# Patient Record
Sex: MALE | Race: WHITE | NOT HISPANIC OR LATINO | Employment: OTHER | URBAN - METROPOLITAN AREA
[De-identification: names, ages, dates, MRNs, and addresses within clinical notes are randomized per-mention and may not be internally consistent; named-entity substitution may affect disease eponyms.]

---

## 2017-12-14 ENCOUNTER — HOSPITAL ENCOUNTER (OUTPATIENT)
Dept: GENERAL RADIOLOGY | Facility: HOSPITAL | Age: 72
Discharge: HOME | End: 2017-12-14
Attending: SURGERY | Admitting: SURGERY

## 2017-12-14 DIAGNOSIS — Z98.890 S/P AAA (ABDOMINAL AORTIC ANEURYSM) REPAIR: ICD-10-CM

## 2017-12-14 DIAGNOSIS — Z86.79 S/P AAA (ABDOMINAL AORTIC ANEURYSM) REPAIR: ICD-10-CM

## 2017-12-14 PROCEDURE — 74010 HC ABDOMEN SERIES FOR ANEURYSM: CPT

## 2018-11-01 ENCOUNTER — HOSPITAL ENCOUNTER (OUTPATIENT)
Dept: GENERAL RADIOLOGY | Facility: HOSPITAL | Age: 73
Discharge: HOME | End: 2018-11-01
Attending: SURGERY | Admitting: SURGERY

## 2018-11-01 DIAGNOSIS — Z86.79 S/P AAA REPAIR: ICD-10-CM

## 2018-11-01 DIAGNOSIS — Z98.890 S/P AAA REPAIR: ICD-10-CM

## 2018-11-01 PROCEDURE — 74021 RADEX ABDOMEN 3+ VIEWS: CPT

## 2019-11-07 ENCOUNTER — HOSPITAL ENCOUNTER (OUTPATIENT)
Dept: GENERAL RADIOLOGY | Facility: HOSPITAL | Age: 74
Discharge: HOME | End: 2019-11-07
Admitting: SURGERY

## 2019-11-07 DIAGNOSIS — Z86.79 S/P AAA REPAIR: ICD-10-CM

## 2019-11-07 DIAGNOSIS — Z98.890 S/P AAA REPAIR: ICD-10-CM

## 2019-11-07 PROCEDURE — 74021 RADEX ABDOMEN 3+ VIEWS: CPT

## 2020-11-12 ENCOUNTER — HOSPITAL ENCOUNTER (OUTPATIENT)
Dept: GENERAL RADIOLOGY | Facility: HOSPITAL | Age: 75
Discharge: HOME | End: 2020-11-12
Admitting: SURGERY

## 2020-11-12 DIAGNOSIS — I71.40 ABDOMINAL AORTIC ANEURYSM (AAA) WITHOUT RUPTURE (HCC): ICD-10-CM

## 2020-11-12 PROCEDURE — 74021 RADEX ABDOMEN 3+ VIEWS: CPT

## 2021-05-13 ENCOUNTER — HOSPITAL ENCOUNTER (OUTPATIENT)
Dept: GENERAL RADIOLOGY | Facility: HOSPITAL | Age: 76
Discharge: HOME | End: 2021-05-13
Admitting: SURGERY

## 2021-05-13 DIAGNOSIS — I71.40 ABDOMINAL AORTIC ANEURYSM (AAA) WITHOUT RUPTURE (HCC): ICD-10-CM

## 2021-05-13 PROCEDURE — 74021 RADEX ABDOMEN 3+ VIEWS: CPT

## 2021-11-18 ENCOUNTER — HOSPITAL ENCOUNTER (OUTPATIENT)
Dept: GENERAL RADIOLOGY | Facility: HOSPITAL | Age: 76
Discharge: HOME | End: 2021-11-18
Admitting: SURGERY

## 2021-11-18 DIAGNOSIS — I71.40 ABDOMINAL AORTIC ANEURYSM (AAA) WITHOUT RUPTURE (HCC): ICD-10-CM

## 2021-11-18 PROCEDURE — 74021 RADEX ABDOMEN 3+ VIEWS: CPT

## 2022-08-25 ENCOUNTER — TRANSCRIBE ORDERS (OUTPATIENT)
Dept: ADMINISTRATIVE | Facility: HOSPITAL | Age: 77
End: 2022-08-25

## 2022-08-25 ENCOUNTER — HOSPITAL ENCOUNTER (OUTPATIENT)
Dept: GENERAL RADIOLOGY | Facility: HOSPITAL | Age: 77
Discharge: HOME | End: 2022-08-25
Admitting: SURGERY

## 2022-08-25 DIAGNOSIS — Z95.828 HISTORY OF ENDOVASCULAR STENT GRAFT FOR ABDOMINAL AORTIC ANEURYSM: ICD-10-CM

## 2022-08-25 DIAGNOSIS — I71.40 AAA (ABDOMINAL AORTIC ANEURYSM) WITHOUT RUPTURE: Primary | ICD-10-CM

## 2022-08-25 PROCEDURE — 74021 RADEX ABDOMEN 3+ VIEWS: CPT

## 2022-10-13 ENCOUNTER — APPOINTMENT (OUTPATIENT)
Dept: CT IMAGING | Facility: HOSPITAL | Age: 77
End: 2022-10-13

## 2023-01-05 ENCOUNTER — TRANSCRIBE ORDERS (OUTPATIENT)
Dept: CARDIOLOGY | Facility: HOSPITAL | Age: 78
End: 2023-01-05
Payer: MEDICARE

## 2023-01-23 DIAGNOSIS — I71.40 ABDOMINAL AORTIC ANEURYSM (AAA) WITHOUT RUPTURE, UNSPECIFIED PART: Primary | ICD-10-CM

## 2023-01-23 RX ORDER — LORAZEPAM 1 MG/1
1 TABLET ORAL AS NEEDED
Qty: 2 TABLET | Refills: 0 | Status: SHIPPED | OUTPATIENT
Start: 2023-01-23 | End: 2023-03-20 | Stop reason: HOSPADM

## 2023-01-24 DIAGNOSIS — I71.40 ABDOMINAL AORTIC ANEURYSM (AAA) WITHOUT RUPTURE, UNSPECIFIED PART: Primary | ICD-10-CM

## 2023-01-24 RX ORDER — LORAZEPAM 1 MG/1
1 TABLET ORAL AS NEEDED
Qty: 2 TABLET | Refills: 0 | Status: ON HOLD | OUTPATIENT
Start: 2023-01-24 | End: 2023-03-20 | Stop reason: SDUPTHER

## 2023-02-23 ENCOUNTER — TRANSCRIBE ORDERS (OUTPATIENT)
Dept: ADMINISTRATIVE | Facility: HOSPITAL | Age: 78
End: 2023-02-23
Payer: MEDICARE

## 2023-02-23 ENCOUNTER — HOSPITAL ENCOUNTER (OUTPATIENT)
Dept: CT IMAGING | Facility: HOSPITAL | Age: 78
Discharge: HOME | End: 2023-02-23
Payer: MEDICARE

## 2023-02-23 DIAGNOSIS — R91.8 LUNG MASS: ICD-10-CM

## 2023-02-23 DIAGNOSIS — R91.8 LUNG MASS: Primary | ICD-10-CM

## 2023-02-23 DIAGNOSIS — I71.40 AAA (ABDOMINAL AORTIC ANEURYSM) WITHOUT RUPTURE: ICD-10-CM

## 2023-02-23 LAB — CREAT BLDA-MCNC: 1.1 MG/DL (ref 0.6–1.3)

## 2023-02-23 PROCEDURE — 0 IOPAMIDOL PER 1 ML: Performed by: SURGERY

## 2023-02-23 PROCEDURE — 82565 ASSAY OF CREATININE: CPT

## 2023-02-23 PROCEDURE — 74174 CTA ABD&PLVS W/CONTRAST: CPT

## 2023-02-23 PROCEDURE — 71250 CT THORAX DX C-: CPT

## 2023-02-23 RX ADMIN — IOPAMIDOL 95 ML: 755 INJECTION, SOLUTION INTRAVENOUS at 13:19

## 2023-03-02 ENCOUNTER — TELEPHONE (OUTPATIENT)
Dept: OTHER | Facility: HOSPITAL | Age: 78
End: 2023-03-02
Payer: MEDICARE

## 2023-03-02 NOTE — TELEPHONE ENCOUNTER
Rec'd call from wife. She states PCP submitted referral to see Dr. Lara. I am not seeing referral to Dr. Lara or [facility] [place] in [place]. She will call PCP office about this.

## 2023-03-09 ENCOUNTER — OFFICE VISIT (OUTPATIENT)
Dept: OTHER | Facility: HOSPITAL | Age: 78
End: 2023-03-09
Payer: MEDICARE

## 2023-03-09 ENCOUNTER — PREP FOR SURGERY (OUTPATIENT)
Dept: OTHER | Facility: HOSPITAL | Age: 78
End: 2023-03-09
Payer: MEDICARE

## 2023-03-09 VITALS
DIASTOLIC BLOOD PRESSURE: 69 MMHG | WEIGHT: 215 LBS | RESPIRATION RATE: 14 BRPM | SYSTOLIC BLOOD PRESSURE: 113 MMHG | BODY MASS INDEX: 30.1 KG/M2 | HEART RATE: 77 BPM | HEIGHT: 71 IN | OXYGEN SATURATION: 96 %

## 2023-03-09 DIAGNOSIS — R91.1 LUNG NODULE: Primary | ICD-10-CM

## 2023-03-09 DIAGNOSIS — R79.1 ABNORMAL COAGULATION PROFILE: ICD-10-CM

## 2023-03-09 DIAGNOSIS — I25.10 CORONARY ARTERY DISEASE INVOLVING NATIVE CORONARY ARTERY OF NATIVE HEART WITHOUT ANGINA PECTORIS: ICD-10-CM

## 2023-03-09 PROCEDURE — G0463 HOSPITAL OUTPT CLINIC VISIT: HCPCS

## 2023-03-09 PROCEDURE — 99204 OFFICE O/P NEW MOD 45 MIN: CPT | Performed by: THORACIC SURGERY (CARDIOTHORACIC VASCULAR SURGERY)

## 2023-03-09 RX ORDER — VIT C/B6/B5/MAGNESIUM/HERB 173 50-5-6-5MG
CAPSULE ORAL
COMMUNITY

## 2023-03-09 RX ORDER — DIAZEPAM 5 MG/1
5 TABLET ORAL 2 TIMES DAILY PRN
Qty: 2 TABLET | Refills: 0 | Status: SHIPPED | OUTPATIENT
Start: 2023-03-09 | End: 2023-03-20 | Stop reason: HOSPADM

## 2023-03-09 RX ORDER — MULTIVITAMIN WITH IRON
TABLET ORAL
COMMUNITY

## 2023-03-09 RX ORDER — UBIDECARENONE 100 MG
100 CAPSULE ORAL DAILY
COMMUNITY

## 2023-03-09 RX ORDER — SODIUM CHLORIDE 0.9 % (FLUSH) 0.9 %
3-10 SYRINGE (ML) INJECTION AS NEEDED
Status: CANCELLED | OUTPATIENT
Start: 2023-03-09

## 2023-03-09 RX ORDER — ASPIRIN 81 MG/1
TABLET ORAL EVERY 24 HOURS
COMMUNITY

## 2023-03-09 RX ORDER — SODIUM CHLORIDE 9 MG/ML
40 INJECTION, SOLUTION INTRAVENOUS AS NEEDED
Status: CANCELLED | OUTPATIENT
Start: 2023-03-09

## 2023-03-09 RX ORDER — SODIUM CHLORIDE 0.9 % (FLUSH) 0.9 %
3 SYRINGE (ML) INJECTION EVERY 12 HOURS SCHEDULED
Status: CANCELLED | OUTPATIENT
Start: 2023-03-09

## 2023-03-09 RX ORDER — TIZANIDINE 4 MG/1
TABLET ORAL EVERY 8 HOURS
COMMUNITY

## 2023-03-09 RX ORDER — PRAVASTATIN SODIUM 40 MG
TABLET ORAL EVERY 24 HOURS
COMMUNITY
Start: 2023-02-07

## 2023-03-09 RX ORDER — MULTIVIT WITH MINERALS/LUTEIN
TABLET ORAL
COMMUNITY

## 2023-03-09 RX ORDER — ZINC GLUCONATE 50 MG
50 TABLET ORAL DAILY
COMMUNITY

## 2023-03-09 NOTE — PROGRESS NOTES
"Chief Complaint  Lung nodule    Subjective        Russell Ramírez Sr. presents to [facility]  History of Present Illness    Mr. Ramírez is a pleasant 77-year-old gentleman who was undergoing work-up with Dr. Arroyo. On his work-up for his abdominal aortic aneurysm, he was found to have a lung nodule. He presents with a CT of the chest. He is accompanied by an adult female.    The patient reports he has been feeling fine. The adult female states the patient has been very lethargic. She adds he has been very tired, and has no energy at all. He states he does not get much exercise. He reports he experiences constant lower extremity pain. He reports he has a history of 2 back surgeries. The adult female states the patient has a history of angioplasty in 2013 or 2014.     He reports he discontinued smoking approximately 1 month ago. He reports he smoked 1 pack a day. He reports he started smoking when he was approximately 21 years old. The adult female reports the patient has had a partial colectomy and prostatectomy. He reports he has a history of vertigo. He states he experiences a difficult time laying on his back. The adult female states the patient will experience dizziness, and vomiting. He reports he utilizes a CPAP machine.    Objective   Vital Signs:  /69   Pulse 77   Resp 14   Ht 180.3 cm (71\")   Wt 97.5 kg (215 lb)   SpO2 96%   BMI 29.99 kg/m²   Estimated body mass index is 29.99 kg/m² as calculated from the following:    Height as of this encounter: 180.3 cm (71\").    Weight as of this encounter: 97.5 kg (215 lb).             Physical Exam  Vitals and nursing note reviewed.   Constitutional:       Appearance: He is well-developed.   HENT:      Head: Normocephalic and atraumatic.      Nose: Nose normal.   Eyes:      Conjunctiva/sclera: Conjunctivae normal.   Pulmonary:      Effort: Pulmonary effort is normal.   Musculoskeletal:         General: Normal range of " motion.      Cervical back: Normal range of motion and neck supple.   Skin:     General: Skin is warm and dry.   Neurological:      Mental Status: He is alert and oriented to person, place, and time.   Psychiatric:         Behavior: Behavior normal.         Thought Content: Thought content normal.         Judgment: Judgment normal.        Result Review :        Result Review     I have independently reviewed the CT of the chest performed on 02/23/2023 which demonstrates right lower lobe lung nodules, the largest of which is 1.7 cm and is contiguous with the other 1.1 cm nodule. He does have fibrous changes bilateral lungs, mostly in the upper portion of the lung and there are calcified granuloma in the left upper lobe.             Assessment and Plan   Diagnoses and all orders for this visit:    1. Lung nodule (Primary)  -     NM PET/CT Skull Base to Mid Thigh; Future  -     CT Chest Without Contrast; Future  -     Full Pulmonary Function Test With Bronchodilator; Future  -     Adult Stress Echo W/ Cont or Stress Agent if Necessary Per Protocol; Future  -     diazePAM (Valium) 5 MG tablet; Take 1 tablet by mouth 2 (Two) Times a Day As Needed for Anxiety.  Dispense: 2 tablet; Refill: 0    2. Coronary artery disease involving native coronary artery of native heart without angina pectoris  -     NM PET/CT Skull Base to Mid Thigh; Future  -     CT Chest Without Contrast; Future  -     Full Pulmonary Function Test With Bronchodilator; Future  -     Adult Stress Echo W/ Cont or Stress Agent if Necessary Per Protocol; Future      Assessment & Plan     Mr. Ramírez is a pleasant 77-year-old gentleman with a history of prostate cancer and an abdominal aortic aneurysm status post repair with a significant smoking history who presents with a new lung nodule in the right lower lobe. Given his history, this is concerning for bronchogenic malignancy.     He will need a PET scan and an ion robotic bronchoscopy for diagnosis. He will  also need pulmonary function studies for risk stratification. I will plan to see him back after he obtained all of these studies. We will plan his robotic navigation for next week.  Risks and benefits of the procedure were discussed with the patient today.  He will need a CT chest prior to the procedure for planning.       I spent 45 minutes caring for Russell on this date of service. This time includes time spent by me in the following activities:preparing for the visit, reviewing tests, obtaining and/or reviewing a separately obtained history, performing a medically appropriate examination and/or evaluation , counseling and educating the patient/family/caregiver, ordering medications, tests, or procedures, documenting information in the medical record and independently interpreting results and communicating that information with the patient/family/caregiver  Follow Up   No follow-ups on file.  Patient was given instructions and counseling regarding his condition or for health maintenance advice. Please see specific information pulled into the AVS if appropriate.     Transcribed from ambient dictation for Sonali Lara MD by Betty Forde.  03/09/23   11:46 EST    Patient or patient representative verbalized consent to the visit recording.  I have personally performed the services described in this document as transcribed by the above individual, and it is both accurate and complete.

## 2023-03-09 NOTE — H&P (VIEW-ONLY)
"Chief Complaint  Lung nodule    Subjective        Russell Ramírez Sr. presents to [facility]  History of Present Illness    Mr. Ramírez is a pleasant 77-year-old gentleman who was undergoing work-up with Dr. Arroyo. On his work-up for his abdominal aortic aneurysm, he was found to have a lung nodule. He presents with a CT of the chest. He is accompanied by an adult female.    The patient reports he has been feeling fine. The adult female states the patient has been very lethargic. She adds he has been very tired, and has no energy at all. He states he does not get much exercise. He reports he experiences constant lower extremity pain. He reports he has a history of 2 back surgeries. The adult female states the patient has a history of angioplasty in 2013 or 2014.     He reports he discontinued smoking approximately 1 month ago. He reports he smoked 1 pack a day. He reports he started smoking when he was approximately 21 years old. The adult female reports the patient has had a partial colectomy and prostatectomy. He reports he has a history of vertigo. He states he experiences a difficult time laying on his back. The adult female states the patient will experience dizziness, and vomiting. He reports he utilizes a CPAP machine.    Objective   Vital Signs:  /69   Pulse 77   Resp 14   Ht 180.3 cm (71\")   Wt 97.5 kg (215 lb)   SpO2 96%   BMI 29.99 kg/m²   Estimated body mass index is 29.99 kg/m² as calculated from the following:    Height as of this encounter: 180.3 cm (71\").    Weight as of this encounter: 97.5 kg (215 lb).             Physical Exam  Vitals and nursing note reviewed.   Constitutional:       Appearance: He is well-developed.   HENT:      Head: Normocephalic and atraumatic.      Nose: Nose normal.   Eyes:      Conjunctiva/sclera: Conjunctivae normal.   Pulmonary:      Effort: Pulmonary effort is normal.   Musculoskeletal:         General: Normal range of " motion.      Cervical back: Normal range of motion and neck supple.   Skin:     General: Skin is warm and dry.   Neurological:      Mental Status: He is alert and oriented to person, place, and time.   Psychiatric:         Behavior: Behavior normal.         Thought Content: Thought content normal.         Judgment: Judgment normal.        Result Review :        Result Review     I have independently reviewed the CT of the chest performed on 02/23/2023 which demonstrates right lower lobe lung nodules, the largest of which is 1.7 cm and is contiguous with the other 1.1 cm nodule. He does have fibrous changes bilateral lungs, mostly in the upper portion of the lung and there are calcified granuloma in the left upper lobe.             Assessment and Plan   Diagnoses and all orders for this visit:    1. Lung nodule (Primary)  -     NM PET/CT Skull Base to Mid Thigh; Future  -     CT Chest Without Contrast; Future  -     Full Pulmonary Function Test With Bronchodilator; Future  -     Adult Stress Echo W/ Cont or Stress Agent if Necessary Per Protocol; Future  -     diazePAM (Valium) 5 MG tablet; Take 1 tablet by mouth 2 (Two) Times a Day As Needed for Anxiety.  Dispense: 2 tablet; Refill: 0    2. Coronary artery disease involving native coronary artery of native heart without angina pectoris  -     NM PET/CT Skull Base to Mid Thigh; Future  -     CT Chest Without Contrast; Future  -     Full Pulmonary Function Test With Bronchodilator; Future  -     Adult Stress Echo W/ Cont or Stress Agent if Necessary Per Protocol; Future      Assessment & Plan     Mr. Ramírez is a pleasant 77-year-old gentleman with a history of prostate cancer and an abdominal aortic aneurysm status post repair with a significant smoking history who presents with a new lung nodule in the right lower lobe. Given his history, this is concerning for bronchogenic malignancy.     He will need a PET scan and an ion robotic bronchoscopy for diagnosis. He will  also need pulmonary function studies for risk stratification. I will plan to see him back after he obtained all of these studies. We will plan his robotic navigation for next week.  Risks and benefits of the procedure were discussed with the patient today.  He will need a CT chest prior to the procedure for planning.       I spent 45 minutes caring for Russell on this date of service. This time includes time spent by me in the following activities:preparing for the visit, reviewing tests, obtaining and/or reviewing a separately obtained history, performing a medically appropriate examination and/or evaluation , counseling and educating the patient/family/caregiver, ordering medications, tests, or procedures, documenting information in the medical record and independently interpreting results and communicating that information with the patient/family/caregiver  Follow Up   No follow-ups on file.  Patient was given instructions and counseling regarding his condition or for health maintenance advice. Please see specific information pulled into the AVS if appropriate.     Transcribed from ambient dictation for Sonali Lara MD by Betty Forde.  03/09/23   11:46 EST    Patient or patient representative verbalized consent to the visit recording.  I have personally performed the services described in this document as transcribed by the above individual, and it is both accurate and complete.

## 2023-03-09 NOTE — PATIENT INSTRUCTIONS
Met with patient and family member in lung clinic on 3/9/23. He met with Dr. Lara. He was sent for PFTs today @ [facility]. He is scheduled for stress echo and PET scan on 3/14/23. Provided education pamphlets for PET scan.  Explained pertinent information regarding preparation for and location of tests. Patient and wife able to repeat back and verbalize understanding. He will be scheduled for ION biopsy @ [facility].  [facility] will schedule tentatively Monday with CT scan @ [place] per ION protocol tomorrow. Pt will be seen for follow up appointment in lung clinic once tests are completed.   Gilma Goldstein RN, BSN, OCN, CCM, Lung navigator

## 2023-03-09 NOTE — H&P (VIEW-ONLY)
"Chief Complaint  Lung nodule    Subjective        Russell Ramírez Sr. presents to [facility]  History of Present Illness    Mr. Ramírez is a pleasant 77-year-old gentleman who was undergoing work-up with Dr. Arroyo. On his work-up for his abdominal aortic aneurysm, he was found to have a lung nodule. He presents with a CT of the chest. He is accompanied by an adult female.    The patient reports he has been feeling fine. The adult female states the patient has been very lethargic. She adds he has been very tired, and has no energy at all. He states he does not get much exercise. He reports he experiences constant lower extremity pain. He reports he has a history of 2 back surgeries. The adult female states the patient has a history of angioplasty in 2013 or 2014.     He reports he discontinued smoking approximately 1 month ago. He reports he smoked 1 pack a day. He reports he started smoking when he was approximately 21 years old. The adult female reports the patient has had a partial colectomy and prostatectomy. He reports he has a history of vertigo. He states he experiences a difficult time laying on his back. The adult female states the patient will experience dizziness, and vomiting. He reports he utilizes a CPAP machine.    Objective   Vital Signs:  /69   Pulse 77   Resp 14   Ht 180.3 cm (71\")   Wt 97.5 kg (215 lb)   SpO2 96%   BMI 29.99 kg/m²   Estimated body mass index is 29.99 kg/m² as calculated from the following:    Height as of this encounter: 180.3 cm (71\").    Weight as of this encounter: 97.5 kg (215 lb).             Physical Exam  Vitals and nursing note reviewed.   Constitutional:       Appearance: He is well-developed.   HENT:      Head: Normocephalic and atraumatic.      Nose: Nose normal.   Eyes:      Conjunctiva/sclera: Conjunctivae normal.   Pulmonary:      Effort: Pulmonary effort is normal.   Musculoskeletal:         General: Normal range of " motion.      Cervical back: Normal range of motion and neck supple.   Skin:     General: Skin is warm and dry.   Neurological:      Mental Status: He is alert and oriented to person, place, and time.   Psychiatric:         Behavior: Behavior normal.         Thought Content: Thought content normal.         Judgment: Judgment normal.        Result Review :        Result Review     I have independently reviewed the CT of the chest performed on 02/23/2023 which demonstrates right lower lobe lung nodules, the largest of which is 1.7 cm and is contiguous with the other 1.1 cm nodule. He does have fibrous changes bilateral lungs, mostly in the upper portion of the lung and there are calcified granuloma in the left upper lobe.             Assessment and Plan   Diagnoses and all orders for this visit:    1. Lung nodule (Primary)  -     NM PET/CT Skull Base to Mid Thigh; Future  -     CT Chest Without Contrast; Future  -     Full Pulmonary Function Test With Bronchodilator; Future  -     Adult Stress Echo W/ Cont or Stress Agent if Necessary Per Protocol; Future  -     diazePAM (Valium) 5 MG tablet; Take 1 tablet by mouth 2 (Two) Times a Day As Needed for Anxiety.  Dispense: 2 tablet; Refill: 0    2. Coronary artery disease involving native coronary artery of native heart without angina pectoris  -     NM PET/CT Skull Base to Mid Thigh; Future  -     CT Chest Without Contrast; Future  -     Full Pulmonary Function Test With Bronchodilator; Future  -     Adult Stress Echo W/ Cont or Stress Agent if Necessary Per Protocol; Future      Assessment & Plan     Mr. Ramírez is a pleasant 77-year-old gentleman with a history of prostate cancer and an abdominal aortic aneurysm status post repair with a significant smoking history who presents with a new lung nodule in the right lower lobe. Given his history, this is concerning for bronchogenic malignancy.     He will need a PET scan and an ion robotic bronchoscopy for diagnosis. He will  also need pulmonary function studies for risk stratification. I will plan to see him back after he obtained all of these studies. We will plan his robotic navigation for next week.  Risks and benefits of the procedure were discussed with the patient today.  He will need a CT chest prior to the procedure for planning.       I spent 45 minutes caring for Russell on this date of service. This time includes time spent by me in the following activities:preparing for the visit, reviewing tests, obtaining and/or reviewing a separately obtained history, performing a medically appropriate examination and/or evaluation , counseling and educating the patient/family/caregiver, ordering medications, tests, or procedures, documenting information in the medical record and independently interpreting results and communicating that information with the patient/family/caregiver  Follow Up   No follow-ups on file.  Patient was given instructions and counseling regarding his condition or for health maintenance advice. Please see specific information pulled into the AVS if appropriate.     Transcribed from ambient dictation for Sonali Lara MD by eBtty Forde.  03/09/23   11:46 EST    Patient or patient representative verbalized consent to the visit recording.  I have personally performed the services described in this document as transcribed by the above individual, and it is both accurate and complete.

## 2023-03-11 ENCOUNTER — HOSPITAL ENCOUNTER (OUTPATIENT)
Dept: CARDIOLOGY | Facility: HOSPITAL | Age: 78
Discharge: HOME | End: 2023-03-11
Payer: MEDICARE

## 2023-03-11 ENCOUNTER — HOSPITAL ENCOUNTER (OUTPATIENT)
Dept: CT IMAGING | Facility: HOSPITAL | Age: 78
Discharge: HOME | End: 2023-03-11
Payer: MEDICARE

## 2023-03-11 ENCOUNTER — LAB (OUTPATIENT)
Dept: LAB | Facility: HOSPITAL | Age: 78
End: 2023-03-11
Payer: MEDICARE

## 2023-03-11 DIAGNOSIS — R91.1 LUNG NODULE: ICD-10-CM

## 2023-03-11 DIAGNOSIS — I25.10 CORONARY ARTERY DISEASE INVOLVING NATIVE CORONARY ARTERY OF NATIVE HEART WITHOUT ANGINA PECTORIS: ICD-10-CM

## 2023-03-11 LAB
ANION GAP SERPL CALCULATED.3IONS-SCNC: 15.9 MMOL/L (ref 5–15)
APTT PPP: 27.9 SECONDS (ref 24–31)
BASOPHILS # BLD AUTO: 0.05 10*3/MM3 (ref 0–0.2)
BASOPHILS NFR BLD AUTO: 0.7 % (ref 0–1.5)
BUN SERPL-MCNC: 16 MG/DL (ref 8–23)
BUN/CREAT SERPL: 20.8 (ref 7–25)
CALCIUM SPEC-SCNC: 9.7 MG/DL (ref 8.6–10.5)
CHLORIDE SERPL-SCNC: 102 MMOL/L (ref 98–107)
CO2 SERPL-SCNC: 23.1 MMOL/L (ref 22–29)
CREAT SERPL-MCNC: 0.77 MG/DL (ref 0.76–1.27)
DEPRECATED RDW RBC AUTO: 53.8 FL (ref 37–54)
EGFRCR SERPLBLD CKD-EPI 2021: 92.8 ML/MIN/1.73
EOSINOPHIL # BLD AUTO: 0.33 10*3/MM3 (ref 0–0.4)
EOSINOPHIL NFR BLD AUTO: 4.4 % (ref 0.3–6.2)
ERYTHROCYTE [DISTWIDTH] IN BLOOD BY AUTOMATED COUNT: 15.7 % (ref 12.3–15.4)
GLUCOSE SERPL-MCNC: 100 MG/DL (ref 65–99)
HCT VFR BLD AUTO: 41.6 % (ref 37.5–51)
HGB BLD-MCNC: 14.4 G/DL (ref 13–17.7)
IMM GRANULOCYTES # BLD AUTO: 0.03 10*3/MM3 (ref 0–0.05)
IMM GRANULOCYTES NFR BLD AUTO: 0.4 % (ref 0–0.5)
INR PPP: 0.97 (ref 0.93–1.1)
LYMPHOCYTES # BLD AUTO: 1.53 10*3/MM3 (ref 0.7–3.1)
LYMPHOCYTES NFR BLD AUTO: 20.3 % (ref 19.6–45.3)
MCH RBC QN AUTO: 32.3 PG (ref 26.6–33)
MCHC RBC AUTO-ENTMCNC: 34.6 G/DL (ref 31.5–35.7)
MCV RBC AUTO: 93.3 FL (ref 79–97)
MONOCYTES # BLD AUTO: 0.84 10*3/MM3 (ref 0.1–0.9)
MONOCYTES NFR BLD AUTO: 11.1 % (ref 5–12)
NEUTROPHILS NFR BLD AUTO: 4.76 10*3/MM3 (ref 1.7–7)
NEUTROPHILS NFR BLD AUTO: 63.1 % (ref 42.7–76)
NRBC BLD AUTO-RTO: 0 /100 WBC (ref 0–0.2)
PLATELET # BLD AUTO: 167 10*3/MM3 (ref 140–450)
PMV BLD AUTO: 9.3 FL (ref 6–12)
POTASSIUM SERPL-SCNC: 4.6 MMOL/L (ref 3.5–5.2)
PROTHROMBIN TIME: 10 SECONDS (ref 9.6–11.7)
RBC # BLD AUTO: 4.46 10*6/MM3 (ref 4.14–5.8)
SODIUM SERPL-SCNC: 141 MMOL/L (ref 136–145)
WBC NRBC COR # BLD: 7.54 10*3/MM3 (ref 3.4–10.8)

## 2023-03-11 PROCEDURE — 85610 PROTHROMBIN TIME: CPT

## 2023-03-11 PROCEDURE — 85730 THROMBOPLASTIN TIME PARTIAL: CPT

## 2023-03-11 PROCEDURE — 80048 BASIC METABOLIC PNL TOTAL CA: CPT

## 2023-03-11 PROCEDURE — 93005 ELECTROCARDIOGRAM TRACING: CPT | Performed by: THORACIC SURGERY (CARDIOTHORACIC VASCULAR SURGERY)

## 2023-03-11 PROCEDURE — 71250 CT THORAX DX C-: CPT

## 2023-03-11 PROCEDURE — 93010 ELECTROCARDIOGRAM REPORT: CPT | Performed by: INTERNAL MEDICINE

## 2023-03-11 PROCEDURE — 85025 COMPLETE CBC W/AUTO DIFF WBC: CPT

## 2023-03-11 PROCEDURE — 36415 COLL VENOUS BLD VENIPUNCTURE: CPT

## 2023-03-12 LAB — QT INTERVAL: 392 MS

## 2023-03-13 ENCOUNTER — APPOINTMENT (OUTPATIENT)
Dept: GENERAL RADIOLOGY | Facility: HOSPITAL | Age: 78
End: 2023-03-13
Payer: MEDICARE

## 2023-03-13 ENCOUNTER — ANESTHESIA EVENT (OUTPATIENT)
Dept: GASTROENTEROLOGY | Facility: HOSPITAL | Age: 78
End: 2023-03-13
Payer: MEDICARE

## 2023-03-13 ENCOUNTER — ANESTHESIA (OUTPATIENT)
Dept: GASTROENTEROLOGY | Facility: HOSPITAL | Age: 78
End: 2023-03-13
Payer: MEDICARE

## 2023-03-13 ENCOUNTER — HOSPITAL ENCOUNTER (OUTPATIENT)
Facility: HOSPITAL | Age: 78
Setting detail: HOSPITAL OUTPATIENT SURGERY
Discharge: HOME | End: 2023-03-13
Attending: THORACIC SURGERY (CARDIOTHORACIC VASCULAR SURGERY) | Admitting: THORACIC SURGERY (CARDIOTHORACIC VASCULAR SURGERY)
Payer: MEDICARE

## 2023-03-13 VITALS
OXYGEN SATURATION: 94 % | DIASTOLIC BLOOD PRESSURE: 77 MMHG | SYSTOLIC BLOOD PRESSURE: 123 MMHG | BODY MASS INDEX: 29.08 KG/M2 | HEART RATE: 86 BPM | WEIGHT: 214.73 LBS | TEMPERATURE: 98.5 F | RESPIRATION RATE: 17 BRPM | HEIGHT: 72 IN

## 2023-03-13 DIAGNOSIS — R91.1 LUNG NODULE: ICD-10-CM

## 2023-03-13 PROCEDURE — 88305 TISSUE EXAM BY PATHOLOGIST: CPT | Performed by: THORACIC SURGERY (CARDIOTHORACIC VASCULAR SURGERY)

## 2023-03-13 PROCEDURE — 88341 IMHCHEM/IMCYTCHM EA ADD ANTB: CPT | Performed by: THORACIC SURGERY (CARDIOTHORACIC VASCULAR SURGERY)

## 2023-03-13 PROCEDURE — 31654 BRONCH EBUS IVNTJ PERPH LES: CPT | Performed by: THORACIC SURGERY (CARDIOTHORACIC VASCULAR SURGERY)

## 2023-03-13 PROCEDURE — 88108 CYTOPATH CONCENTRATE TECH: CPT | Performed by: THORACIC SURGERY (CARDIOTHORACIC VASCULAR SURGERY)

## 2023-03-13 PROCEDURE — 76000 FLUOROSCOPY <1 HR PHYS/QHP: CPT

## 2023-03-13 PROCEDURE — 25010000002 SUCCINYLCHOLINE PER 20 MG: Performed by: NURSE ANESTHETIST, CERTIFIED REGISTERED

## 2023-03-13 PROCEDURE — 88342 IMHCHEM/IMCYTCHM 1ST ANTB: CPT | Performed by: THORACIC SURGERY (CARDIOTHORACIC VASCULAR SURGERY)

## 2023-03-13 PROCEDURE — 31645 BRNCHSC W/THER ASPIR 1ST: CPT | Performed by: THORACIC SURGERY (CARDIOTHORACIC VASCULAR SURGERY)

## 2023-03-13 PROCEDURE — 31628 BRONCHOSCOPY/LUNG BX EACH: CPT | Performed by: THORACIC SURGERY (CARDIOTHORACIC VASCULAR SURGERY)

## 2023-03-13 PROCEDURE — 31624 DX BRONCHOSCOPE/LAVAGE: CPT | Performed by: THORACIC SURGERY (CARDIOTHORACIC VASCULAR SURGERY)

## 2023-03-13 PROCEDURE — 25010000002 DEXAMETHASONE PER 1 MG: Performed by: NURSE ANESTHETIST, CERTIFIED REGISTERED

## 2023-03-13 PROCEDURE — 25010000002 ONDANSETRON PER 1 MG: Performed by: NURSE ANESTHETIST, CERTIFIED REGISTERED

## 2023-03-13 PROCEDURE — 88177 CYTP FNA EVAL EA ADDL: CPT | Performed by: THORACIC SURGERY (CARDIOTHORACIC VASCULAR SURGERY)

## 2023-03-13 PROCEDURE — 25010000002 PROPOFOL 1000 MG/100ML EMULSION: Performed by: NURSE ANESTHETIST, CERTIFIED REGISTERED

## 2023-03-13 PROCEDURE — 88172 CYTP DX EVAL FNA 1ST EA SITE: CPT | Performed by: THORACIC SURGERY (CARDIOTHORACIC VASCULAR SURGERY)

## 2023-03-13 PROCEDURE — 71045 X-RAY EXAM CHEST 1 VIEW: CPT

## 2023-03-13 RX ORDER — SUCCINYLCHOLINE CHLORIDE 20 MG/ML
INJECTION INTRAMUSCULAR; INTRAVENOUS AS NEEDED
Status: DISCONTINUED | OUTPATIENT
Start: 2023-03-13 | End: 2023-03-13 | Stop reason: SURG

## 2023-03-13 RX ORDER — PROPOFOL 10 MG/ML
INJECTION, EMULSION INTRAVENOUS AS NEEDED
Status: DISCONTINUED | OUTPATIENT
Start: 2023-03-13 | End: 2023-03-13 | Stop reason: SURG

## 2023-03-13 RX ORDER — SODIUM CHLORIDE 0.9 % (FLUSH) 0.9 %
3 SYRINGE (ML) INJECTION EVERY 12 HOURS SCHEDULED
Status: DISCONTINUED | OUTPATIENT
Start: 2023-03-13 | End: 2023-03-13 | Stop reason: HOSPADM

## 2023-03-13 RX ORDER — ROCURONIUM BROMIDE 10 MG/ML
INJECTION, SOLUTION INTRAVENOUS AS NEEDED
Status: DISCONTINUED | OUTPATIENT
Start: 2023-03-13 | End: 2023-03-13 | Stop reason: SURG

## 2023-03-13 RX ORDER — ONDANSETRON 2 MG/ML
4 INJECTION INTRAMUSCULAR; INTRAVENOUS EVERY 6 HOURS PRN
Status: CANCELLED | OUTPATIENT
Start: 2023-03-13

## 2023-03-13 RX ORDER — SODIUM CHLORIDE 0.9 % (FLUSH) 0.9 %
3-10 SYRINGE (ML) INJECTION AS NEEDED
Status: DISCONTINUED | OUTPATIENT
Start: 2023-03-13 | End: 2023-03-13 | Stop reason: HOSPADM

## 2023-03-13 RX ORDER — EPHEDRINE SULFATE 5 MG/ML
INJECTION INTRAVENOUS AS NEEDED
Status: DISCONTINUED | OUTPATIENT
Start: 2023-03-13 | End: 2023-03-13 | Stop reason: SURG

## 2023-03-13 RX ORDER — SODIUM CHLORIDE 9 MG/ML
INJECTION, SOLUTION INTRAVENOUS CONTINUOUS PRN
Status: DISCONTINUED | OUTPATIENT
Start: 2023-03-13 | End: 2023-03-13 | Stop reason: SURG

## 2023-03-13 RX ORDER — PHENYLEPHRINE HCL IN 0.9% NACL 1 MG/10 ML
SYRINGE (ML) INTRAVENOUS AS NEEDED
Status: DISCONTINUED | OUTPATIENT
Start: 2023-03-13 | End: 2023-03-13 | Stop reason: SURG

## 2023-03-13 RX ORDER — LIDOCAINE 50 MG/G
OINTMENT TOPICAL AS NEEDED
Status: DISCONTINUED | OUTPATIENT
Start: 2023-03-13 | End: 2023-03-13 | Stop reason: HOSPADM

## 2023-03-13 RX ORDER — ONDANSETRON 4 MG/1
4 TABLET, FILM COATED ORAL EVERY 6 HOURS PRN
Status: CANCELLED | OUTPATIENT
Start: 2023-03-13

## 2023-03-13 RX ORDER — ONDANSETRON 2 MG/ML
INJECTION INTRAMUSCULAR; INTRAVENOUS AS NEEDED
Status: DISCONTINUED | OUTPATIENT
Start: 2023-03-13 | End: 2023-03-13 | Stop reason: SURG

## 2023-03-13 RX ORDER — LIDOCAINE HYDROCHLORIDE 20 MG/ML
INJECTION, SOLUTION INFILTRATION; PERINEURAL AS NEEDED
Status: DISCONTINUED | OUTPATIENT
Start: 2023-03-13 | End: 2023-03-13 | Stop reason: SURG

## 2023-03-13 RX ORDER — DEXAMETHASONE SODIUM PHOSPHATE 4 MG/ML
INJECTION, SOLUTION INTRA-ARTICULAR; INTRALESIONAL; INTRAMUSCULAR; INTRAVENOUS; SOFT TISSUE AS NEEDED
Status: DISCONTINUED | OUTPATIENT
Start: 2023-03-13 | End: 2023-03-13 | Stop reason: SURG

## 2023-03-13 RX ORDER — SODIUM CHLORIDE 9 MG/ML
40 INJECTION, SOLUTION INTRAVENOUS AS NEEDED
Status: DISCONTINUED | OUTPATIENT
Start: 2023-03-13 | End: 2023-03-13 | Stop reason: HOSPADM

## 2023-03-13 RX ADMIN — Medication 100 MCG: at 12:57

## 2023-03-13 RX ADMIN — SUGAMMADEX 200 MG: 100 INJECTION, SOLUTION INTRAVENOUS at 13:14

## 2023-03-13 RX ADMIN — LIDOCAINE HYDROCHLORIDE 100 MG: 20 INJECTION, SOLUTION INFILTRATION; PERINEURAL at 11:59

## 2023-03-13 RX ADMIN — ROCURONIUM BROMIDE 45 MG: 10 INJECTION, SOLUTION INTRAVENOUS at 12:00

## 2023-03-13 RX ADMIN — Medication 100 MCG: at 12:18

## 2023-03-13 RX ADMIN — PROPOFOL INJECTABLE EMULSION 130 MCG/KG/MIN: 10 INJECTION, EMULSION INTRAVENOUS at 12:57

## 2023-03-13 RX ADMIN — SODIUM CHLORIDE 40 ML: 9 INJECTION, SOLUTION INTRAVENOUS at 10:31

## 2023-03-13 RX ADMIN — EPHEDRINE SULFATE 5 MG: 5 INJECTION INTRAVENOUS at 12:39

## 2023-03-13 RX ADMIN — Medication 100 MCG: at 12:34

## 2023-03-13 RX ADMIN — EPHEDRINE SULFATE 10 MG: 5 INJECTION INTRAVENOUS at 12:20

## 2023-03-13 RX ADMIN — SODIUM CHLORIDE: 0.9 INJECTION, SOLUTION INTRAVENOUS at 11:53

## 2023-03-13 RX ADMIN — Medication 100 MCG: at 12:11

## 2023-03-13 RX ADMIN — PROPOFOL INJECTABLE EMULSION 150 MCG/KG/MIN: 10 INJECTION, EMULSION INTRAVENOUS at 12:02

## 2023-03-13 RX ADMIN — EPHEDRINE SULFATE 5 MG: 5 INJECTION INTRAVENOUS at 12:29

## 2023-03-13 RX ADMIN — Medication 100 MCG: at 12:29

## 2023-03-13 RX ADMIN — EPHEDRINE SULFATE 5 MG: 5 INJECTION INTRAVENOUS at 12:56

## 2023-03-13 RX ADMIN — SUCCINYLCHOLINE CHLORIDE 120 MG: 20 INJECTION, SOLUTION INTRAMUSCULAR; INTRAVENOUS at 11:59

## 2023-03-13 RX ADMIN — DEXAMETHASONE SODIUM PHOSPHATE 8 MG: 4 INJECTION, SOLUTION INTRAMUSCULAR; INTRAVENOUS at 12:15

## 2023-03-13 RX ADMIN — Medication 100 MCG: at 12:15

## 2023-03-13 RX ADMIN — ROCURONIUM BROMIDE 10 MG: 10 INJECTION, SOLUTION INTRAVENOUS at 13:02

## 2023-03-13 RX ADMIN — PROPOFOL INJECTABLE EMULSION 130 MG: 10 INJECTION, EMULSION INTRAVENOUS at 11:59

## 2023-03-13 RX ADMIN — ROCURONIUM BROMIDE 5 MG: 10 INJECTION, SOLUTION INTRAVENOUS at 11:59

## 2023-03-13 RX ADMIN — Medication 100 MCG: at 12:39

## 2023-03-13 RX ADMIN — ONDANSETRON 4 MG: 2 INJECTION INTRAMUSCULAR; INTRAVENOUS at 12:15

## 2023-03-13 NOTE — OP NOTE
BRONCHOSCOPY WITH ION ROBOT  Procedure Report    Patient Name:  Russell Ramírez Sr.  YOB: 1945    Date of Surgery:  3/13/2023     Indications:  Lung nodule    Pre-op Diagnosis:   Lung nodule [R91.1]       Post-Op Diagnosis Codes:     * Lung nodule [R91.1]    Procedure/CPT® Codes:      Procedure(s):  BRONCHOSCOPY ,ENDOBRONCHIAL ULTRASOUND AND ROBOTIC NAVIGATIONAL BRONCHOSCOPY WITH FINE NEEDLE ASPIRATION, BIOPSY X1 AREA, AND BRONCHOALEOLAR LAVAGE    Staff:  Surgeon(s):  Sonali Lara MD         Anesthesia: General    Estimated Blood Loss: minimal    Implants:    Nothing was implanted during the procedure    Specimen:          Specimens     ID Source Type Tests Collected By Collected At Frozen?    A Lung, Right Lower Lobe Fine Needle Aspirate · FINE NEEDLE ASPIRATION   Sonali Lara MD 3/13/23 1239     Description: SLIDES IN ALCOHOL    This specimen was not marked as sent.    B Lung, Right Lower Lobe Fine Needle Aspirate · FINE NEEDLE ASPIRATION   Sonali Lara MD 3/13/23 1241     Description: DRY SLIDES     This specimen was not marked as sent.    C Lung, Right Lower Lobe Fine Needle Aspirate · FINE NEEDLE ASPIRATION   Sonali Lara MD 3/13/23 1241     Description: CELL BLOCK    This specimen was not marked as sent.    D Lung, Right Lower Lobe Tissue · TISSUE PATHOLOGY EXAM   Sonali Lara MD 3/13/23 1256     Description: IN FORMALIN    This specimen was not marked as sent.    E Lung, Right Lower Lobe Lavage · NON-GYNECOLOGIC CYTOLOGY   Sonali Lara MD 3/13/23 1309     This specimen was not marked as sent.            Findings: Nodule consistent with malignancy    Complications: None apparent    Description of Procedure: Mr. Ramírez was identified in the preoperative area and again his consent for the procedure was verified.  He was transported the endoscopy suite and placed on the table in supine position.  A general anesthetic was successfully ministered he was intubated without  difficulty.  A timeout was performed.  The Olympus video endoscope was introduced in the patient's airway is an examination was conducted the secondary khang's.  All mucus was cleared from the airways.  The scope was withdrawn.  The Ion robotic platform was brought to the patient and docked in standard fashion.  The catheter was passed into the ET tube.  The airways were registered based off a preplanned path that was performed prior to the start of the procedure.  The area in the right lower lobe was navigated to without difficulty.  Radial EBUS was used to evaluate for the nodule and there was good signal.  Multiple biopsies were performed using a 19-gauge needle.  A pathologic evaluation was performed which demonstrated concerning features for small cell.  Biopsy forceps were used.  We navigated to a slightly different area and repeated this process.  A bronchoalveolar lavage was performed with 15 cc of saline.  All of this was sent for pathologic evaluation.  The scope was withdrawn.  The Olympus video endoscope was introduced in the patient's airways and there was no evidence of bleeding.  Patient tolerated this procedure well, was extubated and transferred to recovery room in stable condition.      Sonali Lara MD     Date: 3/13/2023  Time: 13:32 EDT

## 2023-03-13 NOTE — DISCHARGE INSTRUCTIONS
Keep appointment for PET scan and stress test tomorrow, March 14th. Follow up with Dr. Lara in her office on Thursday 8am, March 16th.     Endoscopic Bronchial Ultrasound (EBUS)  Endoscopic Bronchial Ultrasound (EBUS) allows for sampling of lymph nodes in your chest during a bronchoscopy procedure.  Samples (biopsies) of the lymph nodes are taken from inside the lungs and sent for diagnostic testing.  Final results of your biopsy take up to 5 business days to process; your endoscopic physician will contact you with your results.  EBUS is recommended in the following instances:  To diagnose different types of lung disorders (such as sarcoidosis or tuberculosis)  To diagnose or 'stage' cancer   To investigate enlarged lymph  nodes in the chest  Due to effects of sedation, do not drive or operate heavy machinery for 24 hours.  Avoid heavy lifting (>10 lbs.) or strenuous activity for 48 hours.  Continue to avoid non-steroidal anti-inflammatory medications (NSAIDS) for 5-7 days after the procedure unless told otherwise by your endoscopic and primary care physicians.  Some patients have a temporary sore throat after the procedure; this is a normal finding and over-the-counter lozenges help soothe symptoms.  You may resume normal diet once you are discharged.   Avoid red-colored foods for 24 hours.   You may resume your blood thinner medications (if applicable) as directed by your endoscopic and primary care physicians.  It is normal to have a very small amount of blood-tinged sputum immediately after the procedure. This should resolve within a few hours.  Although complications are rare, there is a small risk of pain, collapsed lung, bleeding and infection. Contact your endoscopic physician if you have these signs or symptoms (Dr. Durán @ [phone]/ Dr. Arango @ [phone]):  Temperature greater than 102°F  Worsening pain not relieved by medications  Go to your nearest emergency room is you experience:  Shaking,  chills or a temperature over 102°F.    New, sudden difficulty breathing.    New pain when taking a deep breath.    New, sudden chest pain.  Worsening cough that produces large amounts of blood.

## 2023-03-13 NOTE — ANESTHESIA PREPROCEDURE EVALUATION
Anesthesia Evaluation     Patient summary reviewed and Nursing notes reviewed   NPO Solid Status: > 8 hours  NPO Liquid Status: > 8 hours           Airway   Mallampati: I  TM distance: >3 FB  Neck ROM: full  No difficulty expected  Dental - normal exam   (+) edentulous    Pulmonary - negative pulmonary ROS and normal exam   Cardiovascular - normal exam    (+) hypertension, CAD, cardiac stents more than 12 months ago hyperlipidemia,       Neuro/Psych- negative ROS  GI/Hepatic/Renal/Endo    (+)  GERD,      Musculoskeletal (-) negative ROS    Abdominal  - normal exam    Bowel sounds: normal.   Substance History - negative use     OB/GYN negative ob/gyn ROS         Other      history of cancer active                    Anesthesia Plan    ASA 3     general   total IV anesthesia  intravenous induction     Anesthetic plan, risks, benefits, and alternatives have been provided, discussed and informed consent has been obtained with: patient.  Pre-procedure education provided  Plan discussed with CRNA.        CODE STATUS:

## 2023-03-14 ENCOUNTER — HOSPITAL ENCOUNTER (OUTPATIENT)
Dept: PET IMAGING | Facility: HOSPITAL | Age: 78
Discharge: HOME | End: 2023-03-14
Payer: MEDICARE

## 2023-03-14 ENCOUNTER — HOSPITAL ENCOUNTER (OUTPATIENT)
Dept: CARDIOLOGY | Facility: HOSPITAL | Age: 78
Discharge: HOME | End: 2023-03-14
Payer: MEDICARE

## 2023-03-14 VITALS — BODY MASS INDEX: 29.08 KG/M2 | WEIGHT: 214.73 LBS | HEIGHT: 72 IN

## 2023-03-14 DIAGNOSIS — I25.10 CORONARY ARTERY DISEASE INVOLVING NATIVE CORONARY ARTERY OF NATIVE HEART WITHOUT ANGINA PECTORIS: ICD-10-CM

## 2023-03-14 DIAGNOSIS — R91.1 LUNG NODULE: ICD-10-CM

## 2023-03-14 LAB
AORTIC DIMENSIONLESS INDEX: 0.6 (DI)
BEAKER LAB AP INTRAOPERATIVE CONSULTATION: NORMAL
BH CV ECHO MEAS - ACS: 2.24 CM
BH CV ECHO MEAS - AO MAX PG: 11.9 MMHG
BH CV ECHO MEAS - AO MEAN PG: 6.4 MMHG
BH CV ECHO MEAS - AO ROOT DIAM: 3.5 CM
BH CV ECHO MEAS - AO V2 MAX: 172.4 CM/SEC
BH CV ECHO MEAS - AO V2 VTI: 34.8 CM
BH CV ECHO MEAS - AVA(I,D): 3.2 CM2
BH CV ECHO MEAS - EDV(CUBED): 147.4 ML
BH CV ECHO MEAS - EDV(MOD-SP2): 100 ML
BH CV ECHO MEAS - EDV(MOD-SP4): 103 ML
BH CV ECHO MEAS - EF(MOD-BP): 58 %
BH CV ECHO MEAS - EF(MOD-SP2): 73 %
BH CV ECHO MEAS - EF(MOD-SP4): 58 %
BH CV ECHO MEAS - ESV(CUBED): 61.1 ML
BH CV ECHO MEAS - ESV(MOD-SP2): 27 ML
BH CV ECHO MEAS - ESV(MOD-SP4): 25 ML
BH CV ECHO MEAS - FS: 25.4 %
BH CV ECHO MEAS - IVS/LVPW: 1.01 CM
BH CV ECHO MEAS - IVSD: 1.26 CM
BH CV ECHO MEAS - LV DIASTOLIC VOL/BSA (35-75): 46.9 CM2
BH CV ECHO MEAS - LV MASS(C)D: 271.3 GRAMS
BH CV ECHO MEAS - LV MAX PG: 3.9 MMHG
BH CV ECHO MEAS - LV MEAN PG: 2.2 MMHG
BH CV ECHO MEAS - LV SYSTOLIC VOL/BSA (12-30): 11.4 CM2
BH CV ECHO MEAS - LV V1 MAX: 99 CM/SEC
BH CV ECHO MEAS - LV V1 VTI: 21.5 CM
BH CV ECHO MEAS - LVIDD: 5.3 CM
BH CV ECHO MEAS - LVIDS: 3.9 CM
BH CV ECHO MEAS - LVOT AREA: 5.1 CM2
BH CV ECHO MEAS - LVOT DIAM: 2.6 CM
BH CV ECHO MEAS - LVPWD: 1.25 CM
BH CV ECHO MEAS - MV A DUR: 0.12 SEC
BH CV ECHO MEAS - MV A MAX VEL: 105 CM/SEC
BH CV ECHO MEAS - MV DEC SLOPE: 370.1 CM/SEC2
BH CV ECHO MEAS - MV DEC TIME: 254 MSEC
BH CV ECHO MEAS - MV E MAX VEL: 93.8 CM/SEC
BH CV ECHO MEAS - MV E/A: 0.89
BH CV ECHO MEAS - MV MEAN PG: 3.8 MMHG
BH CV ECHO MEAS - PA ACC TIME: 0.09 SEC
BH CV ECHO MEAS - PA PR(ACCEL): 38.6 MMHG
BH CV ECHO MEAS - PA V2 MAX: 109.9 CM/SEC
BH CV ECHO MEAS - PI END-D VEL: 134.4 CM/SEC
BH CV ECHO MEAS - RAP SYSTOLE: 8 MMHG
BH CV ECHO MEAS - RV MAX PG: 1.98 MMHG
BH CV ECHO MEAS - RV V1 MAX: 70.3 CM/SEC
BH CV ECHO MEAS - RV V1 VTI: 14.5 CM
BH CV ECHO MEAS - RVSP: 34 MMHG
BH CV ECHO MEAS - SI(MOD-SP2): 33.2 ML/M2
BH CV ECHO MEAS - SI(MOD-SP4): 35.5 ML/M2
BH CV ECHO MEAS - SV(LVOT): 110.2 ML
BH CV ECHO MEAS - SV(MOD-SP2): 73 ML
BH CV ECHO MEAS - SV(MOD-SP4): 78 ML
BH CV ECHO MEAS - TAPSE (>1.6): 2.3 CM
BH CV ECHO MEAS - TR MAX PG: 25.8 MMHG
BH CV ECHO MEAS - TR MAX VEL: 253.8 CM/SEC
BH CV STRESS BP STAGE 1: NORMAL
BH CV STRESS BP STAGE 2: NORMAL
BH CV STRESS BP STAGE 3: NORMAL
BH CV STRESS DOSE DOBUTAMINE STAGE 1: 10
BH CV STRESS DOSE DOBUTAMINE STAGE 2: 20
BH CV STRESS DOSE DOBUTAMINE STAGE 3: 30
BH CV STRESS DURATION MIN STAGE 1: 3
BH CV STRESS DURATION MIN STAGE 2: 2
BH CV STRESS DURATION MIN STAGE 3: 1
BH CV STRESS DURATION SEC STAGE 1: 0
BH CV STRESS DURATION SEC STAGE 2: 0
BH CV STRESS DURATION SEC STAGE 3: 40
BH CV STRESS ECHO POST STRESS EJECTION FRACTION EF: 74 %
BH CV STRESS HR STAGE 1: 102
BH CV STRESS HR STAGE 2: 120
BH CV STRESS HR STAGE 3: 130
BH CV STRESS PROTOCOL 1: NORMAL
BH CV STRESS RECOVERY BP: NORMAL MMHG
BH CV STRESS RECOVERY HR: 100 BPM
BH CV STRESS STAGE 1: 1
BH CV STRESS STAGE 2: 2
BH CV STRESS STAGE 3: 3
GLUCOSE BLDC GLUCOMTR-MCNC: 95 MG/DL (ref 70–130)
LAB AP CASE REPORT: NORMAL
LAB AP CASE REPORT: NORMAL
LAB AP DIAGNOSIS COMMENT: NORMAL
LEFT ATRIUM VOLUME INDEX: 33.7 ML/M2
MAXIMAL PREDICTED HEART RATE: 144 BPM
PATH REPORT.FINAL DX SPEC: NORMAL
PATH REPORT.FINAL DX SPEC: NORMAL
PATH REPORT.GROSS SPEC: NORMAL
PATH REPORT.GROSS SPEC: NORMAL
PERCENT MAX PREDICTED HR: 92.36 %
SINUS: 4.1 CM
STJ: 3.1 CM
STRESS BASELINE BP: NORMAL MMHG
STRESS BASELINE HR: 83 BPM
STRESS PERCENT HR: 109 %
STRESS POST EXERCISE DUR MIN: 7 MIN
STRESS POST EXERCISE DUR SEC: 40 SEC
STRESS POST PEAK BP: NORMAL MMHG
STRESS POST PEAK HR: 133 BPM
STRESS TARGET HR: 122 BPM

## 2023-03-14 PROCEDURE — 93325 DOPPLER ECHO COLOR FLOW MAPG: CPT | Performed by: INTERNAL MEDICINE

## 2023-03-14 PROCEDURE — 25510000001 PERFLUTREN (DEFINITY) 8.476 MG IN SODIUM CHLORIDE (PF) 0.9 % 10 ML INJECTION: Performed by: THORACIC SURGERY (CARDIOTHORACIC VASCULAR SURGERY)

## 2023-03-14 PROCEDURE — A9552 F18 FDG: HCPCS | Performed by: THORACIC SURGERY (CARDIOTHORACIC VASCULAR SURGERY)

## 2023-03-14 PROCEDURE — 93350 STRESS TTE ONLY: CPT

## 2023-03-14 PROCEDURE — 78815 PET IMAGE W/CT SKULL-THIGH: CPT

## 2023-03-14 PROCEDURE — 93018 CV STRESS TEST I&R ONLY: CPT | Performed by: INTERNAL MEDICINE

## 2023-03-14 PROCEDURE — 93016 CV STRESS TEST SUPVJ ONLY: CPT | Performed by: INTERNAL MEDICINE

## 2023-03-14 PROCEDURE — 93352 ADMIN ECG CONTRAST AGENT: CPT | Performed by: INTERNAL MEDICINE

## 2023-03-14 PROCEDURE — 0 FLUDEOXYGLUCOSE F18 SOLUTION: Performed by: THORACIC SURGERY (CARDIOTHORACIC VASCULAR SURGERY)

## 2023-03-14 PROCEDURE — 93325 DOPPLER ECHO COLOR FLOW MAPG: CPT

## 2023-03-14 PROCEDURE — 93017 CV STRESS TEST TRACING ONLY: CPT

## 2023-03-14 PROCEDURE — 82962 GLUCOSE BLOOD TEST: CPT

## 2023-03-14 PROCEDURE — 93320 DOPPLER ECHO COMPLETE: CPT

## 2023-03-14 PROCEDURE — 93350 STRESS TTE ONLY: CPT | Performed by: INTERNAL MEDICINE

## 2023-03-14 PROCEDURE — 93320 DOPPLER ECHO COMPLETE: CPT | Performed by: INTERNAL MEDICINE

## 2023-03-14 RX ADMIN — SODIUM CHLORIDE 8 ML: 9 INJECTION INTRAMUSCULAR; INTRAVENOUS; SUBCUTANEOUS at 11:30

## 2023-03-14 RX ADMIN — FLUDEOXYGLUCOSE F18 1 DOSE: 300 INJECTION INTRAVENOUS at 12:37

## 2023-03-15 ENCOUNTER — PATIENT OUTREACH (OUTPATIENT)
Dept: OTHER | Facility: HOSPITAL | Age: 78
End: 2023-03-15
Payer: MEDICARE

## 2023-03-15 DIAGNOSIS — C34.31 MALIGNANT NEOPLASM OF LOWER LOBE OF RIGHT LUNG: Primary | ICD-10-CM

## 2023-03-16 ENCOUNTER — APPOINTMENT (OUTPATIENT)
Dept: OTHER | Facility: HOSPITAL | Age: 78
End: 2023-03-16
Payer: MEDICARE

## 2023-03-16 ENCOUNTER — PATIENT OUTREACH (OUTPATIENT)
Dept: OTHER | Facility: HOSPITAL | Age: 78
End: 2023-03-16
Payer: MEDICARE

## 2023-03-16 ENCOUNTER — DOCUMENTATION (OUTPATIENT)
Dept: OTHER | Facility: HOSPITAL | Age: 78
End: 2023-03-16
Payer: MEDICARE

## 2023-03-16 ENCOUNTER — OFFICE VISIT (OUTPATIENT)
Dept: OTHER | Facility: HOSPITAL | Age: 78
End: 2023-03-16
Payer: MEDICARE

## 2023-03-16 ENCOUNTER — PREP FOR SURGERY (OUTPATIENT)
Dept: OTHER | Facility: HOSPITAL | Age: 78
End: 2023-03-16
Payer: MEDICARE

## 2023-03-16 VITALS
BODY MASS INDEX: 29.16 KG/M2 | WEIGHT: 215.3 LBS | DIASTOLIC BLOOD PRESSURE: 82 MMHG | OXYGEN SATURATION: 96 % | SYSTOLIC BLOOD PRESSURE: 150 MMHG | RESPIRATION RATE: 16 BRPM | TEMPERATURE: 98.7 F | HEIGHT: 72 IN | HEART RATE: 85 BPM

## 2023-03-16 VITALS
DIASTOLIC BLOOD PRESSURE: 82 MMHG | BODY MASS INDEX: 29.16 KG/M2 | SYSTOLIC BLOOD PRESSURE: 150 MMHG | HEIGHT: 72 IN | RESPIRATION RATE: 16 BRPM | HEART RATE: 85 BPM | WEIGHT: 215.3 LBS | OXYGEN SATURATION: 96 % | TEMPERATURE: 98.7 F

## 2023-03-16 DIAGNOSIS — C34.90 SMALL CELL LUNG CANCER: Primary | ICD-10-CM

## 2023-03-16 DIAGNOSIS — C34.31 MALIGNANT NEOPLASM OF LOWER LOBE OF RIGHT LUNG: ICD-10-CM

## 2023-03-16 DIAGNOSIS — C34.90 SMALL CELL LUNG CANCER: ICD-10-CM

## 2023-03-16 PROBLEM — Z45.2 ENCOUNTER FOR ADJUSTMENT OR MANAGEMENT OF VASCULAR ACCESS DEVICE: Status: ACTIVE | Noted: 2023-03-16

## 2023-03-16 PROBLEM — Z79.899 HIGH RISK MEDICATION USE: Status: ACTIVE | Noted: 2023-03-16

## 2023-03-16 PROCEDURE — G0463 HOSPITAL OUTPT CLINIC VISIT: HCPCS

## 2023-03-16 PROCEDURE — 99205 OFFICE O/P NEW HI 60 MIN: CPT | Performed by: INTERNAL MEDICINE

## 2023-03-16 PROCEDURE — 1159F MED LIST DOCD IN RCRD: CPT | Performed by: THORACIC SURGERY (CARDIOTHORACIC VASCULAR SURGERY)

## 2023-03-16 PROCEDURE — 1160F RVW MEDS BY RX/DR IN RCRD: CPT | Performed by: THORACIC SURGERY (CARDIOTHORACIC VASCULAR SURGERY)

## 2023-03-16 PROCEDURE — 99214 OFFICE O/P EST MOD 30 MIN: CPT | Performed by: THORACIC SURGERY (CARDIOTHORACIC VASCULAR SURGERY)

## 2023-03-16 PROCEDURE — 1126F AMNT PAIN NOTED NONE PRSNT: CPT | Performed by: INTERNAL MEDICINE

## 2023-03-16 RX ORDER — SODIUM CHLORIDE 0.9 % (FLUSH) 0.9 %
10 SYRINGE (ML) INJECTION AS NEEDED
Status: CANCELLED | OUTPATIENT
Start: 2023-03-27

## 2023-03-16 RX ORDER — HEPARIN SODIUM (PORCINE) LOCK FLUSH IV SOLN 100 UNIT/ML 100 UNIT/ML
500 SOLUTION INTRAVENOUS AS NEEDED
Status: CANCELLED | OUTPATIENT
Start: 2023-03-27

## 2023-03-16 RX ORDER — HEPARIN SODIUM 5000 [USP'U]/ML
5000 INJECTION, SOLUTION INTRAVENOUS; SUBCUTANEOUS
Status: CANCELLED | OUTPATIENT
Start: 2023-03-21 | End: 2023-03-22

## 2023-03-16 RX ORDER — CELECOXIB 200 MG/1
200 CAPSULE ORAL ONCE
Status: CANCELLED | OUTPATIENT
Start: 2023-03-20 | End: 2023-03-16

## 2023-03-16 RX ORDER — GABAPENTIN 300 MG/1
300 CAPSULE ORAL ONCE
Status: CANCELLED | OUTPATIENT
Start: 2023-03-20 | End: 2023-03-16

## 2023-03-16 RX ORDER — ACETAMINOPHEN 500 MG
1000 TABLET ORAL ONCE
Status: CANCELLED | OUTPATIENT
Start: 2023-03-20 | End: 2023-03-16

## 2023-03-16 RX ORDER — DIPHENHYDRAMINE HCL 25 MG
25 CAPSULE ORAL EVERY 6 HOURS PRN
COMMUNITY

## 2023-03-16 NOTE — PROGRESS NOTES
Subjective     REASON FOR CONSULTATION: Extensive stage small cell carcinoma of the lung  Provide an opinion on any further workup or treatment                             REQUESTING PHYSICIAN: Mann Nobles MD    RECORDS OBTAINED:  Records of the patients history including those obtained from the referring provider were reviewed and summarized in detail.    HISTORY OF PRESENT ILLNESS:  The patient is a 77 y.o. year old male who is here for an opinion about the above issue.  He was seen today in the multidisciplinary thoracic oncology clinic along with Dr Lara and Dr. Coreas.  He has been following up with Dr. Nobles of vascular surgery and underwent a CT angiogram of the abdomen on 2/23/2023.  There were some nodules noted in the lung bases and this led to a CT scan of the chest which again showed pulmonary nodules in the right lower lobe as well as subcarinal lymphadenopathy.    He had previously been seen by Dr. Lara and she arranged for him to undergo outpatient navigational bronchoscopy with biopsy on 3/13/2023.  His pathology was positive for small cell carcinoma of the lung.    He underwent additional staging with a PET scan performed on 3/14/2023 which showed evidence of metastatic disease to the liver and the right adrenal gland as well as hypermetabolic mediastinal and hilar lymphadenopathy.    He is here today to discuss treatment recommendations.  We had a lengthy visit with the patient and his wife.  They live in [facility] which is close to [facility] but they expressed that they would desire to have their treatment delivered here in [place].    Because of his extensive stage disease we have recommended systemic treatment with carboplatin/-16 and atezolizumab immunotherapy.    He needs an MRI of the brain to complete his staging but he has positional vertigo and is unable to lie flat without becoming terribly nauseated.  He will need to receive IV sedation for the MRI to be  performed.    History of Present Illness     Past Medical History:   Diagnosis Date   • Acid reflux    • Anemia    • Arthritis    • Coronary artery disease    • High cholesterol    • Hx of blood clots    • Hypertension    • Irregular heartbeat    • Peptic ulcer disease    • Prostate cancer (HCC)    • Rheumatoid arthritis (HCC)    • Skin cancer         Past Surgical History:   Procedure Laterality Date   • ABDOMINAL AORTIC ANEURYSM REPAIR     • BACK SURGERY      x2   • BRONCHOSCOPY WITH ION ROBOTIC ASSIST N/A 3/13/2023    Procedure: BRONCHOSCOPY ,ENDOBRONCHIAL ULTRASOUND AND ROBOTIC NAVIGATIONAL BRONCHOSCOPY WITH FINE NEEDLE ASPIRATION, BIOPSY X1 AREA, AND BRONCHOALEOLAR LAVAGE;  Surgeon: Sonali Lara MD;  Location: [facility] ENDOSCOPY;  Service: Robotics - Pulmonary;  Laterality: N/A;  Post: LUNG CANCER   • CARDIAC ABLATION      x2   • COLON SURGERY     • HERNIA REPAIR          Current Outpatient Medications on File Prior to Visit   Medication Sig Dispense Refill   • Acetaminophen (ARTHRITIS PAIN RELIEF PO) Arthritis Relief   650 mg     • ascorbic acid (VITAMIN C) 1000 MG tablet Vitamin C     • aspirin 81 MG EC tablet Daily.     • coenzyme Q10 100 MG capsule Take 1 capsule by mouth Daily.     • cyanocobalamin (VITAMIN B-12) 1000 MCG tablet Take 1 tablet by mouth Daily.     • diphenhydrAMINE (BENADRYL) 25 mg capsule Take 1 capsule by mouth Every 6 (Six) Hours As Needed for Itching.     • Elderberry 500 MG capsule Take  by mouth.     • Magnesium 250 MG tablet Take  by mouth.     • metoprolol tartrate (LOPRESSOR) 25 MG tablet metoprolol tartrate 25 mg tablet   TAKE 1 TABLET BY MOUTH TWICE DAILY.     • pravastatin (PRAVACHOL) 40 MG tablet Daily.     • tiZANidine (ZANAFLEX) 4 MG tablet Every 8 (Eight) Hours.     • Turmeric 500 MG capsule Take  by mouth.     • vitamin D3 125 MCG (5000 UT) capsule capsule Take 1 capsule by mouth Daily.     • Zinc 50 MG tablet Take 1 tablet by mouth Daily.     • diazePAM (Valium) 5 MG  tablet Take 1 tablet by mouth 2 (Two) Times a Day As Needed for Anxiety. (Patient not taking: Reported on 3/16/2023) 2 tablet 0   • LORazepam (Ativan) 1 MG tablet Take 1 tablet by mouth As Needed for Anxiety (Take 1 pill 60 minutes prior to planned CT scan.  Patient to have  and caregiver for the entirety of the day.) for up to 1 dose. (Patient not taking: Reported on 3/16/2023) 2 tablet 0   • LORazepam (Ativan) 1 MG tablet Take 1 tablet by mouth As Needed for Anxiety (take 1 hour prior to CT scan). Patient to take 1 hour prior to CT scan.  Patient to have a ride to and from the CT scan and to have someone with him the rest of the day (Patient not taking: Reported on 3/16/2023) 2 tablet 0     No current facility-administered medications on file prior to visit.        ALLERGIES:    Allergies   Allergen Reactions   • Codeine Other (See Comments)     headache          Social History     Socioeconomic History   • Marital status:    Tobacco Use   • Smoking status: Former     Types: Cigarettes   • Smokeless tobacco: Never   Vaping Use   • Vaping Use: Never used   Substance and Sexual Activity   • Alcohol use: Never   • Drug use: Never   • Sexual activity: Defer        Family History   Problem Relation Age of Onset   • Brain cancer Mother    • Lung cancer Sister         Review of Systems   Constitutional: Negative for activity change, chills, fatigue and fever.   HENT: Negative for mouth sores, trouble swallowing and voice change.    Eyes: Negative for pain and visual disturbance.   Respiratory: Positive for cough and shortness of breath. Negative for wheezing.    Cardiovascular: Negative for chest pain and palpitations.   Gastrointestinal: Negative for abdominal pain, constipation, diarrhea, nausea and vomiting.   Genitourinary: Negative for difficulty urinating, frequency and urgency.   Musculoskeletal: Negative for arthralgias and joint swelling.   Skin: Negative for rash.   Neurological: Positive for  "dizziness. Negative for seizures, weakness and headaches.        Patient reports severe positional vertigo.  He is unable to lay flat on his back without having nausea and vomiting   Hematological: Negative for adenopathy. Does not bruise/bleed easily.   Psychiatric/Behavioral: Negative for behavioral problems and confusion. The patient is not nervous/anxious.         Objective     Vitals:    03/16/23 0849   BP: 150/82   Pulse: 85   Resp: 16   Temp: 98.7 °F (37.1 °C)   SpO2: 96%   Weight: 97.7 kg (215 lb 4.8 oz)   Height: 183 cm (72.05\")   PainSc: 0-No pain     Current Status 3/16/2023   ECOG score 0       Physical Exam  Constitutional:       General: He is not in acute distress.     Appearance: He is well-developed.   HENT:      Head: Normocephalic.   Eyes:      General: No scleral icterus.     Conjunctiva/sclera: Conjunctivae normal.      Pupils: Pupils are equal, round, and reactive to light.   Neck:      Thyroid: No thyromegaly.      Vascular: No JVD.   Cardiovascular:      Rate and Rhythm: Normal rate and regular rhythm.      Heart sounds: No murmur heard.    No friction rub. No gallop.   Pulmonary:      Effort: Pulmonary effort is normal.      Breath sounds: Normal breath sounds. No wheezing or rales.   Abdominal:      General: There is no distension.      Palpations: Abdomen is soft. There is no mass.      Tenderness: There is no abdominal tenderness.   Musculoskeletal:         General: No deformity. Normal range of motion.      Cervical back: Normal range of motion and neck supple.   Lymphadenopathy:      Cervical: No cervical adenopathy.   Skin:     General: Skin is warm and dry.      Findings: No erythema or rash.   Neurological:      Mental Status: He is alert and oriented to person, place, and time.      Cranial Nerves: No cranial nerve deficit.      Deep Tendon Reflexes: Reflexes are normal and symmetric.   Psychiatric:         Behavior: Behavior normal.         Judgment: Judgment normal.       "     RECENT LABS:  Hematology WBC   Date Value Ref Range Status   03/11/2023 7.54 3.40 - 10.80 10*3/mm3 Final   09/18/2019 5.9 4.8 - 10.8 10*9/L Final     RBC   Date Value Ref Range Status   03/11/2023 4.46 4.14 - 5.80 10*6/mm3 Final   09/18/2019 4.36 (L) 4.7 - 6.1 10*12/L Final     Hemoglobin   Date Value Ref Range Status   03/11/2023 14.4 13.0 - 17.7 g/dL Final   09/18/2019 14.1 14.0 - 18.0 g/dL Final     Hematocrit   Date Value Ref Range Status   03/11/2023 41.6 37.5 - 51.0 % Final   09/18/2019 42.0 42 - 54 % Final     Platelets   Date Value Ref Range Status   03/11/2023 167 140 - 450 10*3/mm3 Final   09/18/2019 165 150 - 450 10*9/L Final        Lab Results   Component Value Date    GLUCOSE 100 (H) 03/11/2023    CALCIUM 9.7 03/11/2023     03/11/2023    K 4.6 03/11/2023    CO2 23.1 03/11/2023     03/11/2023    BUN 16 03/11/2023    CREATININE 0.77 03/11/2023    EGFR 92.8 03/11/2023    BCR 20.8 03/11/2023    ANIONGAP 15.9 (H) 03/11/2023     PATHOLOGY 3/13/2023  Final Diagnosis   Lung, right lower lobe, endobronchial biopsies:    Small cell carcinoma, see comment      Comment    The biopsy shows an invasive tumor with crush artifact. Immunohistochemistry was performed utilizing appropriate controls and the tumor is positive for CD56, TTF-1 and synaptophysin and is negative for CD45 and chromogranin. The staining pattern and morphology are consistent with small cell carcinoma.       IMAGING:  F-18 FDG PET FROM SKULL BASE TO MID THIGH WITH PET/CT FUSION 3/14/2023  IMPRESSION:  1.  Intensely avid right lower lobe pulmonary nodule most suggestive of  malignancy.  2.  Hypermetabolic mediastinal and hilar adenopathy, hepatic lesions,  right adrenal nodule and sternal lesion most likely representing  metastatic disease. Nodular thickening along the right major fissure  which contacts the right hilum indicate uptake mildly above that of the  adjacent lung may represent lymphangitic spread of malignancy  3.   Scattered bilateral sub-6 mm pulmonary nodules are below PET  resolution. A mildly hypermetabolic cortes hepatic node is present. These  findings are indeterminate indeterminate and continued close attention  on follow-up is recommended.  4.  Other findings as above.    CT CHEST WO CONTRAST DIAGNOSTIC-2/23/2023  IMPRESSION:   Right lower lobe pulmonary nodules may represent neoplasm, initial  further evaluation with positron emission tomography is advised. Mildly  prominent, nonspecific subcarinal lymph node.    Assessment & Plan     1.  New diagnosis of extensive stage small cell lung carcinoma with PET scan evidence of metastatic disease to the liver and right adrenal gland.  2.  Comorbidities including vascular disease, coronary artery disease and history of prostate cancer.  He does have a decent performance status overall.  3.  We discussed the diagnosis of small cell lung carcinoma and the stage of his disease which appears to be extensive and metastatic.  We also had a lengthy discussion regarding recommended treatment with chemotherapy and immunotherapy.  The patient lives in [facility] which is a couple of hours away but he and his wife wish to receive his treatment here in [place].  4.  He will need port placement for chemotherapy and this will be scheduled by Dr. Lara  5.  He will need to undergo an MRI of the brain to complete his staging.  He has terrible issues with positional vertigo and is unable to lie flat on his back without becoming violently ill therefore he will require sedation with his MRI visit.  6.  We will schedule formal chemotherapy education for carboplatin -16 and atezolizumab with Neulasta growth factor support.  7.  We hope to be able to begin his first cycle of therapy the week of 3/27/2023.  8.  Obviously, if his MRI of the brain does show evidence of metastatic disease we will need to refer him to radiation oncology as well.  9.  We discussed an overall treatment  plan of 4 cycles of chemotherapy and immunotherapy with radiographic assessment with CT scans after cycle 2 and 4.  If he does well with combined chemoimmunotherapy treatment we may then continue maintenance atezolizumab immunotherapy from there.    Thanks for allowing us to see this nice gentleman in consultation.

## 2023-03-16 NOTE — PROGRESS NOTES
Referral rec'd from Dr. Lara. Met patient and wife initially in lung clinic 3/9 and again today. The patient met with Dr. Conn and Dr. Lara for his biopsy confirmed small cell carcinoma.  Dr. Lara will be placing a port (her office will call him to schedule). Dr. Conn ordered a MRI of the brain, which his office was going to schedule, as the patient stated he needed sedation for this.     The patient will be starting chemotherapy, which he will receive 3 days in a row every 21 days. Dr. Conn's office will call to schedule chemotherapy teaching next week. He has a good understanding of his pathology and treatment options.  He was able to teach back of his plan of care.    He has a PMH of [id number] prostate cancer (s/p prostatectomy @ [facility] 6/2007), PVD, osteoarthritis, HTN, CAD, Raynauds, A fib, LEXII, AAA (s/p endovascular stent, AAA repair 2014), anxiety/depression (takes Ativan), back surgery 1990, 1995, 2005, right hemicolectomy due to unresectable adenoma 2018.  His family HX of cancer includes sister with lung cancer, mother with brain cancer, brother with prostate cancer.      The patient and his wife live in a single level home in [place], [place].  He is alert and oriented. He is independent with ADLs and does not use assistive devices. He drives although his wife is available to drive if he can't.     He was a former smoker, 1/2 PPD x40 years; quit in 2013.    He does not have an ACP on file and does not want additional information on this at this time.     The patient states his appetite is down, although he denies weight loss. We discussed [facility] services. He denies needing a dietician at this time.     The patient has not received any E bills and denies needing to speak with [employer] financial navigator at this time. The patient denies any financial or other resource needs at this time.     The patient and wife are overwhelmed today hearing about his diagnosis.  He reported an adequate support system.  Provided active listening and emotional support, validating and normalizing patient's feelings. We discussed [facility], [facility], OSW and Behavioral Oncology services.  They met with Gale JACKSON today, as they may need assistance with lodging while receiving treatment, since they live in [place], [place].  Patient expressed gratitude for my support and denied any additional needs at this time.    We discussed integrative therapies and other services at the [facility].  He received a navigation folder with the following information today:   [facility] Cancer Support Network, Cancer and Restorative Exercise - CARE, [facility] exercise program, Living with a Diagnosis of Lung Cancer, Lung Cancer [place] Support Services, Cancer Resource [place], Message Therapy, Reiki Therapy, [facility] \[facility]\"", Cancer Nutrition, Smoking Cessation and Survivorship Clinic.  I provided the patient with a seatbelt cover for his port from the Cancer Center.     I will call in 1-2 weeks; they have my contact information and will call if the need arises.

## 2023-03-16 NOTE — PROGRESS NOTES
"Chief Complaint  Follow-up    Subjective          Russell Ramírez . presents to [facility]  History of Present Illness     Mr. Russell Ramírez is a pleasant 77-year-old gentleman status post ION robotic bronchoscopy on 03/13/2023. He presents in follow-up. He is accompanited by an adult female. His son has joined the visit via phone.     Mr. Ramírez reports he is doing well. The accompanying female queries as to where the patient's port would be placed and needs for care of port. She states she will make sure he gets to his treatments. He reports he had prostate cancer and questions if it could have spread and caused his lung cancer. The adult female is wondering if the patient needs to keep his pulmonary appointment on 05/23/2023 with Dr. Watkins. They ask about diet restriction. They will follow with Dr. Conn for chemotherapy treatments.     Objective   Vital Signs:   /82   Pulse 85   Temp 98.7 °F (37.1 °C)   Resp 16   Ht 183 cm (72.05\")   Wt 97.7 kg (215 lb 4.8 oz)   SpO2 96%   BMI 29.16 kg/m²     Physical Exam  Vitals and nursing note reviewed.   Constitutional:       Appearance: He is well-developed.   HENT:      Head: Normocephalic and atraumatic.      Nose: Nose normal.   Eyes:      Conjunctiva/sclera: Conjunctivae normal.   Pulmonary:      Effort: Pulmonary effort is normal.   Musculoskeletal:         General: Normal range of motion.      Cervical back: Normal range of motion and neck supple.   Skin:     General: Skin is warm and dry.   Neurological:      Mental Status: He is alert and oriented to person, place, and time.   Psychiatric:         Behavior: Behavior normal.         Thought Content: Thought content normal.         Judgment: Judgment normal.          Result Review :            I have independently reviewed the PET CT performed on 3/14/2023 which demonstrates an intensely avid right lower lobe lung nodule with hypermetabolic mediastinal and hilar " lymphadenopathy, hepatic lesions and right adrenal lesion and a sternal lesion representing metastatic disease.    Pathology consistent with small cell carcinoma.           Assessment and Plan    Diagnoses and all orders for this visit:    1. Small cell lung cancer    Mr. Ramírez is a pleasant 77-year-old gentleman with extensive stage small cell lung cancer. He will need treatment with chemotherapy for his therapy. He will need a port to facilitate this. We will plan to get him scheduled with Dr. Javier Gonzalez for this port..    1. Small cell lung cancer  - Chemotherapy port implantaton will be scheduled for next week.      I spent 30 minutes caring for Russell on this date of service. This time includes time spent by me in the following activities:preparing for the visit, reviewing tests, obtaining and/or reviewing a separately obtained history, performing a medically appropriate examination and/or evaluation , counseling and educating the patient/family/caregiver, ordering medications, tests, or procedures, documenting information in the medical record and independently interpreting results and communicating that information with the patient/family/caregiver  Follow Up   No follow-ups on file.  Patient was given instructions and counseling regarding his condition or for health maintenance advice. Please see specific information pulled into the AVS if appropriate.       Transcribed from ambient dictation for Sonali Lara MD by Maggie Patton.  03/16/23   10:31 EDT    Patient or patient representative verbalized consent to the visit recording.  I have personally performed the services described in this document as transcribed by the above individual, and it is both accurate and complete.

## 2023-03-16 NOTE — PROGRESS NOTES
Oncology Social Work  Clinical Case Management - juliarocco    Diagnosis: Metastatic Small Cell Lung Cancer   Treatment:  Chemotherapy and Immunotherapy    Physical Concerns: Vertigo, weakness, decreased mobility    Practical Problems: patient lives in [facility] which is about 2 hours away.  Will need Lodging assistance and Travel costs assistance ( gasoline).     Emotional Concerns: Overwhelmed, tearful, scared - many questions pertaining to how aggressive this cancer is and prognosis.  OSW did discuss the seriousness of his diagnosis and did explain that it was advanced and an aggressive type - discussed that the goal of chemo/immunotherapy was to treat, manage and slow down the cancer ;focusing on having a good response to tx and to be able to tolerate the tx.  Patient expressed understanding.  Encouraged him to also raise these questions with his doctor at his next appt.   Patient's daughter asked question in regards to him traveling to [place] for his grandsons graduation in May.     Family Concerns: Daughter lives in [place] and son lives near [place].         Interventions:     Transportation Needs: gas cards; other (see comments) (Will refer to [place] [facility] for gas card.  Will refer to Travel to [place] for travel costs)  Emotional Needs: emotional suppport/coping strategies    Comments:  Will cont to follow and assist with needs.   Gale Worthy, L[place], CSW

## 2023-03-20 ENCOUNTER — TELEPHONE (OUTPATIENT)
Dept: OTHER | Facility: HOSPITAL | Age: 78
End: 2023-03-20
Payer: MEDICARE

## 2023-03-20 ENCOUNTER — APPOINTMENT (OUTPATIENT)
Dept: GENERAL RADIOLOGY | Facility: HOSPITAL | Age: 78
End: 2023-03-20
Payer: MEDICARE

## 2023-03-20 ENCOUNTER — TELEPHONE (OUTPATIENT)
Dept: ONCOLOGY | Facility: CLINIC | Age: 78
End: 2023-03-20
Payer: MEDICARE

## 2023-03-20 ENCOUNTER — HOSPITAL ENCOUNTER (OUTPATIENT)
Facility: HOSPITAL | Age: 78
Setting detail: HOSPITAL OUTPATIENT SURGERY
Discharge: HOME | End: 2023-03-20
Attending: STUDENT IN AN ORGANIZED HEALTH CARE EDUCATION/TRAINING PROGRAM | Admitting: STUDENT IN AN ORGANIZED HEALTH CARE EDUCATION/TRAINING PROGRAM
Payer: MEDICARE

## 2023-03-20 ENCOUNTER — ANESTHESIA (OUTPATIENT)
Dept: PERIOP | Facility: HOSPITAL | Age: 78
End: 2023-03-20
Payer: MEDICARE

## 2023-03-20 ENCOUNTER — ANESTHESIA EVENT (OUTPATIENT)
Dept: PERIOP | Facility: HOSPITAL | Age: 78
End: 2023-03-20
Payer: MEDICARE

## 2023-03-20 VITALS
DIASTOLIC BLOOD PRESSURE: 92 MMHG | SYSTOLIC BLOOD PRESSURE: 142 MMHG | RESPIRATION RATE: 18 BRPM | OXYGEN SATURATION: 98 % | HEIGHT: 71 IN | BODY MASS INDEX: 30.15 KG/M2 | HEART RATE: 72 BPM | WEIGHT: 215.39 LBS | TEMPERATURE: 98.3 F

## 2023-03-20 DIAGNOSIS — C34.90 SMALL CELL LUNG CANCER: ICD-10-CM

## 2023-03-20 LAB
LAB AP CARIS, ADDENDUM: NORMAL
LAB AP CASE REPORT: NORMAL
LAB AP DIAGNOSIS COMMENT: NORMAL
PATH REPORT.FINAL DX SPEC: NORMAL
PATH REPORT.GROSS SPEC: NORMAL

## 2023-03-20 PROCEDURE — 77001 FLUOROGUIDE FOR VEIN DEVICE: CPT | Performed by: STUDENT IN AN ORGANIZED HEALTH CARE EDUCATION/TRAINING PROGRAM

## 2023-03-20 PROCEDURE — 0 LIDOCAINE 1 % SOLUTION: Performed by: STUDENT IN AN ORGANIZED HEALTH CARE EDUCATION/TRAINING PROGRAM

## 2023-03-20 PROCEDURE — 76000 FLUOROSCOPY <1 HR PHYS/QHP: CPT

## 2023-03-20 PROCEDURE — C1788 PORT, INDWELLING, IMP: HCPCS | Performed by: STUDENT IN AN ORGANIZED HEALTH CARE EDUCATION/TRAINING PROGRAM

## 2023-03-20 PROCEDURE — 36561 INSERT TUNNELED CV CATH: CPT | Performed by: STUDENT IN AN ORGANIZED HEALTH CARE EDUCATION/TRAINING PROGRAM

## 2023-03-20 PROCEDURE — 25010000002 PROPOFOL 10 MG/ML EMULSION: Performed by: STUDENT IN AN ORGANIZED HEALTH CARE EDUCATION/TRAINING PROGRAM

## 2023-03-20 PROCEDURE — 25010000002 CEFOXITIN PER 1 G: Performed by: STUDENT IN AN ORGANIZED HEALTH CARE EDUCATION/TRAINING PROGRAM

## 2023-03-20 PROCEDURE — 76937 US GUIDE VASCULAR ACCESS: CPT | Performed by: STUDENT IN AN ORGANIZED HEALTH CARE EDUCATION/TRAINING PROGRAM

## 2023-03-20 PROCEDURE — 25010000002 HEPARIN (PORCINE) PER 1000 UNITS: Performed by: STUDENT IN AN ORGANIZED HEALTH CARE EDUCATION/TRAINING PROGRAM

## 2023-03-20 DEVICE — PRT INTRO VASC/INTERV VORTEX FILL/HL DETACH/POLYURET/CATH 8F: Type: IMPLANTABLE DEVICE | Site: CHEST | Status: FUNCTIONAL

## 2023-03-20 RX ORDER — FLUMAZENIL 0.1 MG/ML
0.2 INJECTION INTRAVENOUS AS NEEDED
Status: DISCONTINUED | OUTPATIENT
Start: 2023-03-20 | End: 2023-03-20 | Stop reason: HOSPADM

## 2023-03-20 RX ORDER — FENTANYL CITRATE 50 UG/ML
50 INJECTION, SOLUTION INTRAMUSCULAR; INTRAVENOUS
Status: DISCONTINUED | OUTPATIENT
Start: 2023-03-20 | End: 2023-03-20 | Stop reason: HOSPADM

## 2023-03-20 RX ORDER — FAMOTIDINE 10 MG/ML
20 INJECTION, SOLUTION INTRAVENOUS ONCE
Status: COMPLETED | OUTPATIENT
Start: 2023-03-20 | End: 2023-03-20

## 2023-03-20 RX ORDER — IPRATROPIUM BROMIDE AND ALBUTEROL SULFATE 2.5; .5 MG/3ML; MG/3ML
3 SOLUTION RESPIRATORY (INHALATION) ONCE AS NEEDED
Status: DISCONTINUED | OUTPATIENT
Start: 2023-03-20 | End: 2023-03-20 | Stop reason: HOSPADM

## 2023-03-20 RX ORDER — ACETAMINOPHEN 500 MG
1000 TABLET ORAL ONCE
Status: COMPLETED | OUTPATIENT
Start: 2023-03-20 | End: 2023-03-20

## 2023-03-20 RX ORDER — LIDOCAINE HYDROCHLORIDE 10 MG/ML
INJECTION, SOLUTION INFILTRATION; PERINEURAL AS NEEDED
Status: DISCONTINUED | OUTPATIENT
Start: 2023-03-20 | End: 2023-03-20 | Stop reason: HOSPADM

## 2023-03-20 RX ORDER — DIPHENHYDRAMINE HYDROCHLORIDE 50 MG/ML
12.5 INJECTION INTRAMUSCULAR; INTRAVENOUS
Status: DISCONTINUED | OUTPATIENT
Start: 2023-03-20 | End: 2023-03-20 | Stop reason: HOSPADM

## 2023-03-20 RX ORDER — EPHEDRINE SULFATE 50 MG/ML
5 INJECTION, SOLUTION INTRAVENOUS ONCE AS NEEDED
Status: DISCONTINUED | OUTPATIENT
Start: 2023-03-20 | End: 2023-03-20 | Stop reason: HOSPADM

## 2023-03-20 RX ORDER — NALOXONE HCL 0.4 MG/ML
0.2 VIAL (ML) INJECTION AS NEEDED
Status: DISCONTINUED | OUTPATIENT
Start: 2023-03-20 | End: 2023-03-20 | Stop reason: HOSPADM

## 2023-03-20 RX ORDER — SODIUM CHLORIDE 0.9 % (FLUSH) 0.9 %
3 SYRINGE (ML) INJECTION EVERY 12 HOURS SCHEDULED
Status: DISCONTINUED | OUTPATIENT
Start: 2023-03-20 | End: 2023-03-20 | Stop reason: HOSPADM

## 2023-03-20 RX ORDER — SODIUM CHLORIDE, SODIUM LACTATE, POTASSIUM CHLORIDE, CALCIUM CHLORIDE 600; 310; 30; 20 MG/100ML; MG/100ML; MG/100ML; MG/100ML
9 INJECTION, SOLUTION INTRAVENOUS CONTINUOUS
Status: DISCONTINUED | OUTPATIENT
Start: 2023-03-20 | End: 2023-03-20 | Stop reason: HOSPADM

## 2023-03-20 RX ORDER — HEPARIN SODIUM 5000 [USP'U]/ML
5000 INJECTION, SOLUTION INTRAVENOUS; SUBCUTANEOUS
Status: DISCONTINUED | OUTPATIENT
Start: 2023-03-20 | End: 2023-03-20

## 2023-03-20 RX ORDER — PROMETHAZINE HYDROCHLORIDE 25 MG/1
25 TABLET ORAL ONCE AS NEEDED
Status: DISCONTINUED | OUTPATIENT
Start: 2023-03-20 | End: 2023-03-20 | Stop reason: HOSPADM

## 2023-03-20 RX ORDER — HYDRALAZINE HYDROCHLORIDE 20 MG/ML
5 INJECTION INTRAMUSCULAR; INTRAVENOUS
Status: DISCONTINUED | OUTPATIENT
Start: 2023-03-20 | End: 2023-03-20 | Stop reason: HOSPADM

## 2023-03-20 RX ORDER — LIDOCAINE HYDROCHLORIDE 10 MG/ML
0.5 INJECTION, SOLUTION EPIDURAL; INFILTRATION; INTRACAUDAL; PERINEURAL ONCE AS NEEDED
Status: DISCONTINUED | OUTPATIENT
Start: 2023-03-20 | End: 2023-03-20 | Stop reason: HOSPADM

## 2023-03-20 RX ORDER — GABAPENTIN 300 MG/1
300 CAPSULE ORAL ONCE
Status: COMPLETED | OUTPATIENT
Start: 2023-03-20 | End: 2023-03-20

## 2023-03-20 RX ORDER — PROPOFOL 10 MG/ML
VIAL (ML) INTRAVENOUS AS NEEDED
Status: DISCONTINUED | OUTPATIENT
Start: 2023-03-20 | End: 2023-03-20 | Stop reason: SURG

## 2023-03-20 RX ORDER — MIDAZOLAM HYDROCHLORIDE 1 MG/ML
0.5 INJECTION INTRAMUSCULAR; INTRAVENOUS
Status: DISCONTINUED | OUTPATIENT
Start: 2023-03-20 | End: 2023-03-20 | Stop reason: HOSPADM

## 2023-03-20 RX ORDER — LIDOCAINE HYDROCHLORIDE 20 MG/ML
INJECTION, SOLUTION INFILTRATION; PERINEURAL AS NEEDED
Status: DISCONTINUED | OUTPATIENT
Start: 2023-03-20 | End: 2023-03-20 | Stop reason: SURG

## 2023-03-20 RX ORDER — LABETALOL HYDROCHLORIDE 5 MG/ML
5 INJECTION, SOLUTION INTRAVENOUS
Status: DISCONTINUED | OUTPATIENT
Start: 2023-03-20 | End: 2023-03-20 | Stop reason: HOSPADM

## 2023-03-20 RX ORDER — SODIUM CHLORIDE 0.9 % (FLUSH) 0.9 %
3-10 SYRINGE (ML) INJECTION AS NEEDED
Status: DISCONTINUED | OUTPATIENT
Start: 2023-03-20 | End: 2023-03-20 | Stop reason: HOSPADM

## 2023-03-20 RX ORDER — DROPERIDOL 2.5 MG/ML
0.62 INJECTION, SOLUTION INTRAMUSCULAR; INTRAVENOUS
Status: DISCONTINUED | OUTPATIENT
Start: 2023-03-20 | End: 2023-03-20 | Stop reason: HOSPADM

## 2023-03-20 RX ORDER — ONDANSETRON 2 MG/ML
4 INJECTION INTRAMUSCULAR; INTRAVENOUS ONCE AS NEEDED
Status: DISCONTINUED | OUTPATIENT
Start: 2023-03-20 | End: 2023-03-20 | Stop reason: HOSPADM

## 2023-03-20 RX ORDER — FENTANYL CITRATE 50 UG/ML
25 INJECTION, SOLUTION INTRAMUSCULAR; INTRAVENOUS
Status: DISCONTINUED | OUTPATIENT
Start: 2023-03-20 | End: 2023-03-20 | Stop reason: HOSPADM

## 2023-03-20 RX ORDER — PROMETHAZINE HYDROCHLORIDE 25 MG/1
25 SUPPOSITORY RECTAL ONCE AS NEEDED
Status: DISCONTINUED | OUTPATIENT
Start: 2023-03-20 | End: 2023-03-20 | Stop reason: HOSPADM

## 2023-03-20 RX ORDER — CELECOXIB 200 MG/1
200 CAPSULE ORAL ONCE
Status: COMPLETED | OUTPATIENT
Start: 2023-03-20 | End: 2023-03-20

## 2023-03-20 RX ADMIN — GABAPENTIN 300 MG: 300 CAPSULE ORAL at 07:42

## 2023-03-20 RX ADMIN — SODIUM CHLORIDE, POTASSIUM CHLORIDE, SODIUM LACTATE AND CALCIUM CHLORIDE 9 ML/HR: 600; 310; 30; 20 INJECTION, SOLUTION INTRAVENOUS at 09:08

## 2023-03-20 RX ADMIN — PROPOFOL 170 MG: 10 INJECTION, EMULSION INTRAVENOUS at 09:45

## 2023-03-20 RX ADMIN — ACETAMINOPHEN 1000 MG: 500 TABLET, FILM COATED ORAL at 07:42

## 2023-03-20 RX ADMIN — CELECOXIB 200 MG: 200 CAPSULE ORAL at 07:42

## 2023-03-20 RX ADMIN — LIDOCAINE HYDROCHLORIDE 60 MG: 20 INJECTION, SOLUTION INFILTRATION; PERINEURAL at 09:45

## 2023-03-20 RX ADMIN — CEFOXITIN SODIUM 2 G: 2 POWDER, FOR SOLUTION INTRAVENOUS at 09:16

## 2023-03-20 RX ADMIN — FAMOTIDINE 20 MG: 10 INJECTION INTRAVENOUS at 09:08

## 2023-03-20 NOTE — ANESTHESIA PROCEDURE NOTES
Airway  Urgency: elective    Date/Time: 3/20/2023 9:47 AM  Airway not difficult    General Information and Staff    Patient location during procedure: OR  Anesthesiologist: Claudia Klein MD  CRNA/CAA: Edis Jones CRNA    Indications and Patient Condition  Indications for airway management: airway protection    Preoxygenated: yes  MILS not maintained throughout  Mask difficulty assessment: 0 - not attempted    Final Airway Details  Final airway type: supraglottic airway      Successful airway: classic  Size 4     Number of attempts at approach: 1  Assessment: lips, teeth, and gum same as pre-op

## 2023-03-20 NOTE — OP NOTE
INSERTION VENOUS ACCESS DEVICE  Procedure Report    Patient Name:  Russell Ramírez Sr.  YOB: 1945    Date of Surgery:  3/20/2023     Indications: Small cell lung cancer.  Need for venous access port for chemotherapy treatment.    Pre-op Diagnosis:   Small cell lung cancer (HCC) [C34.90]       Post-Op Diagnosis Codes:     * Small cell lung cancer (HCC) [C34.90]    Procedure/CPT® Codes:      Procedure(s):  INSERTION VENOUS ACCESS DEVICE    Staff:  Surgeon(s):  Javier Gonzalez MD PhD         Anesthesia: General    Estimated Blood Loss: minimal    Implants:    Implant Name Type Inv. Item Serial No.  Lot No. LRB No. Used Action   PRT INTRO VASC/INTERV VORTEX FILL/HL DETACH/POLYURET/CATH 8F - [id number] Implant PRT INTRO VASC/INTERV VORTEX FILL/HL DETACH/POLYURET/CATH 8F  [employer] [id number] Right 1 Implanted       Specimen:          None      Findings: Port with easy flush and withdraw after skin closure    Complications: None    Description of Procedure: Russell Ramírez Sr. was identified in the preoperative holding area and again his consent for the procedure was verified.  He was transported to the operating room and placed on the operating room table in supine position.  A general anesthetic was successfully administered and an LMA was placed without difficulty.  A timeout was performed to verify correct patient, correct procedure, and correct administration of IV antibiotics per [facility] protocol.  The patient was prepped and draped in standard sterile fashion.  An ultrasound probe was utilized to access the right IJ vein with an 18-gauge needle and a wire was placed into the vein.  Fluoroscopy was used to confirm placement.  Lidocaine was instilled into the incision site.  An approximately 2 cm incision was made 2 finger breaths below the right clavicle and dissection was carried down to the fascia.  A pocket was constructed using blunt dissection to be big enough  for the PowerPort.  A small puncture was made at the IJ insertion site.  The tunneler was used to connect the infraclavicular port site to the right internal jugular puncture site.  The catheter and port were connected and flushed.  The port was secured to the underlying fascia with a silk suture.  The catheter was measured using fluoroscopy.  The introducer trocar was inserted over the wire via Seldinger technique.  The catheter was placed into the trocar under fluoroscopic guidance and the outer sheath of the trocar was removed.  The positioning was confirmed with fluoroscopy and the catheter tip was at the SVC/right atrial junction.  The port was flushed with heparinized saline and the incisions were closed with Vicryl and Monocryl in standard fashion.  Dermabond was applied as dressing and the counts were correct at the end of the case.  The patient tolerated this procedure well, was extubated and transported to the recovery room in stable condition.      Javier Gonzalez MD PhD

## 2023-03-20 NOTE — ANESTHESIA PREPROCEDURE EVALUATION
Anesthesia Evaluation     Patient summary reviewed and Nursing notes reviewed   NPO Solid Status: > 8 hours  NPO Liquid Status: > 6 hours           Airway   Mallampati: II  TM distance: >3 FB  Neck ROM: full  No difficulty expected  Dental    (+) edentulous    Pulmonary - normal exam   (+) a smoker Former, lung cancer,   Cardiovascular - normal exam    ECG reviewed  Rhythm: regular  Rate: normal    (+) hypertension less than 2 medications, CAD, hyperlipidemia,     ROS comment: Ef 58%, normal stress ECHO 3/23/hx afib, s/p ablations X2/hx AAA stent graft repair  PE comment: NSR    Neuro/Psych  GI/Hepatic/Renal/Endo    (+) obesity,  GERD, PUD,      Musculoskeletal     Abdominal   (+) obese,    Substance History      OB/GYN          Other   arthritis, autoimmune disease rheumatoid arthritis,    history of cancer      Other Comment: Lung CA                  Anesthesia Plan    ASA 3     general     (Possible LMA)  intravenous induction     Anesthetic plan, risks, benefits, and alternatives have been provided, discussed and informed consent has been obtained with: patient.    Plan discussed with CRNA.        CODE STATUS:

## 2023-03-20 NOTE — TELEPHONE ENCOUNTER
Oncology Social Work  Clinical Case Management - [employer]    Referrals made to [employer] [place], [facility] Stay [facility]od[facility] and [place] [facility] on patient's behalf.  Will cont to assist with both travel and lodging needs for upcoming chemotherapy appt's.     OSW will call patient's wife to make them aware of these referrals and follow up communications that are necessary to access the assistance.     Gale Worthy, MSSW, CSW

## 2023-03-20 NOTE — ANESTHESIA POSTPROCEDURE EVALUATION
Patient: Russell Ramírez Sr.    Procedure Summary     Date: 03/20/23 Room / Location: [facility] OR 06 / [facility] MAIN OR    Anesthesia Start: 0937 Anesthesia Stop: 1036    Procedure: INSERTION VENOUS ACCESS DEVICE (Right) Diagnosis:       Small cell lung cancer (HCC)      (Small cell lung cancer (HCC) [C34.90])    Surgeons: Javier Gonzalez MD PhD Provider: Claudia Klein MD    Anesthesia Type: general ASA Status: 3          Anesthesia Type: general    Vitals  Vitals Value Taken Time   /98 03/20/23 1131   Temp 36.8 °C (98.3 °F) 03/20/23 1130   Pulse 72 03/20/23 1136   Resp 16 03/20/23 1130   SpO2 95 % 03/20/23 1136   Vitals shown include unvalidated device data.        Post Anesthesia Care and Evaluation    Patient location during evaluation: PACU  Patient participation: complete - patient participated  Level of consciousness: awake and alert  Pain management: adequate    Airway patency: patent  Anesthetic complications: No anesthetic complications    Cardiovascular status: acceptable  Respiratory status: acceptable  Hydration status: acceptable    Comments: --------------------            03/20/23               1145     --------------------   BP:       137/78     Pulse:      72       Resp:       18       Temp:                SpO2:      97%      --------------------

## 2023-03-20 NOTE — TELEPHONE ENCOUNTER
----- Message from Alexsander Conn MD sent at 3/16/2023  8:49 AM EDT -----  Patient has extensive stage small cell lung cancer.  Dr. Lara will put in a port and he will need chemo education for carboplatin ,-16, and atezolizumab next week.  Please schedule the chemo education visit next week and place a care plan with plan to start treatment as early as possible the week of 3/27/2023.  Patient will need 2 unit MD appointment with labs that day.    He also needs to be scheduled for an MRI of the brain next week.  He will require IV sedation at the time of his MRI due to the fact that he has severe vertigo and has nausea and vomiting if he lies on his back for any length of time.    He also needs to talk with oncology social worker.  He lives in [facility] [place] and will be traveling extensively for these treatments.  I do not know if we have any assistance available but that would certainly be welcome.

## 2023-03-21 ENCOUNTER — APPOINTMENT (OUTPATIENT)
Dept: OTHER | Facility: HOSPITAL | Age: 78
End: 2023-03-21
Payer: MEDICARE

## 2023-03-21 ENCOUNTER — HOSPITAL ENCOUNTER (OUTPATIENT)
Dept: INTERVENTIONAL RADIOLOGY/VASCULAR | Facility: HOSPITAL | Age: 78
Discharge: HOME | End: 2023-03-21
Payer: MEDICARE

## 2023-03-21 ENCOUNTER — ANESTHESIA (OUTPATIENT)
Dept: MRI IMAGING | Facility: HOSPITAL | Age: 78
End: 2023-03-21
Payer: MEDICARE

## 2023-03-21 ENCOUNTER — HOSPITAL ENCOUNTER (OUTPATIENT)
Dept: MRI IMAGING | Facility: HOSPITAL | Age: 78
Discharge: HOME | End: 2023-03-21
Payer: MEDICARE

## 2023-03-21 ENCOUNTER — ANESTHESIA EVENT (OUTPATIENT)
Dept: MRI IMAGING | Facility: HOSPITAL | Age: 78
End: 2023-03-21
Payer: MEDICARE

## 2023-03-21 VITALS
WEIGHT: 213 LBS | TEMPERATURE: 97.8 F | OXYGEN SATURATION: 97 % | HEIGHT: 72 IN | BODY MASS INDEX: 28.85 KG/M2 | DIASTOLIC BLOOD PRESSURE: 51 MMHG | HEART RATE: 75 BPM | RESPIRATION RATE: 19 BRPM | SYSTOLIC BLOOD PRESSURE: 130 MMHG

## 2023-03-21 DIAGNOSIS — C34.90 SMALL CELL LUNG CANCER: ICD-10-CM

## 2023-03-21 DIAGNOSIS — C34.31 MALIGNANT NEOPLASM OF LOWER LOBE OF RIGHT LUNG: ICD-10-CM

## 2023-03-21 DIAGNOSIS — Z09 FOLLOW-UP EXAM: ICD-10-CM

## 2023-03-21 PROCEDURE — 70553 MRI BRAIN STEM W/O & W/DYE: CPT

## 2023-03-21 PROCEDURE — 25010000002 PROPOFOL 10 MG/ML EMULSION: Performed by: ANESTHESIOLOGY

## 2023-03-21 PROCEDURE — 25010000002 DEXAMETHASONE PER 1 MG: Performed by: ANESTHESIOLOGY

## 2023-03-21 PROCEDURE — A9577 INJ MULTIHANCE: HCPCS | Performed by: INTERNAL MEDICINE

## 2023-03-21 PROCEDURE — 25010000002 PHENYLEPHRINE 10 MG/ML SOLUTION: Performed by: ANESTHESIOLOGY

## 2023-03-21 PROCEDURE — 0 GADOBENATE DIMEGLUMINE 529 MG/ML SOLUTION: Performed by: INTERNAL MEDICINE

## 2023-03-21 PROCEDURE — 25010000002 ONDANSETRON PER 1 MG: Performed by: ANESTHESIOLOGY

## 2023-03-21 RX ORDER — EPHEDRINE SULFATE 50 MG/ML
INJECTION, SOLUTION INTRAVENOUS AS NEEDED
Status: DISCONTINUED | OUTPATIENT
Start: 2023-03-21 | End: 2023-03-21 | Stop reason: SURG

## 2023-03-21 RX ORDER — DROPERIDOL 2.5 MG/ML
0.62 INJECTION, SOLUTION INTRAMUSCULAR; INTRAVENOUS
OUTPATIENT
Start: 2023-03-21

## 2023-03-21 RX ORDER — ONDANSETRON 2 MG/ML
INJECTION INTRAMUSCULAR; INTRAVENOUS AS NEEDED
Status: DISCONTINUED | OUTPATIENT
Start: 2023-03-21 | End: 2023-03-21 | Stop reason: SURG

## 2023-03-21 RX ORDER — HYDRALAZINE HYDROCHLORIDE 20 MG/ML
5 INJECTION INTRAMUSCULAR; INTRAVENOUS
OUTPATIENT
Start: 2023-03-21

## 2023-03-21 RX ORDER — DEXAMETHASONE SODIUM PHOSPHATE 10 MG/ML
INJECTION INTRAMUSCULAR; INTRAVENOUS AS NEEDED
Status: DISCONTINUED | OUTPATIENT
Start: 2023-03-21 | End: 2023-03-21 | Stop reason: SURG

## 2023-03-21 RX ORDER — ONDANSETRON 2 MG/ML
4 INJECTION INTRAMUSCULAR; INTRAVENOUS ONCE AS NEEDED
OUTPATIENT
Start: 2023-03-21

## 2023-03-21 RX ORDER — SODIUM CHLORIDE, SODIUM LACTATE, POTASSIUM CHLORIDE, CALCIUM CHLORIDE 600; 310; 30; 20 MG/100ML; MG/100ML; MG/100ML; MG/100ML
INJECTION, SOLUTION INTRAVENOUS CONTINUOUS PRN
Status: DISCONTINUED | OUTPATIENT
Start: 2023-03-21 | End: 2023-03-21 | Stop reason: SURG

## 2023-03-21 RX ORDER — PROMETHAZINE HYDROCHLORIDE 25 MG/1
25 TABLET ORAL ONCE AS NEEDED
OUTPATIENT
Start: 2023-03-21

## 2023-03-21 RX ORDER — EPHEDRINE SULFATE 50 MG/ML
5 INJECTION, SOLUTION INTRAVENOUS ONCE AS NEEDED
OUTPATIENT
Start: 2023-03-21

## 2023-03-21 RX ORDER — PHENYLEPHRINE HYDROCHLORIDE 10 MG/ML
INJECTION INTRAVENOUS AS NEEDED
Status: DISCONTINUED | OUTPATIENT
Start: 2023-03-21 | End: 2023-03-21 | Stop reason: SURG

## 2023-03-21 RX ORDER — FLUMAZENIL 0.1 MG/ML
0.2 INJECTION INTRAVENOUS AS NEEDED
OUTPATIENT
Start: 2023-03-21

## 2023-03-21 RX ORDER — PROPOFOL 10 MG/ML
VIAL (ML) INTRAVENOUS AS NEEDED
Status: DISCONTINUED | OUTPATIENT
Start: 2023-03-21 | End: 2023-03-21 | Stop reason: SURG

## 2023-03-21 RX ORDER — LABETALOL HYDROCHLORIDE 5 MG/ML
5 INJECTION, SOLUTION INTRAVENOUS
OUTPATIENT
Start: 2023-03-21

## 2023-03-21 RX ORDER — IPRATROPIUM BROMIDE AND ALBUTEROL SULFATE 2.5; .5 MG/3ML; MG/3ML
3 SOLUTION RESPIRATORY (INHALATION) ONCE AS NEEDED
OUTPATIENT
Start: 2023-03-21

## 2023-03-21 RX ORDER — DIPHENHYDRAMINE HYDROCHLORIDE 50 MG/ML
12.5 INJECTION INTRAMUSCULAR; INTRAVENOUS
OUTPATIENT
Start: 2023-03-21

## 2023-03-21 RX ORDER — PROMETHAZINE HYDROCHLORIDE 25 MG/1
25 SUPPOSITORY RECTAL ONCE AS NEEDED
OUTPATIENT
Start: 2023-03-21

## 2023-03-21 RX ORDER — NALOXONE HCL 0.4 MG/ML
0.2 VIAL (ML) INJECTION AS NEEDED
OUTPATIENT
Start: 2023-03-21

## 2023-03-21 RX ORDER — LIDOCAINE HYDROCHLORIDE 20 MG/ML
INJECTION, SOLUTION INFILTRATION; PERINEURAL AS NEEDED
Status: DISCONTINUED | OUTPATIENT
Start: 2023-03-21 | End: 2023-03-21 | Stop reason: SURG

## 2023-03-21 RX ADMIN — PROPOFOL 150 MG: 10 INJECTION, EMULSION INTRAVENOUS at 12:54

## 2023-03-21 RX ADMIN — ONDANSETRON HYDROCHLORIDE 4 MG: 2 SOLUTION INTRAMUSCULAR; INTRAVENOUS at 13:26

## 2023-03-21 RX ADMIN — PHENYLEPHRINE HYDROCHLORIDE 100 MCG: 10 INJECTION INTRAVENOUS at 13:25

## 2023-03-21 RX ADMIN — PHENYLEPHRINE HYDROCHLORIDE 100 MCG: 10 INJECTION INTRAVENOUS at 13:13

## 2023-03-21 RX ADMIN — PHENYLEPHRINE HYDROCHLORIDE 50 MCG: 10 INJECTION INTRAVENOUS at 13:04

## 2023-03-21 RX ADMIN — EPHEDRINE SULFATE 5 MG: 50 INJECTION INTRAVENOUS at 13:13

## 2023-03-21 RX ADMIN — GADOBENATE DIMEGLUMINE 20 ML: 529 INJECTION, SOLUTION INTRAVENOUS at 13:19

## 2023-03-21 RX ADMIN — DEXAMETHASONE SODIUM PHOSPHATE 4 MG: 10 INJECTION INTRAMUSCULAR; INTRAVENOUS at 13:00

## 2023-03-21 RX ADMIN — SODIUM CHLORIDE, POTASSIUM CHLORIDE, SODIUM LACTATE AND CALCIUM CHLORIDE: 600; 310; 30; 20 INJECTION, SOLUTION INTRAVENOUS at 12:51

## 2023-03-21 RX ADMIN — LIDOCAINE HYDROCHLORIDE 100 MG: 20 INJECTION, SOLUTION INFILTRATION; PERINEURAL at 12:54

## 2023-03-21 NOTE — ANESTHESIA PROCEDURE NOTES
Airway  Urgency: elective    Date/Time: 3/21/2023 12:56 PM  Airway not difficult    General Information and Staff    Patient location during procedure: OR  Anesthesiologist: Lety Stewart MD    Indications and Patient Condition  Indications for airway management: airway protection    Preoxygenated: yes  Mask difficulty assessment: 0 - not attempted    Final Airway Details  Final airway type: supraglottic airway      Successful airway: Size 4  Cuff Pressure (cm H2O): 20     Number of attempts at approach: 1  Assessment: lips, teeth, and gum same as pre-op and atraumatic intubation

## 2023-03-21 NOTE — PROGRESS NOTES
[facility] Hematology/Oncology Treatment Plan Summary    Name: Russell Ramírez Sr.  [facility]# [id number]  MD: Dr. Conn    Diagnosis:     ICD-10-CM ICD-9-CM   1. Small cell lung cancer (HCC)  C34.90 162.9         Goal of treatment: disease control    Treatment Medication(s):   1. Carboplatin   2. -16   3. Atezolizumab   4. Neulasta or biosimilar     Frequency: Carboplatin and Atezo given day 1; -16 given days 1,2,3; Neulasta/biosimilar given day 4. Repeat every 21 days    Number of cycles: 4    Starting on: 3/27/2023    Repeat after 2 cycles: CT Scan    Items for home use: Senokot-S (for constipation), Imodium AD (for diarrhea) and Thermometer    Rx written for: [x] Nausea    [x] Pre-Treatment   EMLA cream to port site 30 minutes prior to access, olanzapine 5mg nightly on days as directed and ondansetron 8 mg by mouth every 8 hours as needed for nausea    Notes:     Next Steps: PORT     Completing Provider: PAULIE Whelan           Date/time: 03/24/2023      Please note: You will be seen by a provider frequently with your treatment plan. This plan may change depending on many factors, if so, this will be discussed with you by your physician.  Last update 03/2022.

## 2023-03-21 NOTE — NURSING NOTE
Patient arrived to bay 17 in IR preop area.  PPE worn per patient and care providers for any bedside care.

## 2023-03-21 NOTE — ANESTHESIA PREPROCEDURE EVALUATION
Anesthesia Evaluation     Patient summary reviewed and Nursing notes reviewed   NPO Solid Status: > 8 hours  NPO Liquid Status: > 6 hours           Airway   Mallampati: II  TM distance: >3 FB  Neck ROM: full  No difficulty expected  Dental    (+) edentulous    Pulmonary - normal exam   (+) a smoker Former, lung cancer,   Cardiovascular - normal exam    ECG reviewed  Rhythm: regular  Rate: normal    (+) hypertension less than 2 medications, CAD, hyperlipidemia,     ROS comment: Ef 58%, normal stress ECHO 3/23/hx afib, s/p ablations X2/hx AAA stent graft repair  PE comment: NSR    Neuro/Psych  GI/Hepatic/Renal/Endo    (+) obesity,  GERD, PUD,      Musculoskeletal     Abdominal   (+) obese,    Substance History      OB/GYN          Other   arthritis, autoimmune disease rheumatoid arthritis,    history of cancer      Other Comment: Lung CA                    Anesthesia Plan    ASA 3     general     (LMA    I have reviewed the patient's history and chart with the patient, including all pertinent laboratory results and imaging. I have explained the risks of anesthesia including but not limited to dental damage, corneal abrasion, nerve injury, MI, stroke, aspiration, and death. Patient has agreed to proceed.     There were no vitals taken for this visit.  )  intravenous induction     Anesthetic plan, risks, benefits, and alternatives have been provided, discussed and informed consent has been obtained with: patient.    Plan discussed with CRNA.        CODE STATUS:

## 2023-03-21 NOTE — ANESTHESIA POSTPROCEDURE EVALUATION
Patient: Russlel Ramírez Sr.    Procedure Summary     Date: 03/21/23 Room / Location: [facility] MRI; [facility] INTERVENTIONAL    Anesthesia Start: 1251 Anesthesia Stop: 1344    Procedure: MRI BRAIN W WO CONTRAST Diagnosis:       Small cell lung cancer (HCC)      Malignant neoplasm of lower lobe of right lung (HCC)    Scheduled Providers:  Provider: Lety Stewart MD    Anesthesia Type: general ASA Status: 3          Anesthesia Type: general    Vitals  Vitals Value Taken Time   /51 03/21/23 1430   Temp     Pulse 75 03/21/23 1430   Resp 19 03/21/23 1430   SpO2 97 % 03/21/23 1430           Post Anesthesia Care and Evaluation      Comments: Pt. Discharged prior to being evaluated by anesthesia.  Chart is reviewed and no complications are noted.  THIS CASE IS NOT MEDICALLY DIRECTED

## 2023-03-21 NOTE — NURSING NOTE
Patient transferred to discharge area via wheelchair and with all belongings. Patient transferred into private vehicle and into care of wife Marivel. No concerns voiced at discharge.

## 2023-03-23 ENCOUNTER — TELEPHONE (OUTPATIENT)
Dept: ONCOLOGY | Facility: CLINIC | Age: 78
End: 2023-03-23

## 2023-03-23 ENCOUNTER — TELEPHONE (OUTPATIENT)
Dept: OTHER | Facility: HOSPITAL | Age: 78
End: 2023-03-23
Payer: MEDICARE

## 2023-03-23 NOTE — TELEPHONE ENCOUNTER
Made aware that Dr Conn will be calling them. [facility] understood and requests to call the [phone].

## 2023-03-23 NOTE — TELEPHONE ENCOUNTER
Oncology Social Work  Clinical Case Management - [facility]    OSW called and spoke to patient's wife regarding transportation assistance and lodging assistance.   Patient's daughter is coming in town this weekend and will be assisting with lodging arrangements for this first cycle of treatment.   Patient and wife did receive a phone call and spoke with [employer] ( [employer]) with instructions on how to utilize this program.     Application received from [employer] [place] for gasoline assistance.  With patient's permission - will fill out the application and send back to [facility] with [employer] [place].     OSW will remain available to assist with psychosocial needs  Gale Worthy LCSW

## 2023-03-23 NOTE — TELEPHONE ENCOUNTER
Caller: RICHIE CROUCH    Relationship: Child    Best call back number: [phone]    What test was performed: MRI BRAIN    When was the test performed: 03/21    Where was the test performed:

## 2023-03-24 ENCOUNTER — OFFICE VISIT (OUTPATIENT)
Dept: ONCOLOGY | Facility: CLINIC | Age: 78
End: 2023-03-24
Payer: MEDICARE

## 2023-03-24 DIAGNOSIS — Z79.899 HIGH RISK MEDICATION USE: ICD-10-CM

## 2023-03-24 DIAGNOSIS — C34.90 SMALL CELL LUNG CANCER: Primary | ICD-10-CM

## 2023-03-24 PROCEDURE — 1125F AMNT PAIN NOTED PAIN PRSNT: CPT | Performed by: NURSE PRACTITIONER

## 2023-03-24 PROCEDURE — 99215 OFFICE O/P EST HI 40 MIN: CPT | Performed by: NURSE PRACTITIONER

## 2023-03-24 RX ORDER — OLANZAPINE 5 MG/1
5 TABLET ORAL NIGHTLY
Qty: 3 TABLET | Refills: 3 | Status: SHIPPED | OUTPATIENT
Start: 2023-03-24

## 2023-03-24 RX ORDER — ONDANSETRON HYDROCHLORIDE 8 MG/1
8 TABLET, FILM COATED ORAL 3 TIMES DAILY PRN
Qty: 30 TABLET | Refills: 5 | Status: SHIPPED | OUTPATIENT
Start: 2023-03-24

## 2023-03-24 RX ORDER — LIDOCAINE AND PRILOCAINE 25; 25 MG/G; MG/G
CREAM TOPICAL
Qty: 5 G | Refills: 2 | Status: SHIPPED | OUTPATIENT
Start: 2023-03-24

## 2023-03-24 NOTE — PROGRESS NOTES
TREATMENT  PREPARATION    Russell Ramírez Sr.  [id number]  1945    Chief Complaint: Treatment preparation and needs assessment    History of present illness:  Russell Ramírez Sr. is a 77 y.o. year old male who is here today for treatment preparation and needs assessment.  The patient has been diagnosed with   Encounter Diagnoses   Name Primary?   • Small cell lung cancer (HCC) Yes   • High risk medication use     and is scheduled to begin treatment with:     Oncology History:    Oncology/Hematology History   Small cell lung cancer (HCC)   3/16/2023 Initial Diagnosis    Small cell lung cancer (HCC)     3/27/2023 -  Chemotherapy    OP LUNG Atezolizumab / CARBOplatin AUC=5 / Etoposide (SCLC)         The current medication list and allergy list were reviewed and reconciled.     Past Medical History, Past Surgical History, Social History, Family History have been reviewed and are without significant changes except as mentioned.    Physical Exam:    There were no vitals filed for this visit.  Vitals:    03/24/23 1248   PainSc:   2        ECOG score: 0             Physical Exam  HENT:      Head: Normocephalic and atraumatic.   Eyes:      Extraocular Movements: Extraocular movements intact.      Conjunctiva/sclera: Conjunctivae normal.   Pulmonary:      Effort: Pulmonary effort is normal. No respiratory distress.   Neurological:      General: No focal deficit present.      Mental Status: He is alert and oriented to person, place, and time.   Psychiatric:         Mood and Affect: Mood normal.         Behavior: Behavior normal.           NEEDS ASSESSMENTS    Genetics  The patient's new diagnosis and family history have been reviewed for genetic counseling needs. The patient will not be referred..     Psychosocial and Barriers to care  The patient has completed a PHQ-9 Depression Screening and the Distress Thermometer (DT) today.  PHQ-9 results show PHQ-2 Total Score: 0 PHQ-9 Total Score: PHQ-9 Total Score: 0     The  patient scored their distress today as Distress Level: 0-No distress on a scale of 0-10 with 0 being no distress and 10 being extreme distress. Problems marked by the patient as being an issue for them within the last week include Practical Problems  : No  Housing: No  Transportation: No  Treatment decisions: No  Family Concerns  Ability to have children: No  Physical concerns  Fatigue: No  Pain: No  Sleep: No  Substance abuse: No .      Results were reviewed along with psychosocial resources offered by our cancer center.  Our Supportive Oncology team will be flagged for a score of 4 or above, and a same day call will be made for a score of 9 or 10.  A mental health referral is offered at that time. Patients who score less than 4 have been educated on our support services and can be referred to our  upon request.  The patient will not be referred to our .       Nutrition  The patient has completed the malnutrition screening today. They scored Malnutrition Screening Tool  Have you recently lost weight without trying?  If yes, how much weight have you lost?: 0--> No  Have you been eating poorly because of a decreased appetite?: 0--> No  MST score: 0   with a score of 0-1 meaning not at risk in a score of 2 or greater meaning at risk.  Patients with a score of 3 or higher will be referred to our oncology dietitian for support. Patients beginning at risk treatment regimens or who have dietary concerns will also be referred to our oncology dietitian. The patient will not be referred.    Functional Assessment  Persons who are age 70 or greater will be screened for qualification of a comprehensive geriatric assessment by our survivorship nurse practitioner.  Older adults with cancer face unique challenges. These may include an increased risk of drug reactions, financial burdens, and caregiver stress. The patient scored G8 Questionnaire  Has food intake declined over the past 3 months due  to loss of appetite, digestive problems, chewing or swallowing difficulties?: No decrease in food intake  Weight loss during the last 3 months: No weight loss  Mobility: Goes out  Neuropsychological Problems: No psychological problems  Body Mass Index (BMI (weight in kg) / (height in m2)): BMI 23 and > 23  Takes more than 3 medications a day: Yes, takes more than 3 prescription drugs per day  In comparison with other people of the same age, how does the patient consider his/her health status?: As good  Age: < 80  Total Score: 15 . Patients scoring 14 or lower will referred for an older adult functional assessment with the survivorship advanced practice registered nurse to ensure all needed support is provided as patients plan for their treatments. The patient will not be referred.    Intravenous Access Assessment  The patient and I discussed planned intravenous chemo/biotherapy as well as other IV treatments that are often needed throughout the course of treatment. These may include, but are not limited to blood transfusions, antibiotics, and IV hydration. Discussed that depending on selected treatment and vein assessment, patient may require venous access device (VAD) which could include but not limited to a Mediport or PICC line. Risks and benefits of VADs reviewed. The patient will be treated via Port.    Reproductive/Sexual Activity   People should avoid becoming pregnant and should not get a partner pregnant while undergoing chemo/biotherapy.  People of childbearing age should use effective contraception during active therapy. The best recommendation for all people is to use a barrier method for a minimum of 1 week after the last infusion of chemo/biotherapy to prevent your partner being exposed to byproducts from treatment medications in bodily fluids. Effective contraception should be discussed with your oncology team to make sure it is safe to take based on your diagnosis. Possible options include oral  "contraceptives, barrier methods. Chemo/biotherapy can change your ability to reproduce children in the future.  There are options for fertility preservation. NOT APPLICABLE    Advanced Care Planning  Advance Care Planning   The patient and I discussed advanced care planning, \"Conversations that Matter\".   This service is offered for development of advance directives with a certified ACP facilitator.  The patient does not have an up-to-date advanced directive. This document is not on file with our office. The patient is not interested in an appointment with one of our facilitators to create or update their advanced directives.     Smoking cessation  Tobacco Use: Medium Risk   • Smoking Tobacco Use: Former   • Smokeless Tobacco Use: Never   • Passive Exposure: Not on file       Patient and I discussed their tobacco use history. Referral will not be made for smoking cessation.      Palliative Care  When appropriate, the patient and I discussed the availability palliative care services and when appropriate Hospice care. Palliative care is not the same as Hospice care which was explained to the patient.The patient is not interested in additional information from our  on these services.    Survivorship   When appropriate, we discussed that we will refer the patient to survivorship clinic to discuss next steps following completion of planned treatment.  Reviewed this visit will include assessment of your physical, psychological, functional, and spiritual needs as a survivor and the need at attend this visit when scheduled.    TREATMENT EDUCATION    Today I met with the patient to discuss the chemo/biotherapy regimen recommended for treatment of Small cell lung cancer (HCC)  - lidocaine-prilocaine (EMLA) 2.5-2.5 % cream  - OLANZapine (zyPREXA) 5 MG tablet  - ondansetron (ZOFRAN) 8 MG tablet    High risk medication use  - lidocaine-prilocaine (EMLA) 2.5-2.5 % cream  - OLANZapine (zyPREXA) 5 MG tablet  - " ondansetron (ZOFRAN) 8 MG tablet  .  The patient was given explanation of treatment premed side effects including office policy that prohibits patients to drive if sedating medications are administered, MD explanation given regarding benefits, side effects, toxicities and goals of treatment.  The patient received a Chemotherapy/Biotherapy Plan Summary including diagnosis and explanation of specific treatment plan.    SIDE EFFECTS:  Common side effects were discussed with the patient and/or significant other.  Discussion included where applicable hair loss/discoloration, anemia/fatigue, infection/chills/fever, appetite, bleeding risk/precautions, constipation, diarrhea, mouth sores, taste alteration, loss of appetite, nausea/vomiting, peripheral neuropathy, skin/nail changes, rash, muscle aches/weakness, photosensitivity, weight gain/loss, hearing loss, dizziness, menopausal symptoms, menstrual irregularity, sterility, high blood pressure, heart damage, liver damage, lung damage, kidney damage, DVT/PE risk, fluid retention, pleural/pericardial effusion, somnolence, electrolyte/LFT imbalance, vein exercises and/or the possible need for vascular access/port placement.  The patient was advised that although uncommon, leakage of an infused medication from the vein or venous access device may lead to skin breakdown and/or other tissue damage.  The patient was advised that he/she may have pain, bleeding, and/or bruising from the insertion of a needle in their vein or venous access device (port).  The patient was further advised that, in spite of proper technique, infection with redness and irritation may rarely occur at the site where the needle was inserted.  The patient was advised that if complications occur, additional medical treatment is available.  Finally, where applicable we have reviewed rare but potential immune mediated side effects including shortness of breath, cough, chest pain (pneumonitis), abdominal pain,  diarrhea (colitis), thyroiditis (hypothyroid or hyperthyroid), hepatitis and liver dysfunction, nephritis and renal dysfunction.    Discussion also included side effects specific to drugs in the treatment plan, specifically:    Treatment Plans     Name Type Hold Status Plan Dates Plan Provider       Active    OP LUNG Atezolizumab / CARBOplatin AUC=5 / Etoposide (SCLC) ONCOLOGY TREATMENT On Automatic Hold  3/26/2023 - Present Alexsander Conn MD                   Questions answered and additional information discussed on topics including:  Anemia, Thrombocytopenia, Neutropenia, Nutrition and appetite changes, Constipation, Diarrhea, Nausea & vomiting, Mouth sores, Alopecia, Nervous system changes, Skin & nail changes, Organ toxicities and Home care       Assessment and Plan:    Diagnoses and all orders for this visit:    1. Small cell lung cancer (HCC) (Primary)  -     lidocaine-prilocaine (EMLA) 2.5-2.5 % cream; Apply nickel size amount to port site 30 min before appt time do not rub in cover with plastic wrap  Dispense: 5 g; Refill: 2  -     OLANZapine (zyPREXA) 5 MG tablet; Take 1 tablet by mouth Every Night. Take on days 2, 3 and 4 after chemotherapy.  Dispense: 3 tablet; Refill: 3  -     ondansetron (ZOFRAN) 8 MG tablet; Take 1 tablet by mouth 3 (Three) Times a Day As Needed for Nausea or Vomiting.  Dispense: 30 tablet; Refill: 5    2. High risk medication use  -     lidocaine-prilocaine (EMLA) 2.5-2.5 % cream; Apply nickel size amount to port site 30 min before appt time do not rub in cover with plastic wrap  Dispense: 5 g; Refill: 2  -     OLANZapine (zyPREXA) 5 MG tablet; Take 1 tablet by mouth Every Night. Take on days 2, 3 and 4 after chemotherapy.  Dispense: 3 tablet; Refill: 3  -     ondansetron (ZOFRAN) 8 MG tablet; Take 1 tablet by mouth 3 (Three) Times a Day As Needed for Nausea or Vomiting.  Dispense: 30 tablet; Refill: 5      No orders of the defined types were placed in this encounter.        1. The  patient and I have reviewed their diagnosis and scheduled treatment plan. Needs assessment was completed where applicable including genetics, psychosocial needs, barriers to care, VAD evaluation, advanced care planning, survivorship, and palliative care services where indicated. Referrals have been ordered as appropriate based upon evaluation today and patient desires.   2. Chemo/biotherapy teaching was completed today and consent obtained. See separate documentation for further details.  3. Adequate time was given to answer questions.  Patient made aware of their care team members and contact information if they have questions or problems throughout the treatment course.  4. Discussion held and written information provided describing frequency of office visits and ongoing monitoring throughout the treatment plan.     5. Reviewed with patient any prescribed medication sent to pharmacy.  Education provided regarding proper storage, safe handling, and proper disposal of unused medication.  6. Proper handling of body fluids and waste discussed and written information provided.  7. If appropriate, patient had pretreatment labs drawn today.    Learning assessment completed at initial patient encounter. See separate flowsheet. Chemo/biotherapy education comprehension assessed at today's visit.    I spent 55 minutes caring for Russell on this date of service. This time includes time spent by me in the following activities: preparing for the visit, reviewing tests, obtaining and/or reviewing a separately obtained history, counseling and educating the patient/family/caregiver, ordering medications, tests, or procedures, documenting information in the medical record and care coordination.     You have chosen to receive care through a telehealth visit.  Do you consent to use a video/audio connection for your medical care today? Yes      Tonja Navarro, APRN   03/24/23

## 2023-03-27 ENCOUNTER — INFUSION (OUTPATIENT)
Dept: ONCOLOGY | Facility: HOSPITAL | Age: 78
End: 2023-03-27
Payer: MEDICARE

## 2023-03-27 ENCOUNTER — OFFICE VISIT (OUTPATIENT)
Dept: ONCOLOGY | Facility: CLINIC | Age: 78
End: 2023-03-27
Payer: MEDICARE

## 2023-03-27 VITALS
DIASTOLIC BLOOD PRESSURE: 72 MMHG | WEIGHT: 216 LBS | OXYGEN SATURATION: 98 % | RESPIRATION RATE: 18 BRPM | SYSTOLIC BLOOD PRESSURE: 128 MMHG | BODY MASS INDEX: 29.26 KG/M2 | HEIGHT: 72 IN | HEART RATE: 79 BPM | TEMPERATURE: 96.8 F

## 2023-03-27 DIAGNOSIS — Z79.899 HIGH RISK MEDICATION USE: ICD-10-CM

## 2023-03-27 DIAGNOSIS — C34.90 SMALL CELL LUNG CANCER: Primary | ICD-10-CM

## 2023-03-27 DIAGNOSIS — Z45.2 ENCOUNTER FOR ADJUSTMENT OR MANAGEMENT OF VASCULAR ACCESS DEVICE: ICD-10-CM

## 2023-03-27 LAB
ALBUMIN SERPL-MCNC: 4 G/DL (ref 3.5–5.2)
ALBUMIN/GLOB SERPL: 1.4 G/DL (ref 1.1–2.4)
ALP SERPL-CCNC: 84 U/L (ref 38–116)
ALT SERPL W P-5'-P-CCNC: 16 U/L (ref 0–41)
ANION GAP SERPL CALCULATED.3IONS-SCNC: 10.8 MMOL/L (ref 5–15)
AST SERPL-CCNC: 16 U/L (ref 0–40)
BASOPHILS # BLD AUTO: 0.07 10*3/MM3 (ref 0–0.2)
BASOPHILS NFR BLD AUTO: 0.8 % (ref 0–1.5)
BILIRUB SERPL-MCNC: 0.3 MG/DL (ref 0.2–1.2)
BUN SERPL-MCNC: 24 MG/DL (ref 6–20)
BUN/CREAT SERPL: 23.8 (ref 7.3–30)
CALCIUM SPEC-SCNC: 9.5 MG/DL (ref 8.5–10.2)
CHLORIDE SERPL-SCNC: 105 MMOL/L (ref 98–107)
CO2 SERPL-SCNC: 26.2 MMOL/L (ref 22–29)
CREAT SERPL-MCNC: 1.01 MG/DL (ref 0.7–1.3)
DEPRECATED RDW RBC AUTO: 51.1 FL (ref 37–54)
EGFRCR SERPLBLD CKD-EPI 2021: 77.1 ML/MIN/1.73
EOSINOPHIL # BLD AUTO: 0.36 10*3/MM3 (ref 0–0.4)
EOSINOPHIL NFR BLD AUTO: 4.3 % (ref 0.3–6.2)
ERYTHROCYTE [DISTWIDTH] IN BLOOD BY AUTOMATED COUNT: 14.3 % (ref 12.3–15.4)
GLOBULIN UR ELPH-MCNC: 2.8 GM/DL (ref 1.8–3.5)
GLUCOSE SERPL-MCNC: 109 MG/DL (ref 74–124)
HCT VFR BLD AUTO: 38.7 % (ref 37.5–51)
HGB BLD-MCNC: 13 G/DL (ref 13–17.7)
IMM GRANULOCYTES # BLD AUTO: 0.03 10*3/MM3 (ref 0–0.05)
IMM GRANULOCYTES NFR BLD AUTO: 0.4 % (ref 0–0.5)
LYMPHOCYTES # BLD AUTO: 1.28 10*3/MM3 (ref 0.7–3.1)
LYMPHOCYTES NFR BLD AUTO: 15.4 % (ref 19.6–45.3)
MCH RBC QN AUTO: 32.7 PG (ref 26.6–33)
MCHC RBC AUTO-ENTMCNC: 33.6 G/DL (ref 31.5–35.7)
MCV RBC AUTO: 97.2 FL (ref 79–97)
MONOCYTES # BLD AUTO: 0.78 10*3/MM3 (ref 0.1–0.9)
MONOCYTES NFR BLD AUTO: 9.4 % (ref 5–12)
NEUTROPHILS NFR BLD AUTO: 5.8 10*3/MM3 (ref 1.7–7)
NEUTROPHILS NFR BLD AUTO: 69.7 % (ref 42.7–76)
NRBC BLD AUTO-RTO: 0 /100 WBC (ref 0–0.2)
PLATELET # BLD AUTO: 147 10*3/MM3 (ref 140–450)
PMV BLD AUTO: 8.9 FL (ref 6–12)
POTASSIUM SERPL-SCNC: 4 MMOL/L (ref 3.5–4.7)
PROT SERPL-MCNC: 6.8 G/DL (ref 6.3–8)
RBC # BLD AUTO: 3.98 10*6/MM3 (ref 4.14–5.8)
SODIUM SERPL-SCNC: 142 MMOL/L (ref 134–145)
T3FREE SERPL-MCNC: 3.39 PG/ML (ref 2–4.4)
T4 FREE SERPL-MCNC: 1.24 NG/DL (ref 0.93–1.7)
TSH SERPL DL<=0.05 MIU/L-ACNC: 2.48 UIU/ML (ref 0.27–4.2)
WBC NRBC COR # BLD: 8.32 10*3/MM3 (ref 3.4–10.8)

## 2023-03-27 PROCEDURE — 80053 COMPREHEN METABOLIC PANEL: CPT

## 2023-03-27 PROCEDURE — 85025 COMPLETE CBC W/AUTO DIFF WBC: CPT

## 2023-03-27 PROCEDURE — 96417 CHEMO IV INFUS EACH ADDL SEQ: CPT

## 2023-03-27 PROCEDURE — 63710000001 OLANZAPINE 5 MG TABLET: Performed by: INTERNAL MEDICINE

## 2023-03-27 PROCEDURE — A9270 NON-COVERED ITEM OR SERVICE: HCPCS | Performed by: INTERNAL MEDICINE

## 2023-03-27 PROCEDURE — 25010000002 DEXAMETHASONE SODIUM PHOSPHATE 100 MG/10ML SOLUTION: Performed by: INTERNAL MEDICINE

## 2023-03-27 PROCEDURE — 84439 ASSAY OF FREE THYROXINE: CPT | Performed by: INTERNAL MEDICINE

## 2023-03-27 PROCEDURE — 84481 FREE ASSAY (FT-3): CPT | Performed by: INTERNAL MEDICINE

## 2023-03-27 PROCEDURE — 25010000002 ETOPOSIDE 1 GM/50ML SOLUTION 50 ML VIAL: Performed by: INTERNAL MEDICINE

## 2023-03-27 PROCEDURE — 96413 CHEMO IV INFUSION 1 HR: CPT

## 2023-03-27 PROCEDURE — 25010000002 CARBOPLATIN PER 50 MG: Performed by: INTERNAL MEDICINE

## 2023-03-27 PROCEDURE — 84443 ASSAY THYROID STIM HORMONE: CPT | Performed by: INTERNAL MEDICINE

## 2023-03-27 PROCEDURE — 1125F AMNT PAIN NOTED PAIN PRSNT: CPT | Performed by: INTERNAL MEDICINE

## 2023-03-27 PROCEDURE — 99215 OFFICE O/P EST HI 40 MIN: CPT | Performed by: INTERNAL MEDICINE

## 2023-03-27 PROCEDURE — 96367 TX/PROPH/DG ADDL SEQ IV INF: CPT

## 2023-03-27 PROCEDURE — 25010000002 PALONOSETRON PER 25 MCG: Performed by: INTERNAL MEDICINE

## 2023-03-27 PROCEDURE — 25010000002 ATEZOLIZUMAB 1200 MG/20ML SOLUTION 20 ML VIAL: Performed by: INTERNAL MEDICINE

## 2023-03-27 PROCEDURE — 25010000002 FOSAPREPITANT PER 1 MG: Performed by: INTERNAL MEDICINE

## 2023-03-27 PROCEDURE — 96375 TX/PRO/DX INJ NEW DRUG ADDON: CPT

## 2023-03-27 RX ORDER — OLANZAPINE 5 MG/1
5 TABLET ORAL ONCE
Status: COMPLETED | OUTPATIENT
Start: 2023-03-27 | End: 2023-03-27

## 2023-03-27 RX ORDER — PALONOSETRON 0.05 MG/ML
0.25 INJECTION, SOLUTION INTRAVENOUS ONCE
Status: COMPLETED | OUTPATIENT
Start: 2023-03-27 | End: 2023-03-27

## 2023-03-27 RX ORDER — OLANZAPINE 5 MG/1
5 TABLET ORAL ONCE
Status: CANCELLED | OUTPATIENT
Start: 2023-03-27 | End: 2023-03-27

## 2023-03-27 RX ORDER — FAMOTIDINE 10 MG/ML
20 INJECTION, SOLUTION INTRAVENOUS AS NEEDED
Status: CANCELLED | OUTPATIENT
Start: 2023-03-27

## 2023-03-27 RX ORDER — PALONOSETRON 0.05 MG/ML
0.25 INJECTION, SOLUTION INTRAVENOUS ONCE
Status: CANCELLED | OUTPATIENT
Start: 2023-03-27

## 2023-03-27 RX ORDER — DIPHENHYDRAMINE HYDROCHLORIDE 50 MG/ML
50 INJECTION INTRAMUSCULAR; INTRAVENOUS AS NEEDED
Status: CANCELLED | OUTPATIENT
Start: 2023-03-27

## 2023-03-27 RX ORDER — SODIUM CHLORIDE 9 MG/ML
250 INJECTION, SOLUTION INTRAVENOUS ONCE
Status: COMPLETED | OUTPATIENT
Start: 2023-03-27 | End: 2023-03-27

## 2023-03-27 RX ORDER — SODIUM CHLORIDE 9 MG/ML
250 INJECTION, SOLUTION INTRAVENOUS ONCE
Status: CANCELLED | OUTPATIENT
Start: 2023-03-27

## 2023-03-27 RX ORDER — SODIUM CHLORIDE 9 MG/ML
250 INJECTION, SOLUTION INTRAVENOUS ONCE
Status: CANCELLED | OUTPATIENT
Start: 2023-03-29

## 2023-03-27 RX ORDER — SODIUM CHLORIDE 9 MG/ML
250 INJECTION, SOLUTION INTRAVENOUS ONCE
Status: CANCELLED | OUTPATIENT
Start: 2023-03-28

## 2023-03-27 RX ADMIN — OLANZAPINE 5 MG: 5 TABLET, FILM COATED ORAL at 11:24

## 2023-03-27 RX ADMIN — DEXAMETHASONE SODIUM PHOSPHATE 12 MG: 10 INJECTION, SOLUTION INTRAMUSCULAR; INTRAVENOUS at 13:09

## 2023-03-27 RX ADMIN — PALONOSETRON 0.25 MG: 0.05 INJECTION, SOLUTION INTRAVENOUS at 11:30

## 2023-03-27 RX ADMIN — SODIUM CHLORIDE 100 ML: 9 INJECTION, SOLUTION INTRAVENOUS at 11:30

## 2023-03-27 RX ADMIN — ETOPOSIDE 220 MG: 20 INJECTION INTRAVENOUS at 14:05

## 2023-03-27 RX ADMIN — ATEZOLIZUMAB 1200 MG: 1200 INJECTION, SOLUTION INTRAVENOUS at 12:07

## 2023-03-27 RX ADMIN — SODIUM CHLORIDE 250 ML: 9 INJECTION, SOLUTION INTRAVENOUS at 11:24

## 2023-03-27 RX ADMIN — SODIUM CHLORIDE 560 MG: 900 INJECTION, SOLUTION INTRAVENOUS at 13:30

## 2023-03-27 NOTE — NURSING NOTE
Written consent obtained for Atezo,Carbo, and Etoposide. Questions answered for patient and spouse. Chemo folder provided. Teaching video on Neulasta Onbody viewed by patient and spouse.

## 2023-03-27 NOTE — PROGRESS NOTES
Subjective     REASON FOR FOLLOW UP:  1.  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  2.  Initiation of palliative chemo/immunotherapy with carboplatin/-16/atezolizumab with cycle #1 3/27/2023  3.  MediPort placed 3/20/2023 by Dr. Gonzalez      HISTORY OF PRESENT ILLNESS:  The patient is a 77 y.o. year old male who is here for an opinion about the above issue.  He was seen today in the multidisciplinary thoracic oncology clinic along with Dr Lara and Dr. Coreas.  He has been following up with Dr. Nobles of vascular surgery and underwent a CT angiogram of the abdomen on 2/23/2023.  There were some nodules noted in the lung bases and this led to a CT scan of the chest which again showed pulmonary nodules in the right lower lobe as well as subcarinal lymphadenopathy.    He had previously been seen by Dr. Lara and she arranged for him to undergo outpatient navigational bronchoscopy with biopsy on 3/13/2023.  His pathology was positive for small cell carcinoma of the lung.    He underwent additional staging with a PET scan performed on 3/14/2023 which showed evidence of metastatic disease to the liver and the right adrenal gland as well as hypermetabolic mediastinal and hilar lymphadenopathy.    INTERVAL HISTORY:  He was seen in the multidisciplinary thoracic oncology clinic 3/16/2023 to discuss treatment recommendations.  We had a lengthy visit with the patient and his wife.  They live in [facility] which is close to [facility] but they expressed that they would desire to have their treatment delivered here in [place].    Because of his extensive stage disease we have recommended systemic treatment with carboplatin/-16 and atezolizumab immunotherapy.    He underwent an MRI of the brain 3/21/2023 to complete his staging and was found to have multiple tiny mets with no surrounding edema or obvious mass effect.  We had discussed the MRI results with the patient  and his family by phone and elected to proceed with his systemic treatment initially with close follow-up on subsequent MRIs and eventual whole brain radiation treatment in the future.    History of Present Illness     Past Medical History:   Diagnosis Date   • Acid reflux    • Anemia    • Arthritis    • Coronary artery disease    • CVA (cerebral vascular accident) (HCC)    • High cholesterol    • History of atrial fibrillation    • Hx of blood clots    • Hypertension    • Irregular heartbeat    • Peptic ulcer disease    • Prostate cancer (HCC)    • Rheumatoid arthritis (HCC)    • Skin cancer    • Small cell lung cancer (HCC)         Past Surgical History:   Procedure Laterality Date   • ABDOMINAL AORTIC ANEURYSM REPAIR     • BACK SURGERY      x2   • BRONCHOSCOPY WITH ION ROBOTIC ASSIST N/A 03/13/2023    Procedure: BRONCHOSCOPY ,ENDOBRONCHIAL ULTRASOUND AND ROBOTIC NAVIGATIONAL BRONCHOSCOPY WITH FINE NEEDLE ASPIRATION, BIOPSY X1 AREA, AND BRONCHOALEOLAR LAVAGE;  Surgeon: Sonali Lara MD;  Location[facility] ENDOSCOPY;  Service: Robotics - Pulmonary;  Laterality: N/A;  Post: LUNG CANCER   • CARDIAC ABLATION      x2   • COLON SURGERY     • HERNIA REPAIR     • INSERTION CENTRAL VENOUS ACCESS DEVICE W/ SUBCUTANEOUS PORT Right 03/20/2023   • VENOUS ACCESS DEVICE (PORT) INSERTION Right 03/20/2023    Procedure: INSERTION VENOUS ACCESS DEVICE;  Surgeon: Javier Gonzalez MD PhD;  Location: [facility];  Service: Thoracic;  Laterality: Right;        Current Outpatient Medications on File Prior to Visit   Medication Sig Dispense Refill   • Acetaminophen (ARTHRITIS PAIN RELIEF PO) Arthritis Relief   650 mg     • ascorbic acid (VITAMIN C) 1000 MG tablet Vitamin C     • aspirin 81 MG EC tablet Daily.     • coenzyme Q10 100 MG capsule Take 1 capsule by mouth Daily.     • cyanocobalamin (VITAMIN B-12) 1000 MCG tablet Take 1 tablet by mouth Daily.     • diphenhydrAMINE (BENADRYL) 25 mg capsule Take 1 capsule by mouth Every 6  (Six) Hours As Needed for Itching.     • Elderberry 500 MG capsule Take  by mouth.     • lidocaine-prilocaine (EMLA) 2.5-2.5 % cream Apply nickel size amount to port site 30 min before appt time do not rub in cover with plastic wrap 5 g 2   • Magnesium 250 MG tablet Take  by mouth.     • metoprolol tartrate (LOPRESSOR) 25 MG tablet metoprolol tartrate 25 mg tablet   TAKE 1 TABLET BY MOUTH TWICE DAILY.     • OLANZapine (zyPREXA) 5 MG tablet Take 1 tablet by mouth Every Night. Take on days 2, 3 and 4 after chemotherapy. 3 tablet 3   • ondansetron (ZOFRAN) 8 MG tablet Take 1 tablet by mouth 3 (Three) Times a Day As Needed for Nausea or Vomiting. 30 tablet 5   • pravastatin (PRAVACHOL) 40 MG tablet Daily.     • tiZANidine (ZANAFLEX) 4 MG tablet Every 8 (Eight) Hours.     • Turmeric 500 MG capsule Take  by mouth.     • vitamin D3 125 MCG (5000 UT) capsule capsule Take 1 capsule by mouth Daily.     • Zinc 50 MG tablet Take 1 tablet by mouth Daily.       No current facility-administered medications on file prior to visit.        ALLERGIES:    Allergies   Allergen Reactions   • Codeine Other (See Comments)     headache          Social History     Socioeconomic History   • Marital status:      Spouse name: Marivel   Tobacco Use   • Smoking status: Former     Types: Cigarettes   • Smokeless tobacco: Never   Vaping Use   • Vaping Use: Never used   Substance and Sexual Activity   • Alcohol use: Never   • Drug use: Never   • Sexual activity: Defer        Family History   Problem Relation Age of Onset   • Brain cancer Mother    • Lung cancer Sister         Review of Systems   Constitutional: Negative for activity change, chills, fatigue and fever.   HENT: Negative for mouth sores, trouble swallowing and voice change.    Eyes: Negative for pain and visual disturbance.   Respiratory: Positive for cough and shortness of breath. Negative for wheezing.    Cardiovascular: Negative for chest pain and palpitations.  "  Gastrointestinal: Negative for abdominal pain, constipation, diarrhea, nausea and vomiting.   Genitourinary: Negative for difficulty urinating, frequency and urgency.   Musculoskeletal: Negative for arthralgias and joint swelling.   Skin: Negative for rash.   Neurological: Positive for dizziness. Negative for seizures, weakness and headaches.        Patient reports severe positional vertigo.  He is unable to lay flat on his back without having nausea and vomiting   Hematological: Negative for adenopathy. Does not bruise/bleed easily.   Psychiatric/Behavioral: Negative for behavioral problems and confusion. The patient is not nervous/anxious.         Objective     Vitals:    03/27/23 0955   BP: 128/72   Pulse: 79   Resp: 18   Temp: 96.8 °F (36 °C)   TempSrc: Infrared   SpO2: 98%   Weight: 98 kg (216 lb)   Height: 183 cm (72.05\")   PainSc:   2   PainLoc: Back  Comment: back and leg     Current Status 3/27/2023   ECOG score 0       Physical Exam  Constitutional:       General: He is not in acute distress.     Appearance: He is well-developed.   HENT:      Head: Normocephalic.   Eyes:      General: No scleral icterus.     Conjunctiva/sclera: Conjunctivae normal.      Pupils: Pupils are equal, round, and reactive to light.   Neck:      Thyroid: No thyromegaly.      Vascular: No JVD.      Comments: Right IJ Mediport  Cardiovascular:      Rate and Rhythm: Normal rate and regular rhythm.      Heart sounds: No murmur heard.    No friction rub. No gallop.   Pulmonary:      Effort: Pulmonary effort is normal.      Breath sounds: Normal breath sounds. No wheezing or rales.   Abdominal:      General: There is no distension.      Palpations: Abdomen is soft. There is no mass.      Tenderness: There is no abdominal tenderness.   Musculoskeletal:         General: No deformity. Normal range of motion.      Cervical back: Normal range of motion and neck supple.   Lymphadenopathy:      Cervical: No cervical adenopathy.   Skin:     " General: Skin is warm and dry.      Findings: No erythema or rash.   Neurological:      Mental Status: He is alert and oriented to person, place, and time.      Cranial Nerves: No cranial nerve deficit.      Deep Tendon Reflexes: Reflexes are normal and symmetric.   Psychiatric:         Behavior: Behavior normal.         Judgment: Judgment normal.           RECENT LABS:  Hematology WBC   Date Value Ref Range Status   03/27/2023 8.32 3.40 - 10.80 10*3/mm3 Final   09/18/2019 5.9 4.8 - 10.8 10*9/L Final     RBC   Date Value Ref Range Status   03/27/2023 3.98 (L) 4.14 - 5.80 10*6/mm3 Final   09/18/2019 4.36 (L) 4.7 - 6.1 10*12/L Final     Hemoglobin   Date Value Ref Range Status   03/27/2023 13.0 13.0 - 17.7 g/dL Final   09/18/2019 14.1 14.0 - 18.0 g/dL Final     Hematocrit   Date Value Ref Range Status   03/27/2023 38.7 37.5 - 51.0 % Final   09/18/2019 42.0 42 - 54 % Final     Platelets   Date Value Ref Range Status   03/27/2023 147 140 - 450 10*3/mm3 Final   09/18/2019 165 150 - 450 10*9/L Final        Lab Results   Component Value Date    GLUCOSE 100 (H) 03/11/2023    CALCIUM 9.7 03/11/2023     03/11/2023    K 4.6 03/11/2023    CO2 23.1 03/11/2023     03/11/2023    BUN 16 03/11/2023    CREATININE 0.77 03/11/2023    EGFR 92.8 03/11/2023    BCR 20.8 03/11/2023    ANIONGAP 15.9 (H) 03/11/2023     PATHOLOGY 3/13/2023  Final Diagnosis   Lung, right lower lobe, endobronchial biopsies:    Small cell carcinoma, see comment      Comment    The biopsy shows an invasive tumor with crush artifact. Immunohistochemistry was performed utilizing appropriate controls and the tumor is positive for CD56, TTF-1 and synaptophysin and is negative for CD45 and chromogranin. The staining pattern and morphology are consistent with small cell carcinoma.     Molecular profiling has been ordered but is pending    IMAGING:  MRI OF THE BRAIN WITH AND WITHOUT CONTRAST ON 03/21/2023  IMPRESSION:  1. There is mild-to-moderate small  vessel disease in the cerebral white  matter and there is a tiny 5 x 3 mm old posterior lateral right  cerebellar infarct in the right PICA territory  2. This patient has at least 8 enhancing metastatic lesions to the  brain, 2 infratentorial lesions including a 3 to 4 mm lesion in the  inferior lateral right cerebellum and an additional 9 x 8 x 10 mm lesion  in the posterior medial left cerebellum with some some central necrosis.  There are at least 6 separate tiny 2 to 5 mm enhancing supratentorial  metastatic lesions including a 4 mm enhancing metastases to the  posterior superior right frontal lobe and 5 mm round enhancing  metastases to  the posterior superior left frontal juxtacortical white  matter, 2 mm met to the posterior medial left parietal lobe and an  additional 2 mm met to the far posterior inferior medial tip of the left  parietal lobe just above the left parietal occipital sulcus and a 4 to 5  mm round enhancing metastatic lesion in the anterior-inferior left  frontal lobe just above the left orbital roof and a 3 mm met to the left  putamen and there is a questionable very subtle 3 x 2 mm cortical met to  the anterior-inferior tip of the left temporal lobe which could be a  seventh supratentorial met and this is an indeterminate lesion. There is  no surrounding edema and no mass effect. The remainder of MRI of the  brain is normal.     F-18 FDG PET FROM SKULL BASE TO MID THIGH WITH PET/CT FUSION 3/14/2023  IMPRESSION:  1.  Intensely avid right lower lobe pulmonary nodule most suggestive of  malignancy.  2.  Hypermetabolic mediastinal and hilar adenopathy, hepatic lesions,  right adrenal nodule and sternal lesion most likely representing  metastatic disease. Nodular thickening along the right major fissure  which contacts the right hilum indicate uptake mildly above that of the  adjacent lung may represent lymphangitic spread of malignancy  3.  Scattered bilateral sub-6 mm pulmonary nodules are below  PET  resolution. A mildly hypermetabolic cortes hepatic node is present. These  findings are indeterminate indeterminate and continued close attention  on follow-up is recommended.  4.  Other findings as above.    CT CHEST WO CONTRAST DIAGNOSTIC-2/23/2023  IMPRESSION:   Right lower lobe pulmonary nodules may represent neoplasm, initial  further evaluation with positron emission tomography is advised. Mildly  prominent, nonspecific subcarinal lymph node.    Assessment & Plan     1.  New diagnosis of extensive stage small cell lung carcinoma with PET scan evidence of metastatic disease to the liver and right adrenal gland, hilar and mediastinal lymph nodes and several scattered tiny lesions noted on MRI of the brain.  Patient is remarkably asymptomatic and has a decent performance status and is ready to proceed with carboplatin -16 and atezolizumab palliative chemoimmunotherapy.  2.  Comorbidities including vascular disease, coronary artery disease and history of prostate cancer.  He does have a decent performance status overall.  3.  Right IJ Mediport placed by Dr. Gonzalez.    PLAN:  1.   Patient will proceed with his first cycle of chemotherapy today (day 1, cycle #1 carboplatin, -16, atezolizumab).  2.  Although he lives in [facility] he and his wife have a hotel room in [place] for the next couple of nights.  3.  He will receive Neulasta on body injector on day 3 of the cycle.  4.  He will be scheduled for 2-week nurse practitioner visit for toxicity check.  5.  MD follow-up with lab in 3 weeks at that time he should be ready to begin his second cycle of treatment.  6.  We discussed an overall treatment plan of 4 cycles of chemotherapy and immunotherapy with radiographic assessment with CT scans after cycle 2 and 4.  If he does well with combined chemoimmunotherapy treatment we may then continue maintenance atezolizumab immunotherapy from there.  7.  Whole brain radiation will be recommended if he  develops any symptomatic progression of his brain mets but otherwise we would hold off on considering radiation therapy at least until he has completed his 4 cycles of chemotherapy treatment.    We spent over 60 minutes on the visit today including 30 minutes of face-to-face time with the patient and his family to address all of their questions concerning his first cycle of chemotherapy.  We also reviewed his labs and radiographic imaging on the computer, make sure he had necessary prescriptions to manage side effects, and arranged appropriate follow-up.

## 2023-03-28 ENCOUNTER — INFUSION (OUTPATIENT)
Dept: ONCOLOGY | Facility: HOSPITAL | Age: 78
End: 2023-03-28
Payer: MEDICARE

## 2023-03-28 ENCOUNTER — DOCUMENTATION (OUTPATIENT)
Dept: OTHER | Facility: HOSPITAL | Age: 78
End: 2023-03-28
Payer: MEDICARE

## 2023-03-28 VITALS
WEIGHT: 217 LBS | SYSTOLIC BLOOD PRESSURE: 148 MMHG | TEMPERATURE: 98.7 F | BODY MASS INDEX: 29.39 KG/M2 | DIASTOLIC BLOOD PRESSURE: 80 MMHG | OXYGEN SATURATION: 95 % | RESPIRATION RATE: 16 BRPM | HEART RATE: 82 BPM

## 2023-03-28 DIAGNOSIS — Z45.2 ENCOUNTER FOR ADJUSTMENT OR MANAGEMENT OF VASCULAR ACCESS DEVICE: ICD-10-CM

## 2023-03-28 DIAGNOSIS — C34.90 SMALL CELL LUNG CANCER: Primary | ICD-10-CM

## 2023-03-28 DIAGNOSIS — Z79.899 HIGH RISK MEDICATION USE: ICD-10-CM

## 2023-03-28 PROCEDURE — 25010000002 HEPARIN LOCK FLUSH PER 10 UNITS: Performed by: INTERNAL MEDICINE

## 2023-03-28 PROCEDURE — 63710000001 PROCHLORPERAZINE MALEATE PER 10 MG: Performed by: INTERNAL MEDICINE

## 2023-03-28 PROCEDURE — A9270 NON-COVERED ITEM OR SERVICE: HCPCS | Performed by: INTERNAL MEDICINE

## 2023-03-28 PROCEDURE — 25010000002 ETOPOSIDE 1 GM/50ML SOLUTION 50 ML VIAL: Performed by: INTERNAL MEDICINE

## 2023-03-28 PROCEDURE — 96413 CHEMO IV INFUSION 1 HR: CPT

## 2023-03-28 RX ORDER — PROCHLORPERAZINE MALEATE 10 MG
10 TABLET ORAL ONCE
Status: COMPLETED | OUTPATIENT
Start: 2023-03-28 | End: 2023-03-28

## 2023-03-28 RX ORDER — SODIUM CHLORIDE 0.9 % (FLUSH) 0.9 %
10 SYRINGE (ML) INJECTION AS NEEDED
Status: DISCONTINUED | OUTPATIENT
Start: 2023-03-28 | End: 2023-03-28 | Stop reason: HOSPADM

## 2023-03-28 RX ORDER — HEPARIN SODIUM (PORCINE) LOCK FLUSH IV SOLN 100 UNIT/ML 100 UNIT/ML
500 SOLUTION INTRAVENOUS AS NEEDED
OUTPATIENT
Start: 2023-03-28

## 2023-03-28 RX ORDER — SODIUM CHLORIDE 0.9 % (FLUSH) 0.9 %
10 SYRINGE (ML) INJECTION AS NEEDED
OUTPATIENT
Start: 2023-03-28

## 2023-03-28 RX ORDER — SODIUM CHLORIDE 9 MG/ML
250 INJECTION, SOLUTION INTRAVENOUS ONCE
Status: COMPLETED | OUTPATIENT
Start: 2023-03-28 | End: 2023-03-28

## 2023-03-28 RX ORDER — HEPARIN SODIUM (PORCINE) LOCK FLUSH IV SOLN 100 UNIT/ML 100 UNIT/ML
500 SOLUTION INTRAVENOUS AS NEEDED
Status: DISCONTINUED | OUTPATIENT
Start: 2023-03-28 | End: 2023-03-28 | Stop reason: HOSPADM

## 2023-03-28 RX ADMIN — SODIUM CHLORIDE 250 ML: 9 INJECTION, SOLUTION INTRAVENOUS at 14:23

## 2023-03-28 RX ADMIN — Medication 500 UNITS: at 15:30

## 2023-03-28 RX ADMIN — Medication 10 ML: at 15:29

## 2023-03-28 RX ADMIN — ETOPOSIDE 220 MG: 20 INJECTION INTRAVENOUS at 14:23

## 2023-03-28 RX ADMIN — PROCHLORPERAZINE MALEATE 10 MG: 10 TABLET ORAL at 14:03

## 2023-03-28 NOTE — PROGRESS NOTES
Oncology Social Work  Clinical Case Management     OSW has been assisting patient and wife with travel and lodging concerns.  Patient has been awarded $500.00 from [employer] for travel and gasoline costs in order to get back and forth to his treatments here in [place].  OSW to work with patient's wife on necessary documents to submit for reimbursement.      Patient and wife also have information on seeking assistance through the [employer] Extended Stay hotel lodging.       Will remain available and assist  Gale Worthy LCSW

## 2023-03-29 ENCOUNTER — PATIENT OUTREACH (OUTPATIENT)
Dept: OTHER | Facility: HOSPITAL | Age: 78
End: 2023-03-29
Payer: MEDICARE

## 2023-03-29 ENCOUNTER — INFUSION (OUTPATIENT)
Dept: ONCOLOGY | Facility: HOSPITAL | Age: 78
End: 2023-03-29
Payer: MEDICARE

## 2023-03-29 VITALS
BODY MASS INDEX: 29.8 KG/M2 | HEART RATE: 81 BPM | RESPIRATION RATE: 16 BRPM | DIASTOLIC BLOOD PRESSURE: 83 MMHG | WEIGHT: 220 LBS | SYSTOLIC BLOOD PRESSURE: 148 MMHG | OXYGEN SATURATION: 94 % | TEMPERATURE: 98.8 F

## 2023-03-29 DIAGNOSIS — Z79.899 HIGH RISK MEDICATION USE: ICD-10-CM

## 2023-03-29 DIAGNOSIS — C34.90 SMALL CELL LUNG CANCER: Primary | ICD-10-CM

## 2023-03-29 PROCEDURE — 96413 CHEMO IV INFUSION 1 HR: CPT

## 2023-03-29 PROCEDURE — 25010000002 PEGFILGRASTIM 6 MG/0.6ML PREFILLED SYRINGE KIT: Performed by: INTERNAL MEDICINE

## 2023-03-29 PROCEDURE — 25010000002 ETOPOSIDE 1 GM/50ML SOLUTION 50 ML VIAL: Performed by: INTERNAL MEDICINE

## 2023-03-29 PROCEDURE — A9270 NON-COVERED ITEM OR SERVICE: HCPCS | Performed by: INTERNAL MEDICINE

## 2023-03-29 PROCEDURE — 96377 APPLICATON ON-BODY INJECTOR: CPT

## 2023-03-29 PROCEDURE — 63710000001 PROCHLORPERAZINE MALEATE PER 10 MG: Performed by: INTERNAL MEDICINE

## 2023-03-29 RX ORDER — PROCHLORPERAZINE MALEATE 10 MG
10 TABLET ORAL ONCE
Status: COMPLETED | OUTPATIENT
Start: 2023-03-29 | End: 2023-03-29

## 2023-03-29 RX ORDER — SODIUM CHLORIDE 9 MG/ML
250 INJECTION, SOLUTION INTRAVENOUS ONCE
Status: COMPLETED | OUTPATIENT
Start: 2023-03-29 | End: 2023-03-29

## 2023-03-29 RX ADMIN — SODIUM CHLORIDE 250 ML: 9 INJECTION, SOLUTION INTRAVENOUS at 14:37

## 2023-03-29 RX ADMIN — PROCHLORPERAZINE MALEATE 10 MG: 10 TABLET ORAL at 14:36

## 2023-03-29 RX ADMIN — PEGFILGRASTIM 6 MG: KIT SUBCUTANEOUS at 15:22

## 2023-03-29 RX ADMIN — ETOPOSIDE 220 MG: 20 INJECTION INTRAVENOUS at 15:18

## 2023-03-29 NOTE — PROGRESS NOTES
Called patient. Left message that I was checking in to see how he was doing. Saw he talked with Gale JACKSON. Wanted to make sure he had no other needs at this time. Requested cb. If I don't hear back, will try again in a few weeks

## 2023-03-30 ENCOUNTER — DOCUMENTATION (OUTPATIENT)
Dept: OTHER | Facility: HOSPITAL | Age: 78
End: 2023-03-30
Payer: MEDICARE

## 2023-03-30 NOTE — PROGRESS NOTES
Oncology Social Work  Clinical Case Management - [place]    Met with patient's wife.  Completed necessary documents.  Scanned and Emailed to Yulisa with [employer] -  First reimbursement check should be issued some time next week to patient and wife -  Reimbursement for a total of 260 miles.     Will cont to assist.   Gale Worthy, VERÓNICA

## 2023-04-10 LAB
LAB AP CASE REPORT: NORMAL
PATH REPORT.FINAL DX SPEC: NORMAL
PATH REPORT.GROSS SPEC: NORMAL

## 2023-04-11 ENCOUNTER — LAB (OUTPATIENT)
Dept: LAB | Facility: HOSPITAL | Age: 78
End: 2023-04-11
Payer: MEDICARE

## 2023-04-11 ENCOUNTER — OFFICE VISIT (OUTPATIENT)
Dept: ONCOLOGY | Facility: CLINIC | Age: 78
End: 2023-04-11
Payer: MEDICARE

## 2023-04-11 VITALS
OXYGEN SATURATION: 96 % | TEMPERATURE: 97.8 F | RESPIRATION RATE: 18 BRPM | WEIGHT: 216.2 LBS | BODY MASS INDEX: 29.28 KG/M2 | DIASTOLIC BLOOD PRESSURE: 73 MMHG | HEIGHT: 72 IN | HEART RATE: 80 BPM | SYSTOLIC BLOOD PRESSURE: 121 MMHG

## 2023-04-11 DIAGNOSIS — Z79.899 HIGH RISK MEDICATION USE: ICD-10-CM

## 2023-04-11 DIAGNOSIS — C34.90 SMALL CELL LUNG CANCER: Primary | ICD-10-CM

## 2023-04-11 DIAGNOSIS — C34.90 SMALL CELL LUNG CANCER: ICD-10-CM

## 2023-04-11 LAB
ALBUMIN SERPL-MCNC: 3.9 G/DL (ref 3.5–5.2)
ALBUMIN/GLOB SERPL: 1.3 G/DL (ref 1.1–2.4)
ALP SERPL-CCNC: 109 U/L (ref 38–116)
ALT SERPL W P-5'-P-CCNC: 15 U/L (ref 0–41)
ANION GAP SERPL CALCULATED.3IONS-SCNC: 8.4 MMOL/L (ref 5–15)
AST SERPL-CCNC: 17 U/L (ref 0–40)
BASOPHILS # BLD AUTO: 0.06 10*3/MM3 (ref 0–0.2)
BASOPHILS NFR BLD AUTO: 0.6 % (ref 0–1.5)
BILIRUB SERPL-MCNC: 0.2 MG/DL (ref 0.2–1.2)
BUN SERPL-MCNC: 21 MG/DL (ref 6–20)
BUN/CREAT SERPL: 20.8 (ref 7.3–30)
CALCIUM SPEC-SCNC: 9.2 MG/DL (ref 8.5–10.2)
CHLORIDE SERPL-SCNC: 104 MMOL/L (ref 98–107)
CO2 SERPL-SCNC: 25.6 MMOL/L (ref 22–29)
CREAT SERPL-MCNC: 1.01 MG/DL (ref 0.7–1.3)
DEPRECATED RDW RBC AUTO: 48.3 FL (ref 37–54)
EGFRCR SERPLBLD CKD-EPI 2021: 77.1 ML/MIN/1.73
EOSINOPHIL # BLD AUTO: 0.15 10*3/MM3 (ref 0–0.4)
EOSINOPHIL NFR BLD AUTO: 1.6 % (ref 0.3–6.2)
ERYTHROCYTE [DISTWIDTH] IN BLOOD BY AUTOMATED COUNT: 13.8 % (ref 12.3–15.4)
GLOBULIN UR ELPH-MCNC: 3 GM/DL (ref 1.8–3.5)
GLUCOSE SERPL-MCNC: 125 MG/DL (ref 74–124)
HCT VFR BLD AUTO: 34 % (ref 37.5–51)
HGB BLD-MCNC: 11.4 G/DL (ref 13–17.7)
IMM GRANULOCYTES # BLD AUTO: 0.18 10*3/MM3 (ref 0–0.05)
IMM GRANULOCYTES NFR BLD AUTO: 1.9 % (ref 0–0.5)
LYMPHOCYTES # BLD AUTO: 1.68 10*3/MM3 (ref 0.7–3.1)
LYMPHOCYTES NFR BLD AUTO: 17.4 % (ref 19.6–45.3)
MCH RBC QN AUTO: 32.9 PG (ref 26.6–33)
MCHC RBC AUTO-ENTMCNC: 33.5 G/DL (ref 31.5–35.7)
MCV RBC AUTO: 98.3 FL (ref 79–97)
MONOCYTES # BLD AUTO: 1.23 10*3/MM3 (ref 0.1–0.9)
MONOCYTES NFR BLD AUTO: 12.7 % (ref 5–12)
NEUTROPHILS NFR BLD AUTO: 6.37 10*3/MM3 (ref 1.7–7)
NEUTROPHILS NFR BLD AUTO: 65.8 % (ref 42.7–76)
NRBC BLD AUTO-RTO: 0 /100 WBC (ref 0–0.2)
PLATELET # BLD AUTO: 96 10*3/MM3 (ref 140–450)
PMV BLD AUTO: 8.8 FL (ref 6–12)
POTASSIUM SERPL-SCNC: 4.6 MMOL/L (ref 3.5–4.7)
PROT SERPL-MCNC: 6.9 G/DL (ref 6.3–8)
RBC # BLD AUTO: 3.46 10*6/MM3 (ref 4.14–5.8)
SODIUM SERPL-SCNC: 138 MMOL/L (ref 134–145)
WBC NRBC COR # BLD: 9.67 10*3/MM3 (ref 3.4–10.8)

## 2023-04-11 PROCEDURE — 85025 COMPLETE CBC W/AUTO DIFF WBC: CPT

## 2023-04-11 PROCEDURE — 99214 OFFICE O/P EST MOD 30 MIN: CPT | Performed by: NURSE PRACTITIONER

## 2023-04-11 PROCEDURE — 36415 COLL VENOUS BLD VENIPUNCTURE: CPT

## 2023-04-11 PROCEDURE — 80053 COMPREHEN METABOLIC PANEL: CPT

## 2023-04-11 PROCEDURE — 1125F AMNT PAIN NOTED PAIN PRSNT: CPT | Performed by: NURSE PRACTITIONER

## 2023-04-11 RX ORDER — TESTOSTERONE 16.2 MG/G
GEL TRANSDERMAL
COMMUNITY
End: 2023-04-17

## 2023-04-11 RX ORDER — PANTOPRAZOLE SODIUM 40 MG/1
TABLET, DELAYED RELEASE ORAL
COMMUNITY
End: 2023-04-17

## 2023-04-11 RX ORDER — B-COMPLEX WITH VITAMIN C
TABLET ORAL
COMMUNITY

## 2023-04-11 RX ORDER — AMIODARONE HYDROCHLORIDE 200 MG/1
TABLET ORAL
COMMUNITY
End: 2023-04-17

## 2023-04-11 RX ORDER — BUPROPION HYDROCHLORIDE 100 MG/1
TABLET ORAL
COMMUNITY
End: 2023-04-17

## 2023-04-11 RX ORDER — OXYCODONE AND ACETAMINOPHEN 7.5; 325 MG/1; MG/1
TABLET ORAL
COMMUNITY
End: 2023-04-17

## 2023-04-11 RX ORDER — FUROSEMIDE 40 MG/1
TABLET ORAL
COMMUNITY

## 2023-04-11 RX ORDER — AMLODIPINE BESYLATE 5 MG/1
TABLET ORAL
COMMUNITY
End: 2023-04-17

## 2023-04-11 RX ORDER — BETHANECHOL CHLORIDE 50 MG/1
1 TABLET ORAL EVERY 12 HOURS SCHEDULED
COMMUNITY
Start: 2023-04-04

## 2023-04-11 RX ORDER — HYDROCODONE BITARTRATE AND ACETAMINOPHEN 5; 300 MG/1; MG/1
TABLET ORAL
COMMUNITY

## 2023-04-11 NOTE — PROGRESS NOTES
Subjective     REASON FOR FOLLOW UP:  1.  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  2.  Initiation of palliative chemo/immunotherapy with carboplatin/-16/atezolizumab with cycle #1 3/27/2023  3.  MediPort placed 3/20/2023 by Dr. Gonzalez      HISTORY OF PRESENT ILLNESS:  The patient is a 77 y.o. year old male who is here for an opinion about the above issue.  He was seen today in the multidisciplinary thoracic oncology clinic along with Dr Lara and Dr. Coreas.  He has been following up with Dr. Nobles of vascular surgery and underwent a CT angiogram of the abdomen on 2/23/2023.  There were some nodules noted in the lung bases and this led to a CT scan of the chest which again showed pulmonary nodules in the right lower lobe as well as subcarinal lymphadenopathy.    He had previously been seen by Dr. Lara and she arranged for him to undergo outpatient navigational bronchoscopy with biopsy on 3/13/2023.  His pathology was positive for small cell carcinoma of the lung.    He underwent additional staging with a PET scan performed on 3/14/2023 which showed evidence of metastatic disease to the liver and the right adrenal gland as well as hypermetabolic mediastinal and hilar lymphadenopathy.    He was seen in the multidisciplinary thoracic oncology clinic 3/16/2023 to discuss treatment recommendations.  We had a lengthy visit with the patient and his wife.  They live in [facility] which is close to [facility] but they expressed that they would desire to have their treatment delivered here in [place].    Because of his extensive stage disease we have recommended systemic treatment with carboplatin/-16 and atezolizumab immunotherapy.    He underwent an MRI of the brain 3/21/2023 to complete his staging and was found to have multiple tiny mets with no surrounding edema or obvious mass effect.  We had discussed the MRI results with the patient and his family by  phone and elected to proceed with his systemic treatment initially with close follow-up on subsequent MRIs and eventual whole brain radiation treatment in the future.    Patient initiating C1 carbo/etoposide/atezolizumab with Neulasta support on 3/27/2023    INTERVAL HISTORY:  Patient is seen back today in 2-week follow-up after receiving cycle 1 carbo/etoposide/atezolizumab along with Neulasta support.  He is accompanied by his wife.  Patient thankfully has done well so far with treatment.  Denies any significant GI toxicity.  Has continued to eat well.  Weight is stable.    We reviewed his blood counts showing mild thrombocytopenia with platelet count 96,000 and hemoglobin down slightly to 11.4.  He denies any bleeding difficulty.    [place] tick bites recently and has a few scabbed areas on his right upper back as well as left leg.  He is noting some tenderness at the right upper back as well as the right deltoid area.  This is just occurred in the last few days.  We discussed monitoring this for now.    He denies other concerns at this time.      History of Present Illness     Past Medical History:   Diagnosis Date   • Acid reflux    • Anemia    • Arthritis    • Coronary artery disease    • CVA (cerebral vascular accident)    • High cholesterol    • History of atrial fibrillation    • Hx of blood clots    • Hypertension    • Irregular heartbeat    • Peptic ulcer disease    • Prostate cancer    • Rheumatoid arthritis    • Skin cancer    • Small cell lung cancer         Past Surgical History:   Procedure Laterality Date   • ABDOMINAL AORTIC ANEURYSM REPAIR     • BACK SURGERY      x2   • BRONCHOSCOPY WITH ION ROBOTIC ASSIST N/A 03/13/2023    Procedure: BRONCHOSCOPY ,ENDOBRONCHIAL ULTRASOUND AND ROBOTIC NAVIGATIONAL BRONCHOSCOPY WITH FINE NEEDLE ASPIRATION, BIOPSY X1 AREA, AND BRONCHOALEOLAR LAVAGE;  Surgeon: Sonali Lara MD;  Location: [facility] ENDOSCOPY;  Service:  Robotics - Pulmonary;  Laterality: N/A;  Post: LUNG CANCER   • CARDIAC ABLATION      x2   • COLON SURGERY     • HERNIA REPAIR     • INSERTION CENTRAL VENOUS ACCESS DEVICE W/ SUBCUTANEOUS PORT Right 03/20/2023   • VENOUS ACCESS DEVICE (PORT) INSERTION Right 03/20/2023    Procedure: INSERTION VENOUS ACCESS DEVICE;  Surgeon: Javier Gonzalez MD PhD;  Location: [facility] OR;  Service: Thoracic;  Laterality: Right;        Current Outpatient Medications on File Prior to Visit   Medication Sig Dispense Refill   • Acetaminophen (ARTHRITIS PAIN RELIEF PO) Arthritis Relief   650 mg     • amiodarone (PACERONE) 200 MG tablet Take 1 tablet 3 times a day by oral route.     • amLODIPine (NORVASC) 5 MG tablet Take 1 tablet every day by oral route.     • ascorbic acid (VITAMIN C) 1000 MG tablet Vitamin C     • Ashwagandha 125 MG capsule      • aspirin 81 MG EC tablet Daily.     • bethanechol (URECHOLINE) 50 MG tablet Take 1 tablet by mouth Every 12 (Twelve) Hours.     • buPROPion (WELLBUTRIN) 100 MG tablet Take 1 tablet twice a day by oral route.     • coenzyme Q10 100 MG capsule Take 1 capsule by mouth Daily.     • cyanocobalamin (VITAMIN B-12) 1000 MCG tablet Take 1 tablet by mouth Daily.     • diphenhydrAMINE (BENADRYL) 25 mg capsule Take 1 capsule by mouth Every 6 (Six) Hours As Needed for Itching.     • Elderberry 500 MG capsule Take  by mouth.     • fluocinonide (LIDEX) 0.05 % cream APPLY TO THE AFFECTED AREA(S) BY TOPICAL ROUTE 2 TIMES PER DAY     • furosemide (LASIX) 40 MG tablet Take 1 tablet every day by oral route.     • HYDROcodone-Acetaminophen (XODOL) 5-300 MG per tablet Take 1 tablet every 4 hours by oral route.     • lidocaine-prilocaine (EMLA) 2.5-2.5 % cream Apply nickel size amount to port site 30 min before appt time do not rub in cover with plastic wrap 5 g 2   • Magnesium 250 MG tablet Take  by mouth.     • metoprolol tartrate (LOPRESSOR) 25 MG tablet metoprolol tartrate 25 mg tablet   TAKE 1 TABLET BY MOUTH  TWICE DAILY.     • OLANZapine (zyPREXA) 5 MG tablet Take 1 tablet by mouth Every Night. Take on days 2, 3 and 4 after chemotherapy. 3 tablet 3   • ondansetron (ZOFRAN) 8 MG tablet Take 1 tablet by mouth 3 (Three) Times a Day As Needed for Nausea or Vomiting. 30 tablet 5   • oxyCODONE-acetaminophen (PERCOCET) 7.5-325 MG per tablet Take 1 tablet every 6 hours by oral route.     • pantoprazole (PROTONIX) 40 MG EC tablet Take 1 tablet every day by oral route.     • pravastatin (PRAVACHOL) 40 MG tablet Daily.     • Testosterone 20.25 MG/ACT (1.62%) gel Apply 1 pump every day by transdermal route.     • Turmeric 500 MG capsule Take  by mouth.     • Vitamin B Complex-C capsule      • vitamin D3 125 MCG (5000 UT) capsule capsule Take 1 capsule by mouth Daily.     • Zinc 50 MG tablet Take 1 tablet by mouth Daily.     • tiZANidine (ZANAFLEX) 4 MG tablet Every 8 (Eight) Hours.       No current facility-administered medications on file prior to visit.        ALLERGIES:    Allergies   Allergen Reactions   • Codeine Other (See Comments)     headache          Social History     Socioeconomic History   • Marital status:      Spouse name: Marivel   Tobacco Use   • Smoking status: Former     Types: Cigarettes   • Smokeless tobacco: Never   Vaping Use   • Vaping Use: Never used   Substance and Sexual Activity   • Alcohol use: Never   • Drug use: Never   • Sexual activity: Defer        Family History   Problem Relation Age of Onset   • Brain cancer Mother    • Lung cancer Sister         Review of Systems   Constitutional: Negative for activity change, chills, fatigue and fever.   HENT: Negative for mouth sores, trouble swallowing and voice change.    Eyes: Negative for pain and visual disturbance.   Respiratory: Positive for shortness of breath. Negative for wheezing.    Cardiovascular: Negative for chest pain and palpitations.   Gastrointestinal: Negative for abdominal pain, constipation, diarrhea, nausea and vomiting.  "  Genitourinary: Negative for difficulty urinating, frequency and urgency.   Musculoskeletal: Negative for arthralgias and joint swelling.   Skin: Negative for rash.   Neurological: Negative for seizures, weakness and headaches.        Patient reports severe positional vertigo.  He is unable to lay flat on his back without having nausea and vomiting   Hematological: Negative for adenopathy. Does not bruise/bleed easily.   Psychiatric/Behavioral: Negative for behavioral problems and confusion. The patient is not nervous/anxious.         Objective     Vitals:    04/11/23 1324   BP: 121/73   Pulse: 80   Resp: 18   Temp: 97.8 °F (36.6 °C)   TempSrc: Temporal   SpO2: 96%   Weight: 98.1 kg (216 lb 3.2 oz)   Height: 183 cm (72.05\")   PainSc:   4   PainLoc: Generalized         4/11/2023     1:21 PM   Current Status   ECOG score 0       Physical Exam  Constitutional:       General: He is not in acute distress.     Appearance: He is well-developed.   HENT:      Head: Normocephalic.   Eyes:      General: No scleral icterus.     Conjunctiva/sclera: Conjunctivae normal.      Pupils: Pupils are equal, round, and reactive to light.   Neck:      Thyroid: No thyromegaly.      Vascular: No JVD.      Comments: Right IJ Mediport  Cardiovascular:      Rate and Rhythm: Normal rate and regular rhythm.      Heart sounds: No murmur heard.    No friction rub. No gallop.   Pulmonary:      Effort: Pulmonary effort is normal.      Breath sounds: Normal breath sounds. No wheezing or rales.   Abdominal:      General: There is no distension.      Palpations: Abdomen is soft. There is no mass.      Tenderness: There is no abdominal tenderness.   Musculoskeletal:         General: No deformity. Normal range of motion.      Cervical back: Normal range of motion and neck supple.   Lymphadenopathy:      Cervical: No cervical adenopathy.   Skin:     General: Skin is warm and dry.      Findings: Lesion (right upper back and left anterior lower leg with " scabbed/healing areas from tick bites. No obvious infection) present. No erythema or rash.   Neurological:      Mental Status: He is alert and oriented to person, place, and time.      Cranial Nerves: No cranial nerve deficit.      Deep Tendon Reflexes: Reflexes are normal and symmetric.   Psychiatric:         Behavior: Behavior normal.         Judgment: Judgment normal.           RECENT LABS:  Results from last 7 days   Lab Units 04/11/23  1302   WBC 10*3/mm3 9.67   NEUTROS ABS 10*3/mm3 6.37   HEMOGLOBIN g/dL 11.4*   HEMATOCRIT % 34.0*   PLATELETS 10*3/mm3 96*     Results from last 7 days   Lab Units 04/11/23  1302   SODIUM mmol/L 138   POTASSIUM mmol/L 4.6   CHLORIDE mmol/L 104   CO2 mmol/L 25.6   BUN mg/dL 21*   CREATININE mg/dL 1.01   CALCIUM mg/dL 9.2   ALBUMIN g/dL 3.9   BILIRUBIN mg/dL 0.2   ALK PHOS U/L 109   ALT (SGPT) U/L 15   AST (SGOT) U/L 17   GLUCOSE mg/dL 125*             PATHOLOGY 3/13/2023  Final Diagnosis   Lung, right lower lobe, endobronchial biopsies:    Small cell carcinoma, see comment      Comment    The biopsy shows an invasive tumor with crush artifact. Immunohistochemistry was performed utilizing appropriate controls and the tumor is positive for CD56, TTF-1 and synaptophysin and is negative for CD45 and chromogranin. The staining pattern and morphology are consistent with small cell carcinoma.     Molecular profiling has been ordered but is pending    IMAGING:  MRI OF THE BRAIN WITH AND WITHOUT CONTRAST ON 03/21/2023  IMPRESSION:  1. There is mild-to-moderate small vessel disease in the cerebral white  matter and there is a tiny 5 x 3 mm old posterior lateral right  cerebellar infarct in the right PICA territory  2. This patient has at least 8 enhancing metastatic lesions to the  brain, 2 infratentorial lesions including a 3 to 4 mm lesion in the  inferior lateral right cerebellum and an additional 9 x 8 x 10 mm lesion  in the posterior medial left cerebellum with some some central  necrosis.  There are at least 6 separate tiny 2 to 5 mm enhancing supratentorial  metastatic lesions including a 4 mm enhancing metastases to the  posterior superior right frontal lobe and 5 mm round enhancing  metastases to  the posterior superior left frontal juxtacortical white  matter, 2 mm met to the posterior medial left parietal lobe and an  additional 2 mm met to the far posterior inferior medial tip of the left  parietal lobe just above the left parietal occipital sulcus and a 4 to 5  mm round enhancing metastatic lesion in the anterior-inferior left  frontal lobe just above the left orbital roof and a 3 mm met to the left  putamen and there is a questionable very subtle 3 x 2 mm cortical met to  the anterior-inferior tip of the left temporal lobe which could be a  seventh supratentorial met and this is an indeterminate lesion. There is  no surrounding edema and no mass effect. The remainder of MRI of the  brain is normal.     F-18 FDG PET FROM SKULL BASE TO MID THIGH WITH PET/CT FUSION 3/14/2023  IMPRESSION:  1.  Intensely avid right lower lobe pulmonary nodule most suggestive of  malignancy.  2.  Hypermetabolic mediastinal and hilar adenopathy, hepatic lesions,  right adrenal nodule and sternal lesion most likely representing  metastatic disease. Nodular thickening along the right major fissure  which contacts the right hilum indicate uptake mildly above that of the  adjacent lung may represent lymphangitic spread of malignancy  3.  Scattered bilateral sub-6 mm pulmonary nodules are below PET  resolution. A mildly hypermetabolic cortes hepatic node is present. These  findings are indeterminate indeterminate and continued close attention  on follow-up is recommended.  4.  Other findings as above.    CT CHEST WO CONTRAST DIAGNOSTIC-2/23/2023  IMPRESSION:   Right lower lobe pulmonary nodules may represent neoplasm, initial  further evaluation with positron emission tomography is advised. Mildly  prominent,  nonspecific subcarinal lymph node.    Assessment & Plan     1.  New diagnosis of extensive stage small cell lung carcinoma with PET scan evidence of metastatic disease to the liver and right adrenal gland, hilar and mediastinal lymph nodes and several scattered tiny lesions noted on MRI of the brain.  Patient is remarkably asymptomatic and has a decent performance status.  · 3/27/2023 C1 carbo/etoposide/atezo with Neulasta support.  2.  Comorbidities including vascular disease, coronary artery disease and history of prostate cancer.  He does have a decent performance status overall.  3.  Right IJ Mediport placed by Dr. Gonzalez.    PLAN:  1. Patient with good tolerance to cycle 1 thus far.  2. Discussed mildly low platelet count and monitoring for any bleeding.  3. Patient and his wife with many questions about his blood work and we discussed there is nothing he can do to get his CBC counts up but do expect them to be recovered for treatment next week.  4. He will return in 1 week for follow-up with Dr. Conn with CBC, CMP and consideration of cycle 2 carboplatin/etoposide/atezolizumab.  5. Plan to repeat CT scans after cycle 2 and again after cycle 4.  If he does well with combined chemoimmunotherapy treatment we may then continue maintenance atezolizumab immunotherapy from there.  6. Whole brain radiation will be recommended if he develops any symptomatic progression of his brain mets but otherwise we would hold off on considering radiation therapy at least until he has completed his 4 cycles of chemotherapy treatment.    This patient is on high risk drug therapy requiring intensive monitoring for toxicity.

## 2023-04-12 ENCOUNTER — PATIENT OUTREACH (OUTPATIENT)
Dept: OTHER | Facility: HOSPITAL | Age: 78
End: 2023-04-12
Payer: MEDICARE

## 2023-04-12 NOTE — PROGRESS NOTES
Called patient, s/w wife. He is asleep.  She says he is doing pretty good. He has had one treatment so far. He goes for treatment every 21 days. He will have 4 cycles then CT scan.      The patient hasn't had any significant side effects except hair loss; his wife also reports that his appetite has gone down; they have been encouraging him to eat often. He denies weight loss. We discussed a possible referral to [facility] in the future depending how his appetite and weight is.     The patient/wife met with MANUEL Beasley, who has been helping with transportation.  She denies any other resource needs at this time.     I will f/u in May; she has my info and will call as needed

## 2023-04-17 ENCOUNTER — INFUSION (OUTPATIENT)
Dept: ONCOLOGY | Facility: HOSPITAL | Age: 78
End: 2023-04-17
Payer: MEDICARE

## 2023-04-17 ENCOUNTER — OFFICE VISIT (OUTPATIENT)
Dept: ONCOLOGY | Facility: CLINIC | Age: 78
End: 2023-04-17
Payer: MEDICARE

## 2023-04-17 VITALS
BODY MASS INDEX: 29.27 KG/M2 | OXYGEN SATURATION: 97 % | DIASTOLIC BLOOD PRESSURE: 72 MMHG | RESPIRATION RATE: 16 BRPM | HEIGHT: 72 IN | WEIGHT: 216.1 LBS | TEMPERATURE: 97.1 F | HEART RATE: 78 BPM | SYSTOLIC BLOOD PRESSURE: 122 MMHG

## 2023-04-17 DIAGNOSIS — Z79.899 HIGH RISK MEDICATION USE: ICD-10-CM

## 2023-04-17 DIAGNOSIS — Z45.2 ENCOUNTER FOR ADJUSTMENT OR MANAGEMENT OF VASCULAR ACCESS DEVICE: ICD-10-CM

## 2023-04-17 DIAGNOSIS — C34.90 SMALL CELL LUNG CANCER: Primary | ICD-10-CM

## 2023-04-17 DIAGNOSIS — C34.90 SMALL CELL LUNG CANCER: ICD-10-CM

## 2023-04-17 LAB
ALBUMIN SERPL-MCNC: 3.9 G/DL (ref 3.5–5.2)
ALBUMIN/GLOB SERPL: 1.3 G/DL (ref 1.1–2.4)
ALP SERPL-CCNC: 106 U/L (ref 38–116)
ALT SERPL W P-5'-P-CCNC: 16 U/L (ref 0–41)
ANION GAP SERPL CALCULATED.3IONS-SCNC: 9.6 MMOL/L (ref 5–15)
AST SERPL-CCNC: 16 U/L (ref 0–40)
BASOPHILS # BLD AUTO: 0.07 10*3/MM3 (ref 0–0.2)
BASOPHILS NFR BLD AUTO: 1 % (ref 0–1.5)
BILIRUB SERPL-MCNC: 0.2 MG/DL (ref 0.2–1.2)
BUN SERPL-MCNC: 14 MG/DL (ref 6–20)
BUN/CREAT SERPL: 15.6 (ref 7.3–30)
CALCIUM SPEC-SCNC: 9.4 MG/DL (ref 8.5–10.2)
CHLORIDE SERPL-SCNC: 106 MMOL/L (ref 98–107)
CO2 SERPL-SCNC: 25.4 MMOL/L (ref 22–29)
CREAT SERPL-MCNC: 0.9 MG/DL (ref 0.7–1.3)
DEPRECATED RDW RBC AUTO: 50.2 FL (ref 37–54)
EGFRCR SERPLBLD CKD-EPI 2021: 88.5 ML/MIN/1.73
EOSINOPHIL # BLD AUTO: 0.11 10*3/MM3 (ref 0–0.4)
EOSINOPHIL NFR BLD AUTO: 1.5 % (ref 0.3–6.2)
ERYTHROCYTE [DISTWIDTH] IN BLOOD BY AUTOMATED COUNT: 14.4 % (ref 12.3–15.4)
GLOBULIN UR ELPH-MCNC: 3.1 GM/DL (ref 1.8–3.5)
GLUCOSE SERPL-MCNC: 151 MG/DL (ref 74–124)
HCT VFR BLD AUTO: 35.3 % (ref 37.5–51)
HGB BLD-MCNC: 11.3 G/DL (ref 13–17.7)
IMM GRANULOCYTES # BLD AUTO: 0.08 10*3/MM3 (ref 0–0.05)
IMM GRANULOCYTES NFR BLD AUTO: 1.1 % (ref 0–0.5)
LYMPHOCYTES # BLD AUTO: 1.37 10*3/MM3 (ref 0.7–3.1)
LYMPHOCYTES NFR BLD AUTO: 18.6 % (ref 19.6–45.3)
MCH RBC QN AUTO: 32.1 PG (ref 26.6–33)
MCHC RBC AUTO-ENTMCNC: 32 G/DL (ref 31.5–35.7)
MCV RBC AUTO: 100.3 FL (ref 79–97)
MONOCYTES # BLD AUTO: 0.7 10*3/MM3 (ref 0.1–0.9)
MONOCYTES NFR BLD AUTO: 9.5 % (ref 5–12)
NEUTROPHILS NFR BLD AUTO: 5.03 10*3/MM3 (ref 1.7–7)
NEUTROPHILS NFR BLD AUTO: 68.3 % (ref 42.7–76)
NRBC BLD AUTO-RTO: 0 /100 WBC (ref 0–0.2)
PLATELET # BLD AUTO: 212 10*3/MM3 (ref 140–450)
PMV BLD AUTO: 8.8 FL (ref 6–12)
POTASSIUM SERPL-SCNC: 4.2 MMOL/L (ref 3.5–4.7)
PROT SERPL-MCNC: 7 G/DL (ref 6.3–8)
RBC # BLD AUTO: 3.52 10*6/MM3 (ref 4.14–5.8)
SODIUM SERPL-SCNC: 141 MMOL/L (ref 134–145)
WBC NRBC COR # BLD: 7.36 10*3/MM3 (ref 3.4–10.8)

## 2023-04-17 PROCEDURE — 63710000001 OLANZAPINE 5 MG TABLET: Performed by: INTERNAL MEDICINE

## 2023-04-17 PROCEDURE — 25010000002 ETOPOSIDE 1 GM/50ML SOLUTION 50 ML VIAL: Performed by: INTERNAL MEDICINE

## 2023-04-17 PROCEDURE — 25010000002 HEPARIN LOCK FLUSH PER 10 UNITS: Performed by: INTERNAL MEDICINE

## 2023-04-17 PROCEDURE — 25010000002 FOSAPREPITANT PER 1 MG: Performed by: INTERNAL MEDICINE

## 2023-04-17 PROCEDURE — 25010000002 CARBOPLATIN PER 50 MG: Performed by: INTERNAL MEDICINE

## 2023-04-17 PROCEDURE — 25010000002 DEXAMETHASONE SODIUM PHOSPHATE 100 MG/10ML SOLUTION: Performed by: INTERNAL MEDICINE

## 2023-04-17 PROCEDURE — 96367 TX/PROPH/DG ADDL SEQ IV INF: CPT

## 2023-04-17 PROCEDURE — 85025 COMPLETE CBC W/AUTO DIFF WBC: CPT

## 2023-04-17 PROCEDURE — 25010000002 ATEZOLIZUMAB 1200 MG/20ML SOLUTION 20 ML VIAL: Performed by: INTERNAL MEDICINE

## 2023-04-17 PROCEDURE — 80053 COMPREHEN METABOLIC PANEL: CPT

## 2023-04-17 PROCEDURE — 96417 CHEMO IV INFUS EACH ADDL SEQ: CPT

## 2023-04-17 PROCEDURE — 25010000002 PALONOSETRON PER 25 MCG: Performed by: INTERNAL MEDICINE

## 2023-04-17 PROCEDURE — 96413 CHEMO IV INFUSION 1 HR: CPT

## 2023-04-17 PROCEDURE — 96375 TX/PRO/DX INJ NEW DRUG ADDON: CPT

## 2023-04-17 PROCEDURE — A9270 NON-COVERED ITEM OR SERVICE: HCPCS | Performed by: INTERNAL MEDICINE

## 2023-04-17 RX ORDER — PROCHLORPERAZINE MALEATE 10 MG
10 TABLET ORAL ONCE
OUTPATIENT
Start: 2023-05-10 | End: 2023-05-10

## 2023-04-17 RX ORDER — PALONOSETRON 0.05 MG/ML
0.25 INJECTION, SOLUTION INTRAVENOUS ONCE
OUTPATIENT
Start: 2023-05-08

## 2023-04-17 RX ORDER — SODIUM CHLORIDE 9 MG/ML
250 INJECTION, SOLUTION INTRAVENOUS ONCE
OUTPATIENT
Start: 2023-05-08

## 2023-04-17 RX ORDER — PROCHLORPERAZINE MALEATE 10 MG
10 TABLET ORAL ONCE
Status: CANCELLED | OUTPATIENT
Start: 2023-04-18 | End: 2023-04-18

## 2023-04-17 RX ORDER — SODIUM CHLORIDE 9 MG/ML
250 INJECTION, SOLUTION INTRAVENOUS ONCE
OUTPATIENT
Start: 2023-05-10

## 2023-04-17 RX ORDER — HEPARIN SODIUM (PORCINE) LOCK FLUSH IV SOLN 100 UNIT/ML 100 UNIT/ML
500 SOLUTION INTRAVENOUS AS NEEDED
Status: DISCONTINUED | OUTPATIENT
Start: 2023-04-17 | End: 2023-04-17 | Stop reason: HOSPADM

## 2023-04-17 RX ORDER — SODIUM CHLORIDE 9 MG/ML
250 INJECTION, SOLUTION INTRAVENOUS ONCE
OUTPATIENT
Start: 2023-05-09

## 2023-04-17 RX ORDER — PROCHLORPERAZINE MALEATE 10 MG
10 TABLET ORAL ONCE
OUTPATIENT
Start: 2023-05-09 | End: 2023-05-09

## 2023-04-17 RX ORDER — PALONOSETRON 0.05 MG/ML
0.25 INJECTION, SOLUTION INTRAVENOUS ONCE
Status: COMPLETED | OUTPATIENT
Start: 2023-04-17 | End: 2023-04-17

## 2023-04-17 RX ORDER — PROCHLORPERAZINE MALEATE 10 MG
10 TABLET ORAL ONCE
Status: CANCELLED | OUTPATIENT
Start: 2023-04-19 | End: 2023-04-19

## 2023-04-17 RX ORDER — FAMOTIDINE 10 MG/ML
20 INJECTION, SOLUTION INTRAVENOUS AS NEEDED
OUTPATIENT
Start: 2023-05-08

## 2023-04-17 RX ORDER — SODIUM CHLORIDE 0.9 % (FLUSH) 0.9 %
10 SYRINGE (ML) INJECTION AS NEEDED
Status: CANCELLED | OUTPATIENT
Start: 2023-04-17

## 2023-04-17 RX ORDER — DIPHENHYDRAMINE HYDROCHLORIDE 50 MG/ML
50 INJECTION INTRAMUSCULAR; INTRAVENOUS AS NEEDED
Status: CANCELLED | OUTPATIENT
Start: 2023-04-17

## 2023-04-17 RX ORDER — SODIUM CHLORIDE 9 MG/ML
250 INJECTION, SOLUTION INTRAVENOUS ONCE
Status: CANCELLED | OUTPATIENT
Start: 2023-04-17

## 2023-04-17 RX ORDER — OLANZAPINE 5 MG/1
5 TABLET ORAL ONCE
OUTPATIENT
Start: 2023-05-08 | End: 2023-05-08

## 2023-04-17 RX ORDER — SODIUM CHLORIDE 9 MG/ML
250 INJECTION, SOLUTION INTRAVENOUS ONCE
Status: CANCELLED | OUTPATIENT
Start: 2023-04-18

## 2023-04-17 RX ORDER — FAMOTIDINE 10 MG/ML
20 INJECTION, SOLUTION INTRAVENOUS AS NEEDED
Status: CANCELLED | OUTPATIENT
Start: 2023-04-17

## 2023-04-17 RX ORDER — SODIUM CHLORIDE 9 MG/ML
250 INJECTION, SOLUTION INTRAVENOUS ONCE
Status: CANCELLED | OUTPATIENT
Start: 2023-04-19

## 2023-04-17 RX ORDER — SODIUM CHLORIDE 0.9 % (FLUSH) 0.9 %
10 SYRINGE (ML) INJECTION AS NEEDED
Status: DISCONTINUED | OUTPATIENT
Start: 2023-04-17 | End: 2023-04-17 | Stop reason: HOSPADM

## 2023-04-17 RX ORDER — SODIUM CHLORIDE 9 MG/ML
250 INJECTION, SOLUTION INTRAVENOUS ONCE
Status: COMPLETED | OUTPATIENT
Start: 2023-04-17 | End: 2023-04-17

## 2023-04-17 RX ORDER — PALONOSETRON 0.05 MG/ML
0.25 INJECTION, SOLUTION INTRAVENOUS ONCE
Status: CANCELLED | OUTPATIENT
Start: 2023-04-17

## 2023-04-17 RX ORDER — DIPHENHYDRAMINE HYDROCHLORIDE 50 MG/ML
50 INJECTION INTRAMUSCULAR; INTRAVENOUS AS NEEDED
OUTPATIENT
Start: 2023-05-08

## 2023-04-17 RX ORDER — OLANZAPINE 5 MG/1
5 TABLET ORAL ONCE
Status: COMPLETED | OUTPATIENT
Start: 2023-04-17 | End: 2023-04-17

## 2023-04-17 RX ORDER — OLANZAPINE 5 MG/1
5 TABLET ORAL ONCE
Status: CANCELLED | OUTPATIENT
Start: 2023-04-17 | End: 2023-04-17

## 2023-04-17 RX ORDER — HEPARIN SODIUM (PORCINE) LOCK FLUSH IV SOLN 100 UNIT/ML 100 UNIT/ML
500 SOLUTION INTRAVENOUS AS NEEDED
Status: CANCELLED | OUTPATIENT
Start: 2023-04-17

## 2023-04-17 RX ADMIN — Medication 10 ML: at 12:26

## 2023-04-17 RX ADMIN — FOSAPREPITANT DIMEGLUMINE 100 ML: 150 INJECTION, POWDER, LYOPHILIZED, FOR SOLUTION INTRAVENOUS at 09:19

## 2023-04-17 RX ADMIN — DEXAMETHASONE SODIUM PHOSPHATE 12 MG: 10 INJECTION, SOLUTION INTRAMUSCULAR; INTRAVENOUS at 10:29

## 2023-04-17 RX ADMIN — Medication 500 UNITS: at 12:26

## 2023-04-17 RX ADMIN — ETOPOSIDE 220 MG: 20 INJECTION INTRAVENOUS at 11:22

## 2023-04-17 RX ADMIN — PALONOSETRON 0.25 MG: 0.05 INJECTION, SOLUTION INTRAVENOUS at 09:17

## 2023-04-17 RX ADMIN — ATEZOLIZUMAB 1200 MG: 1200 INJECTION, SOLUTION INTRAVENOUS at 09:53

## 2023-04-17 RX ADMIN — CARBOPLATIN 610 MG: 10 INJECTION, SOLUTION INTRAVENOUS at 10:46

## 2023-04-17 RX ADMIN — SODIUM CHLORIDE 250 ML: 9 INJECTION, SOLUTION INTRAVENOUS at 09:17

## 2023-04-17 RX ADMIN — OLANZAPINE 5 MG: 5 TABLET, FILM COATED ORAL at 09:17

## 2023-04-17 NOTE — PROGRESS NOTES
Subjective     REASON FOR FOLLOW UP:  1.  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  2.  Initiation of palliative chemo/immunotherapy with carboplatin/-16/atezolizumab with cycle #1 3/27/2023  3.  MediPort placed 3/20/2023 by Dr. Gonzalez      HISTORY OF PRESENT ILLNESS:  The patient is a 77 y.o. year old male who is here for an opinion about the above issue.  He was seen today in the multidisciplinary thoracic oncology clinic along with Dr Lara and Dr. Coreas.  He has been following up with Dr. Nobles of vascular surgery and underwent a CT angiogram of the abdomen on 2/23/2023.  There were some nodules noted in the lung bases and this led to a CT scan of the chest which again showed pulmonary nodules in the right lower lobe as well as subcarinal lymphadenopathy.    He had previously been seen by Dr. Lara and she arranged for him to undergo outpatient navigational bronchoscopy with biopsy on 3/13/2023.  His pathology was positive for small cell carcinoma of the lung.    He underwent additional staging with a PET scan performed on 3/14/2023 which showed evidence of metastatic disease to the liver and the right adrenal gland as well as hypermetabolic mediastinal and hilar lymphadenopathy.    INTERVAL HISTORY:  He was seen in the multidisciplinary thoracic oncology clinic 3/16/2023 to discuss treatment recommendations.  We had a lengthy visit with the patient and his wife.  They live in [facility] which is close to [facility] but they expressed that they would desire to have their treatment delivered here in [place].    Because of his extensive stage disease we have recommended systemic treatment with carboplatin/-16 and atezolizumab immunotherapy.    He underwent an MRI of the brain 3/21/2023 to complete his staging and was found to have multiple tiny mets with no surrounding edema or obvious mass effect.  We had discussed the MRI results with the patient  and his family by phone and elected to proceed with his systemic treatment initially with close follow-up on subsequent MRIs and eventual whole brain radiation treatment in the future.    INTERVAL HISTORY:  He returns today scheduled For cycle #2 carboplatin -16 and atezolizumab.  His blood counts have recovered.  We will be ordering follow-up scans after this cycle with CT of the chest and repeat MRI of the brain.  He will again require sedation for his MRI of the brain due to severe claustrophobia.  History of Present Illness     Past Medical History:   Diagnosis Date   • Acid reflux    • Anemia    • Arthritis    • Coronary artery disease    • CVA (cerebral vascular accident)    • High cholesterol    • History of atrial fibrillation    • Hx of blood clots    • Hypertension    • Irregular heartbeat    • Peptic ulcer disease    • Prostate cancer    • Rheumatoid arthritis    • Skin cancer    • Small cell lung cancer         Past Surgical History:   Procedure Laterality Date   • ABDOMINAL AORTIC ANEURYSM REPAIR     • BACK SURGERY      x2   • BRONCHOSCOPY WITH ION ROBOTIC ASSIST N/A 03/13/2023    Procedure: BRONCHOSCOPY ,ENDOBRONCHIAL ULTRASOUND AND ROBOTIC NAVIGATIONAL BRONCHOSCOPY WITH FINE NEEDLE ASPIRATION, BIOPSY X1 AREA, AND BRONCHOALEOLAR LAVAGE;  Surgeon: Sonali Lara MD;  Location: [facility] ENDOSCOPY;  Service: Robotics - Pulmonary;  Laterality: N/A;  Post: LUNG CANCER   • CARDIAC ABLATION      x2   • COLON SURGERY     • HERNIA REPAIR     • INSERTION CENTRAL VENOUS ACCESS DEVICE W/ SUBCUTANEOUS PORT Right 03/20/2023   • VENOUS ACCESS DEVICE (PORT) INSERTION Right 03/20/2023    Procedure: INSERTION VENOUS ACCESS DEVICE;  Surgeon: Javier Gonzalez MD PhD;  Location: [facility];  Service: Thoracic;  Laterality: Right;        Current Outpatient Medications on File Prior to Visit   Medication Sig Dispense Refill   • Acetaminophen (ARTHRITIS PAIN RELIEF PO) Arthritis Relief   650 mg     • amiodarone  (PACERONE) 200 MG tablet Take 1 tablet 3 times a day by oral route.     • amLODIPine (NORVASC) 5 MG tablet Take 1 tablet every day by oral route.     • ascorbic acid (VITAMIN C) 1000 MG tablet Vitamin C     • Ashwagandha 125 MG capsule      • aspirin 81 MG EC tablet Daily.     • bethanechol (URECHOLINE) 50 MG tablet Take 1 tablet by mouth Every 12 (Twelve) Hours.     • buPROPion (WELLBUTRIN) 100 MG tablet Take 1 tablet twice a day by oral route.     • coenzyme Q10 100 MG capsule Take 1 capsule by mouth Daily.     • cyanocobalamin (VITAMIN B-12) 1000 MCG tablet Take 1 tablet by mouth Daily.     • diphenhydrAMINE (BENADRYL) 25 mg capsule Take 1 capsule by mouth Every 6 (Six) Hours As Needed for Itching.     • Elderberry 500 MG capsule Take  by mouth.     • fluocinonide (LIDEX) 0.05 % cream APPLY TO THE AFFECTED AREA(S) BY TOPICAL ROUTE 2 TIMES PER DAY     • furosemide (LASIX) 40 MG tablet Take 1 tablet every day by oral route.     • HYDROcodone-Acetaminophen (XODOL) 5-300 MG per tablet Take 1 tablet every 4 hours by oral route.     • lidocaine-prilocaine (EMLA) 2.5-2.5 % cream Apply nickel size amount to port site 30 min before appt time do not rub in cover with plastic wrap 5 g 2   • Magnesium 250 MG tablet Take  by mouth.     • metoprolol tartrate (LOPRESSOR) 25 MG tablet metoprolol tartrate 25 mg tablet   TAKE 1 TABLET BY MOUTH TWICE DAILY.     • OLANZapine (zyPREXA) 5 MG tablet Take 1 tablet by mouth Every Night. Take on days 2, 3 and 4 after chemotherapy. 3 tablet 3   • ondansetron (ZOFRAN) 8 MG tablet Take 1 tablet by mouth 3 (Three) Times a Day As Needed for Nausea or Vomiting. 30 tablet 5   • oxyCODONE-acetaminophen (PERCOCET) 7.5-325 MG per tablet Take 1 tablet every 6 hours by oral route.     • pantoprazole (PROTONIX) 40 MG EC tablet Take 1 tablet every day by oral route.     • pravastatin (PRAVACHOL) 40 MG tablet Daily.     • Testosterone 20.25 MG/ACT (1.62%) gel Apply 1 pump every day by transdermal route.      • Turmeric 500 MG capsule Take  by mouth.     • Vitamin B Complex-C capsule      • vitamin D3 125 MCG (5000 UT) capsule capsule Take 1 capsule by mouth Daily.     • Zinc 50 MG tablet Take 1 tablet by mouth Daily.       No current facility-administered medications on file prior to visit.        ALLERGIES:    Allergies   Allergen Reactions   • Codeine Other (See Comments)     headache          Social History     Socioeconomic History   • Marital status:      Spouse name: Marivel   Tobacco Use   • Smoking status: Former     Types: Cigarettes   • Smokeless tobacco: Never   Vaping Use   • Vaping Use: Never used   Substance and Sexual Activity   • Alcohol use: Never   • Drug use: Never   • Sexual activity: Defer        Family History   Problem Relation Age of Onset   • Brain cancer Mother    • Lung cancer Sister         Review of Systems   Constitutional: Negative for activity change, chills, fatigue and fever.   HENT: Negative for mouth sores, trouble swallowing and voice change.    Eyes: Negative for pain and visual disturbance.   Respiratory: Positive for cough and shortness of breath. Negative for wheezing.    Cardiovascular: Negative for chest pain and palpitations.   Gastrointestinal: Negative for abdominal pain, constipation, diarrhea, nausea and vomiting.   Genitourinary: Negative for difficulty urinating, frequency and urgency.   Musculoskeletal: Negative for arthralgias and joint swelling.   Skin: Negative for rash.   Neurological: Positive for dizziness. Negative for seizures, weakness and headaches.        Patient reports severe positional vertigo.  He is unable to lay flat on his back without having nausea and vomiting   Hematological: Negative for adenopathy. Does not bruise/bleed easily.   Psychiatric/Behavioral: Negative for behavioral problems and confusion. The patient is not nervous/anxious.         Objective     Vitals:    04/17/23 0817   BP: 122/72   Pulse: 78   Resp: 16   Temp: 97.1 °F  "(36.2 °C)   TempSrc: Temporal   SpO2: 97%   Weight: 98 kg (216 lb 1.6 oz)   Height: 183 cm (72.05\")   PainSc:   3   PainLoc: Abdomen  Comment: lower         4/17/2023     8:17 AM   Current Status   ECOG score 0       Physical Exam  Constitutional:       General: He is not in acute distress.     Appearance: He is well-developed.   HENT:      Head: Normocephalic.   Eyes:      General: No scleral icterus.     Conjunctiva/sclera: Conjunctivae normal.      Pupils: Pupils are equal, round, and reactive to light.   Neck:      Thyroid: No thyromegaly.      Vascular: No JVD.      Comments: Right IJ Mediport  Cardiovascular:      Rate and Rhythm: Normal rate and regular rhythm.      Heart sounds: No murmur heard.    No friction rub. No gallop.   Pulmonary:      Effort: Pulmonary effort is normal.      Breath sounds: Normal breath sounds. No wheezing or rales.   Abdominal:      General: There is no distension.      Palpations: Abdomen is soft. There is no mass.      Tenderness: There is no abdominal tenderness.   Musculoskeletal:         General: No deformity. Normal range of motion.      Cervical back: Normal range of motion and neck supple.   Lymphadenopathy:      Cervical: No cervical adenopathy.   Skin:     General: Skin is warm and dry.      Findings: No erythema or rash.   Neurological:      Mental Status: He is alert and oriented to person, place, and time.      Cranial Nerves: No cranial nerve deficit.      Deep Tendon Reflexes: Reflexes are normal and symmetric.   Psychiatric:         Behavior: Behavior normal.         Judgment: Judgment normal.           RECENT LABS:  Hematology WBC   Date Value Ref Range Status   04/17/2023 7.36 3.40 - 10.80 10*3/mm3 Final   09/18/2019 5.9 4.8 - 10.8 10*9/L Final     RBC   Date Value Ref Range Status   04/17/2023 3.52 (L) 4.14 - 5.80 10*6/mm3 Final   09/18/2019 4.36 (L) 4.7 - 6.1 10*12/L Final     Hemoglobin   Date Value Ref Range Status   04/17/2023 11.3 (L) 13.0 - 17.7 g/dL Final "   09/18/2019 14.1 14.0 - 18.0 g/dL Final     Hematocrit   Date Value Ref Range Status   04/17/2023 35.3 (L) 37.5 - 51.0 % Final   09/18/2019 42.0 42 - 54 % Final     Platelets   Date Value Ref Range Status   04/17/2023 212 140 - 450 10*3/mm3 Final   09/18/2019 165 150 - 450 10*9/L Final        Lab Results   Component Value Date    GLUCOSE 125 (H) 04/11/2023    CALCIUM 9.2 04/11/2023     04/11/2023    K 4.6 04/11/2023    CO2 25.6 04/11/2023     04/11/2023    BUN 21 (H) 04/11/2023    CREATININE 1.01 04/11/2023    EGFR 77.1 04/11/2023    BCR 20.8 04/11/2023    ANIONGAP 8.4 04/11/2023     PATHOLOGY 3/13/2023  Final Diagnosis   Lung, right lower lobe, endobronchial biopsies:    Small cell carcinoma, see comment      Comment    The biopsy shows an invasive tumor with crush artifact. Immunohistochemistry was performed utilizing appropriate controls and the tumor is positive for CD56, TTF-1 and synaptophysin and is negative for CD45 and chromogranin. The staining pattern and morphology are consistent with small cell carcinoma.     Molecular profiling has been ordered but is pending    IMAGING:  MRI OF THE BRAIN WITH AND WITHOUT CONTRAST ON 03/21/2023  IMPRESSION:  1. There is mild-to-moderate small vessel disease in the cerebral white  matter and there is a tiny 5 x 3 mm old posterior lateral right  cerebellar infarct in the right PICA territory  2. This patient has at least 8 enhancing metastatic lesions to the  brain, 2 infratentorial lesions including a 3 to 4 mm lesion in the  inferior lateral right cerebellum and an additional 9 x 8 x 10 mm lesion  in the posterior medial left cerebellum with some some central necrosis.  There are at least 6 separate tiny 2 to 5 mm enhancing supratentorial  metastatic lesions including a 4 mm enhancing metastases to the  posterior superior right frontal lobe and 5 mm round enhancing  metastases to  the posterior superior left frontal juxtacortical white  matter, 2 mm met to  the posterior medial left parietal lobe and an  additional 2 mm met to the far posterior inferior medial tip of the left  parietal lobe just above the left parietal occipital sulcus and a 4 to 5  mm round enhancing metastatic lesion in the anterior-inferior left  frontal lobe just above the left orbital roof and a 3 mm met to the left  putamen and there is a questionable very subtle 3 x 2 mm cortical met to  the anterior-inferior tip of the left temporal lobe which could be a  seventh supratentorial met and this is an indeterminate lesion. There is  no surrounding edema and no mass effect. The remainder of MRI of the  brain is normal.     F-18 FDG PET FROM SKULL BASE TO MID THIGH WITH PET/CT FUSION 3/14/2023  IMPRESSION:  1.  Intensely avid right lower lobe pulmonary nodule most suggestive of  malignancy.  2.  Hypermetabolic mediastinal and hilar adenopathy, hepatic lesions,  right adrenal nodule and sternal lesion most likely representing  metastatic disease. Nodular thickening along the right major fissure  which contacts the right hilum indicate uptake mildly above that of the  adjacent lung may represent lymphangitic spread of malignancy  3.  Scattered bilateral sub-6 mm pulmonary nodules are below PET  resolution. A mildly hypermetabolic cortes hepatic node is present. These  findings are indeterminate indeterminate and continued close attention  on follow-up is recommended.  4.  Other findings as above.    CT CHEST WO CONTRAST DIAGNOSTIC-2/23/2023  IMPRESSION:   Right lower lobe pulmonary nodules may represent neoplasm, initial  further evaluation with positron emission tomography is advised. Mildly  prominent, nonspecific subcarinal lymph node.    Assessment & Plan     1.  Extensive stage small cell lung carcinoma with PET scan evidence of metastatic disease to the liver and right adrenal gland, hilar and mediastinal lymph nodes and several scattered tiny lesions noted on MRI of the brain.  Currently  undergoing chemoimmunotherapy with carboplatin -16 and atezolizumab.  He is scheduled for cycle #2 in the office today.  2.  Comorbidities including vascular disease, coronary artery disease and history of prostate cancer.  He does have a decent performance status overall.  3.  Right IJ Mediport placed by Dr. Gonzalez.  4.  Neulasta support with each cycle of treatment due to high risk of neutropenic fever.    PLAN:  1.   Patient will proceed with his 2nd cycle of chemotherapy today (day 1, cycle #2 carboplatin, -16, atezolizumab).  2.  Although he lives in [facility] he and his wife have a hotel room in [place] for the next couple of nights.  3.  He will receive Neulasta on body injector on day 3 of the cycle.  4.  He will be scheduled for CT scan of the chest and MRI of the brain in 2 weeks.  He will require anesthesia sedation for the MRI due to his severe claustrophobia.    5.  MD follow-up with lab in 3 weeks at that time we will review the results of the scans and likely proceed with cycle #3 carboplatin -16 and atezolizumab on that visit.    6.  We discussed an overall treatment plan of 4 cycles of chemotherapy and immunotherapy with radiographic assessment with CT scans after cycle 2 and 4.  If he does well with combined chemoimmunotherapy treatment we may then continue maintenance atezolizumab immunotherapy from there.  7.  Whole brain radiation will be recommended if he develops any symptomatic progression of his brain mets but otherwise we would hold off on considering radiation therapy at least until he has completed his 4 cycles of chemotherapy treatment.    We spent over 60 minutes on the visit today including 30 minutes of face-to-face time with the patient and his family to address all of their questions concerning his first cycle of chemotherapy.  We also reviewed his labs and radiographic imaging on the computer, make sure he had necessary prescriptions to manage side effects, and  arranged appropriate follow-up.

## 2023-04-18 ENCOUNTER — INFUSION (OUTPATIENT)
Dept: ONCOLOGY | Facility: HOSPITAL | Age: 78
End: 2023-04-18
Payer: MEDICARE

## 2023-04-18 VITALS
SYSTOLIC BLOOD PRESSURE: 151 MMHG | TEMPERATURE: 98.9 F | BODY MASS INDEX: 29.5 KG/M2 | DIASTOLIC BLOOD PRESSURE: 72 MMHG | WEIGHT: 217.8 LBS | HEART RATE: 94 BPM | RESPIRATION RATE: 18 BRPM | OXYGEN SATURATION: 98 %

## 2023-04-18 DIAGNOSIS — C34.90 SMALL CELL LUNG CANCER: Primary | ICD-10-CM

## 2023-04-18 DIAGNOSIS — Z79.899 HIGH RISK MEDICATION USE: ICD-10-CM

## 2023-04-18 PROCEDURE — A9270 NON-COVERED ITEM OR SERVICE: HCPCS | Performed by: INTERNAL MEDICINE

## 2023-04-18 PROCEDURE — 96413 CHEMO IV INFUSION 1 HR: CPT

## 2023-04-18 PROCEDURE — 25010000002 ETOPOSIDE 1 GM/50ML SOLUTION 50 ML VIAL: Performed by: INTERNAL MEDICINE

## 2023-04-18 PROCEDURE — 63710000001 PROCHLORPERAZINE MALEATE PER 10 MG: Performed by: INTERNAL MEDICINE

## 2023-04-18 RX ORDER — SODIUM CHLORIDE 9 MG/ML
250 INJECTION, SOLUTION INTRAVENOUS ONCE
Status: COMPLETED | OUTPATIENT
Start: 2023-04-18 | End: 2023-04-18

## 2023-04-18 RX ORDER — PROCHLORPERAZINE MALEATE 10 MG
10 TABLET ORAL ONCE
Status: COMPLETED | OUTPATIENT
Start: 2023-04-18 | End: 2023-04-18

## 2023-04-18 RX ADMIN — PROCHLORPERAZINE MALEATE 10 MG: 10 TABLET ORAL at 14:16

## 2023-04-18 RX ADMIN — ETOPOSIDE 220 MG: 20 INJECTION INTRAVENOUS at 14:31

## 2023-04-18 RX ADMIN — SODIUM CHLORIDE 250 ML: 9 INJECTION, SOLUTION INTRAVENOUS at 14:16

## 2023-04-19 ENCOUNTER — INFUSION (OUTPATIENT)
Dept: ONCOLOGY | Facility: HOSPITAL | Age: 78
End: 2023-04-19
Payer: MEDICARE

## 2023-04-19 ENCOUNTER — TELEPHONE (OUTPATIENT)
Dept: ONCOLOGY | Facility: CLINIC | Age: 78
End: 2023-04-19
Payer: MEDICARE

## 2023-04-19 VITALS
HEART RATE: 87 BPM | TEMPERATURE: 97.2 F | RESPIRATION RATE: 16 BRPM | BODY MASS INDEX: 29.93 KG/M2 | WEIGHT: 221 LBS | DIASTOLIC BLOOD PRESSURE: 73 MMHG | OXYGEN SATURATION: 94 % | SYSTOLIC BLOOD PRESSURE: 124 MMHG

## 2023-04-19 DIAGNOSIS — Z45.2 ENCOUNTER FOR ADJUSTMENT OR MANAGEMENT OF VASCULAR ACCESS DEVICE: ICD-10-CM

## 2023-04-19 DIAGNOSIS — Z79.899 HIGH RISK MEDICATION USE: ICD-10-CM

## 2023-04-19 DIAGNOSIS — C34.90 SMALL CELL LUNG CANCER: Primary | ICD-10-CM

## 2023-04-19 PROCEDURE — 96413 CHEMO IV INFUSION 1 HR: CPT

## 2023-04-19 PROCEDURE — 25010000002 ETOPOSIDE 1 GM/50ML SOLUTION 50 ML VIAL: Performed by: INTERNAL MEDICINE

## 2023-04-19 PROCEDURE — 96377 APPLICATON ON-BODY INJECTOR: CPT

## 2023-04-19 PROCEDURE — 63710000001 PROCHLORPERAZINE MALEATE PER 10 MG: Performed by: INTERNAL MEDICINE

## 2023-04-19 PROCEDURE — A9270 NON-COVERED ITEM OR SERVICE: HCPCS | Performed by: INTERNAL MEDICINE

## 2023-04-19 PROCEDURE — 25010000002 PEGFILGRASTIM 6 MG/0.6ML PREFILLED SYRINGE KIT: Performed by: INTERNAL MEDICINE

## 2023-04-19 PROCEDURE — 25010000002 HEPARIN LOCK FLUSH PER 10 UNITS: Performed by: INTERNAL MEDICINE

## 2023-04-19 RX ORDER — SODIUM CHLORIDE 0.9 % (FLUSH) 0.9 %
10 SYRINGE (ML) INJECTION AS NEEDED
OUTPATIENT
Start: 2023-04-19

## 2023-04-19 RX ORDER — PROCHLORPERAZINE MALEATE 10 MG
10 TABLET ORAL ONCE
Status: COMPLETED | OUTPATIENT
Start: 2023-04-19 | End: 2023-04-19

## 2023-04-19 RX ORDER — HEPARIN SODIUM (PORCINE) LOCK FLUSH IV SOLN 100 UNIT/ML 100 UNIT/ML
500 SOLUTION INTRAVENOUS AS NEEDED
Status: DISCONTINUED | OUTPATIENT
Start: 2023-04-19 | End: 2023-04-19 | Stop reason: HOSPADM

## 2023-04-19 RX ORDER — SODIUM CHLORIDE 9 MG/ML
250 INJECTION, SOLUTION INTRAVENOUS ONCE
Status: COMPLETED | OUTPATIENT
Start: 2023-04-19 | End: 2023-04-19

## 2023-04-19 RX ORDER — HEPARIN SODIUM (PORCINE) LOCK FLUSH IV SOLN 100 UNIT/ML 100 UNIT/ML
500 SOLUTION INTRAVENOUS AS NEEDED
OUTPATIENT
Start: 2023-04-19

## 2023-04-19 RX ORDER — SODIUM CHLORIDE 0.9 % (FLUSH) 0.9 %
10 SYRINGE (ML) INJECTION AS NEEDED
Status: DISCONTINUED | OUTPATIENT
Start: 2023-04-19 | End: 2023-04-19 | Stop reason: HOSPADM

## 2023-04-19 RX ADMIN — SODIUM CHLORIDE, PRESERVATIVE FREE 500 UNITS: 5 INJECTION INTRAVENOUS at 15:35

## 2023-04-19 RX ADMIN — Medication 10 ML: at 15:35

## 2023-04-19 RX ADMIN — SODIUM CHLORIDE 250 ML: 9 INJECTION, SOLUTION INTRAVENOUS at 14:07

## 2023-04-19 RX ADMIN — PROCHLORPERAZINE MALEATE 10 MG: 10 TABLET ORAL at 14:02

## 2023-04-19 RX ADMIN — PEGFILGRASTIM 6 MG: KIT SUBCUTANEOUS at 15:30

## 2023-04-19 RX ADMIN — ETOPOSIDE 220 MG: 20 INJECTION INTRAVENOUS at 14:31

## 2023-04-19 NOTE — TELEPHONE ENCOUNTER
----- Message from Lizbet Smith RN sent at 4/19/2023  8:18 AM EDT -----  Regarding: FW: Cat scan and blood work  Contact: [phone]    ----- Message -----  From: Russell Ramírez Sr.  Sent: 4/18/2023   7:37 PM EDT  To: [facility] Clinical Pool  Subject: Cat scan and blood work                          Dr Conn    Can you please try to schedule Bill's  (Russell Ramírez D.O.B [birth year]) CT scan on the same day as his MRI?   His MRI is scheduled for 5/5 at 12:45 (?).  This way we will only have to make 1 trip to have this done.    Thank you,    Marivel Ramírez

## 2023-04-19 NOTE — NURSING NOTE
Pt and his wife present for day 3 Etoposide. Pt and his wife expressed concern over pts pain level/generalized fatigue. Pt currently rates his pain a 5/10 located in his back, legs and abdomen, which is a new pain. Pt states he is having normal bowel movements. Pts wife stated he had one choking episode last night while sleeping with his CPAP machine. She also mentioned he has had c/o feeling lightheaded. VS normal today. They would like me to notify Dr. Conn. I will send MD a direct message.

## 2023-04-20 RX ORDER — TRAMADOL HYDROCHLORIDE 50 MG/1
100 TABLET ORAL EVERY 6 HOURS PRN
Qty: 56 TABLET | Refills: 0 | Status: SHIPPED | OUTPATIENT
Start: 2023-04-20

## 2023-04-20 RX ORDER — TRAMADOL HYDROCHLORIDE 50 MG/1
100 TABLET ORAL EVERY 4 HOURS PRN
Qty: 100 TABLET | Refills: 0 | Status: SHIPPED | OUTPATIENT
Start: 2023-04-20

## 2023-04-20 NOTE — TELEPHONE ENCOUNTER
Caller: Marivel Ramírez    Relationship: Emergency Contact    Best call back number: [phone]    What is the best time to reach you: ASAP    Who are you requesting to speak with (clinical staff, provider,  specific staff member): CLINICAL      What was the call regarding: PT'S WIFE SAYS THE PHARMACY IS TELLING HER INSURANCE WON'T APPROVE FOR 8 DAYS, THEY WILL APPROVE FOR ONLY 8 PILLS A DAY INSTEAD OF THE WAY IT IS WRITTEN.    Do you require a callback: YES, PLEASE CALL PT'S WIFE TO ADVISE IF SOMETHING DIFFERENT CAN BE SENT, EITHER LOWER DOSAGE OR ANOTHER WAY TO GET MORE AT A TIME AND APPROVED BY INSURANCE.

## 2023-04-20 NOTE — TELEPHONE ENCOUNTER
----- Message from Alexsander Conn MD sent at 4/20/2023  8:02 AM EDT -----  Regarding: FW: status update  Joy,  Please give this patient a call and see how he is doing. Is he on pain meds?    Thanks  ----- Message -----  From: Skylar Mckenzie RN  Sent: 4/19/2023   3:46 PM EDT  To: Alexsander Conn MD  Subject: status update                                    Pt and his wife present for day 3 Etoposide. Pt and his wife expressed concern over pts pain level/generalized fatigue. Pt currently rates his pain a 5/10 located in his back, legs and abdomen, which is a new pain. Pt states he is having normal bowel movements. Pts wife stated he had one choking episode last night while sleeping with his CPAP machine. She also mentioned he has had c/o feeling lightheaded. VS normal today. They would like me to notify Dr. Conn. I will send MD a direct message.       Called and spoke with spouse. Patient still in bed, currently 8:30am in their location. Patient is tired and sluggish. Patient tries not to complain, but she seems patient is still in pain and only takes Tylenol (arthritis pain relief), not taking a hydrocodone. Choking spell happened again this morning while patient was sleeping with his CPAP machine. Patient is still light headed. Discussed with Dr Conn and recommends for patient to take Tramadol 100 mg po Q4hrs PRN   #100  0RF. Patient spouse aware and confirmed pharmacy.

## 2023-04-20 NOTE — TELEPHONE ENCOUNTER
Spoke with pharmacist at [facility] pharmacy. Insurance will approve a 7 day supply of not more than 8 tabs/day without a PA. New script routed to Dr. Kunz (MD #2) for signature and will send message for PA to be obtained for ongoing prescription coverage after his 7 day script is completed. Called pt's wife to inform her, but there was no answer- left voicemail.

## 2023-04-27 RX ORDER — LORAZEPAM 1 MG/1
TABLET ORAL
Qty: 1 TABLET | Refills: 0 | Status: SHIPPED | OUTPATIENT
Start: 2023-04-27

## 2023-04-27 NOTE — TELEPHONE ENCOUNTER
Discussed with Dr Conn, received order for Ativan 1mg PRN 1 hr prior to CT scan. Called back patient, spoke to wife, made aware, confirmed pharmacy.

## 2023-05-02 ENCOUNTER — TELEPHONE (OUTPATIENT)
Dept: ONCOLOGY | Facility: CLINIC | Age: 78
End: 2023-05-02
Payer: MEDICARE

## 2023-05-04 NOTE — PROGRESS NOTES
05/05/23 0001   Pre-Procedure Phone Call   Procedure Time Verified Yes   Arrival Time 1115   Procedure Location Verified Yes   Medical History Reviewed No   NPO Status Reinforced Yes   Ride and Caregiver Arranged Yes   Patient Knows to Bring Current Medications Yes   Bring Outside Films Requested No

## 2023-05-05 ENCOUNTER — HOSPITAL ENCOUNTER (OUTPATIENT)
Dept: CT IMAGING | Facility: HOSPITAL | Age: 78
Discharge: HOME | End: 2023-05-05
Payer: MEDICARE

## 2023-05-05 ENCOUNTER — ANESTHESIA (OUTPATIENT)
Dept: MRI IMAGING | Facility: HOSPITAL | Age: 78
End: 2023-05-05
Payer: MEDICARE

## 2023-05-05 ENCOUNTER — ANESTHESIA EVENT (OUTPATIENT)
Dept: MRI IMAGING | Facility: HOSPITAL | Age: 78
End: 2023-05-05
Payer: MEDICARE

## 2023-05-05 ENCOUNTER — HOSPITAL ENCOUNTER (OUTPATIENT)
Dept: MRI IMAGING | Facility: HOSPITAL | Age: 78
Discharge: HOME | End: 2023-05-05
Payer: MEDICARE

## 2023-05-05 VITALS
DIASTOLIC BLOOD PRESSURE: 86 MMHG | TEMPERATURE: 97.6 F | HEART RATE: 79 BPM | HEIGHT: 71 IN | RESPIRATION RATE: 15 BRPM | BODY MASS INDEX: 29.4 KG/M2 | WEIGHT: 210 LBS | OXYGEN SATURATION: 93 % | SYSTOLIC BLOOD PRESSURE: 153 MMHG

## 2023-05-05 DIAGNOSIS — Z79.899 HIGH RISK MEDICATION USE: ICD-10-CM

## 2023-05-05 DIAGNOSIS — C34.90 SMALL CELL LUNG CANCER: ICD-10-CM

## 2023-05-05 LAB — CREAT BLDA-MCNC: 0.9 MG/DL (ref 0.6–1.3)

## 2023-05-05 PROCEDURE — 82565 ASSAY OF CREATININE: CPT

## 2023-05-05 PROCEDURE — 70553 MRI BRAIN STEM W/O & W/DYE: CPT

## 2023-05-05 PROCEDURE — 25010000002 ONDANSETRON PER 1 MG: Performed by: NURSE ANESTHETIST, CERTIFIED REGISTERED

## 2023-05-05 PROCEDURE — 0 GADOBENATE DIMEGLUMINE 529 MG/ML SOLUTION: Performed by: INTERNAL MEDICINE

## 2023-05-05 PROCEDURE — 71260 CT THORAX DX C+: CPT

## 2023-05-05 PROCEDURE — 25510000001 IOPAMIDOL 61 % SOLUTION: Performed by: INTERNAL MEDICINE

## 2023-05-05 PROCEDURE — 25010000002 PROPOFOL 10 MG/ML EMULSION: Performed by: NURSE ANESTHETIST, CERTIFIED REGISTERED

## 2023-05-05 PROCEDURE — A9577 INJ MULTIHANCE: HCPCS | Performed by: INTERNAL MEDICINE

## 2023-05-05 PROCEDURE — 25010000002 DEXAMETHASONE PER 1 MG: Performed by: NURSE ANESTHETIST, CERTIFIED REGISTERED

## 2023-05-05 PROCEDURE — 25010000002 PHENYLEPHRINE 10 MG/ML SOLUTION: Performed by: NURSE ANESTHETIST, CERTIFIED REGISTERED

## 2023-05-05 RX ORDER — DEXAMETHASONE SODIUM PHOSPHATE 4 MG/ML
INJECTION, SOLUTION INTRA-ARTICULAR; INTRALESIONAL; INTRAMUSCULAR; INTRAVENOUS; SOFT TISSUE AS NEEDED
Status: DISCONTINUED | OUTPATIENT
Start: 2023-05-05 | End: 2023-05-05 | Stop reason: SURG

## 2023-05-05 RX ORDER — ONDANSETRON 2 MG/ML
4 INJECTION INTRAMUSCULAR; INTRAVENOUS ONCE AS NEEDED
Status: DISCONTINUED | OUTPATIENT
Start: 2023-05-05 | End: 2023-05-06 | Stop reason: HOSPADM

## 2023-05-05 RX ORDER — PHENYLEPHRINE HYDROCHLORIDE 10 MG/ML
INJECTION INTRAVENOUS AS NEEDED
Status: DISCONTINUED | OUTPATIENT
Start: 2023-05-05 | End: 2023-05-05 | Stop reason: SURG

## 2023-05-05 RX ORDER — PROMETHAZINE HYDROCHLORIDE 25 MG/1
25 TABLET ORAL ONCE AS NEEDED
Status: DISCONTINUED | OUTPATIENT
Start: 2023-05-05 | End: 2023-05-06 | Stop reason: HOSPADM

## 2023-05-05 RX ORDER — EPHEDRINE SULFATE 50 MG/ML
5 INJECTION, SOLUTION INTRAVENOUS ONCE AS NEEDED
Status: DISCONTINUED | OUTPATIENT
Start: 2023-05-05 | End: 2023-05-06 | Stop reason: HOSPADM

## 2023-05-05 RX ORDER — DROPERIDOL 2.5 MG/ML
0.62 INJECTION, SOLUTION INTRAMUSCULAR; INTRAVENOUS
Status: DISCONTINUED | OUTPATIENT
Start: 2023-05-05 | End: 2023-05-06 | Stop reason: HOSPADM

## 2023-05-05 RX ORDER — LABETALOL HYDROCHLORIDE 5 MG/ML
5 INJECTION, SOLUTION INTRAVENOUS
Status: DISCONTINUED | OUTPATIENT
Start: 2023-05-05 | End: 2023-05-06 | Stop reason: HOSPADM

## 2023-05-05 RX ORDER — PROMETHAZINE HYDROCHLORIDE 25 MG/1
25 SUPPOSITORY RECTAL ONCE AS NEEDED
Status: DISCONTINUED | OUTPATIENT
Start: 2023-05-05 | End: 2023-05-06 | Stop reason: HOSPADM

## 2023-05-05 RX ORDER — SODIUM CHLORIDE, SODIUM LACTATE, POTASSIUM CHLORIDE, CALCIUM CHLORIDE 600; 310; 30; 20 MG/100ML; MG/100ML; MG/100ML; MG/100ML
INJECTION, SOLUTION INTRAVENOUS CONTINUOUS PRN
Status: DISCONTINUED | OUTPATIENT
Start: 2023-05-05 | End: 2023-05-05 | Stop reason: SURG

## 2023-05-05 RX ORDER — DIPHENHYDRAMINE HYDROCHLORIDE 50 MG/ML
12.5 INJECTION INTRAMUSCULAR; INTRAVENOUS
Status: DISCONTINUED | OUTPATIENT
Start: 2023-05-05 | End: 2023-05-06 | Stop reason: HOSPADM

## 2023-05-05 RX ORDER — LIDOCAINE HYDROCHLORIDE 20 MG/ML
INJECTION, SOLUTION INFILTRATION; PERINEURAL AS NEEDED
Status: DISCONTINUED | OUTPATIENT
Start: 2023-05-05 | End: 2023-05-05 | Stop reason: SURG

## 2023-05-05 RX ORDER — PROPOFOL 10 MG/ML
VIAL (ML) INTRAVENOUS AS NEEDED
Status: DISCONTINUED | OUTPATIENT
Start: 2023-05-05 | End: 2023-05-05 | Stop reason: SURG

## 2023-05-05 RX ORDER — ONDANSETRON 2 MG/ML
INJECTION INTRAMUSCULAR; INTRAVENOUS AS NEEDED
Status: DISCONTINUED | OUTPATIENT
Start: 2023-05-05 | End: 2023-05-05 | Stop reason: SURG

## 2023-05-05 RX ORDER — NALOXONE HCL 0.4 MG/ML
0.2 VIAL (ML) INJECTION AS NEEDED
Status: DISCONTINUED | OUTPATIENT
Start: 2023-05-05 | End: 2023-05-06 | Stop reason: HOSPADM

## 2023-05-05 RX ORDER — FLUMAZENIL 0.1 MG/ML
0.2 INJECTION INTRAVENOUS AS NEEDED
Status: DISCONTINUED | OUTPATIENT
Start: 2023-05-05 | End: 2023-05-06 | Stop reason: HOSPADM

## 2023-05-05 RX ORDER — IPRATROPIUM BROMIDE AND ALBUTEROL SULFATE 2.5; .5 MG/3ML; MG/3ML
3 SOLUTION RESPIRATORY (INHALATION) ONCE AS NEEDED
Status: DISCONTINUED | OUTPATIENT
Start: 2023-05-05 | End: 2023-05-06 | Stop reason: HOSPADM

## 2023-05-05 RX ORDER — HYDRALAZINE HYDROCHLORIDE 20 MG/ML
5 INJECTION INTRAMUSCULAR; INTRAVENOUS
Status: DISCONTINUED | OUTPATIENT
Start: 2023-05-05 | End: 2023-05-06 | Stop reason: HOSPADM

## 2023-05-05 RX ADMIN — PHENYLEPHRINE HYDROCHLORIDE 100 MCG: 10 INJECTION INTRAVENOUS at 12:24

## 2023-05-05 RX ADMIN — LIDOCAINE HYDROCHLORIDE 100 MG: 20 INJECTION, SOLUTION INFILTRATION; PERINEURAL at 11:58

## 2023-05-05 RX ADMIN — GADOBENATE DIMEGLUMINE 20 ML: 529 INJECTION, SOLUTION INTRAVENOUS at 12:20

## 2023-05-05 RX ADMIN — PHENYLEPHRINE HYDROCHLORIDE 100 MCG: 10 INJECTION INTRAVENOUS at 12:14

## 2023-05-05 RX ADMIN — PROPOFOL 150 MG: 10 INJECTION, EMULSION INTRAVENOUS at 11:58

## 2023-05-05 RX ADMIN — IOPAMIDOL 80 ML: 612 INJECTION, SOLUTION INTRAVENOUS at 10:55

## 2023-05-05 RX ADMIN — PHENYLEPHRINE HYDROCHLORIDE 100 MCG: 10 INJECTION INTRAVENOUS at 12:03

## 2023-05-05 RX ADMIN — DEXAMETHASONE SODIUM PHOSPHATE 4 MG: 4 INJECTION, SOLUTION INTRA-ARTICULAR; INTRALESIONAL; INTRAMUSCULAR; INTRAVENOUS; SOFT TISSUE at 11:58

## 2023-05-05 RX ADMIN — ONDANSETRON HYDROCHLORIDE 4 MG: 2 SOLUTION INTRAMUSCULAR; INTRAVENOUS at 11:58

## 2023-05-05 RX ADMIN — SODIUM CHLORIDE, POTASSIUM CHLORIDE, SODIUM LACTATE AND CALCIUM CHLORIDE: 600; 310; 30; 20 INJECTION, SOLUTION INTRAVENOUS at 11:56

## 2023-05-05 NOTE — ANESTHESIA PROCEDURE NOTES
Airway  Urgency: elective    Date/Time: 5/5/2023 11:59 AM  Airway not difficult    General Information and Staff    Patient location during procedure: radiology  Anesthesiologist: Ollie Vazquez MD  CRNA/CAA: Kim Watters CRNA    Indications and Patient Condition  Indications for airway management: airway protection    Preoxygenated: yes  MILS not maintained throughout  Mask difficulty assessment: 0 - not attempted    Final Airway Details  Final airway type: supraglottic airway      Successful airway: unique  Size 4     Number of attempts at approach: 1  Assessment: lips, teeth, and gum same as pre-op and atraumatic intubation

## 2023-05-05 NOTE — ANESTHESIA POSTPROCEDURE EVALUATION
"Patient: Russell Ramírez Sr.    Procedure Summary     Date: 05/05/23 Room / Location: [facility] MRI; [facility] INTERVENTIONAL    Anesthesia Start: 1156 Anesthesia Stop: 1253    Procedure: MRI BRAIN W WO CONTRAST Diagnosis:       Small cell lung cancer      High risk medication use    Scheduled Providers:  Provider: Ollie Vazquez MD    Anesthesia Type: general ASA Status: 3          Anesthesia Type: general    Vitals  Vitals Value Taken Time   /82 05/05/23 1316   Temp     Pulse 73 05/05/23 1328   Resp 17 05/05/23 1315   SpO2 99 % 05/05/23 1328   Vitals shown include unvalidated device data.        Post Anesthesia Care and Evaluation    Patient location during evaluation: bedside  Patient participation: complete - patient participated  Level of consciousness: sleepy but conscious and responsive to verbal stimuli  Pain management: adequate    Airway patency: patent  Anesthetic complications: No anesthetic complications  PONV Status: controlled  Cardiovascular status: acceptable  Respiratory status: acceptable  Hydration status: acceptable    Comments: /82 (BP Location: Right arm, Patient Position: Lying)   Pulse 76   Temp 36.4 °C (97.6 °F) (Oral)   Resp 17   Ht 180.3 cm (71\")   Wt 95.3 kg (210 lb)   SpO2 100%   BMI 29.29 kg/m²         "

## 2023-05-05 NOTE — ANESTHESIA PREPROCEDURE EVALUATION
Anesthesia Evaluation     Patient summary reviewed and Nursing notes reviewed   NPO Solid Status: > 8 hours  NPO Liquid Status: > 2 hours           Airway   Mallampati: II  TM distance: >3 FB  Neck ROM: full  No difficulty expected  Dental    (+) edentulous    Pulmonary - normal exam   (+) a smoker Former, lung cancer,   Cardiovascular - normal exam  Exercise tolerance: good (4-7 METS)    (+) hypertension, CAD, hyperlipidemia,       Neuro/Psych  (+) CVA,    GI/Hepatic/Renal/Endo    (+)  GERD,      Musculoskeletal     Abdominal    Substance History - negative use     OB/GYN          Other   arthritis,    history of cancer                    Anesthesia Plan    ASA 3     general     intravenous induction     Anesthetic plan, risks, benefits, and alternatives have been provided, discussed and informed consent has been obtained with: patient.    Plan discussed with CRNA.        CODE STATUS:

## 2023-05-08 ENCOUNTER — INFUSION (OUTPATIENT)
Dept: ONCOLOGY | Facility: HOSPITAL | Age: 78
End: 2023-05-08
Payer: MEDICARE

## 2023-05-08 ENCOUNTER — OFFICE VISIT (OUTPATIENT)
Dept: ONCOLOGY | Facility: CLINIC | Age: 78
End: 2023-05-08
Payer: MEDICARE

## 2023-05-08 VITALS
TEMPERATURE: 97.8 F | BODY MASS INDEX: 28.62 KG/M2 | WEIGHT: 211.3 LBS | HEIGHT: 72 IN | OXYGEN SATURATION: 96 % | DIASTOLIC BLOOD PRESSURE: 64 MMHG | RESPIRATION RATE: 16 BRPM | HEART RATE: 75 BPM | SYSTOLIC BLOOD PRESSURE: 125 MMHG

## 2023-05-08 DIAGNOSIS — C34.90 SMALL CELL LUNG CANCER: Primary | ICD-10-CM

## 2023-05-08 DIAGNOSIS — C34.90 SMALL CELL LUNG CANCER: ICD-10-CM

## 2023-05-08 DIAGNOSIS — Z79.899 HIGH RISK MEDICATION USE: ICD-10-CM

## 2023-05-08 LAB
ALBUMIN SERPL-MCNC: 3.8 G/DL (ref 3.5–5.2)
ALBUMIN/GLOB SERPL: 1.3 G/DL (ref 1.1–2.4)
ALP SERPL-CCNC: 104 U/L (ref 38–116)
ALT SERPL W P-5'-P-CCNC: 7 U/L (ref 0–41)
ANION GAP SERPL CALCULATED.3IONS-SCNC: 10.9 MMOL/L (ref 5–15)
AST SERPL-CCNC: 15 U/L (ref 0–40)
BASOPHILS # BLD AUTO: 0.1 10*3/MM3 (ref 0–0.2)
BASOPHILS NFR BLD AUTO: 0.8 % (ref 0–1.5)
BILIRUB SERPL-MCNC: 0.2 MG/DL (ref 0.2–1.2)
BUN SERPL-MCNC: 15 MG/DL (ref 6–20)
BUN/CREAT SERPL: 15.5 (ref 7.3–30)
CALCIUM SPEC-SCNC: 9.2 MG/DL (ref 8.5–10.2)
CHLORIDE SERPL-SCNC: 105 MMOL/L (ref 98–107)
CO2 SERPL-SCNC: 24.1 MMOL/L (ref 22–29)
CREAT SERPL-MCNC: 0.97 MG/DL (ref 0.7–1.3)
DEPRECATED RDW RBC AUTO: 59.5 FL (ref 37–54)
EGFRCR SERPLBLD CKD-EPI 2021: 80.9 ML/MIN/1.73
EOSINOPHIL # BLD AUTO: 0.2 10*3/MM3 (ref 0–0.4)
EOSINOPHIL NFR BLD AUTO: 1.7 % (ref 0.3–6.2)
ERYTHROCYTE [DISTWIDTH] IN BLOOD BY AUTOMATED COUNT: 15.8 % (ref 12.3–15.4)
GLOBULIN UR ELPH-MCNC: 3 GM/DL (ref 1.8–3.5)
GLUCOSE SERPL-MCNC: 126 MG/DL (ref 74–124)
HCT VFR BLD AUTO: 34 % (ref 37.5–51)
HGB BLD-MCNC: 10.9 G/DL (ref 13–17.7)
IMM GRANULOCYTES # BLD AUTO: 0.1 10*3/MM3 (ref 0–0.05)
IMM GRANULOCYTES NFR BLD AUTO: 0.8 % (ref 0–0.5)
LYMPHOCYTES # BLD AUTO: 1.33 10*3/MM3 (ref 0.7–3.1)
LYMPHOCYTES NFR BLD AUTO: 11 % (ref 19.6–45.3)
MCH RBC QN AUTO: 33.6 PG (ref 26.6–33)
MCHC RBC AUTO-ENTMCNC: 32.1 G/DL (ref 31.5–35.7)
MCV RBC AUTO: 104.9 FL (ref 79–97)
MONOCYTES # BLD AUTO: 1.31 10*3/MM3 (ref 0.1–0.9)
MONOCYTES NFR BLD AUTO: 10.8 % (ref 5–12)
NEUTROPHILS NFR BLD AUTO: 74.9 % (ref 42.7–76)
NEUTROPHILS NFR BLD AUTO: 9.07 10*3/MM3 (ref 1.7–7)
NRBC BLD AUTO-RTO: 0 /100 WBC (ref 0–0.2)
PLATELET # BLD AUTO: 162 10*3/MM3 (ref 140–450)
PMV BLD AUTO: 9 FL (ref 6–12)
POTASSIUM SERPL-SCNC: 4.2 MMOL/L (ref 3.5–4.7)
PROT SERPL-MCNC: 6.8 G/DL (ref 6.3–8)
RBC # BLD AUTO: 3.24 10*6/MM3 (ref 4.14–5.8)
SODIUM SERPL-SCNC: 140 MMOL/L (ref 134–145)
T3FREE SERPL-MCNC: 3 PG/ML (ref 2–4.4)
T4 FREE SERPL-MCNC: 0.98 NG/DL (ref 0.93–1.7)
TSH SERPL DL<=0.05 MIU/L-ACNC: 3.41 UIU/ML (ref 0.27–4.2)
WBC NRBC COR # BLD: 12.11 10*3/MM3 (ref 3.4–10.8)

## 2023-05-08 PROCEDURE — 80053 COMPREHEN METABOLIC PANEL: CPT

## 2023-05-08 PROCEDURE — 96367 TX/PROPH/DG ADDL SEQ IV INF: CPT

## 2023-05-08 PROCEDURE — 25010000002 ATEZOLIZUMAB 1200 MG/20ML SOLUTION 20 ML VIAL: Performed by: INTERNAL MEDICINE

## 2023-05-08 PROCEDURE — 63710000001 OLANZAPINE 5 MG TABLET: Performed by: INTERNAL MEDICINE

## 2023-05-08 PROCEDURE — 84481 FREE ASSAY (FT-3): CPT | Performed by: INTERNAL MEDICINE

## 2023-05-08 PROCEDURE — 96375 TX/PRO/DX INJ NEW DRUG ADDON: CPT

## 2023-05-08 PROCEDURE — 96417 CHEMO IV INFUS EACH ADDL SEQ: CPT

## 2023-05-08 PROCEDURE — 25010000002 PALONOSETRON PER 25 MCG: Performed by: INTERNAL MEDICINE

## 2023-05-08 PROCEDURE — 85025 COMPLETE CBC W/AUTO DIFF WBC: CPT

## 2023-05-08 PROCEDURE — A9270 NON-COVERED ITEM OR SERVICE: HCPCS | Performed by: INTERNAL MEDICINE

## 2023-05-08 PROCEDURE — 25010000002 CARBOPLATIN PER 50 MG: Performed by: INTERNAL MEDICINE

## 2023-05-08 PROCEDURE — 25010000002 DEXAMETHASONE SODIUM PHOSPHATE 100 MG/10ML SOLUTION: Performed by: INTERNAL MEDICINE

## 2023-05-08 PROCEDURE — 25010000002 ETOPOSIDE 1 GM/50ML SOLUTION 50 ML VIAL: Performed by: INTERNAL MEDICINE

## 2023-05-08 PROCEDURE — 25010000002 FOSAPREPITANT PER 1 MG: Performed by: INTERNAL MEDICINE

## 2023-05-08 PROCEDURE — 84439 ASSAY OF FREE THYROXINE: CPT | Performed by: INTERNAL MEDICINE

## 2023-05-08 PROCEDURE — 96413 CHEMO IV INFUSION 1 HR: CPT

## 2023-05-08 PROCEDURE — 84443 ASSAY THYROID STIM HORMONE: CPT | Performed by: INTERNAL MEDICINE

## 2023-05-08 RX ORDER — SODIUM CHLORIDE 9 MG/ML
250 INJECTION, SOLUTION INTRAVENOUS ONCE
OUTPATIENT
Start: 2023-05-29

## 2023-05-08 RX ORDER — PALONOSETRON 0.05 MG/ML
0.25 INJECTION, SOLUTION INTRAVENOUS ONCE
OUTPATIENT
Start: 2023-05-29

## 2023-05-08 RX ORDER — OLANZAPINE 5 MG/1
5 TABLET ORAL ONCE
Status: COMPLETED | OUTPATIENT
Start: 2023-05-08 | End: 2023-05-08

## 2023-05-08 RX ORDER — SODIUM CHLORIDE 9 MG/ML
250 INJECTION, SOLUTION INTRAVENOUS ONCE
OUTPATIENT
Start: 2023-05-30

## 2023-05-08 RX ORDER — SODIUM CHLORIDE 9 MG/ML
250 INJECTION, SOLUTION INTRAVENOUS ONCE
Status: COMPLETED | OUTPATIENT
Start: 2023-05-08 | End: 2023-05-08

## 2023-05-08 RX ORDER — OLANZAPINE 5 MG/1
5 TABLET ORAL ONCE
OUTPATIENT
Start: 2023-05-29 | End: 2023-05-29

## 2023-05-08 RX ORDER — DIPHENHYDRAMINE HYDROCHLORIDE 50 MG/ML
50 INJECTION INTRAMUSCULAR; INTRAVENOUS AS NEEDED
OUTPATIENT
Start: 2023-05-29

## 2023-05-08 RX ORDER — PROCHLORPERAZINE MALEATE 10 MG
10 TABLET ORAL ONCE
OUTPATIENT
Start: 2023-05-31 | End: 2023-05-31

## 2023-05-08 RX ORDER — PALONOSETRON 0.05 MG/ML
0.25 INJECTION, SOLUTION INTRAVENOUS ONCE
Status: COMPLETED | OUTPATIENT
Start: 2023-05-08 | End: 2023-05-08

## 2023-05-08 RX ORDER — FAMOTIDINE 10 MG/ML
20 INJECTION, SOLUTION INTRAVENOUS AS NEEDED
OUTPATIENT
Start: 2023-05-29

## 2023-05-08 RX ORDER — SODIUM CHLORIDE 9 MG/ML
250 INJECTION, SOLUTION INTRAVENOUS ONCE
OUTPATIENT
Start: 2023-05-31

## 2023-05-08 RX ORDER — PROCHLORPERAZINE MALEATE 10 MG
10 TABLET ORAL ONCE
OUTPATIENT
Start: 2023-05-30 | End: 2023-05-30

## 2023-05-08 RX ADMIN — OLANZAPINE 5 MG: 5 TABLET, FILM COATED ORAL at 10:17

## 2023-05-08 RX ADMIN — SODIUM CHLORIDE 250 ML: 9 INJECTION, SOLUTION INTRAVENOUS at 09:45

## 2023-05-08 RX ADMIN — PALONOSETRON 0.25 MG: 0.05 INJECTION, SOLUTION INTRAVENOUS at 10:17

## 2023-05-08 RX ADMIN — ETOPOSIDE 220 MG: 20 INJECTION INTRAVENOUS at 11:51

## 2023-05-08 RX ADMIN — ATEZOLIZUMAB 1200 MG: 1200 INJECTION, SOLUTION INTRAVENOUS at 10:26

## 2023-05-08 RX ADMIN — DEXAMETHASONE SODIUM PHOSPHATE 12 MG: 10 INJECTION, SOLUTION INTRAMUSCULAR; INTRAVENOUS at 10:58

## 2023-05-08 RX ADMIN — SODIUM CHLORIDE 100 ML: 9 INJECTION, SOLUTION INTRAVENOUS at 09:47

## 2023-05-08 RX ADMIN — CARBOPLATIN 560 MG: 10 INJECTION, SOLUTION INTRAVENOUS at 11:16

## 2023-05-09 ENCOUNTER — INFUSION (OUTPATIENT)
Dept: ONCOLOGY | Facility: HOSPITAL | Age: 78
End: 2023-05-09
Payer: MEDICARE

## 2023-05-09 VITALS
WEIGHT: 216.8 LBS | BODY MASS INDEX: 29.37 KG/M2 | OXYGEN SATURATION: 95 % | HEART RATE: 84 BPM | RESPIRATION RATE: 18 BRPM | DIASTOLIC BLOOD PRESSURE: 63 MMHG | SYSTOLIC BLOOD PRESSURE: 99 MMHG | TEMPERATURE: 98.6 F

## 2023-05-09 DIAGNOSIS — C34.90 SMALL CELL LUNG CANCER: Primary | ICD-10-CM

## 2023-05-09 DIAGNOSIS — Z79.899 HIGH RISK MEDICATION USE: ICD-10-CM

## 2023-05-09 PROCEDURE — 25010000002 ETOPOSIDE 1 GM/50ML SOLUTION 50 ML VIAL: Performed by: INTERNAL MEDICINE

## 2023-05-09 PROCEDURE — 96413 CHEMO IV INFUSION 1 HR: CPT

## 2023-05-09 PROCEDURE — 63710000001 PROCHLORPERAZINE MALEATE PER 10 MG: Performed by: INTERNAL MEDICINE

## 2023-05-09 PROCEDURE — A9270 NON-COVERED ITEM OR SERVICE: HCPCS | Performed by: INTERNAL MEDICINE

## 2023-05-09 RX ORDER — SODIUM CHLORIDE 9 MG/ML
250 INJECTION, SOLUTION INTRAVENOUS ONCE
Status: COMPLETED | OUTPATIENT
Start: 2023-05-09 | End: 2023-05-09

## 2023-05-09 RX ORDER — PROCHLORPERAZINE MALEATE 10 MG
10 TABLET ORAL ONCE
Status: COMPLETED | OUTPATIENT
Start: 2023-05-09 | End: 2023-05-09

## 2023-05-09 RX ADMIN — ETOPOSIDE 220 MG: 20 INJECTION INTRAVENOUS at 13:36

## 2023-05-09 RX ADMIN — SODIUM CHLORIDE 250 ML: 9 INJECTION, SOLUTION INTRAVENOUS at 13:36

## 2023-05-09 RX ADMIN — PROCHLORPERAZINE MALEATE 10 MG: 10 TABLET ORAL at 13:21

## 2023-05-10 ENCOUNTER — INFUSION (OUTPATIENT)
Dept: ONCOLOGY | Facility: HOSPITAL | Age: 78
End: 2023-05-10
Payer: MEDICARE

## 2023-05-10 VITALS
RESPIRATION RATE: 16 BRPM | DIASTOLIC BLOOD PRESSURE: 71 MMHG | HEART RATE: 84 BPM | BODY MASS INDEX: 29.39 KG/M2 | WEIGHT: 217 LBS | TEMPERATURE: 98.2 F | SYSTOLIC BLOOD PRESSURE: 123 MMHG | OXYGEN SATURATION: 97 %

## 2023-05-10 DIAGNOSIS — Z45.2 ENCOUNTER FOR ADJUSTMENT OR MANAGEMENT OF VASCULAR ACCESS DEVICE: ICD-10-CM

## 2023-05-10 DIAGNOSIS — C34.90 SMALL CELL LUNG CANCER: Primary | ICD-10-CM

## 2023-05-10 DIAGNOSIS — Z79.899 HIGH RISK MEDICATION USE: ICD-10-CM

## 2023-05-10 PROCEDURE — 25010000002 HEPARIN LOCK FLUSH PER 10 UNITS: Performed by: INTERNAL MEDICINE

## 2023-05-10 PROCEDURE — 96413 CHEMO IV INFUSION 1 HR: CPT

## 2023-05-10 PROCEDURE — A9270 NON-COVERED ITEM OR SERVICE: HCPCS | Performed by: INTERNAL MEDICINE

## 2023-05-10 PROCEDURE — 96377 APPLICATON ON-BODY INJECTOR: CPT

## 2023-05-10 PROCEDURE — 63710000001 PROCHLORPERAZINE MALEATE PER 10 MG: Performed by: INTERNAL MEDICINE

## 2023-05-10 PROCEDURE — 25010000002 ETOPOSIDE 1 GM/50ML SOLUTION 50 ML VIAL: Performed by: INTERNAL MEDICINE

## 2023-05-10 PROCEDURE — 25010000002 PEGFILGRASTIM 6 MG/0.6ML PREFILLED SYRINGE KIT: Performed by: INTERNAL MEDICINE

## 2023-05-10 RX ORDER — SODIUM CHLORIDE 0.9 % (FLUSH) 0.9 %
10 SYRINGE (ML) INJECTION AS NEEDED
OUTPATIENT
Start: 2023-05-10

## 2023-05-10 RX ORDER — HEPARIN SODIUM (PORCINE) LOCK FLUSH IV SOLN 100 UNIT/ML 100 UNIT/ML
500 SOLUTION INTRAVENOUS AS NEEDED
Status: DISCONTINUED | OUTPATIENT
Start: 2023-05-10 | End: 2023-05-10 | Stop reason: HOSPADM

## 2023-05-10 RX ORDER — HEPARIN SODIUM (PORCINE) LOCK FLUSH IV SOLN 100 UNIT/ML 100 UNIT/ML
500 SOLUTION INTRAVENOUS AS NEEDED
OUTPATIENT
Start: 2023-05-10

## 2023-05-10 RX ORDER — SODIUM CHLORIDE 9 MG/ML
250 INJECTION, SOLUTION INTRAVENOUS ONCE
Status: COMPLETED | OUTPATIENT
Start: 2023-05-10 | End: 2023-05-10

## 2023-05-10 RX ORDER — SODIUM CHLORIDE 0.9 % (FLUSH) 0.9 %
10 SYRINGE (ML) INJECTION AS NEEDED
Status: DISCONTINUED | OUTPATIENT
Start: 2023-05-10 | End: 2023-05-10 | Stop reason: HOSPADM

## 2023-05-10 RX ORDER — PROCHLORPERAZINE MALEATE 10 MG
10 TABLET ORAL ONCE
Status: COMPLETED | OUTPATIENT
Start: 2023-05-10 | End: 2023-05-10

## 2023-05-10 RX ADMIN — ETOPOSIDE 220 MG: 20 INJECTION INTRAVENOUS at 15:11

## 2023-05-10 RX ADMIN — PEGFILGRASTIM 6 MG: KIT SUBCUTANEOUS at 14:52

## 2023-05-10 RX ADMIN — PROCHLORPERAZINE MALEATE 10 MG: 10 TABLET ORAL at 14:35

## 2023-05-10 RX ADMIN — SODIUM CHLORIDE 250 ML: 9 INJECTION, SOLUTION INTRAVENOUS at 15:11

## 2023-05-10 RX ADMIN — Medication 10 ML: at 16:14

## 2023-05-10 RX ADMIN — Medication 500 UNITS: at 16:13

## 2023-05-12 ENCOUNTER — TELEPHONE (OUTPATIENT)
Dept: ONCOLOGY | Facility: CLINIC | Age: 78
End: 2023-05-12
Payer: MEDICARE

## 2023-05-12 NOTE — TELEPHONE ENCOUNTER
LM for patient that appointments have been updated for the last week in May and can be found on their MyChart or call [phone] and hit option 3 for more information.

## 2023-05-22 ENCOUNTER — PATIENT OUTREACH (OUTPATIENT)
Dept: OTHER | Facility: HOSPITAL | Age: 78
End: 2023-05-22
Payer: MEDICARE

## 2023-05-22 NOTE — PROGRESS NOTES
"Called patient. S/w his wife, Marivel. He complains of fatigue, some pain (managed with meds) and some \"lapses in memory\".  He denies any other side effects to treatment.  The patient has had three treatments so far.  The patient is eating ok. He has had a little weight loss, which they attribute to \"puling him off sugar\".    They previously met with [facility]. She needs to submit her mileage to a company who reimburses .20 per mile.     Marivel states the patient has developed a \"hard area\" on the inside if his mouth. She states it is not sore and hasn't opened. Encouraged her to contact PCP if it worsens or doesn't resolve.    Marivel has no other questions/concerns.  I will call next month; she will call as needed  "

## 2023-05-24 ENCOUNTER — TELEPHONE (OUTPATIENT)
Dept: ONCOLOGY | Facility: CLINIC | Age: 78
End: 2023-05-24
Payer: MEDICARE

## 2023-05-24 NOTE — TELEPHONE ENCOUNTER
Reviewed patient chart and returned call to patient with the information that currently there are no scans or MRI's ordered, so I cannot guess a timeline.  I advised him that when he sees Dr. Conn on 5/31/2023 to discuss and determine a more appropriate timeline for his daughter to fly into town.  He v/u.

## 2023-05-24 NOTE — TELEPHONE ENCOUNTER
Caller: Laura Ramírez Sr.    Relationship: Self    Best call back number: [phone]    What is the best time to reach you: ANY    Who are you requesting to speak with (clinical staff, provider,  specific staff member): CLINICAL    What was the call regarding: LAURA IS WANTING TO KNOW WHEN HE WILL BE SCHEDULED FOR HIS CTS AND MRIS AFTER HIS INFUSIONS.  HIS DAUGHTER WANTS TO BOOK HER FLIGHT SO SHE CAN BE HERE WITH HIM    Do you require a callback: YES

## 2023-05-31 ENCOUNTER — INFUSION (OUTPATIENT)
Dept: ONCOLOGY | Facility: HOSPITAL | Age: 78
End: 2023-05-31

## 2023-05-31 ENCOUNTER — OFFICE VISIT (OUTPATIENT)
Dept: ONCOLOGY | Facility: CLINIC | Age: 78
End: 2023-05-31

## 2023-05-31 ENCOUNTER — DOCUMENTATION (OUTPATIENT)
Dept: OTHER | Facility: HOSPITAL | Age: 78
End: 2023-05-31

## 2023-05-31 VITALS
SYSTOLIC BLOOD PRESSURE: 115 MMHG | OXYGEN SATURATION: 98 % | HEIGHT: 72 IN | DIASTOLIC BLOOD PRESSURE: 69 MMHG | HEART RATE: 83 BPM | WEIGHT: 211.7 LBS | RESPIRATION RATE: 18 BRPM | BODY MASS INDEX: 28.68 KG/M2 | TEMPERATURE: 99.5 F

## 2023-05-31 DIAGNOSIS — C34.90 SMALL CELL LUNG CANCER: ICD-10-CM

## 2023-05-31 DIAGNOSIS — Z45.2 ENCOUNTER FOR ADJUSTMENT OR MANAGEMENT OF VASCULAR ACCESS DEVICE: ICD-10-CM

## 2023-05-31 DIAGNOSIS — C34.90 SMALL CELL LUNG CANCER: Primary | ICD-10-CM

## 2023-05-31 DIAGNOSIS — Z79.899 HIGH RISK MEDICATION USE: ICD-10-CM

## 2023-05-31 LAB
ALBUMIN SERPL-MCNC: 3.8 G/DL (ref 3.5–5.2)
ALBUMIN/GLOB SERPL: 1.2 G/DL (ref 1.1–2.4)
ALP SERPL-CCNC: 107 U/L (ref 38–116)
ALT SERPL W P-5'-P-CCNC: 9 U/L (ref 0–41)
ANION GAP SERPL CALCULATED.3IONS-SCNC: 11.9 MMOL/L (ref 5–15)
AST SERPL-CCNC: 15 U/L (ref 0–40)
BASOPHILS # BLD AUTO: 0.1 10*3/MM3 (ref 0–0.2)
BASOPHILS NFR BLD AUTO: 1.2 % (ref 0–1.5)
BILIRUB SERPL-MCNC: 0.2 MG/DL (ref 0.2–1.2)
BUN SERPL-MCNC: 24 MG/DL (ref 6–20)
BUN/CREAT SERPL: 22.2 (ref 7.3–30)
CALCIUM SPEC-SCNC: 9.5 MG/DL (ref 8.5–10.2)
CHLORIDE SERPL-SCNC: 105 MMOL/L (ref 98–107)
CO2 SERPL-SCNC: 25.1 MMOL/L (ref 22–29)
CREAT SERPL-MCNC: 1.08 MG/DL (ref 0.7–1.3)
DEPRECATED RDW RBC AUTO: 63.7 FL (ref 37–54)
EGFRCR SERPLBLD CKD-EPI 2021: 71.1 ML/MIN/1.73
EOSINOPHIL # BLD AUTO: 0.36 10*3/MM3 (ref 0–0.4)
EOSINOPHIL NFR BLD AUTO: 4.2 % (ref 0.3–6.2)
ERYTHROCYTE [DISTWIDTH] IN BLOOD BY AUTOMATED COUNT: 16.3 % (ref 12.3–15.4)
GLOBULIN UR ELPH-MCNC: 3.1 GM/DL (ref 1.8–3.5)
GLUCOSE SERPL-MCNC: 105 MG/DL (ref 74–124)
HCT VFR BLD AUTO: 32.9 % (ref 37.5–51)
HGB BLD-MCNC: 10.6 G/DL (ref 13–17.7)
IMM GRANULOCYTES # BLD AUTO: 0.03 10*3/MM3 (ref 0–0.05)
IMM GRANULOCYTES NFR BLD AUTO: 0.4 % (ref 0–0.5)
LYMPHOCYTES # BLD AUTO: 1.54 10*3/MM3 (ref 0.7–3.1)
LYMPHOCYTES NFR BLD AUTO: 18.1 % (ref 19.6–45.3)
MCH RBC QN AUTO: 34.6 PG (ref 26.6–33)
MCHC RBC AUTO-ENTMCNC: 32.2 G/DL (ref 31.5–35.7)
MCV RBC AUTO: 107.5 FL (ref 79–97)
MONOCYTES # BLD AUTO: 1 10*3/MM3 (ref 0.1–0.9)
MONOCYTES NFR BLD AUTO: 11.7 % (ref 5–12)
NEUTROPHILS NFR BLD AUTO: 5.5 10*3/MM3 (ref 1.7–7)
NEUTROPHILS NFR BLD AUTO: 64.4 % (ref 42.7–76)
NRBC BLD AUTO-RTO: 0 /100 WBC (ref 0–0.2)
PLATELET # BLD AUTO: 141 10*3/MM3 (ref 140–450)
PMV BLD AUTO: 8.4 FL (ref 6–12)
POTASSIUM SERPL-SCNC: 4.2 MMOL/L (ref 3.5–4.7)
PROT SERPL-MCNC: 6.9 G/DL (ref 6.3–8)
RBC # BLD AUTO: 3.06 10*6/MM3 (ref 4.14–5.8)
SODIUM SERPL-SCNC: 142 MMOL/L (ref 134–145)
WBC NRBC COR # BLD: 8.53 10*3/MM3 (ref 3.4–10.8)

## 2023-05-31 PROCEDURE — 25010000002 DEXAMETHASONE SODIUM PHOSPHATE 100 MG/10ML SOLUTION: Performed by: INTERNAL MEDICINE

## 2023-05-31 PROCEDURE — A9270 NON-COVERED ITEM OR SERVICE: HCPCS | Performed by: INTERNAL MEDICINE

## 2023-05-31 PROCEDURE — 25010000002 ETOPOSIDE 1 GM/50ML SOLUTION 50 ML VIAL: Performed by: INTERNAL MEDICINE

## 2023-05-31 PROCEDURE — 63710000001 OLANZAPINE 5 MG TABLET: Performed by: INTERNAL MEDICINE

## 2023-05-31 PROCEDURE — 96375 TX/PRO/DX INJ NEW DRUG ADDON: CPT

## 2023-05-31 PROCEDURE — 25010000002 CARBOPLATIN PER 50 MG: Performed by: INTERNAL MEDICINE

## 2023-05-31 PROCEDURE — 96367 TX/PROPH/DG ADDL SEQ IV INF: CPT

## 2023-05-31 PROCEDURE — 25010000002 HEPARIN LOCK FLUSH PER 10 UNITS: Performed by: INTERNAL MEDICINE

## 2023-05-31 PROCEDURE — 25010000002 FOSAPREPITANT PER 1 MG: Performed by: INTERNAL MEDICINE

## 2023-05-31 PROCEDURE — 80053 COMPREHEN METABOLIC PANEL: CPT

## 2023-05-31 PROCEDURE — 85025 COMPLETE CBC W/AUTO DIFF WBC: CPT

## 2023-05-31 PROCEDURE — 96417 CHEMO IV INFUS EACH ADDL SEQ: CPT

## 2023-05-31 PROCEDURE — 25010000002 PALONOSETRON PER 25 MCG: Performed by: INTERNAL MEDICINE

## 2023-05-31 PROCEDURE — 96413 CHEMO IV INFUSION 1 HR: CPT

## 2023-05-31 PROCEDURE — 25010000002 ATEZOLIZUMAB 1200 MG/20ML SOLUTION 20 ML VIAL: Performed by: INTERNAL MEDICINE

## 2023-05-31 RX ORDER — HEPARIN SODIUM (PORCINE) LOCK FLUSH IV SOLN 100 UNIT/ML 100 UNIT/ML
500 SOLUTION INTRAVENOUS AS NEEDED
Status: CANCELLED | OUTPATIENT
Start: 2023-05-31

## 2023-05-31 RX ORDER — PALONOSETRON 0.05 MG/ML
0.25 INJECTION, SOLUTION INTRAVENOUS ONCE
Status: COMPLETED | OUTPATIENT
Start: 2023-05-31 | End: 2023-05-31

## 2023-05-31 RX ORDER — SODIUM CHLORIDE 0.9 % (FLUSH) 0.9 %
10 SYRINGE (ML) INJECTION AS NEEDED
Status: DISCONTINUED | OUTPATIENT
Start: 2023-05-31 | End: 2023-05-31 | Stop reason: HOSPADM

## 2023-05-31 RX ORDER — HEPARIN SODIUM (PORCINE) LOCK FLUSH IV SOLN 100 UNIT/ML 100 UNIT/ML
500 SOLUTION INTRAVENOUS AS NEEDED
Status: DISCONTINUED | OUTPATIENT
Start: 2023-05-31 | End: 2023-05-31 | Stop reason: HOSPADM

## 2023-05-31 RX ORDER — CEFDINIR 300 MG/1
CAPSULE ORAL
COMMUNITY
Start: 2023-05-30

## 2023-05-31 RX ORDER — OLANZAPINE 5 MG/1
5 TABLET ORAL ONCE
Status: COMPLETED | OUTPATIENT
Start: 2023-05-31 | End: 2023-05-31

## 2023-05-31 RX ORDER — SODIUM CHLORIDE 0.9 % (FLUSH) 0.9 %
10 SYRINGE (ML) INJECTION AS NEEDED
Status: CANCELLED | OUTPATIENT
Start: 2023-05-31

## 2023-05-31 RX ORDER — SODIUM CHLORIDE 9 MG/ML
250 INJECTION, SOLUTION INTRAVENOUS ONCE
Status: COMPLETED | OUTPATIENT
Start: 2023-05-31 | End: 2023-05-31

## 2023-05-31 RX ADMIN — ETOPOSIDE 220 MG: 20 INJECTION INTRAVENOUS at 10:42

## 2023-05-31 RX ADMIN — Medication 500 UNITS: at 11:51

## 2023-05-31 RX ADMIN — FOSAPREPITANT 100 ML: 150 INJECTION, POWDER, LYOPHILIZED, FOR SOLUTION INTRAVENOUS at 08:45

## 2023-05-31 RX ADMIN — DEXAMETHASONE SODIUM PHOSPHATE 12 MG: 10 INJECTION, SOLUTION INTRAMUSCULAR; INTRAVENOUS at 09:48

## 2023-05-31 RX ADMIN — PALONOSETRON 0.25 MG: 0.05 INJECTION, SOLUTION INTRAVENOUS at 08:48

## 2023-05-31 RX ADMIN — SODIUM CHLORIDE 250 ML: 9 INJECTION, SOLUTION INTRAVENOUS at 08:44

## 2023-05-31 RX ADMIN — OLANZAPINE 5 MG: 5 TABLET, FILM COATED ORAL at 08:47

## 2023-05-31 RX ADMIN — Medication 10 ML: at 11:51

## 2023-05-31 RX ADMIN — ATEZOLIZUMAB 1200 MG: 1200 INJECTION, SOLUTION INTRAVENOUS at 09:13

## 2023-05-31 RX ADMIN — SODIUM CHLORIDE 520 MG: 900 INJECTION, SOLUTION INTRAVENOUS at 10:06

## 2023-05-31 NOTE — PROGRESS NOTES
Subjective     REASON FOR FOLLOW UP:  1.  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  2.  Initiation of palliative chemo/immunotherapy with carboplatin/-16/atezolizumab with cycle #1 3/27/2023  3.  MediPort placed 3/20/2023 by Dr. Gonzalez  4.  Fourth cycle of carboplatin -16 and atezolizumab initiated 5/31/2023      HISTORY OF PRESENT ILLNESS:  The patient is a 77 y.o. year old male who is here for an opinion about the above issue.  He was seen today in the multidisciplinary thoracic oncology clinic along with Dr Lara and Dr. Coreas.  He has been following up with Dr. Nobles of vascular surgery and underwent a CT angiogram of the abdomen on 2/23/2023.  There were some nodules noted in the lung bases and this led to a CT scan of the chest which again showed pulmonary nodules in the right lower lobe as well as subcarinal lymphadenopathy.    He had previously been seen by Dr. Lara and she arranged for him to undergo outpatient navigational bronchoscopy with biopsy on 3/13/2023.  His pathology was positive for small cell carcinoma of the lung.    He underwent additional staging with a PET scan performed on 3/14/2023 which showed evidence of metastatic disease to the liver and the right adrenal gland as well as hypermetabolic mediastinal and hilar lymphadenopathy.    INTERVAL HISTORY:  He was seen in the multidisciplinary thoracic oncology clinic 3/16/2023 to discuss treatment recommendations.  We had a lengthy visit with the patient and his wife.  They live in [facility] which is close to [facility] but they expressed that they would desire to have their treatment delivered here in [place].    Because of his extensive stage disease we have recommended systemic treatment with carboplatin/-16 and atezolizumab immunotherapy.    He underwent an MRI of the brain 3/21/2023 to complete his staging and was found to have multiple tiny mets with no surrounding edema  or obvious mass effect.  We had discussed the MRI results with the patient and his family by phone and elected to proceed with his systemic treatment initially with close follow-up on subsequent MRIs and eventual whole brain radiation treatment in the future.    INTERVAL HISTORY:  He returns today scheduled For cycle #4 carboplatin -16 and atezolizumab with Neulasta support.  His counts are adequate for treatment today although he is a little more anemic from the chemotherapy with a hemoglobin of 10.6 g/dL.    He was started on antibiotics by his dermatologist for what appears to be an infected sebaceous cyst on his back.  He will be having this lesion drained at the dermatology office Monday, 6/5/2023.    We plan to repeat CT scans and a follow-up MRI of the brain after this cycle of treatment.  We anticipate this being his last cycle of chemotherapy and switching to single agent atezolizumab 1200 mg per metered squared every 3 weeks as maintenance.    We also may need to discuss with him the option of whole brain radiation depending on the appearance of his upcoming MRI of the brain.      History of Present Illness     Past Medical History:   Diagnosis Date   • Acid reflux    • Anemia    • Arthritis    • Coronary artery disease    • CVA (cerebral vascular accident)    • High cholesterol    • History of atrial fibrillation    • Hx of blood clots    • Hypertension    • Irregular heartbeat    • Peptic ulcer disease    • Prostate cancer    • Rheumatoid arthritis    • Skin cancer    • Small cell lung cancer         Past Surgical History:   Procedure Laterality Date   • ABDOMINAL AORTIC ANEURYSM REPAIR     • BACK SURGERY      x2   • BRONCHOSCOPY WITH ION ROBOTIC ASSIST N/A 03/13/2023    Procedure: BRONCHOSCOPY ,ENDOBRONCHIAL ULTRASOUND AND ROBOTIC NAVIGATIONAL BRONCHOSCOPY WITH FINE NEEDLE ASPIRATION, BIOPSY X1 AREA, AND BRONCHOALEOLAR LAVAGE;  Surgeon: Sonali Lara MD;  Location: [facility] ENDOSCOPY;  Service:  Robotics - Pulmonary;  Laterality: N/A;  Post: LUNG CANCER   • CARDIAC ABLATION      x2   • COLON SURGERY     • HERNIA REPAIR     • INSERTION CENTRAL VENOUS ACCESS DEVICE W/ SUBCUTANEOUS PORT Right 03/20/2023   • VENOUS ACCESS DEVICE (PORT) INSERTION Right 03/20/2023    Procedure: INSERTION VENOUS ACCESS DEVICE;  Surgeon: Javier Gonzalez MD PhD;  Location: [facility] OR;  Service: Thoracic;  Laterality: Right;        Current Outpatient Medications on File Prior to Visit   Medication Sig Dispense Refill   • Acetaminophen (ARTHRITIS PAIN RELIEF PO) Arthritis Relief   650 mg     • ascorbic acid (VITAMIN C) 1000 MG tablet Vitamin C     • Ashwagandha 125 MG capsule      • aspirin 81 MG EC tablet Daily.     • bethanechol (URECHOLINE) 50 MG tablet Take 1 tablet by mouth Every 12 (Twelve) Hours.     • cefdinir (OMNICEF) 300 MG capsule      • coenzyme Q10 100 MG capsule Take 1 capsule by mouth Daily.     • cyanocobalamin (VITAMIN B-12) 1000 MCG tablet Take 1 tablet by mouth Daily.     • diphenhydrAMINE (BENADRYL) 25 mg capsule Take 1 capsule by mouth Every 6 (Six) Hours As Needed for Itching.     • Elderberry 500 MG capsule Take  by mouth.     • fluocinonide (LIDEX) 0.05 % cream APPLY TO THE AFFECTED AREA(S) BY TOPICAL ROUTE 2 TIMES PER DAY     • furosemide (LASIX) 40 MG tablet Take 1 tablet every day by oral route.     • HYDROcodone-Acetaminophen (XODOL) 5-300 MG per tablet Take 1 tablet every 4 hours by oral route.     • lidocaine-prilocaine (EMLA) 2.5-2.5 % cream Apply nickel size amount to port site 30 min before appt time do not rub in cover with plastic wrap 5 g 2   • LORazepam (ATIVAN) 1 MG tablet Take 1 tablet by mouth as needed  1 hour prior to CT scan. 1 tablet 0   • Magnesium 250 MG tablet Take  by mouth.     • metoprolol tartrate (LOPRESSOR) 25 MG tablet metoprolol tartrate 25 mg tablet   TAKE 1 TABLET BY MOUTH TWICE DAILY.     • OLANZapine (zyPREXA) 5 MG tablet Take 1 tablet by mouth Every Night. Take on days  2, 3 and 4 after chemotherapy. 3 tablet 3   • ondansetron (ZOFRAN) 8 MG tablet Take 1 tablet by mouth 3 (Three) Times a Day As Needed for Nausea or Vomiting. 30 tablet 5   • pravastatin (PRAVACHOL) 40 MG tablet Daily.     • traMADol (Ultram) 50 MG tablet Take 2 tablets by mouth Every 6 (Six) Hours As Needed for Moderate Pain. 56 tablet 0   • Turmeric 500 MG capsule Take  by mouth.     • Vitamin B Complex-C capsule      • vitamin D3 125 MCG (5000 UT) capsule capsule Take 1 capsule by mouth Daily.     • Zinc 50 MG tablet Take 1 tablet by mouth Daily.       No current facility-administered medications on file prior to visit.        ALLERGIES:    Allergies   Allergen Reactions   • Codeine Other (See Comments)     headache          Social History     Socioeconomic History   • Marital status:      Spouse name: Marivel   Tobacco Use   • Smoking status: Former     Types: Cigarettes   • Smokeless tobacco: Never   Vaping Use   • Vaping Use: Never used   Substance and Sexual Activity   • Alcohol use: Never   • Drug use: Never   • Sexual activity: Defer        Family History   Problem Relation Age of Onset   • Brain cancer Mother    • Lung cancer Sister         Review of Systems   Constitutional: Negative for activity change, chills, fatigue and fever.   HENT: Negative for mouth sores, trouble swallowing and voice change.    Eyes: Negative for pain and visual disturbance.   Respiratory: Positive for cough and shortness of breath. Negative for wheezing.    Cardiovascular: Negative for chest pain and palpitations.   Gastrointestinal: Negative for abdominal pain, constipation, diarrhea, nausea and vomiting.   Genitourinary: Negative for difficulty urinating, frequency and urgency.   Musculoskeletal: Negative for arthralgias and joint swelling.   Skin: Negative for rash.        Erythematous and tender sebaceous cyst on the left upper back   Neurological: Positive for dizziness. Negative for seizures, weakness and headaches.  "       Patient reports severe positional vertigo.  He is unable to lay flat on his back without having nausea and vomiting   Hematological: Negative for adenopathy. Does not bruise/bleed easily.   Psychiatric/Behavioral: Negative for behavioral problems and confusion. The patient is not nervous/anxious.         Objective     Vitals:    05/31/23 0800   BP: 115/69   Pulse: 83   Resp: 18   Temp: 99.5 °F (37.5 °C)   TempSrc: Temporal   SpO2: 98%   Weight: 96 kg (211 lb 11.2 oz)   Height: 183 cm (72.05\")   PainSc:   6   PainLoc: Leg         5/31/2023     7:53 AM   Current Status   ECOG score 0       Physical Exam  Constitutional:       General: He is not in acute distress.     Appearance: He is well-developed.   HENT:      Head: Normocephalic.   Eyes:      General: No scleral icterus.     Conjunctiva/sclera: Conjunctivae normal.      Pupils: Pupils are equal, round, and reactive to light.   Neck:      Thyroid: No thyromegaly.      Vascular: No JVD.      Comments: Right IJ Mediport  Cardiovascular:      Rate and Rhythm: Normal rate and regular rhythm.      Heart sounds: No murmur heard.    No friction rub. No gallop.   Pulmonary:      Effort: Pulmonary effort is normal.      Breath sounds: Normal breath sounds. No wheezing or rales.   Abdominal:      General: There is no distension.      Palpations: Abdomen is soft. There is no mass.      Tenderness: There is no abdominal tenderness.   Musculoskeletal:         General: No deformity. Normal range of motion.      Cervical back: Normal range of motion and neck supple.   Lymphadenopathy:      Cervical: No cervical adenopathy.   Skin:     General: Skin is warm and dry.      Findings: Erythema and lesion present. No rash.      Comments: Infected sebaceous cyst on left upper back   Neurological:      Mental Status: He is alert and oriented to person, place, and time.      Cranial Nerves: No cranial nerve deficit.      Deep Tendon Reflexes: Reflexes are normal and symmetric. "   Psychiatric:         Behavior: Behavior normal.         Judgment: Judgment normal.           RECENT LABS:  Hematology WBC   Date Value Ref Range Status   05/31/2023 8.53 3.40 - 10.80 10*3/mm3 Final   09/18/2019 5.9 4.8 - 10.8 10*9/L Final     RBC   Date Value Ref Range Status   05/31/2023 3.06 (L) 4.14 - 5.80 10*6/mm3 Final   09/18/2019 4.36 (L) 4.7 - 6.1 10*12/L Final     Hemoglobin   Date Value Ref Range Status   05/31/2023 10.6 (L) 13.0 - 17.7 g/dL Final   09/18/2019 14.1 14.0 - 18.0 g/dL Final     Hematocrit   Date Value Ref Range Status   05/31/2023 32.9 (L) 37.5 - 51.0 % Final   09/18/2019 42.0 42 - 54 % Final     Platelets   Date Value Ref Range Status   05/31/2023 141 140 - 450 10*3/mm3 Final   09/18/2019 165 150 - 450 10*9/L Final        Lab Results   Component Value Date    GLUCOSE 126 (H) 05/08/2023    CALCIUM 9.2 05/08/2023     05/08/2023    K 4.2 05/08/2023    CO2 24.1 05/08/2023     05/08/2023    BUN 15 05/08/2023    CREATININE 0.97 05/08/2023    EGFR 80.9 05/08/2023    BCR 15.5 05/08/2023    ANIONGAP 10.9 05/08/2023     PATHOLOGY 3/13/2023  Final Diagnosis   Lung, right lower lobe, endobronchial biopsies:    Small cell carcinoma, see comment      Comment    The biopsy shows an invasive tumor with crush artifact. Immunohistochemistry was performed utilizing appropriate controls and the tumor is positive for CD56, TTF-1 and synaptophysin and is negative for CD45 and chromogranin. The staining pattern and morphology are consistent with small cell carcinoma.     Molecular profiling has been ordered but is pending    IMAGING:  MRI OF THE BRAIN WITH AND WITHOUT CONTRAST ON 5/5/2023  IMPRESSION:  1. Since prior MRI of the brain from [facility] on  03/21/2023 there has been a response to the enhancing metastatic lesions  to the brain to interval treatment. Previously there were 8 separate  enhancing metastases to the brain. There were 2 infratentorial lesions  and there were six  2-5 mm supratentorial enhancing lesions. On the  current MRI of the brain there are 5 residual enhancing brain metastases  1 infratentorial in 4 separate tiny 1 to 2 mm residual supratentorial  enhancing metastases and no new metastases are identified. The  previously identified 3 mm mets to the inferior lateral right cerebellum  has resolved. The met to the medial left cerebellum has decreased from 9  x 8 x 10 mm down to 6 x 4 x 7 mm. Of the previously identified 6  separate 2-5 mm enhancing supratentorial enhancing metastases, there has  been resolution of enhancing metastases in the lateral left putamen and  there has been resolution of enhancement at the site of the met in the  posterior medial left parietal lobe, the enhancing 4-5 mm metastasis to  the posterior superior right frontal lobe and posterior superior left  frontal juxtacortical white matter and in the anterior-inferior left  frontal lobe have decreased and there are residual punctate 2 mm foci of  residual enhancement at the site of these 3 treated metastases. The  posterior inferior medial left parietal lobe and the anterior-inferior  left temporal lobe met have decreased and there are punctate 1 mm foci  of residual enhancement at these sites. No new enhancing metastases are  seen. The remainder of MRI of the brain is normal.    CT CHEST WITH IV CONTRAST  IMPRESSION:  1. Minimal interval decrease in size of right lower lobe nodule  measuring about 1.6 cm  2. Stable metastatic lesions in the left lobe of the liver and right  adrenal gland.    F-18 FDG PET FROM SKULL BASE TO MID THIGH WITH PET/CT FUSION 3/14/2023  IMPRESSION:  1.  Intensely avid right lower lobe pulmonary nodule most suggestive of  malignancy.  2.  Hypermetabolic mediastinal and hilar adenopathy, hepatic lesions,  right adrenal nodule and sternal lesion most likely representing  metastatic disease. Nodular thickening along the right major fissure  which contacts the right hilum  indicate uptake mildly above that of the  adjacent lung may represent lymphangitic spread of malignancy  3.  Scattered bilateral sub-6 mm pulmonary nodules are below PET  resolution. A mildly hypermetabolic cortes hepatic node is present. These  findings are indeterminate indeterminate and continued close attention  on follow-up is recommended.  4.  Other findings as above.      Assessment & Plan     1.  Extensive stage small cell lung carcinoma with PET scan evidence of metastatic disease to the liver and right adrenal gland, hilar and mediastinal lymph nodes and several scattered tiny lesions noted on MRI of the brain.  Currently undergoing chemoimmunotherapy with carboplatin -16 and atezolizumab.  As noted above, MRI of the brain and CT scan showed favorable response with no new sites of disease identified.  He will proceed with cycle #3 today as planned.  2.  Comorbidities including vascular disease, coronary artery disease and history of prostate cancer.  He does have a decent performance status overall.  3.  Right IJ Mediport placed by Dr. Gonzalez.  4.  Neulasta support with each cycle of treatment due to high risk of neutropenic fever.  5.  CT scans after 2 cycles of therapy show some improvement and no new sites of disease.  6.  MRI scan from 5/5/2023 after 2 cycles of treatment likewise showed improvement.  7.  Infected sebaceous cyst on his back which is being addressed by his dermatologist.  He is on antibiotic therapy and plans to have the lesion drained in the dermatology office on Monday, 6/5/2023    PLAN:  1.   Patient will proceed with his 4th cycle of chemotherapy today (day 1, cycle #4 carboplatin, -16, atezolizumab).  2.  Although he lives in [facility] he and his wife have a hotel room in [place] for the next couple of nights.  3.  He will receive Neulasta on body injector on day 3 of the cycle.  4.  We will order repeat CT scans of the chest abdomen pelvis and an MRI of the brain in  2 weeks to assess his response after 4 cycles of chemoimmunotherapy.  5.  MD follow-up in 3 weeks to review the scans and at that time we anticipate discontinuing carboplatin and -16 and continuing single agent atezolizumab 1200 mg per metered squared IV every 3 weeks as maintenance.  6.  He will follow-up with his dermatologist regarding his sebaceous cyst which is scheduled to be drained on Monday, 6/5/2023.  He currently is on antibiotic therapy with cefdinir.  7.  Whole brain radiation will be recommended if he develops any symptomatic progression of his brain mets but otherwise we would hold off on considering radiation therapy at least until he has completed his 4 cycles of chemotherapy treatment.

## 2023-05-31 NOTE — PROGRESS NOTES
Oncology Social Work    OSW received the Travel Reimbursement forms from patient and his wife for the months of April and May.  OSW scanned and emailed to Yulisa at [employer].      LCSW will remain available to assist with future requests.    Gale Worthy, LILLIEW, LCSW

## 2023-06-01 ENCOUNTER — INFUSION (OUTPATIENT)
Dept: ONCOLOGY | Facility: HOSPITAL | Age: 78
End: 2023-06-01

## 2023-06-01 VITALS
HEART RATE: 87 BPM | DIASTOLIC BLOOD PRESSURE: 71 MMHG | RESPIRATION RATE: 16 BRPM | OXYGEN SATURATION: 95 % | SYSTOLIC BLOOD PRESSURE: 112 MMHG | WEIGHT: 214.8 LBS | BODY MASS INDEX: 29.09 KG/M2 | TEMPERATURE: 98.7 F

## 2023-06-01 DIAGNOSIS — Z45.2 ENCOUNTER FOR ADJUSTMENT OR MANAGEMENT OF VASCULAR ACCESS DEVICE: ICD-10-CM

## 2023-06-01 DIAGNOSIS — Z79.899 HIGH RISK MEDICATION USE: ICD-10-CM

## 2023-06-01 DIAGNOSIS — C34.90 SMALL CELL LUNG CANCER: Primary | ICD-10-CM

## 2023-06-01 PROCEDURE — A9270 NON-COVERED ITEM OR SERVICE: HCPCS | Performed by: INTERNAL MEDICINE

## 2023-06-01 PROCEDURE — 63710000001 PROCHLORPERAZINE MALEATE PER 10 MG: Performed by: INTERNAL MEDICINE

## 2023-06-01 PROCEDURE — 96413 CHEMO IV INFUSION 1 HR: CPT

## 2023-06-01 PROCEDURE — 25010000002 HEPARIN LOCK FLUSH PER 10 UNITS: Performed by: INTERNAL MEDICINE

## 2023-06-01 PROCEDURE — 25010000002 ETOPOSIDE 1 GM/50ML SOLUTION 50 ML VIAL: Performed by: INTERNAL MEDICINE

## 2023-06-01 RX ORDER — HEPARIN SODIUM (PORCINE) LOCK FLUSH IV SOLN 100 UNIT/ML 100 UNIT/ML
500 SOLUTION INTRAVENOUS AS NEEDED
OUTPATIENT
Start: 2023-06-01

## 2023-06-01 RX ORDER — HEPARIN SODIUM (PORCINE) LOCK FLUSH IV SOLN 100 UNIT/ML 100 UNIT/ML
500 SOLUTION INTRAVENOUS AS NEEDED
Status: DISCONTINUED | OUTPATIENT
Start: 2023-06-01 | End: 2023-06-01 | Stop reason: HOSPADM

## 2023-06-01 RX ORDER — SODIUM CHLORIDE 0.9 % (FLUSH) 0.9 %
10 SYRINGE (ML) INJECTION AS NEEDED
Status: DISCONTINUED | OUTPATIENT
Start: 2023-06-01 | End: 2023-06-01 | Stop reason: HOSPADM

## 2023-06-01 RX ORDER — SODIUM CHLORIDE 9 MG/ML
250 INJECTION, SOLUTION INTRAVENOUS ONCE
Status: COMPLETED | OUTPATIENT
Start: 2023-06-01 | End: 2023-06-01

## 2023-06-01 RX ORDER — SODIUM CHLORIDE 0.9 % (FLUSH) 0.9 %
10 SYRINGE (ML) INJECTION AS NEEDED
OUTPATIENT
Start: 2023-06-01

## 2023-06-01 RX ORDER — PROCHLORPERAZINE MALEATE 10 MG
10 TABLET ORAL ONCE
Status: COMPLETED | OUTPATIENT
Start: 2023-06-01 | End: 2023-06-01

## 2023-06-01 RX ADMIN — Medication 10 ML: at 14:54

## 2023-06-01 RX ADMIN — Medication 500 UNITS: at 14:54

## 2023-06-01 RX ADMIN — ETOPOSIDE 220 MG: 20 INJECTION INTRAVENOUS at 13:50

## 2023-06-01 RX ADMIN — SODIUM CHLORIDE 250 ML: 9 INJECTION, SOLUTION INTRAVENOUS at 13:18

## 2023-06-01 RX ADMIN — PROCHLORPERAZINE MALEATE 10 MG: 10 TABLET ORAL at 13:18

## 2023-06-02 ENCOUNTER — INFUSION (OUTPATIENT)
Dept: ONCOLOGY | Facility: HOSPITAL | Age: 78
End: 2023-06-02

## 2023-06-02 VITALS
HEART RATE: 86 BPM | OXYGEN SATURATION: 96 % | TEMPERATURE: 97.5 F | WEIGHT: 216 LBS | BODY MASS INDEX: 29.26 KG/M2 | SYSTOLIC BLOOD PRESSURE: 92 MMHG | DIASTOLIC BLOOD PRESSURE: 56 MMHG | RESPIRATION RATE: 18 BRPM

## 2023-06-02 DIAGNOSIS — Z79.899 HIGH RISK MEDICATION USE: ICD-10-CM

## 2023-06-02 DIAGNOSIS — C34.90 SMALL CELL LUNG CANCER: Primary | ICD-10-CM

## 2023-06-02 PROCEDURE — 96377 APPLICATON ON-BODY INJECTOR: CPT

## 2023-06-02 PROCEDURE — 63710000001 PROCHLORPERAZINE MALEATE PER 10 MG: Performed by: INTERNAL MEDICINE

## 2023-06-02 PROCEDURE — 96413 CHEMO IV INFUSION 1 HR: CPT

## 2023-06-02 PROCEDURE — A9270 NON-COVERED ITEM OR SERVICE: HCPCS | Performed by: INTERNAL MEDICINE

## 2023-06-02 PROCEDURE — 25010000002 ETOPOSIDE 1 GM/50ML SOLUTION 50 ML VIAL: Performed by: INTERNAL MEDICINE

## 2023-06-02 PROCEDURE — 25010000002 PEGFILGRASTIM 6 MG/0.6ML PREFILLED SYRINGE KIT: Performed by: INTERNAL MEDICINE

## 2023-06-02 RX ORDER — PROCHLORPERAZINE MALEATE 10 MG
10 TABLET ORAL ONCE
Status: COMPLETED | OUTPATIENT
Start: 2023-06-02 | End: 2023-06-02

## 2023-06-02 RX ORDER — LORAZEPAM 1 MG/1
TABLET ORAL
Qty: 1 TABLET | Refills: 0 | Status: SHIPPED | OUTPATIENT
Start: 2023-06-02

## 2023-06-02 RX ORDER — SODIUM CHLORIDE 9 MG/ML
250 INJECTION, SOLUTION INTRAVENOUS ONCE
Status: COMPLETED | OUTPATIENT
Start: 2023-06-02 | End: 2023-06-02

## 2023-06-02 RX ADMIN — SODIUM CHLORIDE 250 ML: 9 INJECTION, SOLUTION INTRAVENOUS at 14:46

## 2023-06-02 RX ADMIN — PROCHLORPERAZINE MALEATE 10 MG: 10 TABLET ORAL at 14:43

## 2023-06-02 RX ADMIN — PEGFILGRASTIM 6 MG: KIT SUBCUTANEOUS at 15:45

## 2023-06-02 RX ADMIN — ETOPOSIDE 220 MG: 20 INJECTION INTRAVENOUS at 14:52

## 2023-06-02 NOTE — TELEPHONE ENCOUNTER
Prema Page Jeane Kathleen P, RN HI JEANE, MRS WILLA CAME UP AND WANTS TO MAKE SURE THAT SOMEONE CALLS IN THE MEDICINE FOR THE CT SCAN.  I AM SETTING UP THE MRI AND anesthesia for MRI . JUST CHECKING ON THE MED'S FOR CT   THANK YOU JOCELYNN     [facility] IN [place]

## 2023-06-08 ENCOUNTER — TELEPHONE (OUTPATIENT)
Dept: ONCOLOGY | Facility: CLINIC | Age: 78
End: 2023-06-08
Payer: MEDICARE

## 2023-06-08 NOTE — TELEPHONE ENCOUNTER
----- Message from Alexsander Conn MD sent at 6/8/2023  8:04 AM EDT -----  Regarding: RE: I am so sorry to bother you  I think we should hold off on the MRI. I will plan to order it later.     Thanks    ----- Message -----  From: Prema Page  Sent: 6/7/2023   1:02 PM EDT  To: Alexsander Conn MD  Subject: I am so sorry to bother you                      Hi Dr Conn, I called Ms Ramírez and she really would like the MRI done as well.  I spoke with Billie and she does not think it will be approved.  I can try and schedule it, we will have to have him worked in. What would you like to do?   Thank you Carrie

## 2023-06-21 ENCOUNTER — TELEPHONE (OUTPATIENT)
Dept: ONCOLOGY | Facility: CLINIC | Age: 78
End: 2023-06-21

## 2023-06-21 NOTE — TELEPHONE ENCOUNTER
Caller: Marivel Ramírez    Relationship to patient: Emergency Contact    Best call back number: [phone]    Type of visit: INFUSION OR EKG NEEDS TO BE MOVED AS THE TIMES ARE  CLOSE TOGETHER ON 7/13/2023    Requested date:CALL TO DISCUSS    If rescheduling, when is the original appointment: 7/13/2023

## 2023-07-13 ENCOUNTER — TELEPHONE (OUTPATIENT)
Dept: ONCOLOGY | Facility: CLINIC | Age: 78
End: 2023-07-13

## 2023-07-13 DIAGNOSIS — C34.90 SMALL CELL LUNG CANCER: ICD-10-CM

## 2023-07-13 NOTE — TELEPHONE ENCOUNTER
"  Caller: LENIN    Relationship: [facility]    Best call back number: [phone]    What is the best time to reach you: ANY    Who are you requesting to speak with (clinical staff, provider,  specific staff member): CLINICAL        What was the call regarding: LENIN FROM [facility] CALLED TO VERIFY TRAMADOL SCRIPT ORDER. SHE SAID THAT INSURANCE WONT COVER \"12 TABLETS A DAY\" AND WANTS TO SEE IF WE CAN REWRITE THE SCRIPT FOR NO MORE THAN '8 TABLETS A DAY\" SO THAT PATIENT'S INSURANCE WILL COVER THE SCRIPT.    Is it okay if the provider responds through MyChart: NO          "

## 2023-07-14 RX ORDER — TRAMADOL HYDROCHLORIDE 50 MG/1
100 TABLET ORAL EVERY 4 HOURS PRN
Qty: 240 TABLET | Refills: 0 | Status: CANCELLED | OUTPATIENT
Start: 2023-07-14

## 2023-07-24 ENCOUNTER — HOSPITAL ENCOUNTER (OUTPATIENT)
Dept: CARDIOLOGY | Facility: HOSPITAL | Age: 78
Discharge: HOME | End: 2023-07-24
Admitting: INTERNAL MEDICINE
Payer: MEDICARE

## 2023-07-24 DIAGNOSIS — C34.90 SMALL CELL LUNG CANCER: ICD-10-CM

## 2023-07-24 PROCEDURE — 93005 ELECTROCARDIOGRAM TRACING: CPT | Performed by: INTERNAL MEDICINE

## 2023-07-25 NOTE — PROGRESS NOTES
07/26/23 0001   Pre-Procedure Phone Call   Procedure Time Verified Yes   Arrival Time 1115   Procedure Location Verified Yes   NPO Status Reinforced Yes   Ride and Caregiver Arranged Yes   Patient Knows to Bring Current Medications Yes     Detailed message left on voicemail with above information included.

## 2023-07-26 ENCOUNTER — ANESTHESIA EVENT (OUTPATIENT)
Dept: MRI IMAGING | Facility: HOSPITAL | Age: 78
End: 2023-07-26
Payer: MEDICARE

## 2023-07-26 ENCOUNTER — HOSPITAL ENCOUNTER (OUTPATIENT)
Dept: MRI IMAGING | Facility: HOSPITAL | Age: 78
Discharge: HOME | End: 2023-07-26
Payer: MEDICARE

## 2023-07-26 ENCOUNTER — ANESTHESIA (OUTPATIENT)
Dept: MRI IMAGING | Facility: HOSPITAL | Age: 78
End: 2023-07-26
Payer: MEDICARE

## 2023-07-26 VITALS
HEIGHT: 72 IN | TEMPERATURE: 98.1 F | HEART RATE: 74 BPM | WEIGHT: 205 LBS | DIASTOLIC BLOOD PRESSURE: 83 MMHG | SYSTOLIC BLOOD PRESSURE: 145 MMHG | RESPIRATION RATE: 18 BRPM | BODY MASS INDEX: 27.77 KG/M2 | OXYGEN SATURATION: 100 %

## 2023-07-26 DIAGNOSIS — Z45.2 ENCOUNTER FOR ADJUSTMENT OR MANAGEMENT OF VASCULAR ACCESS DEVICE: ICD-10-CM

## 2023-07-26 DIAGNOSIS — Z79.899 HIGH RISK MEDICATION USE: ICD-10-CM

## 2023-07-26 DIAGNOSIS — C34.90 SMALL CELL LUNG CANCER: ICD-10-CM

## 2023-07-26 PROCEDURE — 70553 MRI BRAIN STEM W/O & W/DYE: CPT

## 2023-07-26 PROCEDURE — 25010000002 PROPOFOL 10 MG/ML EMULSION: Performed by: ANESTHESIOLOGY

## 2023-07-26 PROCEDURE — A9577 INJ MULTIHANCE: HCPCS | Performed by: INTERNAL MEDICINE

## 2023-07-26 PROCEDURE — 25010000002 ONDANSETRON PER 1 MG: Performed by: ANESTHESIOLOGY

## 2023-07-26 PROCEDURE — 0 GADOBENATE DIMEGLUMINE 529 MG/ML SOLUTION: Performed by: INTERNAL MEDICINE

## 2023-07-26 RX ORDER — NALOXONE HCL 0.4 MG/ML
0.2 VIAL (ML) INJECTION AS NEEDED
Status: DISCONTINUED | OUTPATIENT
Start: 2023-07-26 | End: 2023-07-27 | Stop reason: HOSPADM

## 2023-07-26 RX ORDER — ONDANSETRON 2 MG/ML
INJECTION INTRAMUSCULAR; INTRAVENOUS AS NEEDED
Status: DISCONTINUED | OUTPATIENT
Start: 2023-07-26 | End: 2023-07-26 | Stop reason: SURG

## 2023-07-26 RX ORDER — HYDROCODONE BITARTRATE AND ACETAMINOPHEN 5; 325 MG/1; MG/1
1 TABLET ORAL ONCE AS NEEDED
Status: DISCONTINUED | OUTPATIENT
Start: 2023-07-26 | End: 2023-07-27 | Stop reason: HOSPADM

## 2023-07-26 RX ORDER — HYDRALAZINE HYDROCHLORIDE 20 MG/ML
5 INJECTION INTRAMUSCULAR; INTRAVENOUS
Status: DISCONTINUED | OUTPATIENT
Start: 2023-07-26 | End: 2023-07-27 | Stop reason: HOSPADM

## 2023-07-26 RX ORDER — LIDOCAINE HYDROCHLORIDE 20 MG/ML
INJECTION, SOLUTION INFILTRATION; PERINEURAL AS NEEDED
Status: DISCONTINUED | OUTPATIENT
Start: 2023-07-26 | End: 2023-07-26 | Stop reason: SURG

## 2023-07-26 RX ORDER — EPHEDRINE SULFATE 50 MG/ML
INJECTION, SOLUTION INTRAVENOUS AS NEEDED
Status: DISCONTINUED | OUTPATIENT
Start: 2023-07-26 | End: 2023-07-26 | Stop reason: SURG

## 2023-07-26 RX ORDER — HYDROCODONE BITARTRATE AND ACETAMINOPHEN 7.5; 325 MG/1; MG/1
1 TABLET ORAL EVERY 4 HOURS PRN
Status: DISCONTINUED | OUTPATIENT
Start: 2023-07-26 | End: 2023-07-27 | Stop reason: HOSPADM

## 2023-07-26 RX ORDER — PROPOFOL 10 MG/ML
VIAL (ML) INTRAVENOUS AS NEEDED
Status: DISCONTINUED | OUTPATIENT
Start: 2023-07-26 | End: 2023-07-26 | Stop reason: SURG

## 2023-07-26 RX ORDER — DROPERIDOL 2.5 MG/ML
0.62 INJECTION, SOLUTION INTRAMUSCULAR; INTRAVENOUS
Status: DISCONTINUED | OUTPATIENT
Start: 2023-07-26 | End: 2023-07-27 | Stop reason: HOSPADM

## 2023-07-26 RX ORDER — EPHEDRINE SULFATE 50 MG/ML
5 INJECTION, SOLUTION INTRAVENOUS ONCE AS NEEDED
Status: DISCONTINUED | OUTPATIENT
Start: 2023-07-26 | End: 2023-07-27 | Stop reason: HOSPADM

## 2023-07-26 RX ORDER — SODIUM CHLORIDE, SODIUM LACTATE, POTASSIUM CHLORIDE, CALCIUM CHLORIDE 600; 310; 30; 20 MG/100ML; MG/100ML; MG/100ML; MG/100ML
INJECTION, SOLUTION INTRAVENOUS CONTINUOUS PRN
Status: DISCONTINUED | OUTPATIENT
Start: 2023-07-26 | End: 2023-07-26 | Stop reason: SURG

## 2023-07-26 RX ORDER — HYDROMORPHONE HYDROCHLORIDE 1 MG/ML
0.25 INJECTION, SOLUTION INTRAMUSCULAR; INTRAVENOUS; SUBCUTANEOUS
Status: DISCONTINUED | OUTPATIENT
Start: 2023-07-26 | End: 2023-07-27 | Stop reason: HOSPADM

## 2023-07-26 RX ORDER — PROMETHAZINE HYDROCHLORIDE 25 MG/1
25 SUPPOSITORY RECTAL ONCE AS NEEDED
Status: DISCONTINUED | OUTPATIENT
Start: 2023-07-26 | End: 2023-07-27 | Stop reason: HOSPADM

## 2023-07-26 RX ORDER — IPRATROPIUM BROMIDE AND ALBUTEROL SULFATE 2.5; .5 MG/3ML; MG/3ML
3 SOLUTION RESPIRATORY (INHALATION) ONCE AS NEEDED
Status: DISCONTINUED | OUTPATIENT
Start: 2023-07-26 | End: 2023-07-27 | Stop reason: HOSPADM

## 2023-07-26 RX ORDER — FENTANYL CITRATE 50 UG/ML
25 INJECTION, SOLUTION INTRAMUSCULAR; INTRAVENOUS
Status: DISCONTINUED | OUTPATIENT
Start: 2023-07-26 | End: 2023-07-27 | Stop reason: HOSPADM

## 2023-07-26 RX ORDER — PROMETHAZINE HYDROCHLORIDE 25 MG/1
25 TABLET ORAL ONCE AS NEEDED
Status: DISCONTINUED | OUTPATIENT
Start: 2023-07-26 | End: 2023-07-27 | Stop reason: HOSPADM

## 2023-07-26 RX ORDER — LABETALOL HYDROCHLORIDE 5 MG/ML
5 INJECTION, SOLUTION INTRAVENOUS
Status: DISCONTINUED | OUTPATIENT
Start: 2023-07-26 | End: 2023-07-27 | Stop reason: HOSPADM

## 2023-07-26 RX ORDER — FLUMAZENIL 0.1 MG/ML
0.2 INJECTION INTRAVENOUS AS NEEDED
Status: DISCONTINUED | OUTPATIENT
Start: 2023-07-26 | End: 2023-07-27 | Stop reason: HOSPADM

## 2023-07-26 RX ORDER — DIPHENHYDRAMINE HYDROCHLORIDE 50 MG/ML
12.5 INJECTION INTRAMUSCULAR; INTRAVENOUS
Status: DISCONTINUED | OUTPATIENT
Start: 2023-07-26 | End: 2023-07-27 | Stop reason: HOSPADM

## 2023-07-26 RX ORDER — ONDANSETRON 2 MG/ML
4 INJECTION INTRAMUSCULAR; INTRAVENOUS ONCE AS NEEDED
Status: DISCONTINUED | OUTPATIENT
Start: 2023-07-26 | End: 2023-07-27 | Stop reason: HOSPADM

## 2023-07-26 RX ADMIN — ONDANSETRON 4 MG: 2 INJECTION INTRAMUSCULAR; INTRAVENOUS at 13:21

## 2023-07-26 RX ADMIN — PROPOFOL 130 MG: 10 INJECTION, EMULSION INTRAVENOUS at 12:45

## 2023-07-26 RX ADMIN — SODIUM CHLORIDE, POTASSIUM CHLORIDE, SODIUM LACTATE AND CALCIUM CHLORIDE: 600; 310; 30; 20 INJECTION, SOLUTION INTRAVENOUS at 12:42

## 2023-07-26 RX ADMIN — GADOBENATE DIMEGLUMINE 19 ML: 529 INJECTION, SOLUTION INTRAVENOUS at 13:13

## 2023-07-26 RX ADMIN — LIDOCAINE HYDROCHLORIDE 100 MG: 20 INJECTION, SOLUTION INFILTRATION; PERINEURAL at 12:44

## 2023-07-26 RX ADMIN — EPHEDRINE SULFATE 10 MG: 50 INJECTION INTRAVENOUS at 13:14

## 2023-07-26 NOTE — ANESTHESIA POSTPROCEDURE EVALUATION
"Patient: Russell Ramírez Sr.    Procedure Summary       Date: 07/26/23 Room / Location: [facility] MRI; [facility] INTERVENTIONAL    Anesthesia Start: 1239 Anesthesia Stop: 1335    Procedure: MRI BRAIN W WO CONTRAST Diagnosis:       Small cell lung cancer      High risk medication use      Encounter for adjustment or management of vascular access device    Scheduled Providers:  Provider: Javid Valencia MD    Anesthesia Type: general ASA Status: 3            Anesthesia Type: general    Vitals  Vitals Value Taken Time   /85 07/26/23 1416   Temp     Pulse 73 07/26/23 1419   Resp 14 07/26/23 1400   SpO2 98 % 07/26/23 1419   Vitals shown include unvalidated device data.        Post Anesthesia Care and Evaluation    Level of consciousness: awake and alert  Pain management: adequate    Airway patency: patent  Anesthetic complications: No anesthetic complications  PONV Status: none  Cardiovascular status: acceptable  Respiratory status: acceptable  Hydration status: acceptable    Comments: /82   Pulse 73   Temp 36.7 °C (98.1 °F) (Oral)   Resp 14   Ht 182.9 cm (72\")   Wt 93 kg (205 lb)   SpO2 97%   BMI 27.80 kg/m²       "

## 2023-07-26 NOTE — PROGRESS NOTES
Discharged home via private car with spouse.  Escorted to vehicle per this RN.  No acute distress noted and tolerating po well.  All belongings returned to patient prior to discharge and discharge instructions given verbally and in writing to pt and spouse.

## 2023-07-26 NOTE — ANESTHESIA PROCEDURE NOTES
Airway  Urgency: elective    Date/Time: 7/26/2023 12:47 PM  Airway not difficult    General Information and Staff    Patient location during procedure: OR  Anesthesiologist: Javid Valencia MD    Indications and Patient Condition  Indications for airway management: airway protection    Preoxygenated: yes  Mask difficulty assessment: 1 - vent by mask    Final Airway Details  Final airway type: supraglottic airway      Successful airway: LMA  Size 5     Number of attempts at approach: 1  Assessment: lips, teeth, and gum same as pre-op

## 2023-07-26 NOTE — ANESTHESIA PREPROCEDURE EVALUATION
Anesthesia Evaluation     history of anesthetic complications:  PONV               Airway   Mallampati: II  TM distance: >3 FB  Neck ROM: full  No difficulty expected  Dental - normal exam     Pulmonary    (+) a smoker Former Abstained day of surgery, lung cancer,  (-) rhonchi, decreased breath sounds, wheezes  Cardiovascular     Rhythm: regular  Rate: normal    (+) hypertension, CAD, cardiac stents Drug eluting stent more than 12 months ago dysrhythmias Atrial Flutter, hyperlipidemia  (-) murmur      Neuro/Psych  GI/Hepatic/Renal/Endo    (+) GERD    Musculoskeletal     Abdominal     Abdomen: soft.   Substance History      OB/GYN          Other   arthritis,   history of cancer (Stage 4 Lung/ hist of prostate) active                  Anesthesia Plan    ASA 3     general     (LMA 5 )  intravenous induction     Anesthetic plan, risks, benefits, and alternatives have been provided, discussed and informed consent has been obtained with: patient.    CODE STATUS:

## 2023-07-28 ENCOUNTER — TELEPHONE (OUTPATIENT)
Dept: ONCOLOGY | Facility: CLINIC | Age: 78
End: 2023-07-28
Payer: MEDICARE

## 2023-07-28 DIAGNOSIS — C79.31 LUNG CANCER METASTATIC TO BRAIN: Primary | ICD-10-CM

## 2023-07-28 DIAGNOSIS — C34.90 LUNG CANCER METASTATIC TO BRAIN: Primary | ICD-10-CM

## 2023-07-28 RX ORDER — DEXAMETHASONE 4 MG/1
4 TABLET ORAL 2 TIMES DAILY WITH MEALS
Qty: 60 TABLET | Refills: 1 | Status: SHIPPED | OUTPATIENT
Start: 2023-07-28

## 2023-07-28 RX ORDER — FAMOTIDINE 20 MG/1
20 TABLET, FILM COATED ORAL 2 TIMES DAILY
Qty: 60 TABLET | Refills: 1 | Status: SHIPPED | OUTPATIENT
Start: 2023-07-28

## 2023-07-28 NOTE — TELEPHONE ENCOUNTER
----- Message from Alexsander Conn MD sent at 7/28/2023 11:02 AM EDT -----  Joy and Carrie,    I tried to call Mr. Ramírez's wife to discuss the MRI results from 7/26/2023.  Unfortunately, there has been some progression of his metastatic disease.  We will need to get him set up with radiation oncology as soon as possible for whole brain radiation.  He should also keep his appointment for follow-up with me on Thursday, 8/3/2023.  If you are able to speak with the patient or his wife I would also like for him to be started on steroid therapy with Decadron 4 mg by mouth twice daily and Pepcid 20 mg p.o. twice daily.    Please let me know if he is having any symptoms such as confusion, vision changes, or headaches or nausea and vomiting.    Thanks      Called back and spoke with patient's spouse. Pt forgets something and becomes upset, (+) headache last night but had a busy week. Pt currently sleeping at this time, no nausea nor vomiting.  She knows to keep the appt to see you on 8/3, will  the prescriptions. Pt has been referred to [facility], closer to [place].

## 2023-07-28 NOTE — TELEPHONE ENCOUNTER
Called back Joanna Ramírez and made her aware of Dr Conn's message. Answered their questions and v/u.

## 2023-07-28 NOTE — TELEPHONE ENCOUNTER
Caller: HOWARD CROUCH    Relationship: Emergency Contact    Best call back number: [phone]   What was the call regarding: PATIENTS DAUGHTER CALLED, STATED HER MOM CALLED HER UPSET AND THEY COULDN'T UNDERSTAND WHAT SHE HAD BEEN TOLD AND WANTED TO SEE IF SHE COULD SPEAK TO THE NURSE REGARDING HER FATHERS CARE

## 2023-07-29 LAB — QT INTERVAL: 391 MS

## 2023-07-31 ENCOUNTER — CONSULT (OUTPATIENT)
Dept: RADIATION ONCOLOGY | Facility: HOSPITAL | Age: 78
End: 2023-07-31
Payer: MEDICARE

## 2023-07-31 VITALS
OXYGEN SATURATION: 100 % | DIASTOLIC BLOOD PRESSURE: 72 MMHG | TEMPERATURE: 99.5 F | SYSTOLIC BLOOD PRESSURE: 140 MMHG | HEART RATE: 79 BPM | WEIGHT: 210.1 LBS | BODY MASS INDEX: 28.49 KG/M2 | RESPIRATION RATE: 16 BRPM

## 2023-07-31 DIAGNOSIS — C79.31 LUNG CANCER METASTATIC TO BRAIN: Primary | ICD-10-CM

## 2023-07-31 DIAGNOSIS — C34.90 LUNG CANCER METASTATIC TO BRAIN: Primary | ICD-10-CM

## 2023-07-31 PROBLEM — F32.A DEPRESSIVE DISORDER: Status: ACTIVE | Noted: 2023-07-31

## 2023-07-31 PROBLEM — I25.10 CORONARY ARTERIOSCLEROSIS IN NATIVE ARTERY: Status: ACTIVE | Noted: 2023-07-31

## 2023-07-31 PROBLEM — K21.9 GASTROESOPHAGEAL REFLUX DISEASE: Status: ACTIVE | Noted: 2023-07-31

## 2023-07-31 PROBLEM — E66.9 OBESITY: Status: ACTIVE | Noted: 2023-07-31

## 2023-07-31 PROBLEM — G89.29 CHRONIC MIDLINE LOW BACK PAIN WITHOUT SCIATICA: Status: ACTIVE | Noted: 2017-10-31

## 2023-07-31 PROBLEM — I25.10 CORONARY ATHEROSCLEROSIS: Status: ACTIVE | Noted: 2023-07-31

## 2023-07-31 PROBLEM — M53.9 BACK PROBLEM: Status: ACTIVE | Noted: 2023-07-31

## 2023-07-31 PROBLEM — I25.10 CORONARY ARTERIOSCLEROSIS: Status: ACTIVE | Noted: 2023-07-31

## 2023-07-31 PROBLEM — N52.9 ERECTILE DYSFUNCTION: Status: ACTIVE | Noted: 2023-07-31

## 2023-07-31 PROBLEM — E78.00 PURE HYPERCHOLESTEROLEMIA: Status: ACTIVE | Noted: 2023-07-31

## 2023-07-31 PROBLEM — C44.90 MALIGNANT NEOPLASM OF SKIN: Status: ACTIVE | Noted: 2023-07-31

## 2023-07-31 PROBLEM — F17.200 TOBACCO DEPENDENCE SYNDROME: Status: ACTIVE | Noted: 2023-07-31

## 2023-07-31 PROBLEM — M54.50 CHRONIC MIDLINE LOW BACK PAIN WITHOUT SCIATICA: Status: ACTIVE | Noted: 2017-10-31

## 2023-07-31 PROBLEM — I48.92 ATRIAL FLUTTER: Status: ACTIVE | Noted: 2023-07-31

## 2023-07-31 PROBLEM — R60.9 EDEMA: Status: ACTIVE | Noted: 2023-07-31

## 2023-07-31 PROBLEM — E34.9 HYPOTESTOSTERONISM: Status: ACTIVE | Noted: 2023-07-31

## 2023-07-31 PROBLEM — I11.9 HYPERTENSIVE HEART DISEASE: Status: ACTIVE | Noted: 2023-07-31

## 2023-07-31 PROBLEM — I71.40 ABDOMINAL AORTIC ANEURYSM: Status: ACTIVE | Noted: 2023-07-31

## 2023-07-31 PROBLEM — M79.89 SWELLING OF LIMB: Status: ACTIVE | Noted: 2023-07-31

## 2023-07-31 PROBLEM — D12.2 ADENOMATOUS POLYP OF ASCENDING COLON: Status: ACTIVE | Noted: 2018-06-11

## 2023-07-31 PROBLEM — I10 HYPERTENSION: Status: ACTIVE | Noted: 2023-07-31

## 2023-07-31 PROBLEM — F41.1 ANXIETY STATE: Status: ACTIVE | Noted: 2023-07-31

## 2023-07-31 PROBLEM — Z85.46 HISTORY OF MALIGNANT NEOPLASM OF PROSTATE: Status: ACTIVE | Noted: 2023-07-31

## 2023-07-31 PROBLEM — I99.9 VASCULAR DISORDER: Status: ACTIVE | Noted: 2023-07-31

## 2023-07-31 PROCEDURE — G0463 HOSPITAL OUTPT CLINIC VISIT: HCPCS | Performed by: RADIOLOGY

## 2023-07-31 RX ORDER — CHLORAMPHENICOL PALMITATE
POWDER (GRAM) MISCELLANEOUS
COMMUNITY

## 2023-07-31 NOTE — PROGRESS NOTES
New Patient Office Visit      Encounter Date: 07/31/2023   Patient Name: Russell Ramírez Sr.  YOB: 1945   Medical Record Number: [id number]   Primary Diagnosis: Lung cancer metastatic to brain [C34.90, C79.31]   Cancer Staging: Cancer Staging   Small cell lung cancer  Staging form: Lung, AJCC 8th Edition  - Clinical stage from 3/27/2023: Stage IV (cT1c, cN2, cM1) - Signed by Alexsander Conn MD on 3/27/2023      Chief Complaint:    Chief Complaint   Patient presents with    Consult       History of Present Illness: Russell Ramírez Sr. is a 77-year-old gentleman with extensive stage small cell lung cancer with brain metastases who is seen in consultation regarding radiation as management for intracranial progression of disease.  Mr. Ramírez had incidentally found nodules at the bases of his lung after undergoing follow-up by vascular surgery.  These nodules were discovered on CT scan of the abdomen on 2/23/2023.  He was evaluated by Dr. Lara and underwent bronchoscopy and biopsy on 3/13/2023 revealing small cell lung cancer.  PET/CT on 3/14/2023 revealed evidence of metastatic disease to the liver and right adrenal gland as well as hypermetabolic mediastinal and hilar adenopathy.  He was started on carboplatin/etoposide/atezolizumab.  MRI of the brain with and without contrast on 3/21/2023 revealed multiple subcentimeter metastases throughout the brain with no surrounding edema or mass effect.  He completed 4 cycles of chemotherapy and is now receiving atezolizumab every 3 weeks.  Mr. Ramírez reports some vague chronic headaches and dizziness.  He was started on dexamethasone 4 mg p.o. twice daily approximately 2-1/2 days ago and has not noticed any changes in his chronic symptoms since starting this medication.    Subjective          Review of Systems: Review of Systems   Constitutional:  Positive for appetite change (decreased), fatigue and unexpected weight change (7/10).   HENT:  Positive for  trouble swallowing (occasionally). Negative for sore throat.    Eyes:  Negative for visual disturbance.   Respiratory:  Positive for cough (dry cough due to sinus drainage) and shortness of breath (with exertion).         Uses CPAP at night      Cardiovascular:  Negative for chest pain, palpitations and leg swelling.   Gastrointestinal:  Negative for anal bleeding, blood in stool, constipation, diarrhea, nausea and vomiting.   Genitourinary:  Positive for urgency (occasionally). Negative for difficulty urinating, dysuria, frequency and hematuria.        Nocturia x 2  Incontinence and leakage     Musculoskeletal:  Positive for arthralgias, back pain (chronic), neck pain and neck stiffness.        Left knee arthritis      Skin:  Negative for color change and rash.   Allergic/Immunologic: Negative.    Neurological:  Positive for dizziness (with sudden position changes), weakness, light-headedness, numbness (in feet) and headaches (mild occasional headaches).   Hematological:  Bruises/bleeds easily.   Psychiatric/Behavioral:  Positive for sleep disturbance (ongoing).      Past Medical History:   Past Medical History:   Diagnosis Date    Acid reflux     Anemia     Arthritis     Coronary artery disease     CVA (cerebral vascular accident)     High cholesterol     History of atrial fibrillation     Hx of blood clots     Hypertension     Irregular heartbeat     Peptic ulcer disease     Prostate cancer     Rheumatoid arthritis     Skin cancer     Small cell lung cancer        Past Surgical History:   Past Surgical History:   Procedure Laterality Date    ABDOMINAL AORTIC ANEURYSM REPAIR      BACK SURGERY      x2    BRONCHOSCOPY WITH ION ROBOTIC ASSIST N/A 03/13/2023    Procedure: BRONCHOSCOPY ,ENDOBRONCHIAL ULTRASOUND AND ROBOTIC NAVIGATIONAL BRONCHOSCOPY WITH FINE NEEDLE ASPIRATION, BIOPSY X1 AREA, AND BRONCHOALEOLAR LAVAGE;  Surgeon: Sonali Lara MD;  Location: [facility] ENDOSCOPY;  Service: Robotics - Pulmonary;   Laterality: N/A;  Post: LUNG CANCER    CARDIAC ABLATION      x2    COLON SURGERY      HERNIA REPAIR      INSERTION CENTRAL VENOUS ACCESS DEVICE W/ SUBCUTANEOUS PORT Right 03/20/2023    VENOUS ACCESS DEVICE (PORT) INSERTION Right 03/20/2023    Procedure: INSERTION VENOUS ACCESS DEVICE;  Surgeon: Javier Gonzalez MD PhD;  Location: [facility] MAIN OR;  Service: Thoracic;  Laterality: Right;       Family History:   Family History   Problem Relation Age of Onset    Brain cancer Mother     Lung cancer Sister        Social History:   Social History     Socioeconomic History    Marital status:      Spouse name: Marivel   Tobacco Use    Smoking status: Former     Types: Cigarettes     Start date: 1976     Quit date: 2020     Years since quitting: 3.6    Smokeless tobacco: Never   Vaping Use    Vaping Use: Never used   Substance and Sexual Activity    Alcohol use: Never    Drug use: Never    Sexual activity: Defer       Medications:     Current Outpatient Medications:     Acetaminophen (ARTHRITIS PAIN RELIEF PO), Arthritis Relief  650 mg (Patient not taking: Reported on 8/15/2023), Disp: , Rfl:     ascorbic acid (VITAMIN C) 1000 MG tablet, Vitamin C (Patient not taking: Reported on 8/15/2023), Disp: , Rfl:     Ashwagandha 125 MG capsule, , Disp: , Rfl:     aspirin 81 MG EC tablet, Daily., Disp: , Rfl:     bethanechol (URECHOLINE) 50 MG tablet, Take 1 tablet by mouth Every 12 (Twelve) Hours., Disp: , Rfl:     coenzyme Q10 100 MG capsule, Take 1 capsule by mouth Daily., Disp: , Rfl:     Creatinine powder, , Disp: , Rfl:     cyanocobalamin (VITAMIN B-12) 1000 MCG tablet, Take 1 tablet by mouth Daily. (Patient not taking: Reported on 8/15/2023), Disp: , Rfl:     dexamethasone (DECADRON) 4 MG tablet, Take 1 tablet by mouth 2 (Two) Times a Day With Meals., Disp: 60 tablet, Rfl: 1    diphenhydrAMINE (BENADRYL) 25 mg capsule, Take 1 capsule by mouth Every 6 (Six) Hours As Needed for Itching., Disp: , Rfl:     Elderberry 500 MG  capsule, Take  by mouth., Disp: , Rfl:     famotidine (PEPCID) 20 MG tablet, Take 1 tablet by mouth 2 (Two) Times a Day., Disp: 60 tablet, Rfl: 1    Magnesium 250 MG tablet, Take  by mouth., Disp: , Rfl:     metoprolol tartrate (LOPRESSOR) 25 MG tablet, metoprolol tartrate 25 mg tablet  TAKE 1 TABLET BY MOUTH TWICE DAILY., Disp: , Rfl:     Misc Natural Products (SUPER GREENS PO), Take  by mouth., Disp: , Rfl:     Misc Natural Products (YUMVS BEET ROOT-TART CHERRY PO), Take  by mouth., Disp: , Rfl:     traMADol (ULTRAM) 50 MG tablet, Take 2 tablets by mouth Every 6 (Six) Hours As Needed for Moderate Pain., Disp: 340 tablet, Rfl: 0    Turmeric 500 MG capsule, Take  by mouth., Disp: , Rfl:     Vitamin B Complex-C capsule, , Disp: , Rfl:     vitamin D3 125 MCG (5000 UT) capsule capsule, Take 1 capsule by mouth Daily. (Patient not taking: Reported on 8/15/2023), Disp: , Rfl:     Zinc 50 MG tablet, Take 1 tablet by mouth Daily., Disp: , Rfl:     fluocinonide (LIDEX) 0.05 % cream, APPLY TO THE AFFECTED AREA(S) BY TOPICAL ROUTE 2 TIMES PER DAY, Disp: , Rfl:     lidocaine-prilocaine (EMLA) 2.5-2.5 % cream, Apply nickel size amount to port site 30 min before appt time do not rub in cover with plastic wrap, Disp: 5 g, Rfl: 2    LORazepam (Ativan) 1 MG tablet, Take 1 tablet by mouth as needed  1 hour prior to CT scan., Disp: 1 tablet, Rfl: 0    OLANZapine (zyPREXA) 5 MG tablet, Take 1 tablet by mouth Every Night. Take on days 2, 3 and 4 after chemotherapy., Disp: 3 tablet, Rfl: 3    ondansetron (ZOFRAN) 8 MG tablet, Take 1 tablet by mouth 3 (Three) Times a Day As Needed for Nausea or Vomiting., Disp: 30 tablet, Rfl: 5    Allergies:   Allergies   Allergen Reactions    Codeine Other (See Comments)     headache         Pain: (on a scale of 0-10)   Pain Score    07/31/23 0957   PainSc:   4   PainLoc: Back       Advanced Care Plan: N   Quality of Life: 80 - Restricted Physical Activity    Objective     Physical Exam:   Vital Signs:    Vitals:    07/31/23 0957   BP: 140/72   Pulse: 79   Resp: 16   Temp: 99.5 øF (37.5 øC)   TempSrc: Temporal   SpO2: 100%   Weight: 95.3 kg (210 lb 1.6 oz)   PainSc:   4   PainLoc: Back     Body mass index is 28.49 kg/mý.     Physical Exam  Constitutional:       General: He is not in acute distress.     Appearance: He is normal weight. He is not ill-appearing or toxic-appearing.   HENT:      Head: Normocephalic and atraumatic.   Pulmonary:      Effort: Pulmonary effort is normal. No respiratory distress.   Skin:     General: Skin is warm and dry.      Coloration: Skin is not jaundiced.      Findings: No lesion.   Neurological:      General: No focal deficit present.      Mental Status: He is alert and oriented to person, place, and time.      Cranial Nerves: No cranial nerve deficit.      Gait: Gait normal.   Psychiatric:         Mood and Affect: Mood normal.         Judgment: Judgment normal.       Results:   Radiographs: No radiology results for the last 30 days.  I personally reviewed the MRI of the brain with without contrast from 7/27/2023.  The pertinent findings are as above.    Pathology: I personally reviewed the pathology report from the procedure performed on 3/13/2023.  This revealed small cell carcinoma.  Labs:   WBC   Date Value Ref Range Status   08/24/2023 7.31 3.40 - 10.80 10*3/mm3 Final   09/18/2019 5.9 4.8 - 10.8 10*9/L Final     Hemoglobin   Date Value Ref Range Status   08/24/2023 13.2 13.0 - 17.7 g/dL Final   09/18/2019 14.1 14.0 - 18.0 g/dL Final     Hematocrit   Date Value Ref Range Status   08/24/2023 39.8 37.5 - 51.0 % Final   09/18/2019 42.0 42 - 54 % Final     Platelets   Date Value Ref Range Status   08/24/2023 115 (L) 140 - 450 10*3/mm3 Final   09/18/2019 165 150 - 450 10*9/L Final      PSA   Date Value Ref Range Status   04/14/2022 0.01 0.0 - 4.0 ng/mL Final     Comment:           Test performed on the [employer] DXI/Access system using an immunoenzymatic sandwich assay utilizing a  chemiluminescent substrate detection methodology.Values obtained with different assay methods should not be used interchangeably.If in the course of   monitoring a patient the assay method used for determination is changed,additional sequential testing should be carried out to confirm baseline values.   09/20/2021 0.05 0.0 - 4.0 ng/mL Final     Comment:           Test performed on the [employer] DXI/Access system using an immunoenzymatic sandwich assay utilizing a chemiluminescent substrate detection methodology.Values obtained with different assay methods should not be used interchangeably.If in the course of   monitoring a patient the assay method used for determination is changed,additional sequential testing should be carried out to confirm baseline values.   06/03/2020 0.10 0.0 - 4.0 ng/mL Final     Comment:           Test performed on the [email or web address] DXI/Access system using an immunoenzymatic sandwich assay utilizing a chemiluminescent substrate detection methodology.Values obtained with different assay methods should not be used interchangeably.If in the course of   monitoring a patient the assay method used for determination is changed,additional sequential testing should be carried out to confirm baseline values.   09/18/2019 0.02 0.0 - 4.0 ng/mL Final     Comment:           Test performed on the [email or web address] DXI/Access system using an immunoenzymatic sandwich assay utilizing a chemiluminescent substrate detection methodology.Values obtained with different assay methods should not be used interchangeably.If in the course of   monitoring a patient the assay method used for determination is changed,additional sequential testing should be carried out to confirm baseline values.   08/20/2018 0.01 0.0 - 4.0 ng/mL Final     Comment:           Test performed on the [employer] DXI/Access system using an immunoenzymatic sandwich assay utilizing a chemiluminescent substrate detection methodology.Values obtained with different assay  methods should not be used interchangeably.If in the course of   monitoring a patient the assay method used for determination is changed,additional sequential testing should be carried out to confirm baseline values.       Assessment / Plan        Assessment/Plan:   Russell Ramírez is a 77-year-old gentleman with metastatic small cell carcinoma.  He has progression of previously appreciated intracranial contrast-enhancing lesions.    I discussed the clinical, radiographic and pathologic findings to date with Mr. Ramírez.  I explained the role of radiotherapy in the palliative management of brain metastases, outlining the rationale for this approach.  I recommended whole brain radiotherapy given the extent of intracranial metastatic disease, outlining the risks, potential benefits and alternatives of this approach.  Mr. Ramírez is agreeable to my recommendation and will undergo CT simulation for treatment planning purposes.      Russell Chambers MD  Radiation Oncology  [facility]    This document has been signed by Russell Chambers MD on August 29, 2023 10:01 EDT

## 2023-08-01 ENCOUNTER — HOSPITAL ENCOUNTER (OUTPATIENT)
Dept: RADIATION ONCOLOGY | Facility: HOSPITAL | Age: 78
Discharge: HOME | End: 2023-08-01
Payer: MEDICARE

## 2023-08-01 ENCOUNTER — HOSPITAL ENCOUNTER (OUTPATIENT)
Dept: RADIATION ONCOLOGY | Facility: HOSPITAL | Age: 78
Setting detail: RADIATION/ONCOLOGY SERIES
End: 2023-08-01
Payer: MEDICARE

## 2023-08-01 DIAGNOSIS — C79.31 SECONDARY MALIGNANT NEOPLASM OF BRAIN: ICD-10-CM

## 2023-08-01 PROCEDURE — 77334 RADIATION TREATMENT AID(S): CPT | Performed by: RADIOLOGY

## 2023-08-01 PROCEDURE — 77290 THER RAD SIMULAJ FIELD CPLX: CPT | Performed by: RADIOLOGY

## 2023-08-03 ENCOUNTER — INFUSION (OUTPATIENT)
Dept: ONCOLOGY | Facility: HOSPITAL | Age: 78
End: 2023-08-03
Payer: MEDICARE

## 2023-08-03 ENCOUNTER — NUTRITION (OUTPATIENT)
Dept: OTHER | Facility: HOSPITAL | Age: 78
End: 2023-08-03
Payer: MEDICARE

## 2023-08-03 ENCOUNTER — OFFICE VISIT (OUTPATIENT)
Dept: ONCOLOGY | Facility: CLINIC | Age: 78
End: 2023-08-03
Payer: MEDICARE

## 2023-08-03 VITALS
RESPIRATION RATE: 18 BRPM | HEIGHT: 72 IN | OXYGEN SATURATION: 96 % | TEMPERATURE: 98.2 F | BODY MASS INDEX: 28.53 KG/M2 | SYSTOLIC BLOOD PRESSURE: 139 MMHG | DIASTOLIC BLOOD PRESSURE: 83 MMHG | WEIGHT: 210.6 LBS | HEART RATE: 81 BPM

## 2023-08-03 DIAGNOSIS — C34.90 LUNG CANCER METASTATIC TO BRAIN: ICD-10-CM

## 2023-08-03 DIAGNOSIS — C34.90 SMALL CELL LUNG CANCER: Primary | ICD-10-CM

## 2023-08-03 DIAGNOSIS — C79.31 LUNG CANCER METASTATIC TO BRAIN: ICD-10-CM

## 2023-08-03 DIAGNOSIS — C34.90 SMALL CELL LUNG CANCER: ICD-10-CM

## 2023-08-03 DIAGNOSIS — Z45.2 ENCOUNTER FOR ADJUSTMENT OR MANAGEMENT OF VASCULAR ACCESS DEVICE: ICD-10-CM

## 2023-08-03 DIAGNOSIS — Z79.899 HIGH RISK MEDICATION USE: ICD-10-CM

## 2023-08-03 PROBLEM — C79.9 METASTATIC CANCER: Status: ACTIVE | Noted: 2023-08-03

## 2023-08-03 PROBLEM — C79.9 METASTATIC CANCER: Status: RESOLVED | Noted: 2023-08-03 | Resolved: 2023-08-03

## 2023-08-03 LAB
ALBUMIN SERPL-MCNC: 3.8 G/DL (ref 3.5–5.2)
ALBUMIN/GLOB SERPL: 1.8 G/DL
ALP SERPL-CCNC: 85 U/L (ref 39–117)
ALT SERPL W P-5'-P-CCNC: 19 U/L (ref 1–41)
ANION GAP SERPL CALCULATED.3IONS-SCNC: 11.5 MMOL/L (ref 5–15)
AST SERPL-CCNC: 17 U/L (ref 1–40)
BASOPHILS # BLD AUTO: 0.03 10*3/MM3 (ref 0–0.2)
BASOPHILS NFR BLD AUTO: 0.3 % (ref 0–1.5)
BILIRUB SERPL-MCNC: 0.2 MG/DL (ref 0–1.2)
BUN SERPL-MCNC: 19 MG/DL (ref 8–23)
BUN/CREAT SERPL: 20 (ref 7–25)
CALCIUM SPEC-SCNC: 8.8 MG/DL (ref 8.6–10.5)
CHLORIDE SERPL-SCNC: 105 MMOL/L (ref 98–107)
CO2 SERPL-SCNC: 23.5 MMOL/L (ref 22–29)
CREAT SERPL-MCNC: 0.95 MG/DL (ref 0.7–1.3)
DEPRECATED RDW RBC AUTO: 47.4 FL (ref 37–54)
EGFRCR SERPLBLD CKD-EPI 2021: 83 ML/MIN/1.73
EOSINOPHIL # BLD AUTO: 0.14 10*3/MM3 (ref 0–0.4)
EOSINOPHIL NFR BLD AUTO: 1.6 % (ref 0.3–6.2)
ERYTHROCYTE [DISTWIDTH] IN BLOOD BY AUTOMATED COUNT: 12.3 % (ref 12.3–15.4)
GLOBULIN UR ELPH-MCNC: 2.1 GM/DL
GLUCOSE SERPL-MCNC: 108 MG/DL (ref 65–99)
HCT VFR BLD AUTO: 37 % (ref 37.5–51)
HGB BLD-MCNC: 12.3 G/DL (ref 13–17.7)
IMM GRANULOCYTES # BLD AUTO: 0.04 10*3/MM3 (ref 0–0.05)
IMM GRANULOCYTES NFR BLD AUTO: 0.4 % (ref 0–0.5)
LYMPHOCYTES # BLD AUTO: 0.78 10*3/MM3 (ref 0.7–3.1)
LYMPHOCYTES NFR BLD AUTO: 8.7 % (ref 19.6–45.3)
MCH RBC QN AUTO: 34.8 PG (ref 26.6–33)
MCHC RBC AUTO-ENTMCNC: 33.2 G/DL (ref 31.5–35.7)
MCV RBC AUTO: 104.8 FL (ref 79–97)
MONOCYTES # BLD AUTO: 0.7 10*3/MM3 (ref 0.1–0.9)
MONOCYTES NFR BLD AUTO: 7.8 % (ref 5–12)
NEUTROPHILS NFR BLD AUTO: 7.27 10*3/MM3 (ref 1.7–7)
NEUTROPHILS NFR BLD AUTO: 81.2 % (ref 42.7–76)
NRBC BLD AUTO-RTO: 0 /100 WBC (ref 0–0.2)
PLATELET # BLD AUTO: 126 10*3/MM3 (ref 140–450)
PMV BLD AUTO: 8.7 FL (ref 6–12)
POTASSIUM SERPL-SCNC: 4 MMOL/L (ref 3.5–5.2)
PROT SERPL-MCNC: 5.9 G/DL (ref 6–8.5)
RBC # BLD AUTO: 3.53 10*6/MM3 (ref 4.14–5.8)
SODIUM SERPL-SCNC: 140 MMOL/L (ref 136–145)
T3FREE SERPL-MCNC: 2.58 PG/ML (ref 2–4.4)
T4 FREE SERPL-MCNC: 1.03 NG/DL (ref 0.93–1.7)
TSH SERPL DL<=0.05 MIU/L-ACNC: 0.75 UIU/ML (ref 0.27–4.2)
WBC NRBC COR # BLD: 8.96 10*3/MM3 (ref 3.4–10.8)

## 2023-08-03 PROCEDURE — 84481 FREE ASSAY (FT-3): CPT | Performed by: INTERNAL MEDICINE

## 2023-08-03 PROCEDURE — 84439 ASSAY OF FREE THYROXINE: CPT | Performed by: INTERNAL MEDICINE

## 2023-08-03 PROCEDURE — 96413 CHEMO IV INFUSION 1 HR: CPT

## 2023-08-03 PROCEDURE — 80053 COMPREHEN METABOLIC PANEL: CPT

## 2023-08-03 PROCEDURE — 84443 ASSAY THYROID STIM HORMONE: CPT | Performed by: INTERNAL MEDICINE

## 2023-08-03 PROCEDURE — 25010000002 ATEZOLIZUMAB 1200 MG/20ML SOLUTION 20 ML VIAL: Performed by: INTERNAL MEDICINE

## 2023-08-03 PROCEDURE — 25010000002 HEPARIN LOCK FLUSH PER 10 UNITS: Performed by: INTERNAL MEDICINE

## 2023-08-03 PROCEDURE — 85025 COMPLETE CBC W/AUTO DIFF WBC: CPT

## 2023-08-03 RX ORDER — SODIUM CHLORIDE 0.9 % (FLUSH) 0.9 %
10 SYRINGE (ML) INJECTION AS NEEDED
Status: DISCONTINUED | OUTPATIENT
Start: 2023-08-03 | End: 2023-08-03 | Stop reason: HOSPADM

## 2023-08-03 RX ORDER — HEPARIN SODIUM (PORCINE) LOCK FLUSH IV SOLN 100 UNIT/ML 100 UNIT/ML
500 SOLUTION INTRAVENOUS AS NEEDED
Status: DISCONTINUED | OUTPATIENT
Start: 2023-08-03 | End: 2023-08-03 | Stop reason: HOSPADM

## 2023-08-03 RX ORDER — SODIUM CHLORIDE 9 MG/ML
250 INJECTION, SOLUTION INTRAVENOUS ONCE
Status: COMPLETED | OUTPATIENT
Start: 2023-08-03 | End: 2023-08-03

## 2023-08-03 RX ORDER — HEPARIN SODIUM (PORCINE) LOCK FLUSH IV SOLN 100 UNIT/ML 100 UNIT/ML
500 SOLUTION INTRAVENOUS AS NEEDED
OUTPATIENT
Start: 2023-08-03

## 2023-08-03 RX ORDER — SODIUM CHLORIDE 0.9 % (FLUSH) 0.9 %
10 SYRINGE (ML) INJECTION AS NEEDED
OUTPATIENT
Start: 2023-08-03

## 2023-08-03 RX ORDER — SODIUM CHLORIDE 9 MG/ML
250 INJECTION, SOLUTION INTRAVENOUS ONCE
OUTPATIENT
Start: 2023-08-24

## 2023-08-03 RX ORDER — SODIUM CHLORIDE 9 MG/ML
250 INJECTION, SOLUTION INTRAVENOUS ONCE
OUTPATIENT
Start: 2023-09-14

## 2023-08-03 RX ADMIN — SODIUM CHLORIDE 250 ML: 9 INJECTION, SOLUTION INTRAVENOUS at 10:31

## 2023-08-03 RX ADMIN — Medication 500 UNITS: at 11:04

## 2023-08-03 RX ADMIN — Medication 10 ML: at 11:04

## 2023-08-03 RX ADMIN — ATEZOLIZUMAB 1200 MG: 1200 INJECTION, SOLUTION INTRAVENOUS at 10:31

## 2023-08-04 RX ORDER — LORAZEPAM 1 MG/1
TABLET ORAL
Qty: 1 TABLET | Refills: 0 | Status: CANCELLED | OUTPATIENT
Start: 2023-08-04

## 2023-08-06 PROCEDURE — 77334 RADIATION TREATMENT AID(S): CPT | Performed by: RADIOLOGY

## 2023-08-06 PROCEDURE — 77295 3-D RADIOTHERAPY PLAN: CPT | Performed by: RADIOLOGY

## 2023-08-06 PROCEDURE — 77300 RADIATION THERAPY DOSE PLAN: CPT | Performed by: RADIOLOGY

## 2023-08-07 ENCOUNTER — DOCUMENTATION (OUTPATIENT)
Dept: RADIATION ONCOLOGY | Facility: HOSPITAL | Age: 78
End: 2023-08-07
Payer: MEDICARE

## 2023-08-07 ENCOUNTER — HOSPITAL ENCOUNTER (OUTPATIENT)
Dept: RADIATION ONCOLOGY | Facility: HOSPITAL | Age: 78
Discharge: HOME | End: 2023-08-07
Payer: MEDICARE

## 2023-08-07 LAB
RAD ONC ARIA COURSE ID: NORMAL
RAD ONC ARIA COURSE INTENT: NORMAL
RAD ONC ARIA COURSE LAST TREATMENT DATE: NORMAL
RAD ONC ARIA COURSE START DATE: NORMAL
RAD ONC ARIA COURSE TREATMENT ELAPSED DAYS: 0
RAD ONC ARIA FIRST TREATMENT DATE: NORMAL
RAD ONC ARIA PLAN FRACTIONS TREATED TO DATE: 1
RAD ONC ARIA PLAN ID: NORMAL
RAD ONC ARIA PLAN PRESCRIBED DOSE PER FRACTION: 3 GY
RAD ONC ARIA PLAN PRIMARY REFERENCE POINT: NORMAL
RAD ONC ARIA PLAN TOTAL FRACTIONS PRESCRIBED: 10
RAD ONC ARIA PLAN TOTAL PRESCRIBED DOSE: 3000 CGY
RAD ONC ARIA REFERENCE POINT DOSAGE GIVEN TO DATE: 3 GY
RAD ONC ARIA REFERENCE POINT ID: NORMAL
RAD ONC ARIA REFERENCE POINT SESSION DOSAGE GIVEN: 3 GY

## 2023-08-07 PROCEDURE — 77280 THER RAD SIMULAJ FIELD SMPL: CPT | Performed by: RADIOLOGY

## 2023-08-07 PROCEDURE — G6002 STEREOSCOPIC X-RAY GUIDANCE: HCPCS | Performed by: RADIOLOGY

## 2023-08-07 PROCEDURE — 77387 GUIDANCE FOR RADJ TX DLVR: CPT | Performed by: RADIOLOGY

## 2023-08-07 PROCEDURE — 77427 RADIATION TX MANAGEMENT X5: CPT | Performed by: RADIOLOGY

## 2023-08-07 PROCEDURE — 77412 RADIATION TX DELIVERY LVL 3: CPT | Performed by: RADIOLOGY

## 2023-08-07 RX ORDER — LORAZEPAM 1 MG/1
TABLET ORAL
Qty: 1 TABLET | Refills: 0 | Status: SHIPPED | OUTPATIENT
Start: 2023-08-07

## 2023-08-07 NOTE — PROGRESS NOTES
Diagnosis: Metastatic lung cancer    Reason for Referral: New radiation therapy treatment start    Current Tx Plan: Mr. Ramírez is receiving immunotherapy every 21 days at [facility] and started whole brain radiation therapy with us this morning - 10 fractions.    Most Recent Distress Level: 3    Most Recent PHQ -2/PHQ-9 Score: 7    C-SSRS: Denied SI and screened negative    Mental Status: Mr. Ramírez was very pleasant, alert and oriented. He reported feeling like an astronaut after receiving his first whole brain treatment this morning. No SI/HI reported or indicated today.    Mental Health Treatment: No history reported    Substance Use/Tobacco Use: Mr. Ramírez is a former cigarette smoker, quitting roughly 3+ years ago. He denies any h/o of alcohol dependence or illicit drug use.    Support System: Mrs. Ramírez reports they receive positive support from many family and friends. Mr. Ramírez's spouse and daughter are both present with him today. He also has a son.    Spirituality: [facility]    Residential status/Who lives in the home: Mr. Ramírez resides in his home with his spouse in [place].    Home Care Needs: No needs identified at this time    Insurance: Medicare and supplement plan    Finances: Mr. Ramírez and his spouse are both on a fixed income through social security residential.     Transportation: Mr. Ramírez's family is assisting with his transportation to treatments. His spouse and daughter are both present with him today. OSW at [facility], Gale PARKER, has previously assisted Mr. Ramírez in getting approved $500 through [employer] [place] and [facility] [place] [facility]. They've expressed interest in getting connected to additional resources to assist with travel expenses. OSW will re-refer Mr. Ramírez to the [place] [facility] to determine if he's eligible for any additional gas assistance at this time. Also discussed opportunity to apply him for assistance through [employer] [facility]  [employer], however, he will need to provide three months bank statements. OSW submitted a referral to the [facility], per his permission today. We [facility] this morning, but unfortunately the office was not open yet. We will plan to contact them together later today via three way phone call. Advised that sometimes we have gas assistance available through our Foundation, however, we unfortunately do not at this time. Will continue to monitor.    Advance Care Planning: No directive on file    Resources/Referrals: Gas assistance - [facility], [facility], [facility], $15 gas card    Additional Comment: OSW met with Mr. Ramírez, his spouse and daughter in radiation oncology during his first day of treatment to introduce OSW role and assess for psychosocial needs. Mr. Ramírez's primary concern at this time is affording travel expenses to treatments in [place]. OSW is assisting Mr. Ramírez in getting connected to additional transportation resources. Provided my business card, encouraging OSW support remains available.     Update 8/7/23: Gas assistance through our [facility] is now available and OSW left a $15 gas card with the radiation therapists to provide to Mr. Ramírez tomorrow afternoon on 8/8/23. OSW attempted to contact Mr. Ramírez this afternoon at [phone] to make him aware and also assist [facility] in [facility]. Left Mr. Ramírez a VM relaying the above and requesting a return phone call to further navigate resources to assist with travel expenses.     Update 8/8/23: Received confirmation from the [facility] that Mr. Ramírez is eligible for a second gas card.

## 2023-08-08 ENCOUNTER — HOSPITAL ENCOUNTER (OUTPATIENT)
Dept: RADIATION ONCOLOGY | Facility: HOSPITAL | Age: 78
Discharge: HOME | End: 2023-08-08
Payer: MEDICARE

## 2023-08-08 ENCOUNTER — HOSPITAL ENCOUNTER (OUTPATIENT)
Dept: RADIATION ONCOLOGY | Facility: HOSPITAL | Age: 78
Discharge: HOME | End: 2023-08-08

## 2023-08-08 VITALS
BODY MASS INDEX: 28.91 KG/M2 | DIASTOLIC BLOOD PRESSURE: 83 MMHG | HEART RATE: 79 BPM | SYSTOLIC BLOOD PRESSURE: 130 MMHG | TEMPERATURE: 98.3 F | OXYGEN SATURATION: 98 % | RESPIRATION RATE: 16 BRPM | WEIGHT: 213.19 LBS

## 2023-08-08 DIAGNOSIS — C79.31 SECONDARY MALIGNANT NEOPLASM OF BRAIN: Primary | ICD-10-CM

## 2023-08-08 LAB
RAD ONC ARIA COURSE ID: NORMAL
RAD ONC ARIA COURSE INTENT: NORMAL
RAD ONC ARIA COURSE LAST TREATMENT DATE: NORMAL
RAD ONC ARIA COURSE START DATE: NORMAL
RAD ONC ARIA COURSE TREATMENT ELAPSED DAYS: 1
RAD ONC ARIA FIRST TREATMENT DATE: NORMAL
RAD ONC ARIA PLAN FRACTIONS TREATED TO DATE: 2
RAD ONC ARIA PLAN ID: NORMAL
RAD ONC ARIA PLAN PRESCRIBED DOSE PER FRACTION: 3 GY
RAD ONC ARIA PLAN PRIMARY REFERENCE POINT: NORMAL
RAD ONC ARIA PLAN TOTAL FRACTIONS PRESCRIBED: 10
RAD ONC ARIA PLAN TOTAL PRESCRIBED DOSE: 3000 CGY
RAD ONC ARIA REFERENCE POINT DOSAGE GIVEN TO DATE: 6 GY
RAD ONC ARIA REFERENCE POINT ID: NORMAL
RAD ONC ARIA REFERENCE POINT SESSION DOSAGE GIVEN: 3 GY

## 2023-08-08 PROCEDURE — G6002 STEREOSCOPIC X-RAY GUIDANCE: HCPCS | Performed by: RADIOLOGY

## 2023-08-08 PROCEDURE — 77412 RADIATION TX DELIVERY LVL 3: CPT | Performed by: RADIOLOGY

## 2023-08-08 PROCEDURE — 77387 GUIDANCE FOR RADJ TX DLVR: CPT | Performed by: RADIOLOGY

## 2023-08-08 NOTE — PROGRESS NOTES
On Treatment Visit       Patient: Russell Ramírez Sr.   YOB: 1945   Medical Record Number: [id number]     Date of Visit  August 8, 2023   Primary Diagnosis: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]  Cancer Staging: Cancer Staging   Lung cancer metastatic to brain  Staging form: Lung, AJCC 8th Edition  - Clinical stage from 3/27/2023: Stage IV (cT1c, cN2, cM1) - Signed by Alexsander Conn MD on 3/27/2023         was seen today for an on treatment visit.  He is receiving radiation therapy to the whole brain. He  has received 600 cGy in 2 fractions out of a planned dose of 3000 cGy in 10 fractions.    Today on exam the patient is tolerating radiation therapy well and has no new disease or treatment-related complaints.  Does not really have any associated symptoms with brain mets.  Still taking dexamethasone 4 mg once daily.                                            Review of Systems:   Review of Systems   Constitutional:  Positive for fatigue (6/10). Negative for appetite change and unexpected weight change.   Eyes:  Negative for visual disturbance.   Respiratory:  Positive for shortness of breath (WITH EXERTION). Negative for cough.    Gastrointestinal:  Negative for constipation, diarrhea and nausea.   Genitourinary:  Negative for difficulty urinating, dysuria, frequency and urgency.   Musculoskeletal:  Positive for back pain and gait problem. Negative for arthralgias.   Skin:  Negative for rash.   Neurological:  Positive for dizziness, light-headedness and headaches (MILD).   Psychiatric/Behavioral:  Positive for sleep disturbance.      Vitals:     Vitals:    08/08/23 1406   BP: 130/83   Pulse: 79   Resp: 16   Temp: 98.3 øF (36.8 øC)   SpO2: 98%       Weight:   Wt Readings from Last 3 Encounters:   08/08/23 96.7 kg (213 lb 3 oz)   08/03/23 95.5 kg (210 lb 9.6 oz)   07/31/23 95.3 kg (210 lb 1.6 oz)      Pain:    Pain Score    08/08/23 1406   PainSc: 0-No pain         Physical  Exam:  Gen: WD/WN; NAD  HEENT: MMM  Trachea: midline  Chest: symmetric  Resp: normal respiratory effort  Extr: warm, well-perfused  Neuro: awake and alert; no aphasia or neglect    Plan: I have reviewed treatment setup notes, checked and approved the daily guidance images.  I reviewed dose delivery, treatment parameters and deemed them appropriate. We plan to continue radiation therapy as prescribed.    Advise discontinuing dexamethasone.  Stated that he may resume it if he feels as though he is developing symptoms related to brain metastases.  Will continue to monitor.      Radiation Oncology    Electronically signed by Russell Chambers MD 8/8/2023  16:03 EDT

## 2023-08-09 ENCOUNTER — HOSPITAL ENCOUNTER (OUTPATIENT)
Dept: RADIATION ONCOLOGY | Facility: HOSPITAL | Age: 78
Discharge: HOME | End: 2023-08-09
Payer: MEDICARE

## 2023-08-09 LAB
RAD ONC ARIA COURSE ID: NORMAL
RAD ONC ARIA COURSE INTENT: NORMAL
RAD ONC ARIA COURSE LAST TREATMENT DATE: NORMAL
RAD ONC ARIA COURSE START DATE: NORMAL
RAD ONC ARIA COURSE TREATMENT ELAPSED DAYS: 2
RAD ONC ARIA FIRST TREATMENT DATE: NORMAL
RAD ONC ARIA PLAN FRACTIONS TREATED TO DATE: 3
RAD ONC ARIA PLAN ID: NORMAL
RAD ONC ARIA PLAN PRESCRIBED DOSE PER FRACTION: 3 GY
RAD ONC ARIA PLAN PRIMARY REFERENCE POINT: NORMAL
RAD ONC ARIA PLAN TOTAL FRACTIONS PRESCRIBED: 10
RAD ONC ARIA PLAN TOTAL PRESCRIBED DOSE: 3000 CGY
RAD ONC ARIA REFERENCE POINT DOSAGE GIVEN TO DATE: 9 GY
RAD ONC ARIA REFERENCE POINT ID: NORMAL
RAD ONC ARIA REFERENCE POINT SESSION DOSAGE GIVEN: 3 GY

## 2023-08-09 PROCEDURE — 77412 RADIATION TX DELIVERY LVL 3: CPT | Performed by: RADIOLOGY

## 2023-08-09 PROCEDURE — 77387 GUIDANCE FOR RADJ TX DLVR: CPT | Performed by: RADIOLOGY

## 2023-08-09 PROCEDURE — G6002 STEREOSCOPIC X-RAY GUIDANCE: HCPCS | Performed by: RADIOLOGY

## 2023-08-10 ENCOUNTER — DOCUMENTATION (OUTPATIENT)
Dept: RADIATION ONCOLOGY | Facility: HOSPITAL | Age: 78
End: 2023-08-10
Payer: MEDICARE

## 2023-08-10 ENCOUNTER — HOSPITAL ENCOUNTER (OUTPATIENT)
Dept: RADIATION ONCOLOGY | Facility: HOSPITAL | Age: 78
Discharge: HOME | End: 2023-08-10
Payer: MEDICARE

## 2023-08-10 LAB
RAD ONC ARIA COURSE ID: NORMAL
RAD ONC ARIA COURSE INTENT: NORMAL
RAD ONC ARIA COURSE LAST TREATMENT DATE: NORMAL
RAD ONC ARIA COURSE START DATE: NORMAL
RAD ONC ARIA COURSE TREATMENT ELAPSED DAYS: 3
RAD ONC ARIA FIRST TREATMENT DATE: NORMAL
RAD ONC ARIA PLAN FRACTIONS TREATED TO DATE: 4
RAD ONC ARIA PLAN ID: NORMAL
RAD ONC ARIA PLAN PRESCRIBED DOSE PER FRACTION: 3 GY
RAD ONC ARIA PLAN PRIMARY REFERENCE POINT: NORMAL
RAD ONC ARIA PLAN TOTAL FRACTIONS PRESCRIBED: 10
RAD ONC ARIA PLAN TOTAL PRESCRIBED DOSE: 3000 CGY
RAD ONC ARIA REFERENCE POINT DOSAGE GIVEN TO DATE: 12 GY
RAD ONC ARIA REFERENCE POINT ID: NORMAL
RAD ONC ARIA REFERENCE POINT SESSION DOSAGE GIVEN: 3 GY

## 2023-08-10 PROCEDURE — 77387 GUIDANCE FOR RADJ TX DLVR: CPT | Performed by: RADIOLOGY

## 2023-08-10 PROCEDURE — G6002 STEREOSCOPIC X-RAY GUIDANCE: HCPCS | Performed by: RADIOLOGY

## 2023-08-10 PROCEDURE — 77412 RADIATION TX DELIVERY LVL 3: CPT | Performed by: RADIOLOGY

## 2023-08-10 NOTE — PROGRESS NOTES
"Pt's wife stopped this afternoon after Mr. Ramírez's treatment in regards to the \"increased pressure feeling\" in the patients head.  She stated that Dr. Chambers d/c'd his Dex 4mg on Tues but that she gave him one Dex 4 mg on Wed and when she spoke to MONICA Guzmán yesterday she advised to continue the Dex 4mg until speaking with Dr. Chambers today.  I spoke with Dr. Chambers and he advised to continue Dex 4mg and will re-evaluate on 8/15.  I spoke with Ms. Ramírez and told her that Dr. Chambers advised to continue the Dex 4mg.  Pt. Wife voiced understanding.  I told her if any new symptoms came up or the \"pressure\" feeling didn't improve to let us know.    "

## 2023-08-11 ENCOUNTER — HOSPITAL ENCOUNTER (OUTPATIENT)
Dept: RADIATION ONCOLOGY | Facility: HOSPITAL | Age: 78
Discharge: HOME | End: 2023-08-11
Payer: MEDICARE

## 2023-08-11 LAB
RAD ONC ARIA COURSE ID: NORMAL
RAD ONC ARIA COURSE INTENT: NORMAL
RAD ONC ARIA COURSE LAST TREATMENT DATE: NORMAL
RAD ONC ARIA COURSE START DATE: NORMAL
RAD ONC ARIA COURSE TREATMENT ELAPSED DAYS: 4
RAD ONC ARIA FIRST TREATMENT DATE: NORMAL
RAD ONC ARIA PLAN FRACTIONS TREATED TO DATE: 5
RAD ONC ARIA PLAN ID: NORMAL
RAD ONC ARIA PLAN PRESCRIBED DOSE PER FRACTION: 3 GY
RAD ONC ARIA PLAN PRIMARY REFERENCE POINT: NORMAL
RAD ONC ARIA PLAN TOTAL FRACTIONS PRESCRIBED: 10
RAD ONC ARIA PLAN TOTAL PRESCRIBED DOSE: 3000 CGY
RAD ONC ARIA REFERENCE POINT DOSAGE GIVEN TO DATE: 15 GY
RAD ONC ARIA REFERENCE POINT ID: NORMAL
RAD ONC ARIA REFERENCE POINT SESSION DOSAGE GIVEN: 3 GY

## 2023-08-11 PROCEDURE — 77412 RADIATION TX DELIVERY LVL 3: CPT | Performed by: RADIOLOGY

## 2023-08-11 PROCEDURE — 77387 GUIDANCE FOR RADJ TX DLVR: CPT | Performed by: RADIOLOGY

## 2023-08-11 PROCEDURE — G6002 STEREOSCOPIC X-RAY GUIDANCE: HCPCS | Performed by: RADIOLOGY

## 2023-08-11 PROCEDURE — 77336 RADIATION PHYSICS CONSULT: CPT | Performed by: RADIOLOGY

## 2023-08-14 ENCOUNTER — DOCUMENTATION (OUTPATIENT)
Dept: RADIATION ONCOLOGY | Facility: HOSPITAL | Age: 78
End: 2023-08-14
Payer: MEDICARE

## 2023-08-14 ENCOUNTER — HOSPITAL ENCOUNTER (OUTPATIENT)
Dept: RADIATION ONCOLOGY | Facility: HOSPITAL | Age: 78
Discharge: HOME | End: 2023-08-14
Payer: MEDICARE

## 2023-08-14 VITALS — OXYGEN SATURATION: 98 % | DIASTOLIC BLOOD PRESSURE: 74 MMHG | SYSTOLIC BLOOD PRESSURE: 115 MMHG | HEART RATE: 71 BPM

## 2023-08-14 LAB
RAD ONC ARIA COURSE ID: NORMAL
RAD ONC ARIA COURSE INTENT: NORMAL
RAD ONC ARIA COURSE LAST TREATMENT DATE: NORMAL
RAD ONC ARIA COURSE START DATE: NORMAL
RAD ONC ARIA COURSE TREATMENT ELAPSED DAYS: 7
RAD ONC ARIA FIRST TREATMENT DATE: NORMAL
RAD ONC ARIA PLAN FRACTIONS TREATED TO DATE: 6
RAD ONC ARIA PLAN ID: NORMAL
RAD ONC ARIA PLAN PRESCRIBED DOSE PER FRACTION: 3 GY
RAD ONC ARIA PLAN PRIMARY REFERENCE POINT: NORMAL
RAD ONC ARIA PLAN TOTAL FRACTIONS PRESCRIBED: 10
RAD ONC ARIA PLAN TOTAL PRESCRIBED DOSE: 3000 CGY
RAD ONC ARIA REFERENCE POINT DOSAGE GIVEN TO DATE: 18 GY
RAD ONC ARIA REFERENCE POINT ID: NORMAL
RAD ONC ARIA REFERENCE POINT SESSION DOSAGE GIVEN: 3 GY

## 2023-08-14 PROCEDURE — 77412 RADIATION TX DELIVERY LVL 3: CPT | Performed by: RADIOLOGY

## 2023-08-14 PROCEDURE — G6002 STEREOSCOPIC X-RAY GUIDANCE: HCPCS | Performed by: RADIOLOGY

## 2023-08-14 PROCEDURE — 77387 GUIDANCE FOR RADJ TX DLVR: CPT | Performed by: RADIOLOGY

## 2023-08-14 NOTE — PROGRESS NOTES
Patient and his family requested to follow up with staff/Dr. Chambers.  Patient is feeling more lightheaded today and has had a severe headache.  He states that this headache started this morning when he woke.  He states that this headache has improved and is now a 4/10, he medicated with Ibuprofen and Tramadol at home.  Patient denies any other symptoms that are new today (no increased weakness, no confusion...).  Spoke to Dr. Chambers in regards to symptoms and he gave instruction for patient to increase his steroids to BID.  Instructed patient and family, they both verbalize understanding.

## 2023-08-14 NOTE — PROGRESS NOTES
Diagnosis: Metastatic lung cancer    Reason for Referral: Travel assistance    Content of Visit: OSW attempted to follow-up with Mr. Ramírez in radiation oncology this afternoon. OSW provided a $15 gas card for staff to provide to him tomorrow. Also left contact information for [facility] to contact them regarding the financial amandeep partnered through "[employer] to assist with transportation expenses. Encouraged he contact me if he'd like to facilitate this phone call together or if additional assistance is needed. OSW support remains available.    Resources/Referrals Provided: Gas assistance - $15 gas card, [facility]

## 2023-08-15 ENCOUNTER — HOSPITAL ENCOUNTER (OUTPATIENT)
Dept: RADIATION ONCOLOGY | Facility: HOSPITAL | Age: 78
Discharge: HOME | End: 2023-08-15
Payer: MEDICARE

## 2023-08-15 ENCOUNTER — PATIENT OUTREACH (OUTPATIENT)
Dept: OTHER | Facility: HOSPITAL | Age: 78
End: 2023-08-15
Payer: MEDICARE

## 2023-08-15 ENCOUNTER — HOSPITAL ENCOUNTER (OUTPATIENT)
Dept: RADIATION ONCOLOGY | Facility: HOSPITAL | Age: 78
Discharge: HOME | End: 2023-08-15

## 2023-08-15 VITALS
BODY MASS INDEX: 28.31 KG/M2 | SYSTOLIC BLOOD PRESSURE: 130 MMHG | DIASTOLIC BLOOD PRESSURE: 80 MMHG | HEART RATE: 80 BPM | WEIGHT: 208.78 LBS | OXYGEN SATURATION: 97 %

## 2023-08-15 DIAGNOSIS — C79.31 SECONDARY MALIGNANT NEOPLASM OF BRAIN: Primary | ICD-10-CM

## 2023-08-15 LAB
RAD ONC ARIA COURSE ID: NORMAL
RAD ONC ARIA COURSE INTENT: NORMAL
RAD ONC ARIA COURSE LAST TREATMENT DATE: NORMAL
RAD ONC ARIA COURSE START DATE: NORMAL
RAD ONC ARIA COURSE TREATMENT ELAPSED DAYS: 8
RAD ONC ARIA FIRST TREATMENT DATE: NORMAL
RAD ONC ARIA PLAN FRACTIONS TREATED TO DATE: 7
RAD ONC ARIA PLAN ID: NORMAL
RAD ONC ARIA PLAN PRESCRIBED DOSE PER FRACTION: 3 GY
RAD ONC ARIA PLAN PRIMARY REFERENCE POINT: NORMAL
RAD ONC ARIA PLAN TOTAL FRACTIONS PRESCRIBED: 10
RAD ONC ARIA PLAN TOTAL PRESCRIBED DOSE: 3000 CGY
RAD ONC ARIA REFERENCE POINT DOSAGE GIVEN TO DATE: 21 GY
RAD ONC ARIA REFERENCE POINT ID: NORMAL
RAD ONC ARIA REFERENCE POINT SESSION DOSAGE GIVEN: 3 GY

## 2023-08-15 PROCEDURE — 77412 RADIATION TX DELIVERY LVL 3: CPT | Performed by: RADIOLOGY

## 2023-08-15 PROCEDURE — 77387 GUIDANCE FOR RADJ TX DLVR: CPT | Performed by: RADIOLOGY

## 2023-08-15 PROCEDURE — G6002 STEREOSCOPIC X-RAY GUIDANCE: HCPCS | Performed by: RADIOLOGY

## 2023-08-15 NOTE — PROGRESS NOTES
On Treatment Visit       Patient: Russell Ramírez Sr.   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  He is receiving radiation therapy to the whole brain. He  has received 2100 cGy in 7 fractions out of a planned dose of 3000 cGy in 10 fractions.    Some generalized scalp tightness and tenderness.  Now taking dexamethasone 4 mg twice daily; no headache this morning whereas he experienced headache previously                                            Review of Systems:   Review of Systems   Constitutional:  Positive for fatigue (6/10) and unexpected weight change (down about 5 lbs). Negative for appetite change.   HENT:  Negative for trouble swallowing.    Eyes:  Negative for visual disturbance.   Respiratory:  Positive for shortness of breath (WITH EXERTION). Negative for cough.    Gastrointestinal:  Negative for constipation, diarrhea and nausea.   Genitourinary:  Positive for difficulty urinating (ongoing trouble starting stream). Negative for dysuria, frequency and urgency.   Musculoskeletal:  Positive for back pain and gait problem. Negative for arthralgias.   Skin:  Negative for rash.   Neurological:  Positive for dizziness, light-headedness and headaches (MILD).   Psychiatric/Behavioral:  Positive for sleep disturbance (nocturia x3).      Vitals:     Vitals:    08/15/23 1355   BP: 130/80   Pulse: 80   SpO2: 97%       Weight:   Wt Readings from Last 3 Encounters:   08/15/23 94.7 kg (208 lb 12.4 oz)   08/08/23 96.7 kg (213 lb 3 oz)   08/03/23 95.5 kg (210 lb 9.6 oz)      Pain:    There were no vitals filed for this visit.         Physical Exam:  Gen: WD/WN; NAD  HEENT: MMM  Trachea: midline  Chest: symmetric  Resp: normal respiratory effort  Extr: warm, well-perfused  Neuro: awake and alert; no aphasia or neglect    Plan: I have reviewed treatment setup notes, checked and approved the daily guidance images.  I reviewed dose delivery, treatment parameters and deemed them appropriate. We plan to continue radiation therapy as prescribed.    We will continue dexamethasone at 4 mg twice daily through next week and call patient regarding symptoms with potentially decreasing dose to once daily.  Follow-up imaging to be performed at [facility]      Radiation Oncology    Electronically signed by Russell Chambers MD 8/15/2023  15:31 EDT

## 2023-08-15 NOTE — PROGRESS NOTES
Reviewed chart prior to call    Called patient. Left message that I was just touching base to see how he was doing with treatment. Wanted to know if he had any questions/concerns or resource/supportive care needs; requested cb

## 2023-08-16 ENCOUNTER — HOSPITAL ENCOUNTER (OUTPATIENT)
Dept: RADIATION ONCOLOGY | Facility: HOSPITAL | Age: 78
Discharge: HOME | End: 2023-08-16
Payer: MEDICARE

## 2023-08-16 LAB
RAD ONC ARIA COURSE ID: NORMAL
RAD ONC ARIA COURSE INTENT: NORMAL
RAD ONC ARIA COURSE LAST TREATMENT DATE: NORMAL
RAD ONC ARIA COURSE START DATE: NORMAL
RAD ONC ARIA COURSE TREATMENT ELAPSED DAYS: 9
RAD ONC ARIA FIRST TREATMENT DATE: NORMAL
RAD ONC ARIA PLAN FRACTIONS TREATED TO DATE: 8
RAD ONC ARIA PLAN ID: NORMAL
RAD ONC ARIA PLAN PRESCRIBED DOSE PER FRACTION: 3 GY
RAD ONC ARIA PLAN PRIMARY REFERENCE POINT: NORMAL
RAD ONC ARIA PLAN TOTAL FRACTIONS PRESCRIBED: 10
RAD ONC ARIA PLAN TOTAL PRESCRIBED DOSE: 3000 CGY
RAD ONC ARIA REFERENCE POINT DOSAGE GIVEN TO DATE: 24 GY
RAD ONC ARIA REFERENCE POINT ID: NORMAL
RAD ONC ARIA REFERENCE POINT SESSION DOSAGE GIVEN: 3 GY

## 2023-08-16 PROCEDURE — G6002 STEREOSCOPIC X-RAY GUIDANCE: HCPCS | Performed by: RADIOLOGY

## 2023-08-16 PROCEDURE — 77387 GUIDANCE FOR RADJ TX DLVR: CPT | Performed by: RADIOLOGY

## 2023-08-16 PROCEDURE — 77412 RADIATION TX DELIVERY LVL 3: CPT | Performed by: RADIOLOGY

## 2023-08-18 LAB
RAD ONC ARIA COURSE ID: NORMAL
RAD ONC ARIA COURSE INTENT: NORMAL
RAD ONC ARIA COURSE LAST TREATMENT DATE: NORMAL
RAD ONC ARIA COURSE START DATE: NORMAL
RAD ONC ARIA COURSE TREATMENT ELAPSED DAYS: 11
RAD ONC ARIA FIRST TREATMENT DATE: NORMAL
RAD ONC ARIA PLAN FRACTIONS TREATED TO DATE: 9
RAD ONC ARIA PLAN ID: NORMAL
RAD ONC ARIA PLAN PRESCRIBED DOSE PER FRACTION: 3 GY
RAD ONC ARIA PLAN PRIMARY REFERENCE POINT: NORMAL
RAD ONC ARIA PLAN TOTAL FRACTIONS PRESCRIBED: 10
RAD ONC ARIA PLAN TOTAL PRESCRIBED DOSE: 3000 CGY
RAD ONC ARIA REFERENCE POINT DOSAGE GIVEN TO DATE: 27 GY
RAD ONC ARIA REFERENCE POINT ID: NORMAL
RAD ONC ARIA REFERENCE POINT SESSION DOSAGE GIVEN: 3 GY

## 2023-08-18 PROCEDURE — 77387 GUIDANCE FOR RADJ TX DLVR: CPT | Performed by: RADIOLOGY

## 2023-08-18 PROCEDURE — 77412 RADIATION TX DELIVERY LVL 3: CPT | Performed by: RADIOLOGY

## 2023-08-18 PROCEDURE — G6002 STEREOSCOPIC X-RAY GUIDANCE: HCPCS | Performed by: RADIOLOGY

## 2023-08-21 ENCOUNTER — HOSPITAL ENCOUNTER (OUTPATIENT)
Dept: RADIATION ONCOLOGY | Facility: HOSPITAL | Age: 78
Discharge: HOME | End: 2023-08-21
Payer: MEDICARE

## 2023-08-21 LAB
RAD ONC ARIA COURSE ID: NORMAL
RAD ONC ARIA COURSE INTENT: NORMAL
RAD ONC ARIA COURSE LAST TREATMENT DATE: NORMAL
RAD ONC ARIA COURSE START DATE: NORMAL
RAD ONC ARIA COURSE TREATMENT ELAPSED DAYS: 14
RAD ONC ARIA FIRST TREATMENT DATE: NORMAL
RAD ONC ARIA PLAN FRACTIONS TREATED TO DATE: 10
RAD ONC ARIA PLAN ID: NORMAL
RAD ONC ARIA PLAN PRESCRIBED DOSE PER FRACTION: 3 GY
RAD ONC ARIA PLAN PRIMARY REFERENCE POINT: NORMAL
RAD ONC ARIA PLAN TOTAL FRACTIONS PRESCRIBED: 10
RAD ONC ARIA PLAN TOTAL PRESCRIBED DOSE: 3000 CGY
RAD ONC ARIA REFERENCE POINT DOSAGE GIVEN TO DATE: 30 GY
RAD ONC ARIA REFERENCE POINT ID: NORMAL
RAD ONC ARIA REFERENCE POINT SESSION DOSAGE GIVEN: 3 GY

## 2023-08-21 PROCEDURE — 77336 RADIATION PHYSICS CONSULT: CPT | Performed by: RADIOLOGY

## 2023-08-21 PROCEDURE — 77412 RADIATION TX DELIVERY LVL 3: CPT | Performed by: RADIOLOGY

## 2023-08-21 PROCEDURE — 77387 GUIDANCE FOR RADJ TX DLVR: CPT | Performed by: RADIOLOGY

## 2023-08-22 ENCOUNTER — DOCUMENTATION (OUTPATIENT)
Dept: OTHER | Facility: HOSPITAL | Age: 78
End: 2023-08-22
Payer: MEDICARE

## 2023-08-22 DIAGNOSIS — C79.31 SECONDARY MALIGNANT NEOPLASM OF BRAIN: Primary | ICD-10-CM

## 2023-08-22 NOTE — PROGRESS NOTES
Oncology Social Work  Clinical Case Management    Filled out Travel Reimbursement Form- Mileage only through [employer] for his   8/3/2023 MD appt  8/24 Infusion appt.       Scanned and Emailed to [employer].    [employer] with [employer] to  bring check for patient and wife and drop to OSW.  OSW to give this check to patient and wife when they are in clinic on 8/24.    Gale Worthy, MSSW, LCSW

## 2023-08-24 ENCOUNTER — INFUSION (OUTPATIENT)
Dept: ONCOLOGY | Facility: HOSPITAL | Age: 78
End: 2023-08-24
Payer: MEDICARE

## 2023-08-24 VITALS
OXYGEN SATURATION: 94 % | SYSTOLIC BLOOD PRESSURE: 97 MMHG | TEMPERATURE: 98.2 F | RESPIRATION RATE: 16 BRPM | BODY MASS INDEX: 29.37 KG/M2 | WEIGHT: 216.6 LBS | DIASTOLIC BLOOD PRESSURE: 59 MMHG | HEART RATE: 78 BPM

## 2023-08-24 DIAGNOSIS — Z79.899 HIGH RISK MEDICATION USE: ICD-10-CM

## 2023-08-24 DIAGNOSIS — Z45.2 ENCOUNTER FOR ADJUSTMENT OR MANAGEMENT OF VASCULAR ACCESS DEVICE: ICD-10-CM

## 2023-08-24 DIAGNOSIS — C79.31 LUNG CANCER METASTATIC TO BRAIN: Primary | ICD-10-CM

## 2023-08-24 DIAGNOSIS — C34.90 LUNG CANCER METASTATIC TO BRAIN: Primary | ICD-10-CM

## 2023-08-24 LAB
ALBUMIN SERPL-MCNC: 3.6 G/DL (ref 3.5–5.2)
ALBUMIN/GLOB SERPL: 1.7 G/DL
ALP SERPL-CCNC: 88 U/L (ref 39–117)
ALT SERPL W P-5'-P-CCNC: 21 U/L (ref 1–41)
ANION GAP SERPL CALCULATED.3IONS-SCNC: 9.2 MMOL/L (ref 5–15)
AST SERPL-CCNC: 22 U/L (ref 1–40)
BASOPHILS # BLD AUTO: 0.02 10*3/MM3 (ref 0–0.2)
BASOPHILS NFR BLD AUTO: 0.3 % (ref 0–1.5)
BILIRUB SERPL-MCNC: 0.2 MG/DL (ref 0–1.2)
BUN SERPL-MCNC: 29 MG/DL (ref 8–23)
BUN/CREAT SERPL: 29.6 (ref 7–25)
CALCIUM SPEC-SCNC: 8.5 MG/DL (ref 8.6–10.5)
CHLORIDE SERPL-SCNC: 106 MMOL/L (ref 98–107)
CO2 SERPL-SCNC: 26.8 MMOL/L (ref 22–29)
CREAT SERPL-MCNC: 0.98 MG/DL (ref 0.7–1.3)
DEPRECATED RDW RBC AUTO: 47.5 FL (ref 37–54)
EGFRCR SERPLBLD CKD-EPI 2021: 79.9 ML/MIN/1.73
EOSINOPHIL # BLD AUTO: 0.32 10*3/MM3 (ref 0–0.4)
EOSINOPHIL NFR BLD AUTO: 4.4 % (ref 0.3–6.2)
ERYTHROCYTE [DISTWIDTH] IN BLOOD BY AUTOMATED COUNT: 12.6 % (ref 12.3–15.4)
GLOBULIN UR ELPH-MCNC: 2.1 GM/DL
GLUCOSE SERPL-MCNC: 83 MG/DL (ref 65–99)
HCT VFR BLD AUTO: 39.8 % (ref 37.5–51)
HGB BLD-MCNC: 13.2 G/DL (ref 13–17.7)
IMM GRANULOCYTES # BLD AUTO: 0.19 10*3/MM3 (ref 0–0.05)
IMM GRANULOCYTES NFR BLD AUTO: 2.6 % (ref 0–0.5)
LYMPHOCYTES # BLD AUTO: 1.11 10*3/MM3 (ref 0.7–3.1)
LYMPHOCYTES NFR BLD AUTO: 15.2 % (ref 19.6–45.3)
MCH RBC QN AUTO: 34.3 PG (ref 26.6–33)
MCHC RBC AUTO-ENTMCNC: 33.2 G/DL (ref 31.5–35.7)
MCV RBC AUTO: 103.4 FL (ref 79–97)
MONOCYTES # BLD AUTO: 0.64 10*3/MM3 (ref 0.1–0.9)
MONOCYTES NFR BLD AUTO: 8.8 % (ref 5–12)
NEUTROPHILS NFR BLD AUTO: 5.03 10*3/MM3 (ref 1.7–7)
NEUTROPHILS NFR BLD AUTO: 68.7 % (ref 42.7–76)
NRBC BLD AUTO-RTO: 0 /100 WBC (ref 0–0.2)
PLATELET # BLD AUTO: 115 10*3/MM3 (ref 140–450)
PMV BLD AUTO: 8.6 FL (ref 6–12)
POTASSIUM SERPL-SCNC: 4.1 MMOL/L (ref 3.5–5.2)
PROT SERPL-MCNC: 5.7 G/DL (ref 6–8.5)
RBC # BLD AUTO: 3.85 10*6/MM3 (ref 4.14–5.8)
SODIUM SERPL-SCNC: 142 MMOL/L (ref 136–145)
WBC NRBC COR # BLD: 7.31 10*3/MM3 (ref 3.4–10.8)

## 2023-08-24 PROCEDURE — 96413 CHEMO IV INFUSION 1 HR: CPT

## 2023-08-24 PROCEDURE — 80053 COMPREHEN METABOLIC PANEL: CPT

## 2023-08-24 PROCEDURE — 25010000002 ATEZOLIZUMAB 1200 MG/20ML SOLUTION 20 ML VIAL: Performed by: INTERNAL MEDICINE

## 2023-08-24 PROCEDURE — 25010000002 HEPARIN LOCK FLUSH PER 10 UNITS: Performed by: INTERNAL MEDICINE

## 2023-08-24 PROCEDURE — 85025 COMPLETE CBC W/AUTO DIFF WBC: CPT

## 2023-08-24 RX ORDER — HEPARIN SODIUM (PORCINE) LOCK FLUSH IV SOLN 100 UNIT/ML 100 UNIT/ML
500 SOLUTION INTRAVENOUS AS NEEDED
Status: DISCONTINUED | OUTPATIENT
Start: 2023-08-24 | End: 2023-08-24 | Stop reason: HOSPADM

## 2023-08-24 RX ORDER — HEPARIN SODIUM (PORCINE) LOCK FLUSH IV SOLN 100 UNIT/ML 100 UNIT/ML
500 SOLUTION INTRAVENOUS AS NEEDED
OUTPATIENT
Start: 2023-08-24

## 2023-08-24 RX ORDER — SODIUM CHLORIDE 9 MG/ML
250 INJECTION, SOLUTION INTRAVENOUS ONCE
Status: COMPLETED | OUTPATIENT
Start: 2023-08-24 | End: 2023-08-24

## 2023-08-24 RX ORDER — SODIUM CHLORIDE 0.9 % (FLUSH) 0.9 %
10 SYRINGE (ML) INJECTION AS NEEDED
Status: DISCONTINUED | OUTPATIENT
Start: 2023-08-24 | End: 2023-08-24 | Stop reason: HOSPADM

## 2023-08-24 RX ORDER — SODIUM CHLORIDE 0.9 % (FLUSH) 0.9 %
10 SYRINGE (ML) INJECTION AS NEEDED
OUTPATIENT
Start: 2023-08-24

## 2023-08-24 RX ADMIN — SODIUM CHLORIDE 250 ML: 9 INJECTION, SOLUTION INTRAVENOUS at 11:29

## 2023-08-24 RX ADMIN — Medication 10 ML: at 12:02

## 2023-08-24 RX ADMIN — ATEZOLIZUMAB 1200 MG: 1200 INJECTION, SOLUTION INTRAVENOUS at 11:30

## 2023-08-24 RX ADMIN — Medication 500 UNITS: at 12:02

## 2023-08-29 ENCOUNTER — PATIENT OUTREACH (OUTPATIENT)
Dept: OTHER | Facility: HOSPITAL | Age: 78
End: 2023-08-29
Payer: MEDICARE

## 2023-08-29 NOTE — PROGRESS NOTES
Reviewed chart prior to call     Called patient. Left message that I was just touching base to see how he was doing. Wanted to know if he had any questions/concerns or resource/supportive care needs; requested cb

## 2023-08-31 ENCOUNTER — TELEPHONE (OUTPATIENT)
Dept: ONCOLOGY | Facility: CLINIC | Age: 78
End: 2023-08-31
Payer: MEDICARE

## 2023-08-31 ENCOUNTER — DOCUMENTATION (OUTPATIENT)
Dept: RADIATION ONCOLOGY | Facility: HOSPITAL | Age: 78
End: 2023-08-31
Payer: MEDICARE

## 2023-08-31 RX ORDER — DEXAMETHASONE 4 MG/1
4 TABLET ORAL 2 TIMES DAILY WITH MEALS
Qty: 60 TABLET | Refills: 0 | Status: SHIPPED | OUTPATIENT
Start: 2023-08-31

## 2023-08-31 NOTE — PROGRESS NOTES
Patients wife called into clinic today stating that patient ears are extremely swollen, almost swollen shut, they are red, warm to touch and extremely painful.  Patient does not have a fever, and per wife his blood pressure and heart rate were normal.  She states that this has been going on for 2-3 days.  She states that she has called Dr. Conn, Mr. Krishnamurthy medical oncologist and is waiting for a returned call.  I made Dr. Chambers of these symptoms, a prescription of Dexamethasone has been called into patients pharmacy.  Family is aware.

## 2023-08-31 NOTE — TELEPHONE ENCOUNTER
Patients wife is calling in because his ears are red and swollen and they thought it may be a reaction to radiation. His last radiation treatment was 8/21 and he had his last round of tecentriq on 8/23. Patient states this has never happened. They called Dr. Morelos office and they sent in dexamethasone for the patient, but also wanted them to call and make us aware to see if we had any suggestions. I let her know I would talk with Dr. Conn and get back with her. She v/u.

## 2023-08-31 NOTE — TELEPHONE ENCOUNTER
Provider: Fabien  Caller:Marivel  Relationship to Patient: wife  Call Back Phone Number: [phone]  Reason for Call: Pt ears ar real red and swollen      Please Follow if Clinical Question   If question about symptom, when did it start: 3 days ago  If there is pain, please rate on scale of 1-10: 6  When was the patient last seen: 8/24/23

## 2023-09-01 NOTE — TELEPHONE ENCOUNTER
Called patient to let him know per Dr. Conn   Have him try Benadryl 25mg every 6 h as needed and OK to use cortisone cream     Patient did not answer but a v/m was left.

## 2023-09-13 NOTE — PROGRESS NOTES
Subjective     REASON FOR FOLLOW UP:  1.  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  2.  Initiation of palliative chemo/immunotherapy with carboplatin/-16/atezolizumab with cycle #1 3/27/2023  3.  MediPort placed 3/20/2023 by Dr. Gonzalez  4.  Fourth cycle of carboplatin -16 and atezolizumab initiated 5/31/2023      HISTORY OF PRESENT ILLNESS:  The patient is a 77 y.o. year old male who is here for an opinion about the above issue.  He was seen today in the multidisciplinary thoracic oncology clinic along with Dr Lara and Dr. Coreas.  He has been following up with Dr. Nobles of vascular surgery and underwent a CT angiogram of the abdomen on 2/23/2023.  There were some nodules noted in the lung bases and this led to a CT scan of the chest which again showed pulmonary nodules in the right lower lobe as well as subcarinal lymphadenopathy.    He had previously been seen by Dr. Lara and she arranged for him to undergo outpatient navigational bronchoscopy with biopsy on 3/13/2023.  His pathology was positive for small cell carcinoma of the lung.    He underwent additional staging with a PET scan performed on 3/14/2023 which showed evidence of metastatic disease to the liver and the right adrenal gland as well as hypermetabolic mediastinal and hilar lymphadenopathy.    He was seen in the multidisciplinary thoracic oncology clinic 3/16/2023 to discuss treatment recommendations.  We had a lengthy visit with the patient and his wife.  They live in [place] which is close to [facility] but they expressed that they would desire to have their treatment delivered here in [place].    Because of his extensive stage disease we have recommended systemic treatment with carboplatin/-16 and atezolizumab immunotherapy.    He underwent an MRI of the brain 3/21/2023 to complete his staging and was found to have multiple tiny mets with no surrounding edema or obvious mass  effect.  We had discussed the MRI results with the patient and his family by phone and elected to proceed with his systemic treatment initially with close follow-up on subsequent MRIs and eventual whole brain radiation treatment in the future.    INTERVAL HISTORY:  Mr. Ramírez today, 9/14/2023 in anticipation of  atezolizumab which he receives every 3 weeks.  He tolerates immunotherapy very well.  He did recently complete whole brain radiation 8/21/2023.  Following the completion of whole brain radiation he reported swelling of his ears bilaterally he did contact [facility], [place] and was prescribed dexamethasone 4 mg twice daily.  He has been taking this for 2 weeks.  The swelling of his ears is completely resolved.  He reports some hoarseness of his voice which began during radiation and is not worsening.  He has no change in his shortness of breath.  He does report some lower extremity pain which is worse at night.  This has not changed while being on dexamethasone indicating it is not secondary to immunotherapy.  He tolerates his atezolizumab without issue.    History of Present Illness     Past Medical History:   Diagnosis Date    Acid reflux     Anemia     Arthritis     Coronary artery disease     CVA (cerebral vascular accident)     High cholesterol     History of atrial fibrillation     Hx of blood clots     Hypertension     Irregular heartbeat     Peptic ulcer disease     Prostate cancer     Rheumatoid arthritis     Skin cancer     Small cell lung cancer         Past Surgical History:   Procedure Laterality Date    ABDOMINAL AORTIC ANEURYSM REPAIR      BACK SURGERY      x2    BRONCHOSCOPY WITH ION ROBOTIC ASSIST N/A 03/13/2023    Procedure: BRONCHOSCOPY ,ENDOBRONCHIAL ULTRASOUND AND ROBOTIC NAVIGATIONAL BRONCHOSCOPY WITH FINE NEEDLE ASPIRATION, BIOPSY X1 AREA, AND BRONCHOALEOLAR LAVAGE;  Surgeon: Sonali Lara MD;  Location: [facility] ENDOSCOPY;  Service: Robotics - Pulmonary;  Laterality: N/A;  Post:  LUNG CANCER    CARDIAC ABLATION      x2    COLON SURGERY      HERNIA REPAIR      INSERTION CENTRAL VENOUS ACCESS DEVICE W/ SUBCUTANEOUS PORT Right 03/20/2023    VENOUS ACCESS DEVICE (PORT) INSERTION Right 03/20/2023    Procedure: INSERTION VENOUS ACCESS DEVICE;  Surgeon: Javier Gonzalez MD PhD;  Location: [facility] MAIN OR;  Service: Thoracic;  Laterality: Right;        Current Outpatient Medications on File Prior to Visit   Medication Sig Dispense Refill    Acetaminophen (ARTHRITIS PAIN RELIEF PO)       ascorbic acid (VITAMIN C) 1000 MG tablet       Ashwagandha 125 MG capsule       aspirin 81 MG EC tablet Daily.      bethanechol (URECHOLINE) 50 MG tablet Take 1 tablet by mouth Every 12 (Twelve) Hours.      coenzyme Q10 100 MG capsule Take 1 capsule by mouth Daily.      Creatinine powder       cyanocobalamin (VITAMIN B-12) 1000 MCG tablet Take 1 tablet by mouth Daily.      dexAMETHasone (DECADRON) 4 MG tablet Take 1 tablet by mouth 2 (Two) Times a Day With Meals. 60 tablet 0    diphenhydrAMINE (BENADRYL) 25 mg capsule Take 1 capsule by mouth Every 6 (Six) Hours As Needed for Itching.      Elderberry 500 MG capsule Take  by mouth.      famotidine (PEPCID) 20 MG tablet Take 1 tablet by mouth 2 (Two) Times a Day. 60 tablet 1    fluocinonide (LIDEX) 0.05 % cream APPLY TO THE AFFECTED AREA(S) BY TOPICAL ROUTE 2 TIMES PER DAY      lidocaine-prilocaine (EMLA) 2.5-2.5 % cream Apply nickel size amount to port site 30 min before appt time do not rub in cover with plastic wrap 5 g 2    LORazepam (Ativan) 1 MG tablet Take 1 tablet by mouth as needed  1 hour prior to CT scan. 1 tablet 0    Magnesium 250 MG tablet Take  by mouth.      metoprolol tartrate (LOPRESSOR) 25 MG tablet metoprolol tartrate 25 mg tablet   TAKE 1 TABLET BY MOUTH TWICE DAILY.      Misc Natural Products (SUPER GREENS PO) Take  by mouth.      Misc Natural Products (YUMVS BEET ROOT-TART CHERRY PO) Take  by mouth.      traMADol (ULTRAM) 50 MG tablet Take 2  tablets by mouth Every 6 (Six) Hours As Needed for Moderate Pain. 340 tablet 0    Turmeric 500 MG capsule Take  by mouth.      Vitamin B Complex-C capsule       vitamin D3 125 MCG (5000 UT) capsule capsule Take 1 capsule by mouth Daily.      Zinc 50 MG tablet Take 1 tablet by mouth Daily.      OLANZapine (zyPREXA) 5 MG tablet Take 1 tablet by mouth Every Night. Take on days 2, 3 and 4 after chemotherapy. 3 tablet 3    ondansetron (ZOFRAN) 8 MG tablet Take 1 tablet by mouth 3 (Three) Times a Day As Needed for Nausea or Vomiting. 30 tablet 5     No current facility-administered medications on file prior to visit.        ALLERGIES:    Allergies   Allergen Reactions    Codeine Other (See Comments)     headache          Social History     Socioeconomic History    Marital status:      Spouse name: Marivel   Tobacco Use    Smoking status: Former     Types: Cigarettes     Start date: 1976     Quit date: 2020     Years since quitting: 3.7    Smokeless tobacco: Never   Vaping Use    Vaping Use: Never used   Substance and Sexual Activity    Alcohol use: Never    Drug use: Never    Sexual activity: Defer        Family History   Problem Relation Age of Onset    Brain cancer Mother     Lung cancer Sister         Review of Systems   ROS as per HPI    Objective     Vitals:    09/14/23 1254   BP: 133/86   Pulse: 70   Temp: 98 °F (36.7 °C)   TempSrc: Temporal   SpO2: 97%   Weight: 96.7 kg (213 lb 3.2 oz)   PainSc:   4   PainLoc: Generalized         9/14/2023    12:40 PM   Current Status   ECOG score 0       Physical Exam  Constitutional:       General: He is not in acute distress.     Appearance: He is well-developed.   HENT:      Head: Normocephalic.   Eyes:      General: No scleral icterus.     Conjunctiva/sclera: Conjunctivae normal.      Pupils: Pupils are equal, round, and reactive to light.   Neck:      Thyroid: No thyromegaly.      Vascular: No JVD.      Comments: Right IJ Mediport  Cardiovascular:      Rate and Rhythm:  Normal rate and regular rhythm.      Heart sounds: No murmur heard.    No friction rub. No gallop.   Pulmonary:      Effort: Pulmonary effort is normal.      Breath sounds: Normal breath sounds. No wheezing or rales.   Abdominal:      General: There is no distension.      Palpations: Abdomen is soft. There is no mass.      Tenderness: There is no abdominal tenderness.   Musculoskeletal:         General: No deformity. Normal range of motion.      Cervical back: Normal range of motion and neck supple.   Lymphadenopathy:      Cervical: No cervical adenopathy.   Skin:     General: Skin is warm and dry.      Findings: No rash.   Neurological:      Mental Status: He is alert and oriented to person, place, and time.      Cranial Nerves: No cranial nerve deficit.      Deep Tendon Reflexes: Reflexes are normal and symmetric.   Psychiatric:         Behavior: Behavior normal.         Judgment: Judgment normal.       I have reexamined the patient and the results are consistent with the previously documented exam. PAULIE Busby       RECENT LABS:  Results from last 7 days   Lab Units 09/14/23  1228   WBC 10*3/mm3 9.12   NEUTROS ABS 10*3/mm3 7.14*   HEMOGLOBIN g/dL 13.6   HEMATOCRIT % 40.8   PLATELETS 10*3/mm3 114*     Results from last 7 days   Lab Units 09/14/23  1228   SODIUM mmol/L 141   POTASSIUM mmol/L 4.6   CHLORIDE mmol/L 104   CO2 mmol/L 23.5   BUN mg/dL 31*   CREATININE mg/dL 0.93   CALCIUM mg/dL 8.8   ALBUMIN g/dL 3.7   BILIRUBIN mg/dL 0.2   ALK PHOS U/L 84   ALT (SGPT) U/L 15   AST (SGOT) U/L 19   GLUCOSE mg/dL 92           PATHOLOGY 3/13/2023  Final Diagnosis   Lung, right lower lobe, endobronchial biopsies:    Small cell carcinoma, see comment      Comment    The biopsy shows an invasive tumor with crush artifact. Immunohistochemistry was performed utilizing appropriate controls and the tumor is positive for CD56, TTF-1 and synaptophysin and is negative for CD45 and chromogranin. The staining pattern  and morphology are consistent with small cell carcinoma.     Molecular profiling has been ordered but is pending    IMAGING:  MRI OF THE BRAIN WITH AND WITHOUT CONTRAST  7/26/2023  IMPRESSION:   There is interval progression of metastatic disease to the brain. There  is increase in size of the lesions within the medial aspect of the left  cerebellar hemisphere, the lesion within the junction of the right  superior frontal gyrus and the right precentral gyrus, the lesion within  the left precentral gyrus, and the lesion within the anterior and  inferior portion of the left frontal lobe. There is recurrence of the  metastatic lesion within the left putamen. Additionally, new metastatic  foci are appreciated within the left superior frontal gyrus, the  anterior aspect of the left frontal lobe, the superior aspect of the  right frontal lobe, the right precentral gyrus, and the right inferior  parietal lobule. Additionally, there may be new punctate areas of  metastatic disease within both cerebellar hemispheres.    CT ABDOMEN PELVIS W CONTRAST-, CT CHEST W CONTRAST DIAGNOSTIC- 6/19/2023  IMPRESSION:   1. Right lower lobe pulmonary nodule measures slightly smaller.  Mediastinal lymph nodes show interval increase, suggesting metastatic  disease.  2. Grossly stable metastatic liver, right adrenal, sternal lesions.      MRI OF THE BRAIN WITH AND WITHOUT CONTRAST ON 5/5/2023  IMPRESSION:  1. Since prior MRI of the brain from [facility] on  03/21/2023 there has been a response to the enhancing metastatic lesions  to the brain to interval treatment. Previously there were 8 separate  enhancing metastases to the brain. There were 2 infratentorial lesions  and there were six 2-5 mm supratentorial enhancing lesions. On the  current MRI of the brain there are 5 residual enhancing brain metastases  1 infratentorial in 4 separate tiny 1 to 2 mm residual supratentorial  enhancing metastases and no new metastases are  identified. The  previously identified 3 mm mets to the inferior lateral right cerebellum  has resolved. The met to the medial left cerebellum has decreased from 9  x 8 x 10 mm down to 6 x 4 x 7 mm. Of the previously identified 6  separate 2-5 mm enhancing supratentorial enhancing metastases, there has  been resolution of enhancing metastases in the lateral left putamen and  there has been resolution of enhancement at the site of the met in the  posterior medial left parietal lobe, the enhancing 4-5 mm metastasis to  the posterior superior right frontal lobe and posterior superior left  frontal juxtacortical white matter and in the anterior-inferior left  frontal lobe have decreased and there are residual punctate 2 mm foci of  residual enhancement at the site of these 3 treated metastases. The  posterior inferior medial left parietal lobe and the anterior-inferior  left temporal lobe met have decreased and there are punctate 1 mm foci  of residual enhancement at these sites. No new enhancing metastases are  seen. The remainder of MRI of the brain is normal.    CT CHEST WITH IV CONTRAST  IMPRESSION:  1. Minimal interval decrease in size of right lower lobe nodule  measuring about 1.6 cm  2. Stable metastatic lesions in the left lobe of the liver and right  adrenal gland.    F-18 FDG PET FROM SKULL BASE TO MID THIGH WITH PET/CT FUSION 3/14/2023  IMPRESSION:  1.  Intensely avid right lower lobe pulmonary nodule most suggestive of  malignancy.  2.  Hypermetabolic mediastinal and hilar adenopathy, hepatic lesions,  right adrenal nodule and sternal lesion most likely representing  metastatic disease. Nodular thickening along the right major fissure  which contacts the right hilum indicate uptake mildly above that of the  adjacent lung may represent lymphangitic spread of malignancy  3.  Scattered bilateral sub-6 mm pulmonary nodules are below PET  resolution. A mildly hypermetabolic cortes hepatic node is present.  These  findings are indeterminate indeterminate and continued close attention  on follow-up is recommended.  4.  Other findings as above.      Assessment & Plan     1.  Extensive stage small cell lung carcinoma with PET scan evidence of metastatic disease to the liver and right adrenal gland, hilar and mediastinal lymph nodes and several scattered tiny lesions noted on MRI of the brain.  Currently undergoing chemoimmunotherapy with carboplatin -16 and atezolizumab.  As noted above, MRI of the brain and CT scan showed favorable response with no new sites of disease identified.  He has completed 4 cycles of chemotherapy and immunotherapy and is now receiving single agent immunotherapy with atezolizumab every 3 weeks.  2.  Comorbidities including vascular disease, coronary artery disease and history of prostate cancer.  He does have a decent performance status overall.  3.  Right IJ Mediport placed by Dr. Gonzalez.  4.  CT scans after 4 cycles of therapy show electively stable disease with no new sites of disease.  5.  MRI scan from 7/26/2023 as noted above unfortunately showed progression of metastatic disease.  Patient has been evaluated at the [facility] radiation center in [place] and is planning to start whole brain radiation for 10 fractions beginning 8/7/2023.  6.  Swelling of the ears.  Prescribed dexamethasone 4 mg twice daily initiated 8/31/2023.  Symptoms have completely resolved.  We discussed tapering dexamethasone to maximize the benefit of immunotherapy      PLAN:  Proceed today with cycle 9 atezolizumab which is continued every 3 weeks  Dexamethasone to 4 mg once daily  The patient is scheduled for follow-up MRI of the brain next week  At follow-up with Dr. Chambers in [place], radiation oncology, the patient can discuss further taper of dexamethasone pending MRI results  Return in 3 weeks for CBC, CMP, cycle 10 atezolizumab   In 5 weeks the patient will undergo CT of the chest, abdomen and  pelvis  MD follow-up with Dr. Conn in 6 weeks with CBC, CMP, review of CT scans and possible cycle 11 atezolizumab pending scan results  We discussed today if hoarseness persist or worsen we may proceed with CT imaging sooner to evaluate disease state of his chest.    He is on high risk medication requiring close monitoring for toxicity    Gilma Hi, APRN  09/14/2023

## 2023-09-14 ENCOUNTER — INFUSION (OUTPATIENT)
Dept: ONCOLOGY | Facility: HOSPITAL | Age: 78
End: 2023-09-14
Payer: MEDICARE

## 2023-09-14 ENCOUNTER — OFFICE VISIT (OUTPATIENT)
Dept: ONCOLOGY | Facility: CLINIC | Age: 78
End: 2023-09-14
Payer: MEDICARE

## 2023-09-14 VITALS
BODY MASS INDEX: 28.91 KG/M2 | TEMPERATURE: 98 F | HEART RATE: 70 BPM | WEIGHT: 213.2 LBS | SYSTOLIC BLOOD PRESSURE: 133 MMHG | DIASTOLIC BLOOD PRESSURE: 86 MMHG | OXYGEN SATURATION: 97 %

## 2023-09-14 DIAGNOSIS — C79.31 LUNG CANCER METASTATIC TO BRAIN: ICD-10-CM

## 2023-09-14 DIAGNOSIS — C34.90 LUNG CANCER METASTATIC TO BRAIN: ICD-10-CM

## 2023-09-14 DIAGNOSIS — Z45.2 ENCOUNTER FOR ADJUSTMENT OR MANAGEMENT OF VASCULAR ACCESS DEVICE: ICD-10-CM

## 2023-09-14 DIAGNOSIS — Z79.899 HIGH RISK MEDICATION USE: ICD-10-CM

## 2023-09-14 DIAGNOSIS — C34.90 LUNG CANCER METASTATIC TO BRAIN: Primary | ICD-10-CM

## 2023-09-14 DIAGNOSIS — C79.31 LUNG CANCER METASTATIC TO BRAIN: Primary | ICD-10-CM

## 2023-09-14 DIAGNOSIS — C34.90 SMALL CELL LUNG CANCER: Primary | ICD-10-CM

## 2023-09-14 LAB
ALBUMIN SERPL-MCNC: 3.7 G/DL (ref 3.5–5.2)
ALBUMIN/GLOB SERPL: 1.6 G/DL
ALP SERPL-CCNC: 84 U/L (ref 39–117)
ALT SERPL W P-5'-P-CCNC: 15 U/L (ref 1–41)
ANION GAP SERPL CALCULATED.3IONS-SCNC: 13.5 MMOL/L (ref 5–15)
AST SERPL-CCNC: 19 U/L (ref 1–40)
BASOPHILS # BLD AUTO: 0.02 10*3/MM3 (ref 0–0.2)
BASOPHILS NFR BLD AUTO: 0.2 % (ref 0–1.5)
BILIRUB SERPL-MCNC: 0.2 MG/DL (ref 0–1.2)
BUN SERPL-MCNC: 31 MG/DL (ref 8–23)
BUN/CREAT SERPL: 33.3 (ref 7–25)
CALCIUM SPEC-SCNC: 8.8 MG/DL (ref 8.6–10.5)
CHLORIDE SERPL-SCNC: 104 MMOL/L (ref 98–107)
CO2 SERPL-SCNC: 23.5 MMOL/L (ref 22–29)
CREAT SERPL-MCNC: 0.93 MG/DL (ref 0.7–1.3)
DEPRECATED RDW RBC AUTO: 47.8 FL (ref 37–54)
EGFRCR SERPLBLD CKD-EPI 2021: 85.1 ML/MIN/1.73
EOSINOPHIL # BLD AUTO: 0.01 10*3/MM3 (ref 0–0.4)
EOSINOPHIL NFR BLD AUTO: 0.1 % (ref 0.3–6.2)
ERYTHROCYTE [DISTWIDTH] IN BLOOD BY AUTOMATED COUNT: 12.9 % (ref 12.3–15.4)
GLOBULIN UR ELPH-MCNC: 2.3 GM/DL
GLUCOSE SERPL-MCNC: 92 MG/DL (ref 65–99)
HCT VFR BLD AUTO: 40.8 % (ref 37.5–51)
HGB BLD-MCNC: 13.6 G/DL (ref 13–17.7)
IMM GRANULOCYTES # BLD AUTO: 0.2 10*3/MM3 (ref 0–0.05)
IMM GRANULOCYTES NFR BLD AUTO: 2.2 % (ref 0–0.5)
LYMPHOCYTES # BLD AUTO: 0.67 10*3/MM3 (ref 0.7–3.1)
LYMPHOCYTES NFR BLD AUTO: 7.3 % (ref 19.6–45.3)
MCH RBC QN AUTO: 33.7 PG (ref 26.6–33)
MCHC RBC AUTO-ENTMCNC: 33.3 G/DL (ref 31.5–35.7)
MCV RBC AUTO: 101 FL (ref 79–97)
MONOCYTES # BLD AUTO: 1.08 10*3/MM3 (ref 0.1–0.9)
MONOCYTES NFR BLD AUTO: 11.8 % (ref 5–12)
NEUTROPHILS NFR BLD AUTO: 7.14 10*3/MM3 (ref 1.7–7)
NEUTROPHILS NFR BLD AUTO: 78.4 % (ref 42.7–76)
NRBC BLD AUTO-RTO: 0 /100 WBC (ref 0–0.2)
PLATELET # BLD AUTO: 114 10*3/MM3 (ref 140–450)
PMV BLD AUTO: 8.8 FL (ref 6–12)
POTASSIUM SERPL-SCNC: 4.6 MMOL/L (ref 3.5–5.2)
PROT SERPL-MCNC: 6 G/DL (ref 6–8.5)
RBC # BLD AUTO: 4.04 10*6/MM3 (ref 4.14–5.8)
SODIUM SERPL-SCNC: 141 MMOL/L (ref 136–145)
T3FREE SERPL-MCNC: 1.91 PG/ML (ref 2–4.4)
T4 FREE SERPL-MCNC: 0.76 NG/DL (ref 0.93–1.7)
TSH SERPL DL<=0.05 MIU/L-ACNC: 0.54 UIU/ML (ref 0.27–4.2)
WBC NRBC COR # BLD: 9.12 10*3/MM3 (ref 3.4–10.8)

## 2023-09-14 PROCEDURE — 80053 COMPREHEN METABOLIC PANEL: CPT

## 2023-09-14 PROCEDURE — 96413 CHEMO IV INFUSION 1 HR: CPT

## 2023-09-14 PROCEDURE — 25010000002 HEPARIN LOCK FLUSH PER 10 UNITS: Performed by: INTERNAL MEDICINE

## 2023-09-14 PROCEDURE — 85025 COMPLETE CBC W/AUTO DIFF WBC: CPT

## 2023-09-14 PROCEDURE — 84439 ASSAY OF FREE THYROXINE: CPT | Performed by: INTERNAL MEDICINE

## 2023-09-14 PROCEDURE — 84443 ASSAY THYROID STIM HORMONE: CPT | Performed by: INTERNAL MEDICINE

## 2023-09-14 PROCEDURE — 25010000002 ATEZOLIZUMAB 1200 MG/20ML SOLUTION 20 ML VIAL: Performed by: INTERNAL MEDICINE

## 2023-09-14 PROCEDURE — 84481 FREE ASSAY (FT-3): CPT | Performed by: INTERNAL MEDICINE

## 2023-09-14 RX ORDER — SODIUM CHLORIDE 0.9 % (FLUSH) 0.9 %
10 SYRINGE (ML) INJECTION AS NEEDED
OUTPATIENT
Start: 2023-09-14

## 2023-09-14 RX ORDER — HEPARIN SODIUM (PORCINE) LOCK FLUSH IV SOLN 100 UNIT/ML 100 UNIT/ML
500 SOLUTION INTRAVENOUS AS NEEDED
OUTPATIENT
Start: 2023-09-14

## 2023-09-14 RX ORDER — SODIUM CHLORIDE 0.9 % (FLUSH) 0.9 %
10 SYRINGE (ML) INJECTION AS NEEDED
Status: DISCONTINUED | OUTPATIENT
Start: 2023-09-14 | End: 2023-09-14 | Stop reason: HOSPADM

## 2023-09-14 RX ORDER — HEPARIN SODIUM (PORCINE) LOCK FLUSH IV SOLN 100 UNIT/ML 100 UNIT/ML
500 SOLUTION INTRAVENOUS AS NEEDED
Status: DISCONTINUED | OUTPATIENT
Start: 2023-09-14 | End: 2023-09-14 | Stop reason: HOSPADM

## 2023-09-14 RX ORDER — SODIUM CHLORIDE 9 MG/ML
250 INJECTION, SOLUTION INTRAVENOUS ONCE
Status: COMPLETED | OUTPATIENT
Start: 2023-09-14 | End: 2023-09-14

## 2023-09-14 RX ADMIN — Medication 10 ML: at 14:02

## 2023-09-14 RX ADMIN — SODIUM CHLORIDE 250 ML: 9 INJECTION, SOLUTION INTRAVENOUS at 13:30

## 2023-09-14 RX ADMIN — Medication 500 UNITS: at 14:02

## 2023-09-14 RX ADMIN — ATEZOLIZUMAB 1200 MG: 1200 INJECTION, SOLUTION INTRAVENOUS at 13:30

## 2023-09-15 ENCOUNTER — PATIENT OUTREACH (OUTPATIENT)
Dept: OTHER | Facility: HOSPITAL | Age: 78
End: 2023-09-15
Payer: MEDICARE

## 2023-09-15 NOTE — PROGRESS NOTES
Reviewed chart    Called patient. Left message that I was just touching base to see how he was doing. Wanted to know if he had any questions/concerns or resource/supportive care needs; requested cb

## 2023-09-18 ENCOUNTER — LAB (OUTPATIENT)
Dept: LAB | Facility: HOSPITAL | Age: 78
End: 2023-09-18
Payer: MEDICARE

## 2023-09-18 DIAGNOSIS — C34.90 SMALL CELL LUNG CANCER: ICD-10-CM

## 2023-09-18 DIAGNOSIS — C79.31 SECONDARY MALIGNANT NEOPLASM OF BRAIN: ICD-10-CM

## 2023-09-18 LAB
ANION GAP SERPL CALCULATED.3IONS-SCNC: 7 MMOL/L (ref 5–15)
BUN SERPL-MCNC: 20 MG/DL (ref 8–23)
BUN/CREAT SERPL: 22.5 (ref 7–25)
CALCIUM SPEC-SCNC: 9.3 MG/DL (ref 8.6–10.5)
CHLORIDE SERPL-SCNC: 102 MMOL/L (ref 98–107)
CO2 SERPL-SCNC: 31 MMOL/L (ref 22–29)
CREAT SERPL-MCNC: 0.89 MG/DL (ref 0.76–1.27)
EGFRCR SERPLBLD CKD-EPI 2021: 88.8 ML/MIN/1.73
GLUCOSE SERPL-MCNC: 104 MG/DL (ref 65–99)
POTASSIUM SERPL-SCNC: 4.9 MMOL/L (ref 3.5–5.2)
SODIUM SERPL-SCNC: 140 MMOL/L (ref 136–145)

## 2023-09-18 PROCEDURE — 80048 BASIC METABOLIC PNL TOTAL CA: CPT

## 2023-09-18 PROCEDURE — 36415 COLL VENOUS BLD VENIPUNCTURE: CPT

## 2023-09-18 RX ORDER — FAMOTIDINE 20 MG/1
TABLET, FILM COATED ORAL
Qty: 60 TABLET | Refills: 0 | Status: SHIPPED | OUTPATIENT
Start: 2023-09-18

## 2023-09-19 NOTE — PROGRESS NOTES
09/20/23 0001   Pre-Procedure Phone Call   Procedure Time Verified Yes   Arrival Time 1115   Procedure Location Verified Yes   Medical History Reviewed Yes   NPO Status Reinforced Yes   Ride and Caregiver Arranged Yes   Phone Number for Ride/Caregiver Marivel Ramírez   Patient Knows to Bring Current Medications Yes   Bring Outside Films Requested No

## 2023-09-20 ENCOUNTER — ANESTHESIA (OUTPATIENT)
Dept: MRI IMAGING | Facility: HOSPITAL | Age: 78
End: 2023-09-20
Payer: MEDICARE

## 2023-09-20 ENCOUNTER — HOSPITAL ENCOUNTER (OUTPATIENT)
Dept: MRI IMAGING | Facility: HOSPITAL | Age: 78
Discharge: HOME | End: 2023-09-20
Payer: MEDICARE

## 2023-09-20 ENCOUNTER — ANESTHESIA EVENT (OUTPATIENT)
Dept: MRI IMAGING | Facility: HOSPITAL | Age: 78
End: 2023-09-20
Payer: MEDICARE

## 2023-09-20 VITALS
DIASTOLIC BLOOD PRESSURE: 75 MMHG | OXYGEN SATURATION: 97 % | BODY MASS INDEX: 28.85 KG/M2 | SYSTOLIC BLOOD PRESSURE: 135 MMHG | HEIGHT: 72 IN | HEART RATE: 84 BPM | RESPIRATION RATE: 18 BRPM | TEMPERATURE: 97.9 F | WEIGHT: 213 LBS

## 2023-09-20 VITALS
OXYGEN SATURATION: 98 % | DIASTOLIC BLOOD PRESSURE: 84 MMHG | SYSTOLIC BLOOD PRESSURE: 130 MMHG | HEART RATE: 88 BPM | RESPIRATION RATE: 21 BRPM

## 2023-09-20 DIAGNOSIS — C79.31 SECONDARY MALIGNANT NEOPLASM OF BRAIN: ICD-10-CM

## 2023-09-20 PROCEDURE — 25010000002 DEXAMETHASONE PER 1 MG: Performed by: ANESTHESIOLOGY

## 2023-09-20 PROCEDURE — 25010000002 PHENYLEPHRINE 10 MG/ML SOLUTION: Performed by: ANESTHESIOLOGY

## 2023-09-20 PROCEDURE — 70553 MRI BRAIN STEM W/O & W/DYE: CPT

## 2023-09-20 PROCEDURE — 25010000002 PROPOFOL 10 MG/ML EMULSION: Performed by: ANESTHESIOLOGY

## 2023-09-20 PROCEDURE — 25010000002 ONDANSETRON PER 1 MG: Performed by: ANESTHESIOLOGY

## 2023-09-20 PROCEDURE — 0 GADOBENATE DIMEGLUMINE 529 MG/ML SOLUTION: Performed by: RADIOLOGY

## 2023-09-20 PROCEDURE — A9577 INJ MULTIHANCE: HCPCS | Performed by: RADIOLOGY

## 2023-09-20 RX ORDER — DROPERIDOL 2.5 MG/ML
0.62 INJECTION, SOLUTION INTRAMUSCULAR; INTRAVENOUS
OUTPATIENT
Start: 2023-09-20

## 2023-09-20 RX ORDER — DIPHENHYDRAMINE HYDROCHLORIDE 50 MG/ML
12.5 INJECTION INTRAMUSCULAR; INTRAVENOUS
OUTPATIENT
Start: 2023-09-20

## 2023-09-20 RX ORDER — LIDOCAINE HYDROCHLORIDE 20 MG/ML
INJECTION, SOLUTION INFILTRATION; PERINEURAL AS NEEDED
Status: DISCONTINUED | OUTPATIENT
Start: 2023-09-20 | End: 2023-09-20 | Stop reason: SURG

## 2023-09-20 RX ORDER — EPHEDRINE SULFATE 50 MG/ML
INJECTION, SOLUTION INTRAVENOUS AS NEEDED
Status: DISCONTINUED | OUTPATIENT
Start: 2023-09-20 | End: 2023-09-20 | Stop reason: SURG

## 2023-09-20 RX ORDER — DEXAMETHASONE SODIUM PHOSPHATE 4 MG/ML
INJECTION, SOLUTION INTRA-ARTICULAR; INTRALESIONAL; INTRAMUSCULAR; INTRAVENOUS; SOFT TISSUE AS NEEDED
Status: DISCONTINUED | OUTPATIENT
Start: 2023-09-20 | End: 2023-09-20 | Stop reason: SURG

## 2023-09-20 RX ORDER — ONDANSETRON 2 MG/ML
INJECTION INTRAMUSCULAR; INTRAVENOUS AS NEEDED
Status: DISCONTINUED | OUTPATIENT
Start: 2023-09-20 | End: 2023-09-20 | Stop reason: SURG

## 2023-09-20 RX ORDER — PROMETHAZINE HYDROCHLORIDE 25 MG/1
25 TABLET ORAL ONCE AS NEEDED
OUTPATIENT
Start: 2023-09-20

## 2023-09-20 RX ORDER — SODIUM CHLORIDE, SODIUM LACTATE, POTASSIUM CHLORIDE, CALCIUM CHLORIDE 600; 310; 30; 20 MG/100ML; MG/100ML; MG/100ML; MG/100ML
INJECTION, SOLUTION INTRAVENOUS CONTINUOUS PRN
Status: DISCONTINUED | OUTPATIENT
Start: 2023-09-20 | End: 2023-09-20 | Stop reason: SURG

## 2023-09-20 RX ORDER — PROMETHAZINE HYDROCHLORIDE 25 MG/1
25 SUPPOSITORY RECTAL ONCE AS NEEDED
OUTPATIENT
Start: 2023-09-20

## 2023-09-20 RX ORDER — HYDRALAZINE HYDROCHLORIDE 20 MG/ML
5 INJECTION INTRAMUSCULAR; INTRAVENOUS
OUTPATIENT
Start: 2023-09-20

## 2023-09-20 RX ORDER — FENTANYL CITRATE 50 UG/ML
50 INJECTION, SOLUTION INTRAMUSCULAR; INTRAVENOUS
OUTPATIENT
Start: 2023-09-20

## 2023-09-20 RX ORDER — LABETALOL HYDROCHLORIDE 5 MG/ML
5 INJECTION, SOLUTION INTRAVENOUS
OUTPATIENT
Start: 2023-09-20

## 2023-09-20 RX ORDER — FLUMAZENIL 0.1 MG/ML
0.2 INJECTION INTRAVENOUS AS NEEDED
OUTPATIENT
Start: 2023-09-20

## 2023-09-20 RX ORDER — PHENYLEPHRINE HYDROCHLORIDE 10 MG/ML
INJECTION INTRAVENOUS AS NEEDED
Status: DISCONTINUED | OUTPATIENT
Start: 2023-09-20 | End: 2023-09-20 | Stop reason: SURG

## 2023-09-20 RX ORDER — NALOXONE HCL 0.4 MG/ML
0.2 VIAL (ML) INJECTION AS NEEDED
OUTPATIENT
Start: 2023-09-20

## 2023-09-20 RX ORDER — IPRATROPIUM BROMIDE AND ALBUTEROL SULFATE 2.5; .5 MG/3ML; MG/3ML
3 SOLUTION RESPIRATORY (INHALATION) ONCE AS NEEDED
OUTPATIENT
Start: 2023-09-20

## 2023-09-20 RX ORDER — PROPOFOL 10 MG/ML
VIAL (ML) INTRAVENOUS AS NEEDED
Status: DISCONTINUED | OUTPATIENT
Start: 2023-09-20 | End: 2023-09-20 | Stop reason: SURG

## 2023-09-20 RX ORDER — EPHEDRINE SULFATE 50 MG/ML
5 INJECTION, SOLUTION INTRAVENOUS ONCE AS NEEDED
OUTPATIENT
Start: 2023-09-20

## 2023-09-20 RX ORDER — ONDANSETRON 2 MG/ML
4 INJECTION INTRAMUSCULAR; INTRAVENOUS ONCE AS NEEDED
OUTPATIENT
Start: 2023-09-20

## 2023-09-20 RX ADMIN — PHENYLEPHRINE HYDROCHLORIDE 100 MCG: 10 INJECTION INTRAVENOUS at 14:19

## 2023-09-20 RX ADMIN — ONDANSETRON 4 MG: 2 INJECTION INTRAMUSCULAR; INTRAVENOUS at 14:19

## 2023-09-20 RX ADMIN — LIDOCAINE HYDROCHLORIDE 100 MG: 20 INJECTION, SOLUTION INFILTRATION; PERINEURAL at 14:10

## 2023-09-20 RX ADMIN — EPHEDRINE SULFATE 20 MG: 50 INJECTION INTRAVENOUS at 14:34

## 2023-09-20 RX ADMIN — GADOBENATE DIMEGLUMINE 20 ML: 529 INJECTION, SOLUTION INTRAVENOUS at 14:37

## 2023-09-20 RX ADMIN — DEXAMETHASONE SODIUM PHOSPHATE 8 MG: 4 INJECTION, SOLUTION INTRA-ARTICULAR; INTRALESIONAL; INTRAMUSCULAR; INTRAVENOUS; SOFT TISSUE at 14:19

## 2023-09-20 RX ADMIN — EPHEDRINE SULFATE 10 MG: 50 INJECTION INTRAVENOUS at 14:19

## 2023-09-20 RX ADMIN — PROPOFOL 200 MG: 10 INJECTION, EMULSION INTRAVENOUS at 14:10

## 2023-09-20 RX ADMIN — PHENYLEPHRINE HYDROCHLORIDE 100 MCG: 10 INJECTION INTRAVENOUS at 14:34

## 2023-09-20 RX ADMIN — SODIUM CHLORIDE, POTASSIUM CHLORIDE, SODIUM LACTATE AND CALCIUM CHLORIDE: 600; 310; 30; 20 INJECTION, SOLUTION INTRAVENOUS at 14:02

## 2023-09-20 NOTE — PROGRESS NOTES
Discharged home via private car with daughter and wife.  Escorted to vehicle per this RN.  No acute distress noted and tolerating po well.  All belongings returned to patient prior to discharge and discharge instructions given verbally and in writing to pt and wife.

## 2023-09-20 NOTE — ANESTHESIA POSTPROCEDURE EVALUATION
Patient: Russell Ramírez Sr.    Procedure Summary       Date: 09/20/23 Room / Location: [facility] MRI; [facility] INTERVENTIONAL    Anesthesia Start: 1357 Anesthesia Stop: 1502    Procedure: MRI BRAIN W WO CONTRAST Diagnosis: Secondary malignant neoplasm of brain    Scheduled Providers:  Provider: Derrick Caba MD    Anesthesia Type: general ASA Status: 3            Anesthesia Type: general    Vitals  Vitals Value Taken Time   /77 09/20/23 1516   Temp     Pulse 84 09/20/23 1518   Resp 17 09/20/23 1510   SpO2 98 % 09/20/23 1518   Vitals shown include unvalidated device data.        Post Anesthesia Care and Evaluation    Patient location during evaluation: PHASE II  Patient participation: complete - patient participated  Level of consciousness: awake and alert  Pain management: adequate    Airway patency: patent  Anesthetic complications: No anesthetic complications  PONV Status: none  Cardiovascular status: acceptable and hemodynamically stable  Respiratory status: acceptable, nonlabored ventilation and spontaneous ventilation  Hydration status: acceptable

## 2023-09-20 NOTE — ANESTHESIA PROCEDURE NOTES
Airway  Urgency: elective    Date/Time: 9/20/2023 2:12 PM  Airway not difficult    General Information and Staff    Patient location during procedure: OR  Anesthesiologist: eDrrick Caba MD    Indications and Patient Condition  Indications for airway management: airway protection    Preoxygenated: yes  MILS maintained throughout  Mask difficulty assessment: 1 - vent by mask    Final Airway Details  Final airway type: supraglottic airway      Successful airway: LMA  Size 5     Number of attempts at approach: 1  Assessment: lips, teeth, and gum same as pre-op and atraumatic intubation

## 2023-09-20 NOTE — ANESTHESIA PREPROCEDURE EVALUATION
Anesthesia Evaluation     Patient summary reviewed and Nursing notes reviewed   NPO Solid Status: > 8 hours  NPO Liquid Status: > 4 hours           Airway   Mallampati: II  TM distance: >3 FB  Neck ROM: full  No difficulty expected  Comment: LMA 5 used in past for MRIs of brain  Dental - normal exam     Pulmonary - normal exam    breath sounds clear to auscultation  (+) a smoker Former, lung cancer,sleep apnea    ROS comment: Metastatic small cell lung CA  Cardiovascular - normal exam    ECG reviewed  Rhythm: regular  Rate: normal    (+) hypertension less than 2 medications, CAD, cardiac stents Drug eluting stent more than 12 months ago , dysrhythmias Paroxysmal Atrial Fib, Atrial Flutter, PVD, DVT, hyperlipidemia    ROS comment: Hx CAD with stents/hx PAF and aflutter, s/p ablation/EF 58%, normal stress ECHO 3/23/hx AAA, s/p aortic stent graft repair  PE comment: NSR    Neuro/Psych  (+) CVA, psychiatric history Anxiety and Depression    ROS Comment: Brain mets  GI/Hepatic/Renal/Endo    (+) GERD, PUD    Musculoskeletal     (+) back pain, chronic pain, neck pain  Abdominal  - normal exam   Substance History      OB/GYN          Other   arthritis,   history of cancer      Other Comment: Lung CA with brain mets/prostate CA                Anesthesia Plan    ASA 3     general     (LMA as before)  intravenous induction     Anesthetic plan, risks, benefits, and alternatives have been provided, discussed and informed consent has been obtained with: patient.    CODE STATUS:

## 2023-09-22 ENCOUNTER — TELEPHONE (OUTPATIENT)
Dept: RADIATION ONCOLOGY | Facility: HOSPITAL | Age: 78
End: 2023-09-22

## 2023-09-22 ENCOUNTER — OFFICE VISIT (OUTPATIENT)
Dept: RADIATION ONCOLOGY | Facility: HOSPITAL | Age: 78
End: 2023-09-22
Payer: MEDICARE

## 2023-09-22 VITALS
OXYGEN SATURATION: 97 % | BODY MASS INDEX: 29.6 KG/M2 | HEART RATE: 73 BPM | SYSTOLIC BLOOD PRESSURE: 135 MMHG | WEIGHT: 218.26 LBS | RESPIRATION RATE: 21 BRPM | TEMPERATURE: 98.6 F | DIASTOLIC BLOOD PRESSURE: 87 MMHG

## 2023-09-22 DIAGNOSIS — Z01.812 PRE-PROCEDURE LAB EXAM: Primary | ICD-10-CM

## 2023-09-22 DIAGNOSIS — C34.31 PRIMARY SMALL CELL CARCINOMA OF LOWER LOBE OF RIGHT LUNG: ICD-10-CM

## 2023-09-22 DIAGNOSIS — C79.31 SECONDARY MALIGNANT NEOPLASM OF BRAIN: Primary | ICD-10-CM

## 2023-09-22 DIAGNOSIS — C79.31 METASTATIC SMALL CELL CARCINOMA TO BRAIN: ICD-10-CM

## 2023-09-22 RX ORDER — DEXAMETHASONE 2 MG/1
2 TABLET ORAL
Qty: 30 TABLET | Refills: 0 | Status: SHIPPED | OUTPATIENT
Start: 2023-09-22

## 2023-09-22 NOTE — PROGRESS NOTES
Follow Up Office Visit      Encounter Date: 09/22/2023   Patient Name: Russell Ramírez Sr.  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       Oncology/Hematology History   Lung cancer metastatic to brain   3/16/2023 Initial Diagnosis    Small cell lung cancer (HCC)     3/27/2023 -  Chemotherapy    OP LUNG Atezolizumab       3/27/2023 Cancer Staged    Staging form: Lung, AJCC 8th Edition  - Clinical stage from 3/27/2023: Stage IV (cT1c, cN2, cM1) - Signed by Alexsander Conn MD on 3/27/2023     3/27/2023 - 5/10/2023 Biopsy    OP LUNG Atezolizumab / CISplatin 60 mg/m2 / Etoposide 120 mg/m2   Plan Provider: Alexsander Conn MD  Treatment goal: Control  Line of treatment: [No plan line of treatment]     Secondary malignant neoplasm of brain   8/1/2023 Initial Diagnosis    Secondary malignant neoplasm of brain     8/7/2023 - 8/21/2023 Radiation    Radiation OncologyTreatment Course:  Russell Ramírez Sr. received 3000 cGy in 10 fractions to whole brain.          History of Present Illness: Russell Ramírez Sr. is a 77 y.o. male who [place] [facility] [place] short interval follow-up after completing whole brain external beam radiotherapy on 8/21/23. He presents today with his wife and daughter. His post-treatment brain MRI on 9/20/23 demonstrates interval response to therapy with reduction in size of the supratentorial and infratentorial metastatic lesions. There is a tiny acute to subacute  infarct within the right MCA distribution. He reports feeling reasonably well overall with no new complaints or concerns. He tells me that in the days following completion of radiotherapy he developed warmth and external swelling of his ears which has since resolved. He has chronic tinnitus and denies recent change in hearing. He has persistence of dizziness, light-headedness and headaches, all of which was noted to be present to a mild degree at consultation visit. He does continue to see floaters within his left eye, which he tells me he was previously evaluated by ophthalmology for this. He has upcoming appointment with ophthalmology. Since completion of radiation he does experience intermittent discomfort and contracture/drawing in of his fingers. Shortness of breath with exertion that improves with rest unchanged. Hoarseness present prior to XRT persists.     Subjective      Review of Systems: Review of Systems   Constitutional:  Positive for fatigue (6/10). Negative for appetite change and unexpected weight change (down about 5 lbs).   HENT:  Positive for voice change (hoarseness, ongoing prior to XRT). Negative for trouble swallowing.    Eyes:  Negative for visual disturbance.   Respiratory:  Positive for shortness of breath (WITH EXERTION). Negative for cough.    Gastrointestinal:  Negative for constipation, diarrhea and nausea.   Genitourinary:  Positive for difficulty urinating (ongoing trouble starting stream). Negative for dysuria, frequency and urgency.   Musculoskeletal:  Positive for back pain and gait problem. Negative for arthralgias.   Skin:  Negative for rash.   Neurological:  Positive for dizziness, light-headedness and headaches (MODERATE).   Psychiatric/Behavioral:  Positive for sleep disturbance (nocturia x3).      The following portions of the patient's history were reviewed and updated as appropriate: allergies, current medications, past family history, past medical history, past social  history, past surgical history and problem list.    Medications:     Current Outpatient Medications:     Acetaminophen (ARTHRITIS PAIN RELIEF PO), , Disp: , Rfl:     ascorbic acid (VITAMIN C) 1000 MG tablet, , Disp: , Rfl:     Ashwagandha 125 MG capsule, , Disp: , Rfl:     aspirin 81 MG EC tablet, Daily., Disp: , Rfl:     bethanechol (URECHOLINE) 50 MG tablet, Take 1 tablet by mouth Every 12 (Twelve) Hours., Disp: , Rfl:     coenzyme Q10 100 MG capsule, Take 1 capsule by mouth Daily., Disp: , Rfl:     Creatinine powder, , Disp: , Rfl:     cyanocobalamin (VITAMIN B-12) 1000 MCG tablet, Take 1 tablet by mouth Daily., Disp: , Rfl:     diphenhydrAMINE (BENADRYL) 25 mg capsule, Take 1 capsule by mouth Every 6 (Six) Hours As Needed for Itching., Disp: , Rfl:     Elderberry 500 MG capsule, Take  by mouth., Disp: , Rfl:     famotidine (PEPCID) 20 MG tablet, Take 1 tablet by mouth twice daily, Disp: 60 tablet, Rfl: 0    fluocinonide (LIDEX) 0.05 % cream, APPLY TO THE AFFECTED AREA(S) BY TOPICAL ROUTE 2 TIMES PER DAY, Disp: , Rfl:     LORazepam (Ativan) 1 MG tablet, Take 1 tablet by mouth as needed  1 hour prior to CT scan., Disp: 1 tablet, Rfl: 0    Magnesium 250 MG tablet, Take  by mouth., Disp: , Rfl:     metoprolol tartrate (LOPRESSOR) 25 MG tablet, metoprolol tartrate 25 mg tablet  TAKE 1 TABLET BY MOUTH TWICE DAILY., Disp: , Rfl:     Misc Natural Products (SUPER GREENS PO), Take  by mouth., Disp: , Rfl:     Misc Natural Products (YUMVS BEET ROOT-TART CHERRY PO), Take  by mouth., Disp: , Rfl:     ondansetron (ZOFRAN) 8 MG tablet, Take 1 tablet by mouth 3 (Three) Times a Day As Needed for Nausea or Vomiting., Disp: 30 tablet, Rfl: 5    traMADol (ULTRAM) 50 MG tablet, Take 2 tablets by mouth Every 6 (Six) Hours As Needed for Moderate Pain., Disp: 340 tablet, Rfl: 0    Turmeric 500 MG capsule, Take  by mouth., Disp: , Rfl:     Vitamin B Complex-C capsule, , Disp: , Rfl:     vitamin D3 125 MCG (5000 UT) capsule capsule, Take  1 capsule by mouth Daily., Disp: , Rfl:     Zinc 50 MG tablet, Take 1 tablet by mouth Daily., Disp: , Rfl:     dexAMETHasone (DECADRON) 2 MG tablet, Take 1 tablet by mouth Daily With Breakfast. Take 1 tablet by mouth daily with breakfast for one week then stop. If develop neurologic symptoms restart 1 tablet daily and notify our office., Disp: 30 tablet, Rfl: 0    Allergies:   Allergies   Allergen Reactions    Codeine Other (See Comments)     headache         Patient Smoking History:   Social History     Tobacco Use   Smoking Status Former    Packs/day: 0.50    Types: Cigarettes    Start date: 1976    Quit date: 2020    Years since quitting: 3.7   Smokeless Tobacco Never     Measures:  PHQ-9 Total Score: 9   Quality of Life: 100 - Full Activity   ECOG score: 0  ECOG: (0) Fully active, able to carry on all predisease performance without restriction  Pain: (on a scale of 0-10)   Pain Score    09/22/23 1141   PainSc:   5   PainLoc: Generalized     Russell Ramírez Sr. reports a pain score of 5.  Given his pain assessment as noted, treatment options were discussed and the following options were decided upon as a follow-up plan to address the patient's pain: continuation of current treatment plan for pain and use of non-medical modalities (ice, heat, stretching and/or behavior modifications). Taking Tramadol prescribed by Dr. Conn.     Objective     Physical Exam:   Vital Signs:   Vitals:    09/22/23 1141   BP: 135/87   Pulse: 73   Resp: 21   Temp: 98.6 °F (37 °C)   TempSrc: Temporal   SpO2: 97%   Weight: 99 kg (218 lb 4.1 oz)   PainSc:   5   PainLoc: Generalized     Body mass index is 29.6 kg/m².   Wt Readings from Last 3 Encounters:   09/22/23 99 kg (218 lb 4.1 oz)   09/20/23 96.6 kg (213 lb)   09/14/23 96.7 kg (213 lb 3.2 oz)       Physical Exam  Vitals reviewed.   Constitutional:       General: He is not in acute distress.     Appearance: Normal appearance. He is normal weight. He is not ill-appearing.   HENT:       Head: Normocephalic and atraumatic.      Mouth/Throat:      Mouth: Mucous membranes are moist.      Pharynx: Oropharynx is clear. No oropharyngeal exudate or posterior oropharyngeal erythema.   Eyes:      Conjunctiva/sclera: Conjunctivae normal.      Pupils: Pupils are equal, round, and reactive to light.   Cardiovascular:      Rate and Rhythm: Normal rate and regular rhythm.      Pulses: Normal pulses.      Heart sounds: Normal heart sounds.   Pulmonary:      Effort: Pulmonary effort is normal. No respiratory distress.      Breath sounds: Normal breath sounds.   Musculoskeletal:         General: Normal range of motion.      Cervical back: Normal range of motion.   Skin:     General: Skin is warm and dry.   Neurological:      General: No focal deficit present.      Mental Status: He is alert and oriented to person, place, and time. Mental status is at baseline.   Psychiatric:         Mood and Affect: Mood normal.         Behavior: Behavior normal.     Result Review: I independently reviewed the following data. I discussed findings of brain MRI on 9/20/23 with Dr. Chambers.   -- MRI Brain With & Without Contrast (09/20/2023 15:01)   -- Tissue Pathology Exam (03/13/2023 12:56)   -- Basic Metabolic Panel (09/18/2023 13:45)   -- T4, free (09/14/2023 12:28)   -- T4, free (09/14/2023 12:28)   -- TSH (09/14/2023 12:28)   -- Comprehensive metabolic panel (09/14/2023 12:28)   -- CBC and Differential (09/14/2023 12:28)     Pathology:   Lab Results   Component Value Date    FINALDX  03/13/2023     Lung, right lower lobe, bronchoalveolar lavage, smears and cytospin preparation:    Atypical cells present, suspicious for small cell carcinoma    JPR/sms        Imaging: MRI Brain With & Without Contrast    Result Date: 9/21/2023   There is interval response to therapy with reduction in size of the supratentorial and infratentorial metastatic lesions as discussed in detail above.  There is a punctate focus of diffusion weighted  hyperintensity within the anterior aspect of the right centrum semiovale measuring up to approximately 2-3 mm in diameter where no correlating abnormal contrast enhancing focus is appreciated. The findings are felt to represent a tiny acute to subacute infarct within the right MCA distribution.  These findings were discussed with Dr. Russell Chambers on 09/21/2023 at approximately 11:45 a.m.  This report was finalized on 9/21/2023 4:19 PM by Dr. Vinny Brady M.D.      MRI Brain With & Without Contrast    Result Date: 7/27/2023   There is interval progression of metastatic disease to the brain. There is increase in size of the lesions within the medial aspect of the left cerebellar hemisphere, the lesion within the junction of the right superior frontal gyrus and the right precentral gyrus, the lesion within the left precentral gyrus, and the lesion within the anterior and inferior portion of the left frontal lobe. There is recurrence of the metastatic lesion within the left putamen. Additionally, new metastatic foci are appreciated within the left superior frontal gyrus, the anterior aspect of the left frontal lobe, the superior aspect of the right frontal lobe, the right precentral gyrus, and the right inferior parietal lobule. Additionally, there may be new punctate areas of metastatic disease within both cerebellar hemispheres.  This report was finalized on 7/27/2023 9:16 AM by Dr. Vinny Brady M.D.        Labs:   WBC   Date Value Ref Range Status   09/14/2023 9.12 3.40 - 10.80 10*3/mm3 Final   09/18/2019 5.9 4.8 - 10.8 10*9/L Final     Hemoglobin   Date Value Ref Range Status   09/14/2023 13.6 13.0 - 17.7 g/dL Final   09/18/2019 14.1 14.0 - 18.0 g/dL Final     Hematocrit   Date Value Ref Range Status   09/14/2023 40.8 37.5 - 51.0 % Final   09/18/2019 42.0 42 - 54 % Final     Platelets   Date Value Ref Range Status   09/14/2023 114 (L) 140 - 450 10*3/mm3 Final   09/18/2019 165 150 - 450 10*9/L Final     TSH    Date Value Ref Range Status   09/14/2023 0.536 0.270 - 4.200 uIU/mL Final   04/23/2018 0.84 0.42 - 5.47 uIU/mL Final      PSA   Date Value Ref Range Status   04/14/2022 0.01 0.0 - 4.0 ng/mL Final     Comment:           Test performed on the [employer] DXI/Access system using an immunoenzymatic sandwich assay utilizing a chemiluminescent substrate detection methodology.Values obtained with different assay methods should not be used interchangeably.If in the course of   monitoring a patient the assay method used for determination is changed,additional sequential testing should be carried out to confirm baseline values.   09/20/2021 0.05 0.0 - 4.0 ng/mL Final     Comment:           Test performed on the [employer]Access system using an immunoenzymatic sandwich assay utilizing a chemiluminescent substrate detection methodology.Values obtained with different assay methods should not be used interchangeably.If in the course of   monitoring a patient the assay method used for determination is changed,additional sequential testing should be carried out to confirm baseline values.   06/03/2020 0.10 0.0 - 4.0 ng/mL Final     Comment:           Test performed on the [employer] DXI/Access system using an immunoenzymatic sandwich assay utilizing a chemiluminescent substrate detection methodology.Values obtained with different assay methods should not be used interchangeably.If in the course of   monitoring a patient the assay method used for determination is changed,additional sequential testing should be carried out to confirm baseline values.   09/18/2019 0.02 0.0 - 4.0 ng/mL Final     Comment:           Test performed on the [employer]Access system using an immunoenzymatic sandwich assay utilizing a chemiluminescent substrate detection methodology.Values obtained with different assay methods should not be used interchangeably.If in the course of   monitoring a patient the assay method used for determination is  changed,additional sequential testing should be carried out to confirm baseline values.   08/20/2018 0.01 0.0 - 4.0 ng/mL Final     Comment:           Test performed on the [employer] DXI/Access system using an immunoenzymatic sandwich assay utilizing a chemiluminescent substrate detection methodology.Values obtained with different assay methods should not be used interchangeably.If in the course of   monitoring a patient the assay method used for determination is changed,additional sequential testing should be carried out to confirm baseline values.     Assessment / Plan      Impression: Russell Ramírez  is a pleasant 77 y.o. male with metastatic small cell carcinoma. He has progression of previously appreciated intracranial contrast enhancing lesions. He is now status post whole brain radiation, completed on 8/21/2023. He tolerated radiotherapy well with no apparent acute toxicities other than fatigue. His post-treatment MRI of the brain on 9/20/2023 demonstrates an interval response to therapy with reduction in size of the supratentorial and infratentorial metastatic lesions. In the interval since the previous study, the patient has developed a singular punctate focus of restricted diffusion within the anterior aspect of the right centrum semiovale where no correlating abnormal contrast enhancing focus is appreciated. The findings are felt to represent a tiny acute to subacute infarct within the the right MCA distrubution. This finding was discussed with Dr. Chambers and at this time, this is favored to be sequela of whole brain radiation. We do not believe anticoagulation therapy is indicated at this time due to the fact that is not an embolic infarct. Will continue to monitor and he will undergo repeat brain MRI in 1 month for surveillance. MRI to be done at [facility] with AA assisted due to patient having difficult time lying flat and gets vertigo. He is clinically doing well with no focal neurologic  deficit. He is currently taking decadron 4 mg daily. I communicated with Dr. Chambers regarding titration of steroids, and we will now decrease him to decadron 2 mg tablets taking 1 tablet daily for a week and then he will stop taking steroids at that point. Written instructions provided with explanation that if he develops symptoms such as headache or any neurologic symptom, he will restart taking 2 mg daily and will notify our office so we can re-assess and navigate his dosage of steroids.     Assessment/Plan:   Diagnoses and all orders for this visit:    1. Secondary malignant neoplasm of brain (Primary)  -     dexAMETHasone (DECADRON) 2 MG tablet; Take 1 tablet by mouth Daily With Breakfast. Take 1 tablet by mouth daily with breakfast for one week then stop. If develop neurologic symptoms restart 1 tablet daily and notify our office.  Dispense: 30 tablet; Refill: 0    2. 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 Primary small cell carcinoma of lower lobe of right lung  -     MRI Brain With & Without Contrast; Future       Follow Up:   Return for follow-up in 1 month after brain MRI. Brain MRI to be done at [facility] with anesthesia.   Scheduled for CT CAP on 10/29/23 per Dr. Conn with follow-up on 10/26/23.      Return in about 1 month (around 10/22/2023) for Office Visit.  Russell Ramírez Sr. was encouraged to contact me in the interim with any questions or concerns regarding his care.        PAULIE Dejesus  Radiation Oncology  [facility]    This document has been signed by PAULIE Nino on September 22, 2023 15:56 EDT

## 2023-09-28 ENCOUNTER — PATIENT OUTREACH (OUTPATIENT)
Dept: OTHER | Facility: HOSPITAL | Age: 78
End: 2023-09-28
Payer: MEDICARE

## 2023-10-04 ENCOUNTER — TELEPHONE (OUTPATIENT)
Dept: ONCOLOGY | Facility: CLINIC | Age: 78
End: 2023-10-04
Payer: MEDICARE

## 2023-10-04 NOTE — TELEPHONE ENCOUNTER
Provider: Fabien  Caller: wife  Relationship to Patient: wife  Call Back Phone Number: [phone]  Reason for Call: he has been diagnosed with Pneumonia and wants to know if he should come in for treatment tomorrow

## 2023-10-04 NOTE — TELEPHONE ENCOUNTER
Returned wife's call. States he was a seen at walk in clinic with a chest xray on Monday and diagnosed with pneumonia. They placed him on antibiotics (doxycycline) as well as an inhaler and cough medication. Denies any fever or SOA and only has coughing at night. Reviewed with GINA APODACA and pt ok to come in for treatment tomorrow. Called wife back and let her know.

## 2023-10-05 ENCOUNTER — NUTRITION (OUTPATIENT)
Dept: OTHER | Facility: HOSPITAL | Age: 78
End: 2023-10-05
Payer: MEDICARE

## 2023-10-05 ENCOUNTER — INFUSION (OUTPATIENT)
Dept: ONCOLOGY | Facility: HOSPITAL | Age: 78
End: 2023-10-05
Payer: MEDICARE

## 2023-10-05 VITALS
TEMPERATURE: 98.4 F | HEART RATE: 88 BPM | SYSTOLIC BLOOD PRESSURE: 111 MMHG | WEIGHT: 209.4 LBS | BODY MASS INDEX: 28.4 KG/M2 | DIASTOLIC BLOOD PRESSURE: 79 MMHG | RESPIRATION RATE: 18 BRPM | OXYGEN SATURATION: 95 %

## 2023-10-05 DIAGNOSIS — C34.90 LUNG CANCER METASTATIC TO BRAIN: ICD-10-CM

## 2023-10-05 DIAGNOSIS — C79.31 LUNG CANCER METASTATIC TO BRAIN: ICD-10-CM

## 2023-10-05 DIAGNOSIS — Z79.899 HIGH RISK MEDICATION USE: Primary | ICD-10-CM

## 2023-10-05 LAB
ALBUMIN SERPL-MCNC: 3.4 G/DL (ref 3.5–5.2)
ALBUMIN/GLOB SERPL: 1.3 G/DL
ALP SERPL-CCNC: 77 U/L (ref 39–117)
ALT SERPL W P-5'-P-CCNC: 14 U/L (ref 1–41)
ANION GAP SERPL CALCULATED.3IONS-SCNC: 11.1 MMOL/L (ref 5–15)
AST SERPL-CCNC: 17 U/L (ref 1–40)
BASOPHILS # BLD AUTO: 0.05 10*3/MM3 (ref 0–0.2)
BASOPHILS NFR BLD AUTO: 1 % (ref 0–1.5)
BILIRUB SERPL-MCNC: 0.5 MG/DL (ref 0–1.2)
BUN SERPL-MCNC: 18 MG/DL (ref 8–23)
BUN/CREAT SERPL: 17.6 (ref 7–25)
CALCIUM SPEC-SCNC: 8.8 MG/DL (ref 8.6–10.5)
CHLORIDE SERPL-SCNC: 99 MMOL/L (ref 98–107)
CO2 SERPL-SCNC: 24.9 MMOL/L (ref 22–29)
CREAT SERPL-MCNC: 1.02 MG/DL (ref 0.7–1.3)
DEPRECATED RDW RBC AUTO: 48.2 FL (ref 37–54)
EGFRCR SERPLBLD CKD-EPI 2021: 76.2 ML/MIN/1.73
EOSINOPHIL # BLD AUTO: 0.11 10*3/MM3 (ref 0–0.4)
EOSINOPHIL NFR BLD AUTO: 2.3 % (ref 0.3–6.2)
ERYTHROCYTE [DISTWIDTH] IN BLOOD BY AUTOMATED COUNT: 13.4 % (ref 12.3–15.4)
GLOBULIN UR ELPH-MCNC: 2.6 GM/DL
GLUCOSE SERPL-MCNC: 104 MG/DL (ref 65–99)
HCT VFR BLD AUTO: 34.6 % (ref 37.5–51)
HGB BLD-MCNC: 11.7 G/DL (ref 13–17.7)
IMM GRANULOCYTES # BLD AUTO: 0.05 10*3/MM3 (ref 0–0.05)
IMM GRANULOCYTES NFR BLD AUTO: 1 % (ref 0–0.5)
LYMPHOCYTES # BLD AUTO: 0.57 10*3/MM3 (ref 0.7–3.1)
LYMPHOCYTES NFR BLD AUTO: 11.7 % (ref 19.6–45.3)
MAGNESIUM SERPL-MCNC: 2 MG/DL (ref 1.6–2.4)
MCH RBC QN AUTO: 33.4 PG (ref 26.6–33)
MCHC RBC AUTO-ENTMCNC: 33.8 G/DL (ref 31.5–35.7)
MCV RBC AUTO: 98.9 FL (ref 79–97)
MONOCYTES # BLD AUTO: 0.57 10*3/MM3 (ref 0.1–0.9)
MONOCYTES NFR BLD AUTO: 11.7 % (ref 5–12)
NEUTROPHILS NFR BLD AUTO: 3.51 10*3/MM3 (ref 1.7–7)
NEUTROPHILS NFR BLD AUTO: 72.3 % (ref 42.7–76)
NRBC BLD AUTO-RTO: 0 /100 WBC (ref 0–0.2)
PLATELET # BLD AUTO: 112 10*3/MM3 (ref 140–450)
PMV BLD AUTO: 8.6 FL (ref 6–12)
POTASSIUM SERPL-SCNC: 4.3 MMOL/L (ref 3.5–5.2)
PROT SERPL-MCNC: 6 G/DL (ref 6–8.5)
RBC # BLD AUTO: 3.5 10*6/MM3 (ref 4.14–5.8)
SODIUM SERPL-SCNC: 135 MMOL/L (ref 136–145)
WBC NRBC COR # BLD: 4.86 10*3/MM3 (ref 3.4–10.8)

## 2023-10-05 PROCEDURE — 83735 ASSAY OF MAGNESIUM: CPT

## 2023-10-05 PROCEDURE — 96413 CHEMO IV INFUSION 1 HR: CPT

## 2023-10-05 PROCEDURE — 80053 COMPREHEN METABOLIC PANEL: CPT

## 2023-10-05 PROCEDURE — 25810000003 SODIUM CHLORIDE 0.9 % SOLUTION: Performed by: NURSE PRACTITIONER

## 2023-10-05 PROCEDURE — 25810000003 SODIUM CHLORIDE 0.9 % SOLUTION 250 ML FLEX CONT: Performed by: NURSE PRACTITIONER

## 2023-10-05 PROCEDURE — 85025 COMPLETE CBC W/AUTO DIFF WBC: CPT

## 2023-10-05 PROCEDURE — 25010000002 ATEZOLIZUMAB 1200 MG/20ML SOLUTION 20 ML VIAL: Performed by: NURSE PRACTITIONER

## 2023-10-05 RX ORDER — BENZONATATE 100 MG/1
100 CAPSULE ORAL 3 TIMES DAILY PRN
COMMUNITY
Start: 2023-10-02

## 2023-10-05 RX ORDER — DOXYCYCLINE 100 MG/1
1 CAPSULE ORAL EVERY 12 HOURS SCHEDULED
COMMUNITY
Start: 2023-10-02

## 2023-10-05 RX ORDER — SODIUM CHLORIDE 9 MG/ML
250 INJECTION, SOLUTION INTRAVENOUS ONCE
Status: COMPLETED | OUTPATIENT
Start: 2023-10-05 | End: 2023-10-05

## 2023-10-05 RX ORDER — ALBUTEROL SULFATE 90 UG/1
AEROSOL, METERED RESPIRATORY (INHALATION)
COMMUNITY
Start: 2023-10-02

## 2023-10-05 RX ADMIN — ATEZOLIZUMAB 1200 MG: 1200 INJECTION, SOLUTION INTRAVENOUS at 14:45

## 2023-10-05 RX ADMIN — SODIUM CHLORIDE 250 ML: 9 INJECTION, SOLUTION INTRAVENOUS at 14:45

## 2023-10-05 NOTE — NURSING NOTE
Patient here today for treatment and had a question pertaining to when to stop taking dexamethasone. Per Gilma's note patient to followup and seek direction on steroid tapering after the MRI scan with Dr. Chambers with radiation oncology on 10/27. Patient and wife provided a copy of future appts and times. Patient and wife v/u.

## 2023-10-06 NOTE — PROGRESS NOTES
OUTPATIENT ONCOLOGY NUTRITION ASSESSMENT    Patient Name: Russell aRmírez Sr.  YOB: 1945  MRN: [id number]  Assessment Date: 10/6/2023    COMMENTS: Met with pt and pts wife in infusion area, pt reports his appetite is down, was recently dx with pnemonia so has not been eating much. Pt states he did eat 1/2 sub sandwich last night and some cottage cheese with peaches which he tolerated well. Pt denies drinking any Boost lately, encouraged pt to try drinking more now that he isnt feeling well to ensure adequate PO intake. Pt wife to continues to provide lots of encouragement and meals to the pt, but he continues to struggle. Encouraged pt to try eating every couple of hours, even if it is a small snack.         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    Treatment Plan Comment:  Tecentriq    Frequency Every 3 weeks   Goal of cancer treatment Palliative   Comments:      Encounter Information        Nutrition/Diet History:  Pt does not have much of an appetite, has pnemonia    Oral Nutrition Supplements: Has boost at home but hasn't been drinking    Factors/Symptoms Affecting Intake: Anorexia   Comments:      Medical/Surgical History Past Medical History:   Diagnosis Date    Acid reflux     Anemia     Arthritis     Coronary artery disease     CVA (cerebral vascular accident)     High cholesterol     History of atrial fibrillation     Hx of blood clots     Hypertension     Irregular heartbeat     Peptic ulcer disease     Prostate cancer     Rheumatoid arthritis     Skin cancer     Small cell lung cancer      Past Surgical History:   Procedure Laterality Date    ABDOMINAL AORTIC ANEURYSM REPAIR      BACK SURGERY      x2    BRONCHOSCOPY WITH ION ROBOTIC ASSIST N/A 03/13/2023    Procedure: BRONCHOSCOPY ,ENDOBRONCHIAL ULTRASOUND AND ROBOTIC NAVIGATIONAL BRONCHOSCOPY WITH FINE NEEDLE ASPIRATION, BIOPSY X1 AREA, AND BRONCHOALEOLAR LAVAGE;   "Surgeon: Sonali Lara MD;  Location: [facility] ENDOSCOPY;  Service: Robotics - Pulmonary;  Laterality: N/A;  Post: LUNG CANCER    CARDIAC ABLATION      x2    COLON SURGERY      HERNIA REPAIR      INSERTION CENTRAL VENOUS ACCESS DEVICE W/ SUBCUTANEOUS PORT Right 03/20/2023    VENOUS ACCESS DEVICE (PORT) INSERTION Right 03/20/2023    Procedure: INSERTION VENOUS ACCESS DEVICE;  Surgeon: Javier Gonzalez MD PhD;  Location: [facility] MAIN OR;  Service: Thoracic;  Laterality: Right;        Anthropometrics        Current Height Ht Readings from Last 1 Encounters:   09/20/23 182.9 cm (72\")      Current Weight Wt Readings from Last 1 Encounters:   10/05/23 95 kg (209 lb 6.4 oz)      BMI  28.3   Ideal Body Weight (IBW) 178#   Usual Body Weight (UBW) ~215-220#   Weight Change/Trend Loss; -9#/4.1% x 3 weeks    Weight History Wt Readings from Last 30 Encounters:   10/05/23 1318 95 kg (209 lb 6.4 oz)   09/22/23 1141 99 kg (218 lb 4.1 oz)   09/20/23 1145 96.6 kg (213 lb)   09/14/23 1254 96.7 kg (213 lb 3.2 oz)   08/24/23 1020 98.2 kg (216 lb 9.6 oz)   08/15/23 1355 94.7 kg (208 lb 12.4 oz)   08/08/23 1406 96.7 kg (213 lb 3 oz)   08/03/23 0931 95.5 kg (210 lb 9.6 oz)   07/31/23 0957 95.3 kg (210 lb 1.6 oz)   07/26/23 1218 93 kg (205 lb)   07/13/23 1056 93.8 kg (206 lb 12.8 oz)   06/21/23 0813 95.2 kg (209 lb 12.8 oz)   06/02/23 1440 98 kg (216 lb)   06/01/23 1305 97.4 kg (214 lb 12.8 oz)   05/31/23 0800 96 kg (211 lb 11.2 oz)   05/10/23 1435 98.4 kg (217 lb)   05/09/23 1320 98.3 kg (216 lb 12.8 oz)   05/08/23 0854 95.8 kg (211 lb 4.8 oz)   05/05/23 1115 95.3 kg (210 lb)   04/19/23 1358 100 kg (221 lb)   04/18/23 1407 98.8 kg (217 lb 12.8 oz)   05/05/23 0913 100 kg (221 lb)   04/17/23 0817 98 kg (216 lb 1.6 oz)   04/11/23 1324 98.1 kg (216 lb 3.2 oz)   03/29/23 1418 99.8 kg (220 lb)   03/28/23 1347 98.4 kg (217 lb)   03/27/23 0955 98 kg (216 lb)   03/21/23 1045 96.6 kg (213 lb)   03/20/23 0726 97.7 kg (215 lb 6.2 oz)   03/16/23 0849 " "97.7 kg (215 lb 4.8 oz)          Medications           Current medications: Acetaminophen, Ashwagandha, Creatinine, Elderberry, LORazepam, Magnesium, Misc Natural Products, Turmeric, Vitamin B Complex-C, Zinc, albuterol sulfate HFA, ascorbic acid, aspirin, benzonatate, bethanechol, coenzyme Q10, cyanocobalamin, dexAMETHasone, diphenhydrAMINE, doxycycline, famotidine, fluocinonide, metoprolol tartrate, ondansetron, traMADol, and vitamin D3     Tests/Procedures        Tests/Procedures Chemotherapy     Labs       Pertinent Labs    Results from last 7 days   Lab Units 10/05/23  1308   SODIUM mmol/L 135*   POTASSIUM mmol/L 4.3   CHLORIDE mmol/L 99   CO2 mmol/L 24.9   BUN mg/dL 18   CREATININE mg/dL 1.02   CALCIUM mg/dL 8.8   BILIRUBIN mg/dL 0.5   ALK PHOS U/L 77   ALT (SGPT) U/L 14   AST (SGOT) U/L 17   GLUCOSE mg/dL 104*     Results from last 7 days   Lab Units 10/05/23  1308   MAGNESIUM mg/dL 2.0   HEMOGLOBIN g/dL 11.7*   HEMATOCRIT % 34.6*   WBC 10*3/mm3 4.86   ALBUMIN g/dL 3.4*     Results from last 7 days   Lab Units 10/05/23  1308   PLATELETS 10*3/mm3 112*     COVID19   Date Value Ref Range Status   06/19/2020 NEG NEG Final     Comment:     (NOTE)  Test Performed by:  [facility]  [place] [address]     Lab Results   Component Value Date    HGBA1C 5.6 09/20/2021          Physical Findings        Physical Appearance alert, oriented     Edema  no edema   Gastrointestinal None   Tubes/Drains implantable port   Oral/Mouth Cavity WNL     Estimated/Assessed Needs        Energy Requirements    Height for Calculation   72\"   Weight for Calculation 93.8 kg   Method for Estimation  20 kcal/kg, 25 kcal/kg   EST Needs (kcal/day) 6405-7494 kcal/day       Protein Requirements    Weight for Calculation 93.8 kg   EST Protein Needs (g/kg) 1.0 gm/kg   EST Daily Needs (g/day) 95 g/day       Fluid Requirements     Method for Estimation 1 mL/kcal    Estimated Needs (mL/day) 2000 mL/day           PES " STATEMENT / NUTRITION DIAGNOSIS      Nutrition Dx Problem Problem:    NutritionDiagnosisProblem: Inadequate Oral Intake    Etiology:  Medical diagnosis: Cancer with mets, Lung cancer  Factors affecting nutrition: Appetite    Signs/Symptoms:  PO intake, Unintended Weight Change, and Report/Observation    Comment:      NUTRITION INTERVENTION / PLAN OF CARE      Intervention Goal(s) Maintain nutrition status, Meet estimated needs, Disease management/therapy, Tolerate PO , Increase intake, Maintain weight, and No significant weight loss         RD Intervention/Action Encouraged intake, Follow Tx progress         Recommendations:       PO Diet Small, frequent meals       Supplements Increase Boost intake       Snacks       Other          Monitor/Evaluation PO intake, Supplement intake, Weight   Education Will provide eduction as needed   --    RD to follow     Electronically signed by:  Alicia Oshea RD  10/06/23 07:53 EDT

## 2023-10-10 ENCOUNTER — TELEPHONE (OUTPATIENT)
Dept: ONCOLOGY | Facility: CLINIC | Age: 78
End: 2023-10-10
Payer: MEDICARE

## 2023-10-10 NOTE — TELEPHONE ENCOUNTER
Provider: Fabien  Caller: patient  Relationship to Patient: self  Call Back Phone Number: [phone]  Reason for Call: Pt has not been feeling well. Has not been eating or drinking and sleeping a lot.

## 2023-10-10 NOTE — TELEPHONE ENCOUNTER
Called the wife and she states that the patient is not doing well and he has not been eating or drinking and had no energy. She wanted to come in to our office to be seen and I let her know the patient would need to be evaluated by the ER d/t the risk of dehydration with him not taking anything in for a couple days per her. She states they do not want to go to the ER and she was taking him to see his pcp in [place], [place] at 4:30pm and that she would call me tomorrow to let me know what they said. I once again stated our recommendation was to be evaluated in the ER and she refused.

## 2023-10-11 ENCOUNTER — TELEPHONE (OUTPATIENT)
Dept: ONCOLOGY | Facility: CLINIC | Age: 78
End: 2023-10-11
Payer: MEDICARE

## 2023-10-11 NOTE — TELEPHONE ENCOUNTER
Caller: Marivel Ramírez    Relationship to patient: Emergency Contact    Best call back number: [phone]    PT WENT TO EMERGENCY ROOM IN [facility].THEY GAVE HIM A BAG AND ROBINSON OF SALINE SOLUTION, STEROIDS, ANTIBIOTICS, CHEST XR, LABS, AND THEY SAID HE IS BORDERLINE ANEMIC. HIS HEMOGLOBIN HAS DROPPED 3 POINTS SINCE LAST YEAR FROM 9-. THEY ARE CONCERNED THERE MAY BE A BLEED SOMEWHERE.

## 2023-10-16 ENCOUNTER — PATIENT OUTREACH (OUTPATIENT)
Dept: OTHER | Facility: HOSPITAL | Age: 78
End: 2023-10-16
Payer: MEDICARE

## 2023-10-16 NOTE — PROGRESS NOTES
Reviewed chart    Called patient, s/w his wife.  The patient was seen at the walk in clinic; he had pneumonia, gave oral antibiotics.  She states he was seen in the ER; then gave him IV antibx and steroids. His wife states he is not running a fever although has a productive cough. The patient is not eating well and is not getting out of bed much at all.  I explained I can't see the notes from the hospital where he was seen.    His wife will call Dr Conn's office again to see if he can get in sooner. His wife also discussed possibly bringing him tot he ER here even though they live 2 hours away.  The patient's wife states that he doesn't have a PCP in the area.    I will call again in a few weeks.

## 2023-10-19 ENCOUNTER — APPOINTMENT (OUTPATIENT)
Dept: GENERAL RADIOLOGY | Facility: HOSPITAL | Age: 78
DRG: 081 | End: 2023-10-19
Payer: MEDICARE

## 2023-10-19 ENCOUNTER — HOSPITAL ENCOUNTER (INPATIENT)
Facility: HOSPITAL | Age: 78
LOS: 13 days | Discharge: NURSING, REHAB OR LONG-TERM CARE | DRG: 081 | End: 2023-11-02
Attending: STUDENT IN AN ORGANIZED HEALTH CARE EDUCATION/TRAINING PROGRAM | Admitting: INTERNAL MEDICINE
Payer: MEDICARE

## 2023-10-19 ENCOUNTER — HOSPITAL ENCOUNTER (OUTPATIENT)
Dept: CT IMAGING | Facility: HOSPITAL | Age: 78
Discharge: HOME | End: 2023-10-19
Admitting: INTERNAL MEDICINE
Payer: MEDICARE

## 2023-10-19 VITALS
RESPIRATION RATE: 22 BRPM | SYSTOLIC BLOOD PRESSURE: 128 MMHG | OXYGEN SATURATION: 98 % | DIASTOLIC BLOOD PRESSURE: 70 MMHG | HEART RATE: 90 BPM

## 2023-10-19 DIAGNOSIS — C34.90 SMALL CELL LUNG CANCER: ICD-10-CM

## 2023-10-19 DIAGNOSIS — R53.81 PHYSICAL DECONDITIONING: Primary | ICD-10-CM

## 2023-10-19 DIAGNOSIS — Z79.899 HIGH RISK MEDICATION USE: ICD-10-CM

## 2023-10-19 DIAGNOSIS — Z45.2 ENCOUNTER FOR ADJUSTMENT OR MANAGEMENT OF VASCULAR ACCESS DEVICE: Primary | ICD-10-CM

## 2023-10-19 DIAGNOSIS — R63.0 POOR APPETITE: ICD-10-CM

## 2023-10-19 LAB
ALBUMIN SERPL-MCNC: 3.3 G/DL (ref 3.5–5.2)
ALBUMIN/GLOB SERPL: 1.3 G/DL
ALP SERPL-CCNC: 71 U/L (ref 39–117)
ALT SERPL W P-5'-P-CCNC: 19 U/L (ref 1–41)
ANION GAP SERPL CALCULATED.3IONS-SCNC: 7.2 MMOL/L (ref 5–15)
AST SERPL-CCNC: 18 U/L (ref 1–40)
BASOPHILS # BLD AUTO: 0.04 10*3/MM3 (ref 0–0.2)
BASOPHILS NFR BLD AUTO: 0.6 % (ref 0–1.5)
BILIRUB SERPL-MCNC: 0.4 MG/DL (ref 0–1.2)
BILIRUB UR QL STRIP: NEGATIVE
BUN SERPL-MCNC: 20 MG/DL (ref 8–23)
BUN/CREAT SERPL: 20 (ref 7–25)
CALCIUM SPEC-SCNC: 8.7 MG/DL (ref 8.6–10.5)
CHLORIDE SERPL-SCNC: 102 MMOL/L (ref 98–107)
CLARITY UR: CLEAR
CO2 SERPL-SCNC: 24.8 MMOL/L (ref 22–29)
COLOR UR: YELLOW
CREAT BLDA-MCNC: 1.1 MG/DL (ref 0.6–1.3)
CREAT SERPL-MCNC: 1 MG/DL (ref 0.76–1.27)
DEPRECATED RDW RBC AUTO: 47.6 FL (ref 37–54)
EGFRCR SERPLBLD CKD-EPI 2021: 77.5 ML/MIN/1.73
EOSINOPHIL # BLD AUTO: 0.12 10*3/MM3 (ref 0–0.4)
EOSINOPHIL NFR BLD AUTO: 1.7 % (ref 0.3–6.2)
ERYTHROCYTE [DISTWIDTH] IN BLOOD BY AUTOMATED COUNT: 14 % (ref 12.3–15.4)
GLOBULIN UR ELPH-MCNC: 2.6 GM/DL
GLUCOSE SERPL-MCNC: 95 MG/DL (ref 65–99)
GLUCOSE UR STRIP-MCNC: NEGATIVE MG/DL
HCT VFR BLD AUTO: 31.2 % (ref 37.5–51)
HGB BLD-MCNC: 10.8 G/DL (ref 13–17.7)
HGB UR QL STRIP.AUTO: NEGATIVE
IMM GRANULOCYTES # BLD AUTO: 0.12 10*3/MM3 (ref 0–0.05)
IMM GRANULOCYTES NFR BLD AUTO: 1.7 % (ref 0–0.5)
KETONES UR QL STRIP: NEGATIVE
LEUKOCYTE ESTERASE UR QL STRIP.AUTO: NEGATIVE
LYMPHOCYTES # BLD AUTO: 0.97 10*3/MM3 (ref 0.7–3.1)
LYMPHOCYTES NFR BLD AUTO: 13.8 % (ref 19.6–45.3)
MCH RBC QN AUTO: 32.9 PG (ref 26.6–33)
MCHC RBC AUTO-ENTMCNC: 34.6 G/DL (ref 31.5–35.7)
MCV RBC AUTO: 95.1 FL (ref 79–97)
MONOCYTES # BLD AUTO: 0.89 10*3/MM3 (ref 0.1–0.9)
MONOCYTES NFR BLD AUTO: 12.6 % (ref 5–12)
NEUTROPHILS NFR BLD AUTO: 4.91 10*3/MM3 (ref 1.7–7)
NEUTROPHILS NFR BLD AUTO: 69.6 % (ref 42.7–76)
NITRITE UR QL STRIP: NEGATIVE
NRBC BLD AUTO-RTO: 0.1 /100 WBC (ref 0–0.2)
PH UR STRIP.AUTO: 6.5 [PH] (ref 5–8)
PLATELET # BLD AUTO: 150 10*3/MM3 (ref 140–450)
PMV BLD AUTO: 8.4 FL (ref 6–12)
POTASSIUM SERPL-SCNC: 4.1 MMOL/L (ref 3.5–5.2)
PROT SERPL-MCNC: 5.9 G/DL (ref 6–8.5)
PROT UR QL STRIP: NEGATIVE
QT INTERVAL: 362 MS
QTC INTERVAL: 443 MS
RBC # BLD AUTO: 3.28 10*6/MM3 (ref 4.14–5.8)
SODIUM SERPL-SCNC: 134 MMOL/L (ref 136–145)
SP GR UR STRIP: >=1.03 (ref 1–1.03)
TROPONIN T SERPL HS-MCNC: 13 NG/L
TSH SERPL DL<=0.05 MIU/L-ACNC: 0.77 UIU/ML (ref 0.27–4.2)
UROBILINOGEN UR QL STRIP: NORMAL
WBC NRBC COR # BLD: 7.05 10*3/MM3 (ref 3.4–10.8)

## 2023-10-19 PROCEDURE — G0378 HOSPITAL OBSERVATION PER HR: HCPCS

## 2023-10-19 PROCEDURE — 25510000001 IOPAMIDOL 61 % SOLUTION: Performed by: INTERNAL MEDICINE

## 2023-10-19 PROCEDURE — 71045 X-RAY EXAM CHEST 1 VIEW: CPT

## 2023-10-19 PROCEDURE — 94799 UNLISTED PULMONARY SVC/PX: CPT

## 2023-10-19 PROCEDURE — 80053 COMPREHEN METABOLIC PANEL: CPT | Performed by: STUDENT IN AN ORGANIZED HEALTH CARE EDUCATION/TRAINING PROGRAM

## 2023-10-19 PROCEDURE — 85025 COMPLETE CBC W/AUTO DIFF WBC: CPT | Performed by: STUDENT IN AN ORGANIZED HEALTH CARE EDUCATION/TRAINING PROGRAM

## 2023-10-19 PROCEDURE — 94640 AIRWAY INHALATION TREATMENT: CPT

## 2023-10-19 PROCEDURE — 84484 ASSAY OF TROPONIN QUANT: CPT | Performed by: STUDENT IN AN ORGANIZED HEALTH CARE EDUCATION/TRAINING PROGRAM

## 2023-10-19 PROCEDURE — 84443 ASSAY THYROID STIM HORMONE: CPT | Performed by: STUDENT IN AN ORGANIZED HEALTH CARE EDUCATION/TRAINING PROGRAM

## 2023-10-19 PROCEDURE — 25810000003 SODIUM CHLORIDE 0.9 % SOLUTION: Performed by: STUDENT IN AN ORGANIZED HEALTH CARE EDUCATION/TRAINING PROGRAM

## 2023-10-19 PROCEDURE — 93010 ELECTROCARDIOGRAM REPORT: CPT | Performed by: INTERNAL MEDICINE

## 2023-10-19 PROCEDURE — 74177 CT ABD & PELVIS W/CONTRAST: CPT

## 2023-10-19 PROCEDURE — 81003 URINALYSIS AUTO W/O SCOPE: CPT | Performed by: STUDENT IN AN ORGANIZED HEALTH CARE EDUCATION/TRAINING PROGRAM

## 2023-10-19 PROCEDURE — 71260 CT THORAX DX C+: CPT

## 2023-10-19 PROCEDURE — 25810000003 SODIUM CHLORIDE 0.9 % SOLUTION: Performed by: INTERNAL MEDICINE

## 2023-10-19 PROCEDURE — 36415 COLL VENOUS BLD VENIPUNCTURE: CPT

## 2023-10-19 PROCEDURE — 93005 ELECTROCARDIOGRAM TRACING: CPT | Performed by: STUDENT IN AN ORGANIZED HEALTH CARE EDUCATION/TRAINING PROGRAM

## 2023-10-19 PROCEDURE — 99285 EMERGENCY DEPT VISIT HI MDM: CPT

## 2023-10-19 PROCEDURE — 82565 ASSAY OF CREATININE: CPT

## 2023-10-19 RX ORDER — BISACODYL 5 MG/1
5 TABLET, DELAYED RELEASE ORAL DAILY PRN
Status: DISCONTINUED | OUTPATIENT
Start: 2023-10-19 | End: 2023-10-24

## 2023-10-19 RX ORDER — SODIUM CHLORIDE 9 MG/ML
50 INJECTION, SOLUTION INTRAVENOUS CONTINUOUS
Status: DISCONTINUED | OUTPATIENT
Start: 2023-10-19 | End: 2023-10-26

## 2023-10-19 RX ORDER — ACETAMINOPHEN 160 MG/5ML
650 SOLUTION ORAL EVERY 4 HOURS PRN
Status: DISCONTINUED | OUTPATIENT
Start: 2023-10-19 | End: 2023-11-02 | Stop reason: HOSPADM

## 2023-10-19 RX ORDER — FAMOTIDINE 20 MG/1
20 TABLET, FILM COATED ORAL 2 TIMES DAILY
Status: DISCONTINUED | OUTPATIENT
Start: 2023-10-19 | End: 2023-11-02 | Stop reason: HOSPADM

## 2023-10-19 RX ORDER — POLYETHYLENE GLYCOL 3350 17 G/17G
17 POWDER, FOR SOLUTION ORAL DAILY PRN
Status: DISCONTINUED | OUTPATIENT
Start: 2023-10-19 | End: 2023-10-24

## 2023-10-19 RX ORDER — SODIUM CHLORIDE 0.9 % (FLUSH) 0.9 %
10 SYRINGE (ML) INJECTION EVERY 12 HOURS SCHEDULED
Status: DISCONTINUED | OUTPATIENT
Start: 2023-10-19 | End: 2023-11-02

## 2023-10-19 RX ORDER — SODIUM CHLORIDE 0.9 % (FLUSH) 0.9 %
10 SYRINGE (ML) INJECTION AS NEEDED
Status: DISCONTINUED | OUTPATIENT
Start: 2023-10-19 | End: 2023-10-20 | Stop reason: HOSPADM

## 2023-10-19 RX ORDER — ACETAMINOPHEN 325 MG/1
650 TABLET ORAL EVERY 4 HOURS PRN
Status: DISCONTINUED | OUTPATIENT
Start: 2023-10-19 | End: 2023-11-02 | Stop reason: HOSPADM

## 2023-10-19 RX ORDER — LORAZEPAM 1 MG/1
TABLET ORAL
Qty: 1 TABLET | Refills: 0 | Status: ON HOLD | OUTPATIENT
Start: 2023-10-19

## 2023-10-19 RX ORDER — ACETAMINOPHEN 650 MG/1
650 SUPPOSITORY RECTAL EVERY 4 HOURS PRN
Status: DISCONTINUED | OUTPATIENT
Start: 2023-10-19 | End: 2023-11-02 | Stop reason: HOSPADM

## 2023-10-19 RX ORDER — ASPIRIN 81 MG/1
81 TABLET ORAL EVERY 24 HOURS
Status: DISCONTINUED | OUTPATIENT
Start: 2023-10-19 | End: 2023-11-02 | Stop reason: HOSPADM

## 2023-10-19 RX ORDER — HEPARIN SODIUM (PORCINE) LOCK FLUSH IV SOLN 100 UNIT/ML 100 UNIT/ML
500 SOLUTION INTRAVENOUS AS NEEDED
OUTPATIENT
Start: 2023-10-19

## 2023-10-19 RX ORDER — CHOLECALCIFEROL (VITAMIN D3) 125 MCG
1000 CAPSULE ORAL DAILY
Status: DISCONTINUED | OUTPATIENT
Start: 2023-10-19 | End: 2023-11-02 | Stop reason: HOSPADM

## 2023-10-19 RX ORDER — TRAMADOL HYDROCHLORIDE 50 MG/1
100 TABLET ORAL EVERY 6 HOURS PRN
Status: DISCONTINUED | OUTPATIENT
Start: 2023-10-19 | End: 2023-11-02 | Stop reason: HOSPADM

## 2023-10-19 RX ORDER — AMOXICILLIN 250 MG
2 CAPSULE ORAL 2 TIMES DAILY
Status: DISCONTINUED | OUTPATIENT
Start: 2023-10-19 | End: 2023-10-24

## 2023-10-19 RX ORDER — ONDANSETRON 2 MG/ML
4 INJECTION INTRAMUSCULAR; INTRAVENOUS EVERY 6 HOURS PRN
Status: DISCONTINUED | OUTPATIENT
Start: 2023-10-19 | End: 2023-10-20

## 2023-10-19 RX ORDER — SODIUM CHLORIDE 0.9 % (FLUSH) 0.9 %
10 SYRINGE (ML) INJECTION AS NEEDED
Status: DISCONTINUED | OUTPATIENT
Start: 2023-10-19 | End: 2023-11-02

## 2023-10-19 RX ORDER — ALBUTEROL SULFATE 2.5 MG/3ML
2.5 SOLUTION RESPIRATORY (INHALATION) EVERY 6 HOURS PRN
Status: DISCONTINUED | OUTPATIENT
Start: 2023-10-19 | End: 2023-11-02 | Stop reason: HOSPADM

## 2023-10-19 RX ORDER — SODIUM CHLORIDE 9 MG/ML
40 INJECTION, SOLUTION INTRAVENOUS AS NEEDED
Status: DISCONTINUED | OUTPATIENT
Start: 2023-10-19 | End: 2023-11-02

## 2023-10-19 RX ORDER — HEPARIN SODIUM (PORCINE) LOCK FLUSH IV SOLN 100 UNIT/ML 100 UNIT/ML
500 SOLUTION INTRAVENOUS AS NEEDED
Status: DISCONTINUED | OUTPATIENT
Start: 2023-10-19 | End: 2023-10-20 | Stop reason: HOSPADM

## 2023-10-19 RX ORDER — SODIUM CHLORIDE 0.9 % (FLUSH) 0.9 %
10 SYRINGE (ML) INJECTION AS NEEDED
OUTPATIENT
Start: 2023-10-19

## 2023-10-19 RX ORDER — BISACODYL 10 MG
10 SUPPOSITORY, RECTAL RECTAL DAILY PRN
Status: DISCONTINUED | OUTPATIENT
Start: 2023-10-19 | End: 2023-10-24

## 2023-10-19 RX ADMIN — Medication 1000 MCG: at 20:17

## 2023-10-19 RX ADMIN — IOPAMIDOL 85 ML: 612 INJECTION, SOLUTION INTRAVENOUS at 11:01

## 2023-10-19 RX ADMIN — TRAMADOL HYDROCHLORIDE 100 MG: 50 TABLET, COATED ORAL at 21:58

## 2023-10-19 RX ADMIN — FAMOTIDINE 20 MG: 20 TABLET ORAL at 20:17

## 2023-10-19 RX ADMIN — ASPIRIN 81 MG: 81 TABLET, COATED ORAL at 20:17

## 2023-10-19 RX ADMIN — Medication 10 ML: at 20:40

## 2023-10-19 RX ADMIN — METOPROLOL TARTRATE 25 MG: 25 TABLET, FILM COATED ORAL at 20:17

## 2023-10-19 RX ADMIN — SENNOSIDES AND DOCUSATE SODIUM 2 TABLET: 50; 8.6 TABLET ORAL at 20:17

## 2023-10-19 RX ADMIN — ALBUTEROL SULFATE 2.5 MG: 2.5 SOLUTION RESPIRATORY (INHALATION) at 23:21

## 2023-10-19 RX ADMIN — SODIUM CHLORIDE 100 ML/HR: 9 INJECTION, SOLUTION INTRAVENOUS at 17:26

## 2023-10-19 RX ADMIN — SODIUM CHLORIDE 1000 ML: 9 INJECTION, SOLUTION INTRAVENOUS at 15:00

## 2023-10-19 NOTE — PLAN OF CARE
Goal Outcome Evaluation:         Patient arrived from ED earlier. Family at bedside. A & O with periods of forgetfulness. No c/o pain. Lungs are diminished. No s/s of distress noted.

## 2023-10-19 NOTE — ED PROVIDER NOTES
EMERGENCY DEPARTMENT ENCOUNTER    Room Number:  17/17  PCP: Morgan Sebastian DO  Historian: Patient, wife, daughter      HPI:  Chief Complaint: Fatigue, falls  Context: Russell Ramírez Sr. is a 78 y.o. male who presents to the ED c/o weakness, fatigue, recurrent falls.  Patient has stage IV lung cancer with metastatic disease and has completed chemotherapy and radiation therapy.  Patient is currently on immunotherapy.  Family member state patient has become increasingly weak at home and has not been eating or drinking for the last 2 weeks.  Patient was diagnosed with pneumonia 2 weeks ago and completed outpatient course of antibiotics.  Family member state over the last several days patient has had multiple falls and is very weak.  Patient had outpatient CT abdomen pelvis and CT chest earlier today.            PAST MEDICAL HISTORY  Active Ambulatory Problems     Diagnosis Date Noted    Lung nodule 03/09/2023    Lung cancer metastatic to brain 03/16/2023    High risk medication use 03/16/2023    Encounter for adjustment or management of vascular access device 03/16/2023    Abdominal aortic aneurysm 07/31/2023    Adenomatous polyp of ascending colon 06/11/2018    Anxiety state 07/31/2023    Atrial fibrillation 12/10/2012    Atrial flutter 07/31/2023    Back problem 07/31/2023    Benign essential hypertension 06/17/2008    Cervical spondyloarthritis 10/28/2015    Chronic midline low back pain without sciatica 10/31/2017    Depressive disorder 07/31/2023    Edema 07/31/2023    Erectile dysfunction 07/31/2023    Gastroesophageal reflux disease 07/31/2023    History of malignant neoplasm of prostate 07/31/2023    Hx of endovascular stent graft for abdominal aortic aneurysm 09/03/2014    Hypercholesteremia 09/13/2016    Pure hypercholesterolemia 07/31/2023    Hypertensive heart disease 07/31/2023    Hypotestosteronism 07/31/2023    Malignant neoplasm of skin 07/31/2023    Neck pain 10/28/2015    Obesity 07/31/2023     Obstructive sleep apnea 06/17/2008    Osteoarthritis 03/05/2014    Prostate cancer 09/12/2012    Hypertension 07/31/2023    PVD (peripheral vascular disease) 09/11/2013    Vascular disorder 07/31/2023    Raynaud's phenomenon 03/05/2014    CAD (coronary artery disease) 10/01/2015    Coronary arteriosclerosis in native artery 07/31/2023    Coronary arteriosclerosis 07/31/2023    Coronary atherosclerosis 07/31/2023    Renal artery atherosclerosis 09/03/2014    S/P ablation of atrial fibrillation 09/11/2013    S/P renal artery angioplasty 08/05/2014    Swelling of limb 07/31/2023    Tobacco dependence syndrome 07/31/2023    Secondary malignant neoplasm of brain 08/01/2023     Resolved Ambulatory Problems     Diagnosis Date Noted    Metastatic cancer 08/03/2023     Past Medical History:   Diagnosis Date    Acid reflux     Anemia     Arthritis     Coronary artery disease     CVA (cerebral vascular accident)     High cholesterol     History of atrial fibrillation     Hx of blood clots     Irregular heartbeat     Peptic ulcer disease     Rheumatoid arthritis     Skin cancer     Small cell lung cancer          PAST SURGICAL HISTORY  Past Surgical History:   Procedure Laterality Date    ABDOMINAL AORTIC ANEURYSM REPAIR      BACK SURGERY      x2    BRONCHOSCOPY WITH ION ROBOTIC ASSIST N/A 03/13/2023    Procedure: BRONCHOSCOPY ,ENDOBRONCHIAL ULTRASOUND AND ROBOTIC NAVIGATIONAL BRONCHOSCOPY WITH FINE NEEDLE ASPIRATION, BIOPSY X1 AREA, AND BRONCHOALEOLAR LAVAGE;  Surgeon: Sonali Lara MD;  Location: [facility] ENDOSCOPY;  Service: Robotics - Pulmonary;  Laterality: N/A;  Post: LUNG CANCER    CARDIAC ABLATION      x2    COLON SURGERY      HERNIA REPAIR      INSERTION CENTRAL VENOUS ACCESS DEVICE W/ SUBCUTANEOUS PORT Right 03/20/2023    VENOUS ACCESS DEVICE (PORT) INSERTION Right 03/20/2023    Procedure: INSERTION VENOUS ACCESS DEVICE;  Surgeon: Jvaier Gonzalez MD PhD;  Location: [facility] OR;  Service: Thoracic;  Laterality:  Right;         FAMILY HISTORY  Family History   Problem Relation Age of Onset    Brain cancer Mother     Lung cancer Sister          SOCIAL HISTORY  Social History     Socioeconomic History    Marital status:      Spouse name: Marivel   Tobacco Use    Smoking status: Former     Packs/day: .5     Types: Cigarettes     Start date: 1976     Quit date: 2020     Years since quitting: 3.8    Smokeless tobacco: Never   Vaping Use    Vaping Use: Never used   Substance and Sexual Activity    Alcohol use: Never    Drug use: Never    Sexual activity: Defer         ALLERGIES  Codeine        REVIEW OF SYSTEMS  Review of Systems   Constitutional:  Positive for fatigue.   Neurological:  Positive for weakness.            PHYSICAL EXAM  ED Triage Vitals   Temp Heart Rate Resp BP SpO2   10/19/23 1122 10/19/23 1119 10/19/23 1119 10/19/23 1119 10/19/23 1119   99.1 °F (37.3 °C) 92 18 117/63 99 %      Temp src Heart Rate Source Patient Position BP Location FiO2 (%)   -- -- -- -- --              Physical Exam      GENERAL: no acute distress  HENT: nares patent  EYES: no scleral icterus  CV: regular rhythm, normal rate  RESPIRATORY: normal effort  ABDOMEN: soft  MUSCULOSKELETAL: no deformity  NEURO: alert, moves all extremities, follows commands  PSYCH:  calm, cooperative  SKIN: warm, dry    Vital signs and nursing notes reviewed.          LAB RESULTS  Recent Results (from the past 24 hour(s))   Comprehensive Metabolic Panel    Collection Time: 10/19/23 11:37 AM    Specimen: Blood   Result Value Ref Range    Glucose 95 65 - 99 mg/dL    BUN 20 8 - 23 mg/dL    Creatinine 1.00 0.76 - 1.27 mg/dL    Sodium 134 (L) 136 - 145 mmol/L    Potassium 4.1 3.5 - 5.2 mmol/L    Chloride 102 98 - 107 mmol/L    CO2 24.8 22.0 - 29.0 mmol/L    Calcium 8.7 8.6 - 10.5 mg/dL    Total Protein 5.9 (L) 6.0 - 8.5 g/dL    Albumin 3.3 (L) 3.5 - 5.2 g/dL    ALT (SGPT) 19 1 - 41 U/L    AST (SGOT) 18 1 - 40 U/L    Alkaline Phosphatase 71 39 - 117 U/L    Total  Bilirubin 0.4 0.0 - 1.2 mg/dL    Globulin 2.6 gm/dL    A/G Ratio 1.3 g/dL    BUN/Creatinine Ratio 20.0 7.0 - 25.0    Anion Gap 7.2 5.0 - 15.0 mmol/L    eGFR 77.5 >60.0 mL/min/1.73   TSH    Collection Time: 10/19/23 11:37 AM    Specimen: Blood   Result Value Ref Range    TSH 0.771 0.270 - 4.200 uIU/mL   Single High Sensitivity Troponin T    Collection Time: 10/19/23 11:37 AM    Specimen: Blood   Result Value Ref Range    HS Troponin T 13 <15 ng/L   CBC Auto Differential    Collection Time: 10/19/23 11:37 AM    Specimen: Blood   Result Value Ref Range    WBC 7.05 3.40 - 10.80 10*3/mm3    RBC 3.28 (L) 4.14 - 5.80 10*6/mm3    Hemoglobin 10.8 (L) 13.0 - 17.7 g/dL    Hematocrit 31.2 (L) 37.5 - 51.0 %    MCV 95.1 79.0 - 97.0 fL    MCH 32.9 26.6 - 33.0 pg    MCHC 34.6 31.5 - 35.7 g/dL    RDW 14.0 12.3 - 15.4 %    RDW-SD 47.6 37.0 - 54.0 fl    MPV 8.4 6.0 - 12.0 fL    Platelets 150 140 - 450 10*3/mm3    Neutrophil % 69.6 42.7 - 76.0 %    Lymphocyte % 13.8 (L) 19.6 - 45.3 %    Monocyte % 12.6 (H) 5.0 - 12.0 %    Eosinophil % 1.7 0.3 - 6.2 %    Basophil % 0.6 0.0 - 1.5 %    Immature Grans % 1.7 (H) 0.0 - 0.5 %    Neutrophils, Absolute 4.91 1.70 - 7.00 10*3/mm3    Lymphocytes, Absolute 0.97 0.70 - 3.10 10*3/mm3    Monocytes, Absolute 0.89 0.10 - 0.90 10*3/mm3    Eosinophils, Absolute 0.12 0.00 - 0.40 10*3/mm3    Basophils, Absolute 0.04 0.00 - 0.20 10*3/mm3    Immature Grans, Absolute 0.12 (H) 0.00 - 0.05 10*3/mm3    nRBC 0.1 0.0 - 0.2 /100 WBC   ECG 12 Lead Other; weakness    Collection Time: 10/19/23 11:41 AM   Result Value Ref Range    QT Interval 362 ms    QTC Interval 443 ms       Ordered the above labs and reviewed the results.        RADIOLOGY  XR Chest 1 View    Result Date: 10/19/2023  XR CHEST 1 VW-  HISTORY: Male who is 78 years-old, infection  TECHNIQUE: Frontal view of the chest  COMPARISON: 3/20/2023  FINDINGS: Right chest port appears stable. Heart, mediastinum and pulmonary vasculature are unremarkable. A nodule  is apparent at the level of the right anterior sixth rib, probably corresponding to previous CT demonstrated nodule on the exam from 6/19/2023, less well characterized radiographically. Old granulomatous disease is present. No focal pulmonary consolidation, pleural effusion, or pneumothorax. No acute osseous process.      No focal pulmonary consolidation. Redemonstration of right lower lung nodule.    This report was finalized on 10/19/2023 12:23 PM by Dr. Inderjit Galan M.D on Workstation: [id number]       Ordered the above noted radiological studies. Reviewed by me in PACS.            MEDICATIONS GIVEN IN ER  Medications   sodium chloride 0.9 % bolus 1,000 mL (has no administration in time range)                   MEDICAL DECISION MAKING, PROGRESS, and CONSULTS    All labs have been independently reviewed by me.  All radiology studies have been reviewed by me and I have also reviewed the radiology report.   EKG's independently viewed and interpreted by me.  Discussion below represents my analysis of pertinent findings related to patient's condition, differential diagnosis, treatment plan and final disposition.      Additional sources:  - Discussed/ obtained information from independent historians: Wife, daughter    - External (non-ED) record review: Office visit from 9/22/2023 with radiation oncology reviewed and notable for visit secondary to lung cancer with metastatic disease to the brain.  Patient was to continue with dexamethasone daily obtain brain MRI and return to clinic in 1 month    - Chronic or social conditions impacting care: Metastatic lung cancer    - Shared decision making: Utilizing shared decision-making techniques we discussed options moving forward and at this time elected for inpatient management      Orders placed during this visit:  Orders Placed This Encounter   Procedures    XR Chest 1 View    Comprehensive Metabolic Panel    Urinalysis With Microscopic If Indicated (No Culture) - Urine,  Clean Catch    TSH    Single High Sensitivity Troponin T    CBC Auto Differential    [facility] (on-call MD unless specified) Details    ECG 12 Lead Other; weakness    Initiate Observation Status    CBC & Differential       Differential diagnosis includes but is not limited to:    Deconditioning, electrolyte abnormality, progression of disease      Independent interpretation of labs, radiology studies, and discussions with consultants:  ED Course as of 10/19/23 1425   Thu Oct 19, 2023   1217 EKG interpreted by me demonstrates sinus rhythm, rate of 90, no WI/QT prolongation, no ST elevation [MW]   1230 Chest x-ray interpreted by me demonstrates no evidence of infiltrate or consolidation [MW]   1230 Hemoglobin(!): 10.8 [MW]   1425 Discussed with Dr. Chester who agrees to admit for further evaluation and management [MW]      ED Course User Index  [MW] Ollie Forde MD           DIAGNOSIS  Final diagnoses:   Physical deconditioning         DISPOSITION  ED Disposition       ED Disposition   Decision to Admit    Condition   --    Comment   Level of Care: Med/Surg [1]   Diagnosis: Physical deconditioning [950450]   Admitting Physician: COMFORT CHESTER [[id number]]   Attending Physician: COMFORT CHESTER [[id number]]                           Latest Documented Vital Signs:  As of 14:25 EDT  BP- 123/78 HR- 89 Temp- 99.1 °F (37.3 °C) O2 sat- 92%              --    Please note that portions of this were completed with a voice recognition program.       Note Disclaimer: At [facility], we believe that sharing information builds trust and better relationships. You are receiving this note because you are receiving care at [facility] or recently visited. It is possible you will see health information before a provider has talked with you about it. This kind of information can be easy to misunderstand. To help you fully understand what it means for your health, we urge you to discuss this note with your provider.             Ollie Forde,  MD  10/19/23 1402       Ollie Forde MD  10/19/23 1427

## 2023-10-19 NOTE — NURSING NOTE
"Patient here for outpatient CT Scan.  Wife and daughter here with him.  They state that they drove 2 hours to get here and he has \"hardly drank or eaten anything for 2 weeks\".  They want to take him to our ER after the CT Scan for evaluation.  Patient took Lorazepam 1 mg at 0955 for anxiety.  He is very tired so I set up for him to go to [facility] Radiology Triage to lie down on a stretcher while waiting for CT Scan, drinking oral prep. Report given to Barbara in Triage.  Call placed to Lizbet at [facility] to inform of above. She states that she already talked with wife this morning.   "

## 2023-10-19 NOTE — H&P
Internal medicine history and physical  INTERNAL MEDICINE   [facility]       Patient Identification:  Name: Russell Ramírez Sr.  Age: 78 y.o.  Sex: male  :  1945  MRN: [id number]                   Primary Care Physician: Morgan Sebastian DO                               Date of admission:10/19/2023    Chief Complaint: Progressive decline in terms of being able to get up and walk around appetite and sense of wellbeing over the course of last 3 weeks.    History of Present Illness:   Patient is a 78-year-old male with history of COPD, obstructive sleep apnea, history of prostate cancer for which he has a treatment and has done well for the last 15 years since his last treatment, history of metastatic lung cancer diagnosed in February of this year and has been treated with radiation treatment of his brain for cranial mets with last treatment in September, history of chemotherapy which he finished in May or  of this year and has been taking immunotherapy since.  In this background patient started to decline with cough shortness of breath and not feeling very well couple weeks ago.  Patient was seen at the immediate care center and was started on steroid and antibiotic therapy with no significant improvement.  Patient was subsequently seen at the emergency room at local hospital after primary care physician referred to them because he was not getting better after a week worth of treatment from the [facility] care center.  Patient was apparently tested with chest x-ray and was told that she does not have any pneumonia and was given a shot of steroid and IV antibiotics with Rocephin and fluids and was discharged.  Patient continued to decline with progressive weakness and falls lack of energy and appetite.  Patient follows with Dr. Conn and has scheduled CT scan of the chest and abdomen for today and after these test done patient was brought to the emergency room for further evaluation.  In  the emergency room chest x-ray did not show any focal consolidation, CT scan of the chest abdomen and pelvis showed interval development of hepatic mets likely new or progression of bilateral adrenal mets as well as worsening of right hilar adenopathy and improvement of left paratracheal lymphadenopathy and stable bilateral lung nodules along with cluster of reticular nodular opacities in the lung.  These changes were thought to be chronic in nature.  Patient received IV fluid bolus and is being admitted for physical deconditioning due to underlying malignancy and its treatment.      Past Medical History:  Past Medical History:   Diagnosis Date    Acid reflux     Anemia     Arthritis     Coronary artery disease     CVA (cerebral vascular accident)     High cholesterol     History of atrial fibrillation     Hx of blood clots     Hypertension     Irregular heartbeat     Peptic ulcer disease     Prostate cancer     Rheumatoid arthritis     Skin cancer     Small cell lung cancer      Past Surgical History:  Past Surgical History:   Procedure Laterality Date    ABDOMINAL AORTIC ANEURYSM REPAIR      BACK SURGERY      x2    BRONCHOSCOPY WITH ION ROBOTIC ASSIST N/A 03/13/2023    Procedure: BRONCHOSCOPY ,ENDOBRONCHIAL ULTRASOUND AND ROBOTIC NAVIGATIONAL BRONCHOSCOPY WITH FINE NEEDLE ASPIRATION, BIOPSY X1 AREA, AND BRONCHOALEOLAR LAVAGE;  Surgeon: Sonali Lara MD;  Location: [facility];  Service: Robotics - Pulmonary;  Laterality: N/A;  Post: LUNG CANCER    CARDIAC ABLATION      x2    COLON SURGERY      HERNIA REPAIR      INSERTION CENTRAL VENOUS ACCESS DEVICE W/ SUBCUTANEOUS PORT Right 03/20/2023    VENOUS ACCESS DEVICE (PORT) INSERTION Right 03/20/2023    Procedure: INSERTION VENOUS ACCESS DEVICE;  Surgeon: Javier Gonzalez MD PhD;  Location: [facility];  Service: Thoracic;  Laterality: Right;      Home Meds:  Medications Prior to Admission   Medication Sig Dispense Refill Last Dose    bethanechol  (URECHOLINE) 50 MG tablet Take 1 tablet by mouth Every 12 (Twelve) Hours.       cyanocobalamin (VITAMIN B-12) 1000 MCG tablet Take 1 tablet by mouth Daily.       famotidine (PEPCID) 20 MG tablet Take 1 tablet by mouth twice daily 60 tablet 0     LORazepam (Ativan) 1 MG tablet Take 1 tablet by mouth as needed 1 hour prior to CT scan. 1 tablet 0     Magnesium 250 MG tablet Take  by mouth.       metoprolol tartrate (LOPRESSOR) 25 MG tablet metoprolol tartrate 25 mg tablet   TAKE 1 TABLET BY MOUTH TWICE DAILY.       ondansetron (ZOFRAN) 8 MG tablet Take 1 tablet by mouth 3 (Three) Times a Day As Needed for Nausea or Vomiting. 30 tablet 5     traMADol (ULTRAM) 50 MG tablet Take 2 tablets by mouth Every 6 (Six) Hours As Needed for Moderate Pain. 340 tablet 0     Vitamin B Complex-C capsule        vitamin D3 125 MCG (5000 UT) capsule capsule Take 1 capsule by mouth Daily.       Zinc 50 MG tablet Take 1 tablet by mouth Daily.       Acetaminophen (ARTHRITIS PAIN RELIEF PO)        ascorbic acid (VITAMIN C) 1000 MG tablet        Ashwagandha 125 MG capsule        aspirin 81 MG EC tablet Daily.       benzonatate (TESSALON) 100 MG capsule Take 1 capsule by mouth 3 (Three) Times a Day As Needed for Cough.       coenzyme Q10 100 MG capsule Take 1 capsule by mouth Daily.       Creatinine powder        dexAMETHasone (DECADRON) 2 MG tablet Take 1 tablet by mouth Daily With Breakfast. Take 1 tablet by mouth daily with breakfast for one week then stop. If develop neurologic symptoms restart 1 tablet daily and notify our office. 30 tablet 0     diphenhydrAMINE (BENADRYL) 25 mg capsule Take 1 capsule by mouth Every 6 (Six) Hours As Needed for Itching.       doxycycline (MONODOX) 100 MG capsule Take 1 capsule by mouth Every 12 (Twelve) Hours.       Elderberry 500 MG capsule Take  by mouth.       fluocinonide (LIDEX) 0.05 % cream APPLY TO THE AFFECTED AREA(S) BY TOPICAL ROUTE 2 TIMES PER DAY       Misc Natural Products (SUPER GREENS PO)  "Take  by mouth.       Misc Natural Products (YUMVS BEET ROOT-TART CHERRY PO) Take  by mouth.       Turmeric 500 MG capsule Take  by mouth.       Ventolin  (90 Base) MCG/ACT inhaler INHALE 2 PUFFS BY MOUTH EVERY 4 TO 6 HOURS AS NEEDED FOR SHORTNESS OF BREATH FOR WHEEZING        Current Meds:     Current Facility-Administered Medications:     sodium chloride 0.9 % infusion, 100 mL/hr, Intravenous, Continuous, Rosalva Chester MD, Last Rate: 100 mL/hr at 10/19/23 1726, 100 mL/hr at 10/19/23 1726    Facility-Administered Medications Ordered in Other Encounters:     heparin injection 500 Units, 500 Units, Intravenous, PRN, Alexsander Conn MD    sodium chloride 0.9 % flush 10 mL, 10 mL, Intravenous, PRN, Alexsander Conn MD  Allergies:  Allergies   Allergen Reactions    Codeine Other (See Comments)     headache       Social History:   Social History     Tobacco Use    Smoking status: Former     Packs/day: .5     Types: Cigarettes     Start date: 1976     Quit date: 2020     Years since quitting: 3.8    Smokeless tobacco: Never   Substance Use Topics    Alcohol use: Never      Family History:  Family History   Problem Relation Age of Onset    Brain cancer Mother     Lung cancer Sister           Review of Systems  See history of present illness and past medical history.    Constitutional: Remarkable for no fever or chills  Cardiovascular: Remarkable for no chest pain or shortness of breath  GI: Remarkable for no nausea vomiting or diarrhea  : Remarkable for no burning in urination frequency urgency  Musculoskeletal: Remarkable for generalized body aches fatigue and weakness but no new pain  Neurological: Remarkable for generalized decline in function and fatigue and generalized weakness.    Vitals:   /81 (BP Location: Right arm, Patient Position: Lying)   Pulse 83   Temp 98.1 °F (36.7 °C) (Oral)   Resp 16   Ht 180.3 cm (71\")   Wt 92.3 kg (203 lb 7.8 oz)   SpO2 96%   BMI 28.38 kg/m²   I/O: No intake or " output data in the 24 hours ending 10/19/23 1750  Exam:  Patient is examined using the personal protective equipment as per guidelines from infection control for this particular patient as enacted.  Hand washing was performed before and after patient interaction.  General Appearance:  Lethargic but interactive male who appears chronically ill   Head:    Normocephalic, without obvious abnormality, atraumatic   Eyes:    PERRL, conjunctiva/corneas clear, EOM's intact, both eyes   Ears:    Normal external ear canals, both ears   Nose:   Nares normal, septum midline, mucosa normal, no drainage    or sinus tenderness   Throat:   Lips, tongue, gums normal; oral mucosa pink and moist   Neck:   Supple, symmetrical, trachea midline, no adenopathy;     thyroid:  no enlargement/tenderness/nodules; no carotid    bruit or JVD   Back:     Symmetric, no curvature, ROM normal, no CVA tenderness   Lungs:     Clear to auscultation bilaterally, respirations unlabored   Chest Wall:    No tenderness or deformity    Heart:  S1-S2 regular   Abdomen:   Soft nontender   Extremities:   Extremities normal, atraumatic, no cyanosis or edema   Pulses:   Pulses palpable in all extremities; symmetric all extremities   Skin: No rash noted   Neurologic: Lethargic but interactive male does not appear to be in any acute distress       Data Review:      I reviewed the patient's new clinical results.  Results from last 7 days   Lab Units 10/19/23  1137   WBC 10*3/mm3 7.05   HEMOGLOBIN g/dL 10.8*   PLATELETS 10*3/mm3 150     Results from last 7 days   Lab Units 10/19/23  1137 10/19/23  1020   SODIUM mmol/L 134*  --    POTASSIUM mmol/L 4.1  --    CHLORIDE mmol/L 102  --    CO2 mmol/L 24.8  --    BUN mg/dL 20  --    CREATININE mg/dL 1.00 1.10   CALCIUM mg/dL 8.7  --    GLUCOSE mg/dL 95  --      CT Chest With Contrast Diagnostic    Result Date: 10/19/2023  1. Interval development of hepatic metastases and likely new or progression of bilateral adrenal  metastases. 2. No significant change in the 1.4 cm right lower lobe pulmonary nodule, but there has been increase in the right hilar lymphadenopathy. 3. The left paratracheal lymphadenopathy has decreased. Several tiny pulmonary nodules bilaterally appear stable. The cluster of reticulonodular opacities      CT Abdomen Pelvis With Contrast    Result Date: 10/19/2023  1. Interval development of hepatic metastases and likely new or progression of bilateral adrenal metastases. 2. No significant change in the 1.4 cm right lower lobe pulmonary nodule, but there has been increase in the right hilar lymphadenopathy. 3. The left paratracheal lymphadenopathy has decreased. Several tiny pulmonary nodules bilaterally appear stable. The cluster of reticulonodular opacities      XR Chest 1 View    Result Date: 10/19/2023  No focal pulmonary consolidation. Redemonstration of right lower lung nodule.    This report was finalized on 10/19/2023 12:23 PM by Dr. Inderjit Galan M.D on Workstation: [id number]      MRI Brain With & Without Contrast    Result Date: 9/21/2023   There is interval response to therapy with reduction in size of the supratentorial and infratentorial metastatic lesions as discussed in detail above.  There is a punctate focus of diffusion weighted hyperintensity within the anterior aspect of the right centrum semiovale measuring up to approximately 2-3 mm in diameter where no correlating abnormal contrast enhancing focus is appreciated. The findings are felt to represent a tiny acute to subacute infarct within the right MCA distribution.  These findings were discussed with Dr. Russell Chambers on 09/21/2023 at approximately 11:45 a.m.  This report was finalized on 9/21/2023 4:19 PM by Dr. Vinny Brady M.D.     Microbiology Results (last 10 days)       ** No results found for the last 240 hours. **          Microbiology Results (last 10 days)       ** No results found for the last 240 hours. **             Assessment:  Active Hospital Problems    Diagnosis  POA    **Physical deconditioning [R53.81]  Yes    Coronary arteriosclerosis [I25.10]  Yes    History of malignant neoplasm of prostate [Z85.46]  Not Applicable     Formatting of this note might be different from the original. hx prostate cancer      Depressive disorder [F32.A]  Yes    Lung cancer metastatic to brain [C34.90, C79.31]  Yes    Chronic midline low back pain without sciatica [M54.50, G89.29]  Yes     Last Assessment & Plan: Formatting of this note might be different from the original. Controlled with zanaflex prn Requesting refill on zanaflex today Buddy Cisneros MD      CAD (coronary artery disease) [I25.10]  Yes     Last Assessment & Plan: Formatting of this note might be different from the original. He really should be on more potent cholesterol medication Change pravastatin to rosuvastatin if insurance will approve He did not tolerate atorvastatin Buddy Cisneros MD      Atrial fibrillation [I48.91]  Yes    Benign essential hypertension [I10]  Yes     Last Assessment & Plan: Formatting of this note might be different from the original. Controlled Continue current regimen Buddy Cisneros MD      Obstructive sleep apnea [G47.33]  Yes       Aaron decision making/care plan:  Physical deconditioning in the setting of recent diagnosis of lung cancer with Mediport in place status post radiation treatment for metastatic process involving the brain lungs and liver with worsening while on immunotherapy after completion of chemotherapy in June 2023-this presentation is concerning for side effects and reaction to immunotherapy plan is to admit the patient continue with as needed nebulizer treatment and OT PT evaluation.  Metastatic lung cancer with mets to brain and liver-consult hematology oncology service regarding his current regimen of immunotherapy and radiation treatment.  Atrial fibrillation-currently rate is controlled plan is to  monitor and continue home regimen of beta-blocker and aspirin.      Rosalva Cehster MD   10/19/2023  17:50 EDT    Parts of this note may be an electronic transcription/translation of spoken language to printed text using the Dragon dictation system.

## 2023-10-19 NOTE — ED NOTES
Patient was brought in from outpatient radiology; patient dx with stage four lung cancer in February- for the past two weeks patient has been fatigued and has a poor appetite.

## 2023-10-19 NOTE — NURSING NOTE
1040 patient resting in triage bay 1 on his right side, VSS, 90- 128/70- 22 - 98% Room Air, Patient reports bilateral leg pain of 6/10.  He is having his CT scan at 1100 (after the po contrast).  I phoned the ER triage RN Jen and reported that this patient will have his CT scan then he will be coming to the ED for evaluation.  Two family members here with him in bay 1 and they are attentive to him and providing support today. Report given to Ludivina Simpson RN as well.

## 2023-10-19 NOTE — ED NOTES
"Nursing report ED to floor  Russell Ramírez Sr.  78 y.o.  male    HPI :   Chief Complaint   Patient presents with    Fatigue       Admitting doctor:   Rosalva Chester MD    Admitting diagnosis:   The encounter diagnosis was Physical deconditioning.    Code status:   Current Code Status       Date Active Code Status Order ID Comments User Context       Not on file            Allergies:   Codeine    Isolation:   No active isolations    Intake and Output  No intake or output data in the 24 hours ending 10/19/23 1436    Weight:       10/19/23  1127   Weight: 91.6 kg (202 lb)       Most recent vitals:   Vitals:    10/19/23 1119 10/19/23 1122 10/19/23 1127 10/19/23 1256   BP: 117/63   123/78   Patient Position:    Lying   Pulse: 92   89   Resp: 18   16   Temp:  99.1 °F (37.3 °C)     SpO2: 99%   92%   Weight:   91.6 kg (202 lb)    Height:   182.9 cm (72\")        Active LDAs/IV Access:   Lines, Drains & Airways       Active LDAs       Name Placement date Placement time Site Days    Single Lumen Implantable Port 03/20/23 Right Internal jugular 03/20/23  --  Internal jugular  213                    Labs (abnormal labs have a star):   Labs Reviewed   COMPREHENSIVE METABOLIC PANEL - Abnormal; Notable for the following components:       Result Value    Sodium 134 (*)     Total Protein 5.9 (*)     Albumin 3.3 (*)     All other components within normal limits    Narrative:     GFR Normal >60  Chronic Kidney Disease <60  Kidney Failure <15    The GFR formula is only valid for adults with stable renal function between ages 18 and 70.   CBC WITH AUTO DIFFERENTIAL - Abnormal; Notable for the following components:    RBC 3.28 (*)     Hemoglobin 10.8 (*)     Hematocrit 31.2 (*)     Lymphocyte % 13.8 (*)     Monocyte % 12.6 (*)     Immature Grans % 1.7 (*)     Immature Grans, Absolute 0.12 (*)     All other components within normal limits   TSH - Normal   SINGLE HSTROPONIN T - Normal    Narrative:     High Sensitive Troponin T Reference " Range:  <10.0 ng/L- Negative Female for AMI  <15.0 ng/L- Negative Male for AMI  >=10 - Abnormal Female indicating possible myocardial injury.  >=15 - Abnormal Male indicating possible myocardial injury.   Clinicians would have to utilize clinical acumen, EKG, Troponin, and serial changes to determine if it is an Acute Myocardial Infarction or myocardial injury due to an underlying chronic condition.        URINALYSIS W/ MICROSCOPIC IF INDICATED (NO CULTURE)   CBC AND DIFFERENTIAL    Narrative:     The following orders were created for panel order CBC & Differential.  Procedure                               Abnormality         Status                     ---------                               -----------         ------                     CBC Auto Differential[[id number]]        Abnormal            Final result                 Please view results for these tests on the individual orders.       EKG:   ECG 12 Lead Other; weakness   Preliminary Result   HEART RATE= 90  bpm   RR Interval= 667  ms   RI Interval= 52  ms   P Horizontal Axis= -20  deg   P Front Axis= 86  deg   QRSD Interval= 115  ms   QT Interval= 362  ms   QTcB= 443  ms   QRS Axis= -34  deg   T Wave Axis= 65  deg   - ABNORMAL ECG -   Sinus rhythm   Short RI interval   Probable left atrial enlargement   Nonspecific intraventricular conduction delay   Baseline wander in lead(s) V4   Electronically Signed By:    Date and Time of Study: 2023-10-19 11:41:10          Meds given in ED:   Medications   sodium chloride 0.9 % bolus 1,000 mL (has no administration in time range)       Imaging results:  XR Chest 1 View    Result Date: 10/19/2023  No focal pulmonary consolidation. Redemonstration of right lower lung nodule.    This report was finalized on 10/19/2023 12:23 PM by Dr. Inderjit Galan M.D on Workstation: [id number]       Ambulatory status:   - not ambulatory due to weakness    Social issues:   Social History     Socioeconomic History    Marital status:       Spouse name: Marivel   Tobacco Use    Smoking status: Former     Packs/day: .5     Types: Cigarettes     Start date: 1976     Quit date: 2020     Years since quitting: 3.8    Smokeless tobacco: Never   Vaping Use    Vaping Use: Never used   Substance and Sexual Activity    Alcohol use: Never    Drug use: Never    Sexual activity: Defer       NIH Stroke Scale:       Tatiana Bradshaw RN  10/19/23 14:36 EDT

## 2023-10-20 LAB
ALBUMIN SERPL-MCNC: 3.4 G/DL (ref 3.5–5.2)
ALBUMIN/GLOB SERPL: 1.4 G/DL
ALP SERPL-CCNC: 74 U/L (ref 39–117)
ALT SERPL W P-5'-P-CCNC: 20 U/L (ref 1–41)
ANION GAP SERPL CALCULATED.3IONS-SCNC: 7 MMOL/L (ref 5–15)
AST SERPL-CCNC: 19 U/L (ref 1–40)
B PARAPERT DNA SPEC QL NAA+PROBE: NOT DETECTED
B PERT DNA SPEC QL NAA+PROBE: NOT DETECTED
BASOPHILS # BLD AUTO: 0.03 10*3/MM3 (ref 0–0.2)
BASOPHILS NFR BLD AUTO: 0.5 % (ref 0–1.5)
BILIRUB SERPL-MCNC: 0.6 MG/DL (ref 0–1.2)
BUN SERPL-MCNC: 16 MG/DL (ref 8–23)
BUN/CREAT SERPL: 19 (ref 7–25)
C PNEUM DNA NPH QL NAA+NON-PROBE: NOT DETECTED
CALCIUM SPEC-SCNC: 8.7 MG/DL (ref 8.6–10.5)
CHLORIDE SERPL-SCNC: 103 MMOL/L (ref 98–107)
CO2 SERPL-SCNC: 27 MMOL/L (ref 22–29)
CORTIS SERPL-MCNC: 27 MCG/DL
CREAT SERPL-MCNC: 0.84 MG/DL (ref 0.76–1.27)
DEPRECATED RDW RBC AUTO: 45.1 FL (ref 37–54)
EGFRCR SERPLBLD CKD-EPI 2021: 89.8 ML/MIN/1.73
EOSINOPHIL # BLD AUTO: 0.15 10*3/MM3 (ref 0–0.4)
EOSINOPHIL NFR BLD AUTO: 2.3 % (ref 0.3–6.2)
ERYTHROCYTE [DISTWIDTH] IN BLOOD BY AUTOMATED COUNT: 13.7 % (ref 12.3–15.4)
FLUAV SUBTYP SPEC NAA+PROBE: NOT DETECTED
FLUBV RNA ISLT QL NAA+PROBE: NOT DETECTED
GLOBULIN UR ELPH-MCNC: 2.5 GM/DL
GLUCOSE SERPL-MCNC: 96 MG/DL (ref 65–99)
HADV DNA SPEC NAA+PROBE: NOT DETECTED
HCOV 229E RNA SPEC QL NAA+PROBE: NOT DETECTED
HCOV HKU1 RNA SPEC QL NAA+PROBE: NOT DETECTED
HCOV NL63 RNA SPEC QL NAA+PROBE: NOT DETECTED
HCOV OC43 RNA SPEC QL NAA+PROBE: NOT DETECTED
HCT VFR BLD AUTO: 29.9 % (ref 37.5–51)
HGB BLD-MCNC: 10.4 G/DL (ref 13–17.7)
HMPV RNA NPH QL NAA+NON-PROBE: NOT DETECTED
HPIV1 RNA ISLT QL NAA+PROBE: NOT DETECTED
HPIV2 RNA SPEC QL NAA+PROBE: NOT DETECTED
HPIV3 RNA NPH QL NAA+PROBE: NOT DETECTED
HPIV4 P GENE NPH QL NAA+PROBE: NOT DETECTED
IMM GRANULOCYTES # BLD AUTO: 0.11 10*3/MM3 (ref 0–0.05)
IMM GRANULOCYTES NFR BLD AUTO: 1.7 % (ref 0–0.5)
LYMPHOCYTES # BLD AUTO: 0.87 10*3/MM3 (ref 0.7–3.1)
LYMPHOCYTES NFR BLD AUTO: 13.1 % (ref 19.6–45.3)
M PNEUMO IGG SER IA-ACNC: NOT DETECTED
MCH RBC QN AUTO: 32.4 PG (ref 26.6–33)
MCHC RBC AUTO-ENTMCNC: 34.8 G/DL (ref 31.5–35.7)
MCV RBC AUTO: 93.1 FL (ref 79–97)
MONOCYTES # BLD AUTO: 0.72 10*3/MM3 (ref 0.1–0.9)
MONOCYTES NFR BLD AUTO: 10.8 % (ref 5–12)
NEUTROPHILS NFR BLD AUTO: 4.76 10*3/MM3 (ref 1.7–7)
NEUTROPHILS NFR BLD AUTO: 71.6 % (ref 42.7–76)
NRBC BLD AUTO-RTO: 0 /100 WBC (ref 0–0.2)
PLATELET # BLD AUTO: 132 10*3/MM3 (ref 140–450)
PMV BLD AUTO: 8.5 FL (ref 6–12)
POTASSIUM SERPL-SCNC: 4.2 MMOL/L (ref 3.5–5.2)
PROT SERPL-MCNC: 5.9 G/DL (ref 6–8.5)
RBC # BLD AUTO: 3.21 10*6/MM3 (ref 4.14–5.8)
RHINOVIRUS RNA SPEC NAA+PROBE: NOT DETECTED
RSV RNA NPH QL NAA+NON-PROBE: NOT DETECTED
SARS-COV-2 RNA NPH QL NAA+NON-PROBE: NOT DETECTED
SODIUM SERPL-SCNC: 137 MMOL/L (ref 136–145)
WBC NRBC COR # BLD: 6.64 10*3/MM3 (ref 3.4–10.8)

## 2023-10-20 PROCEDURE — 25010000002 ONDANSETRON PER 1 MG: Performed by: INTERNAL MEDICINE

## 2023-10-20 PROCEDURE — 85025 COMPLETE CBC W/AUTO DIFF WBC: CPT | Performed by: INTERNAL MEDICINE

## 2023-10-20 PROCEDURE — 99223 1ST HOSP IP/OBS HIGH 75: CPT | Performed by: INTERNAL MEDICINE

## 2023-10-20 PROCEDURE — 25810000003 SODIUM CHLORIDE 0.9 % SOLUTION: Performed by: INTERNAL MEDICINE

## 2023-10-20 PROCEDURE — 0202U NFCT DS 22 TRGT SARS-COV-2: CPT | Performed by: INTERNAL MEDICINE

## 2023-10-20 PROCEDURE — 82533 TOTAL CORTISOL: CPT | Performed by: INTERNAL MEDICINE

## 2023-10-20 PROCEDURE — 80053 COMPREHEN METABOLIC PANEL: CPT | Performed by: INTERNAL MEDICINE

## 2023-10-20 RX ORDER — LORAZEPAM 2 MG/ML
0.5 INJECTION INTRAMUSCULAR
Status: DISCONTINUED | OUTPATIENT
Start: 2023-10-20 | End: 2023-10-27

## 2023-10-20 RX ORDER — ONDANSETRON 2 MG/ML
4 INJECTION INTRAMUSCULAR; INTRAVENOUS EVERY 4 HOURS PRN
Status: DISCONTINUED | OUTPATIENT
Start: 2023-10-20 | End: 2023-11-02 | Stop reason: HOSPADM

## 2023-10-20 RX ADMIN — ONDANSETRON 4 MG: 2 INJECTION INTRAMUSCULAR; INTRAVENOUS at 18:40

## 2023-10-20 RX ADMIN — ACETAMINOPHEN 650 MG: 325 TABLET, FILM COATED ORAL at 08:54

## 2023-10-20 RX ADMIN — Medication 1000 MCG: at 08:54

## 2023-10-20 RX ADMIN — TRAMADOL HYDROCHLORIDE 100 MG: 50 TABLET, COATED ORAL at 14:17

## 2023-10-20 RX ADMIN — SENNOSIDES AND DOCUSATE SODIUM 2 TABLET: 50; 8.6 TABLET ORAL at 08:55

## 2023-10-20 RX ADMIN — ONDANSETRON 4 MG: 2 INJECTION INTRAMUSCULAR; INTRAVENOUS at 14:17

## 2023-10-20 RX ADMIN — ONDANSETRON 4 MG: 2 INJECTION INTRAMUSCULAR; INTRAVENOUS at 08:54

## 2023-10-20 RX ADMIN — SENNOSIDES AND DOCUSATE SODIUM 2 TABLET: 50; 8.6 TABLET ORAL at 21:29

## 2023-10-20 RX ADMIN — Medication 10 ML: at 08:55

## 2023-10-20 RX ADMIN — ASPIRIN 81 MG: 81 TABLET, COATED ORAL at 21:31

## 2023-10-20 RX ADMIN — FAMOTIDINE 20 MG: 20 TABLET ORAL at 08:54

## 2023-10-20 RX ADMIN — METOPROLOL TARTRATE 12.5 MG: 25 TABLET, FILM COATED ORAL at 21:28

## 2023-10-20 RX ADMIN — METOPROLOL TARTRATE 25 MG: 25 TABLET, FILM COATED ORAL at 08:55

## 2023-10-20 RX ADMIN — SODIUM CHLORIDE 100 ML/HR: 9 INJECTION, SOLUTION INTRAVENOUS at 18:24

## 2023-10-20 RX ADMIN — TRAMADOL HYDROCHLORIDE 100 MG: 50 TABLET, COATED ORAL at 03:39

## 2023-10-20 RX ADMIN — FAMOTIDINE 20 MG: 20 TABLET ORAL at 21:29

## 2023-10-20 RX ADMIN — SODIUM CHLORIDE 100 ML/HR: 9 INJECTION, SOLUTION INTRAVENOUS at 01:48

## 2023-10-20 RX ADMIN — TRAMADOL HYDROCHLORIDE 50 MG: 50 TABLET, COATED ORAL at 22:03

## 2023-10-20 NOTE — SIGNIFICANT NOTE
10/20/23 1341   OTHER   Discipline occupational therapist   Rehab Time/Intention   Session Not Performed other (see comments)  (Checked on in AM and PM. Hold OT eval per RN. Patient with N&V. plans to transfer to [facility] as well, will f/u next service date.)   Recommendation   OT - Next Appointment 10/21/23

## 2023-10-20 NOTE — PROGRESS NOTES
Name: Russell Ramírez . ADMIT: 10/19/2023   : 1945  PCP: Morgan Sebastian DO    MRN: [id number] LOS: 0 days   AGE/SEX: 78 y.o. male  ROOM: [facility]/     Subjective   Subjective   Chief Complaint   Patient presents with    Fatigue     Having headache. Reports nausea associated. No vision change. Having some neck pain, right sided. No stiffness. Nausea present. No abdominal pain. Decreased intake reported. No BM for 2 days which is unusual for him. Difficulty with urination. No flank pain. HA has been intermittent for few days. Reporting recent PNA treatment. Having sinus congestion and cough. Does not think RVP was checked as outpatient. No fevers or chills reported. No productive cough currently.     Objective   Objective   Vital Signs  Temp:  [97.5 °F (36.4 °C)-99.9 °F (37.7 °C)] 97.5 °F (36.4 °C)  Heart Rate:  [80-92] 88  Resp:  [16-22] 18  BP: (100-142)/(56-85) 128/78  SpO2:  [92 %-99 %] 93 %  on  Flow (L/min):  [2] 2;   Device (Oxygen Therapy): room air  Body mass index is 28.38 kg/m².    Physical Exam  Vitals and nursing note reviewed.   Constitutional:       Appearance: He is ill-appearing. He is not diaphoretic.   HENT:      Head: Atraumatic.   Eyes:      Conjunctiva/sclera: Conjunctivae normal.      Pupils: Pupils are equal, round, and reactive to light.   Cardiovascular:      Rate and Rhythm: Normal rate and regular rhythm.      Pulses: Normal pulses.   Pulmonary:      Effort: Pulmonary effort is normal.      Breath sounds: Rhonchi present. No wheezing.   Abdominal:      General: There is no distension.      Palpations: Abdomen is soft.      Tenderness: There is no abdominal tenderness. There is no guarding or rebound.   Musculoskeletal:         General: No swelling or tenderness.   Skin:     General: Skin is warm and dry.   Neurological:      Mental Status: He is alert.      Motor: Weakness (generalized) present.   Psychiatric:         Mood and Affect: Mood normal.         Behavior: Behavior  "normal.       Results Review  I reviewed the patient's new clinical results.    Results from last 7 days   Lab Units 10/20/23  0644 10/19/23  1137   WBC 10*3/mm3 6.64 7.05   HEMOGLOBIN g/dL 10.4* 10.8*   PLATELETS 10*3/mm3 132* 150     Results from last 7 days   Lab Units 10/20/23  0644 10/19/23  1137 10/19/23  1020   SODIUM mmol/L 137 134*  --    POTASSIUM mmol/L 4.2 4.1  --    CHLORIDE mmol/L 103 102  --    CO2 mmol/L 27.0 24.8  --    BUN mg/dL 16 20  --    CREATININE mg/dL 0.84 1.00 1.10   GLUCOSE mg/dL 96 95  --    EGFR mL/min/1.73 89.8 77.5  --      Results from last 7 days   Lab Units 10/20/23  0644 10/19/23  1137   ALBUMIN g/dL 3.4* 3.3*   BILIRUBIN mg/dL 0.6 0.4   ALK PHOS U/L 74 71   AST (SGOT) U/L 19 18   ALT (SGPT) U/L 20 19     Results from last 7 days   Lab Units 10/20/23  0644 10/19/23  1137   CALCIUM mg/dL 8.7 8.7   ALBUMIN g/dL 3.4* 3.3*         No results found for: \"HGBA1C\", \"POCGLU\"    CT Chest With Contrast Diagnostic    Result Date: 10/20/2023  1. Interval development of hepatic metastases and likely new or progression of bilateral adrenal metastases. 2. No significant change in the 1.4 cm right lower lobe pulmonary nodule, but there has been increase in the right hilar lymphadenopathy. Close follow-up is recommended for the 7 mm right middle lobe pulmonary nodule which appears marginally larger. There are also stable tiny bilateral pulmonary nodules. 3. The left paratracheal lymphadenopathy has decreased.  This report was finalized on 10/20/2023 8:11 AM by Dr. Gianna Flores M.D on Workstation: [id number]      CT Abdomen Pelvis With Contrast    Result Date: 10/20/2023  1. Interval development of hepatic metastases and likely new or progression of bilateral adrenal metastases. 2. No significant change in the 1.4 cm right lower lobe pulmonary nodule, but there has been increase in the right hilar lymphadenopathy. Close follow-up is recommended for the 7 mm right middle lobe pulmonary nodule which " appears marginally larger. There are also stable tiny bilateral pulmonary nodules. 3. The left paratracheal lymphadenopathy has decreased.  This report was finalized on 10/20/2023 8:11 AM by Dr. Gianna Flores M.D on Workstation: [id number]      XR Chest 1 View    Result Date: 10/19/2023  No focal pulmonary consolidation. Redemonstration of right lower lung nodule.    This report was finalized on 10/19/2023 12:23 PM by Dr. Inderjit Galan M.D on Workstation: [id number]       I have personally reviewed all medications:  Scheduled Medications  aspirin, 81 mg, Oral, Q24H  famotidine, 20 mg, Oral, BID  metoprolol tartrate, 25 mg, Oral, Q12H  senna-docusate sodium, 2 tablet, Oral, BID  sodium chloride, 10 mL, Intravenous, Q12H  cyanocobalamin, 1,000 mcg, Oral, Daily      Infusions  sodium chloride, 100 mL/hr, Last Rate: 100 mL/hr (10/20/23 0941)      Diet  Diet: Regular/House Diet; Texture: Regular Texture (IDDSI 7); Fluid Consistency: Thin (IDDSI 0)    I have personally reviewed:  [x]  Laboratory   []  Microbiology   [x]  Radiology   [x]  EKG/Telemetry  []  Cardiology/Vascular   []  Pathology    []  Records       Assessment/Plan     Active Hospital Problems    Diagnosis  POA    **Physical deconditioning [R53.81]  Yes    Coronary arteriosclerosis [I25.10]  Yes    History of malignant neoplasm of prostate [Z85.46]  Not Applicable    Depressive disorder [F32.A]  Yes    Lung cancer metastatic to brain [C34.90, 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       78 y.o. male admitted with Physical deconditioning.    Metastatic Lung Cancer/Hx Prostate Cancer: Liver and adrenal metastasis noted on CT. Will order MRI brain with headache reported. Consult  Oncology.  Difficulty Urinating: Check post void. UA was not infectious. Screen PSA.  NV/Constipation: Continue bowel regimen. Bilirubin/LFTs ok. Monitor progression.  Rhinitis: Reports recent PNA treatment. Check RVP with ongoing symptoms.  AFib: His chads vasc is elevated. Will defer anticoagulation v continued aspirin to oncology.  Deconditioning: PT/OT  PPX: per oncology  Disposition: TBD, transfer to [facility]    Expected discharge date/ time has not been documented.     Myron Jang MD  [facility]  10/20/23  10:04 EDT    Dictated portions of note using Dragon dictation software.  Copied text in this note has been reviewed by me and remains accurate as of 10/20/23

## 2023-10-20 NOTE — SIGNIFICANT NOTE
10/20/23 1321   OTHER   Discipline physical therapist   Rehab Time/Intention   Session Not Performed other (see comments)  (Checked on in AM and PM. Hold PT eval per RN. Patient with N&V. PT will f/u.)   Recommendation   PT - Next Appointment 10/21/23

## 2023-10-20 NOTE — NURSING NOTE
Pt transferred from 8P to 3P. Pt alert to self, calm, feeling nauseous. IVF at 100 ml/hr. Family at bedside, attentive to pt. RN called MRI and MRI tech stated that it will be done tonight in a couple hrs. PRN Zofran frequency changed to Q4. RN will continue to monitor.

## 2023-10-20 NOTE — CONSULTS
Nutrition Services    Patient Name:  Russell Ramírez Sr.  YOB: 1945  MRN: [id number]  Admit Date:  10/19/2023  Assessment Date:  10/20/23    Summary: Nutrition screen per RN admission screen  This is a 79 yo male admitted for c/o weakness, decrease po, due to metastatic lung cancer.  Pt has been working with the oncology RD  He reports decrease appetite and has not been drinking much of his boost lately.  -220----12lb wt loss(5.5%)  Regular diet, ate 100% of dinner last night    Plan/Recommendation  Encourage good po intake with all meals  Encourage boost supplements    Will continue to follow clinical course and monitor nutritional needs.     CLINICAL NUTRITION ASSESSMENT      Reason for Assessment Nurse Admission Screen     Diagnosis/Problem   weakness, decrease po, due to metastatic lung cancer.   Medical/Surgical History Past Medical History:   Diagnosis Date    Acid reflux     Anemia     Arthritis     Coronary artery disease     CVA (cerebral vascular accident)     High cholesterol     History of atrial fibrillation     Hx of blood clots     Hypertension     Irregular heartbeat     Peptic ulcer disease     Prostate cancer     Rheumatoid arthritis     Skin cancer     Small cell lung cancer        Past Surgical History:   Procedure Laterality Date    ABDOMINAL AORTIC ANEURYSM REPAIR      BACK SURGERY      x2    BRONCHOSCOPY WITH ION ROBOTIC ASSIST N/A 03/13/2023    Procedure: BRONCHOSCOPY ,ENDOBRONCHIAL ULTRASOUND AND ROBOTIC NAVIGATIONAL BRONCHOSCOPY WITH FINE NEEDLE ASPIRATION, BIOPSY X1 AREA, AND BRONCHOALEOLAR LAVAGE;  Surgeon: Sonali Lara MD;  Location: [facility] ENDOSCOPY;  Service: Robotics - Pulmonary;  Laterality: N/A;  Post: LUNG CANCER    CARDIAC ABLATION      x2    COLON SURGERY      HERNIA REPAIR      INSERTION CENTRAL VENOUS ACCESS DEVICE W/ SUBCUTANEOUS PORT Right 03/20/2023    VENOUS ACCESS DEVICE (PORT) INSERTION Right 03/20/2023    Procedure: INSERTION VENOUS ACCESS  "DEVICE;  Surgeon: Javier Gonzalez MD PhD;  Location: [facility] OR;  Service: Thoracic;  Laterality: Right;        Anthropometrics        Current Height  Current Weight  BMI kg/m2 Height: 180.3 cm (71\")  Weight: 92.3 kg (203 lb 7.8 oz) (10/19/23 1525)  Body mass index is 28.38 kg/m².   Adjusted BMI (if applicable)    BMI Category Overweight (25 - 29.9)   Ideal Body Weight (IBW) 166   Usual Body Weight (UBW) 215-220   Weight Trend Loss   Weight History Wt Readings from Last 30 Encounters:   10/19/23 1525 92.3 kg (203 lb 7.8 oz)   10/19/23 1127 91.6 kg (202 lb)   10/05/23 1318 95 kg (209 lb 6.4 oz)   09/22/23 1141 99 kg (218 lb 4.1 oz)   09/20/23 1145 96.6 kg (213 lb)   09/14/23 1254 96.7 kg (213 lb 3.2 oz)   08/24/23 1020 98.2 kg (216 lb 9.6 oz)   08/15/23 1355 94.7 kg (208 lb 12.4 oz)   08/08/23 1406 96.7 kg (213 lb 3 oz)   08/03/23 0931 95.5 kg (210 lb 9.6 oz)   07/31/23 0957 95.3 kg (210 lb 1.6 oz)   07/26/23 1218 93 kg (205 lb)   07/13/23 1056 93.8 kg (206 lb 12.8 oz)   06/21/23 0813 95.2 kg (209 lb 12.8 oz)   06/02/23 1440 98 kg (216 lb)   06/01/23 1305 97.4 kg (214 lb 12.8 oz)   05/31/23 0800 96 kg (211 lb 11.2 oz)   05/10/23 1435 98.4 kg (217 lb)   05/09/23 1320 98.3 kg (216 lb 12.8 oz)   05/08/23 0854 95.8 kg (211 lb 4.8 oz)   05/05/23 1115 95.3 kg (210 lb)   04/19/23 1358 100 kg (221 lb)   04/18/23 1407 98.8 kg (217 lb 12.8 oz)   05/05/23 0913 100 kg (221 lb)   04/17/23 0817 98 kg (216 lb 1.6 oz)   04/11/23 1324 98.1 kg (216 lb 3.2 oz)   03/29/23 1418 99.8 kg (220 lb)   03/28/23 1347 98.4 kg (217 lb)   03/27/23 0955 98 kg (216 lb)   03/21/23 1045 96.6 kg (213 lb)   03/20/23 0726 97.7 kg (215 lb 6.2 oz)      --  Labs       Pertinent Labs    Results from last 7 days   Lab Units 10/20/23  0644 10/19/23  1137 10/19/23  1020   SODIUM mmol/L 137 134*  --    POTASSIUM mmol/L 4.2 4.1  --    CHLORIDE mmol/L 103 102  --    CO2 mmol/L 27.0 24.8  --    BUN mg/dL 16 20  --    CREATININE mg/dL 0.84 1.00 1.10   CALCIUM " mg/dL 8.7 8.7  --    BILIRUBIN mg/dL 0.6 0.4  --    ALK PHOS U/L 74 71  --    ALT (SGPT) U/L 20 19  --    AST (SGOT) U/L 19 18  --    GLUCOSE mg/dL 96 95  --      Results from last 7 days   Lab Units 10/20/23  0644   HEMOGLOBIN g/dL 10.4*   HEMATOCRIT % 29.9*   WBC 10*3/mm3 6.64   ALBUMIN g/dL 3.4*     Results from last 7 days   Lab Units 10/20/23  0644 10/19/23  1137   PLATELETS 10*3/mm3 132* 150     COVID19   Date Value Ref Range Status   06/19/2020 NEG NEG Final     Comment:     (NOTE)  Test Performed by:  [employer] [place] [address]     Lab Results   Component Value Date    HGBA1C 5.6 09/20/2021          Medications           Scheduled Medications aspirin, 81 mg, Oral, Q24H  famotidine, 20 mg, Oral, BID  metoprolol tartrate, 25 mg, Oral, Q12H  senna-docusate sodium, 2 tablet, Oral, BID  sodium chloride, 10 mL, Intravenous, Q12H  cyanocobalamin, 1,000 mcg, Oral, Daily       Infusions sodium chloride, 100 mL/hr, Last Rate: 100 mL/hr (10/20/23 0736)       PRN Medications   acetaminophen **OR** acetaminophen **OR** acetaminophen    albuterol    senna-docusate sodium **AND** polyethylene glycol **AND** bisacodyl **AND** bisacodyl    ondansetron    sodium chloride    sodium chloride    traMADol     Physical Findings          General Findings disoriented, CPAP   Oral/Mouth Cavity dental appliance   Edema  no edema   Gastrointestinal hypoactive bowel sounds, last bowel movement: 10/18   Skin  skin intact   Tubes/Drains/Lines implantable port   NFPE No clinical signs of muscle wasting or fat loss   --  Current Nutrition Orders & Evaluation of Intake       Oral Nutrition     Food Allergies NKFA   Current PO Diet Diet: Regular/House Diet; Texture: Regular Texture (IDDSI 7); Fluid Consistency: Thin (IDDSI 0)   Supplement n/a   PO Evaluation     % PO Intake 100% dinner    Factors Affecting Intake: decreased appetite   --  Estimated/Assessed Needs        Current Weight  Weight: 92.3 kg  (203 lb 7.8 oz) (10/19/23 1525)       Energy Requirements    Weight for Calculation 92.3 kg   Method for Estimation  20 kcal/kg, 25 kcal/kg   EST Needs (kcal/day) 9593-5493       Protein Requirements    Weight for Calculation 92.3 kg   EST Protein Needs (g/kg) 1.0 gm/kg   EST Daily Needs (g/day) 92       Fluid Requirements     Method for Estimation 1 mL/kcal    EST Needs (mL/day)      PES STATEMENT / NUTRITION DIAGNOSIS      Nutrition Dx Problem  Problem: Unintentional Weight Loss and Inadequate Oral Intake  Etiology: Factors Affecting Nutrition - decrease appetite    Signs/Symptoms: PO intake, Unintended Weight Change, and Report/Observation     NUTRITION INTERVENTION / PLAN OF CARE      Intervention Goal(s) Maintain nutrition status, Reduce/improve symptoms, Meet estimated needs, Disease management/therapy, Increase intake, Maintain weight, and PO intake goal %: 75+         RD Intervention/Action Interview for preferences, Encourage intake, Continue to monitor, and Recommend/order: ONS   --      Prescription/Orders:       PO Diet       Supplements BOOST      Enteral Nutrition       Parenteral Nutrition    New Prescription Ordered? Yes   --      Monitor/Evaluation Per protocol   Discharge Plan/Needs Pending clinical course   --    RD to follow per protocol.      Electronically signed by:  Nga Medina RD  10/20/23 08:18 EDT

## 2023-10-20 NOTE — CONSULTS
Subjective     REASON FOR CONSULTATION:  SCLC  Provide an opinion on any further workup or treatment                             REQUESTING PHYSICIAN:  Suhail    RECORDS OBTAINED:  Records of the patients history including those obtained from the referring provider were reviewed and summarized in detail.    History of Present Illness   This is a 78-year-old man followed in our practice by Dr. Conn with extensive stage small cell lung cancer previously treated with 4 cycles of carboplatin/-16/Tecentriq with good response followed by maintenance Tecentriq every 3 weeks most recently given on 10/5/2023.  The patient developed brain metastases by MRI brain 7/26/2023 treated with whole brain radiation with improved findings per MRI brain 9/20/2023.    The patient was doing well until 3 weeks ago he developed cough and shortness of breath treated for pneumonia in his hometown of [facility].  He was treated with doxycycline but did not improve and then received steroids and an alternate antibiotic outpatient.  Since that time he has continued to have decline in his performance status, not eating or drinking and has subsequently become very weak and lightheaded.    He was in town yesterday for CT restaging and felt so ill he presented to the ER for evaluation subsequently admitted.    His CTs yesterday unfortunately show progression of cancer with enlarging mediastinal lymph nodes, progression of disease in the liver, bilateral adrenal metastases.  MRI of the brain is pending.    Labs are pertinent for hemoglobin 10.4, normal white count 6.6, platelets 132, total protein 5.9/albumin 3.4.  TSH is normal 0.771.    Past Medical History:   Diagnosis Date    Acid reflux     Anemia     Arthritis     Coronary artery disease     CVA (cerebral vascular accident)     High cholesterol     History of atrial fibrillation     Hx of blood clots     Hypertension     Irregular heartbeat     Peptic ulcer disease     Prostate  cancer     Rheumatoid arthritis     Skin cancer     Small cell lung cancer         Past Surgical History:   Procedure Laterality Date    ABDOMINAL AORTIC ANEURYSM REPAIR      BACK SURGERY      x2    BRONCHOSCOPY WITH ION ROBOTIC ASSIST N/A 03/13/2023    Procedure: BRONCHOSCOPY ,ENDOBRONCHIAL ULTRASOUND AND ROBOTIC NAVIGATIONAL BRONCHOSCOPY WITH FINE NEEDLE ASPIRATION, BIOPSY X1 AREA, AND BRONCHOALEOLAR LAVAGE;  Surgeon: Sonali Lara MD;  Location: [facility] ENDOSCOPY;  Service: Robotics - Pulmonary;  Laterality: N/A;  Post: LUNG CANCER    CARDIAC ABLATION      x2    COLON SURGERY      HERNIA REPAIR      INSERTION CENTRAL VENOUS ACCESS DEVICE W/ SUBCUTANEOUS PORT Right 03/20/2023    VENOUS ACCESS DEVICE (PORT) INSERTION Right 03/20/2023    Procedure: INSERTION VENOUS ACCESS DEVICE;  Surgeon: Javier Gonzalez MD PhD;  Location: [facility] MAIN OR;  Service: Thoracic;  Laterality: Right;        Current Facility-Administered Medications on File Prior to Encounter   Medication Dose Route Frequency Provider Last Rate Last Admin    [DISCONTINUED] heparin injection 500 Units  500 Units Intravenous PRN Alexsander Conn MD        [DISCONTINUED] sodium chloride 0.9 % flush 10 mL  10 mL Intravenous PRN Alexsander Conn MD         Current Outpatient Medications on File Prior to Encounter   Medication Sig Dispense Refill    bethanechol (URECHOLINE) 50 MG tablet Take 1 tablet by mouth Every 12 (Twelve) Hours.      cyanocobalamin (VITAMIN B-12) 1000 MCG tablet Take 1 tablet by mouth Daily.      famotidine (PEPCID) 20 MG tablet Take 1 tablet by mouth twice daily 60 tablet 0    LORazepam (Ativan) 1 MG tablet Take 1 tablet by mouth as needed 1 hour prior to CT scan. 1 tablet 0    Magnesium 250 MG tablet Take  by mouth.      metoprolol tartrate (LOPRESSOR) 25 MG tablet metoprolol tartrate 25 mg tablet   TAKE 1 TABLET BY MOUTH TWICE DAILY.      ondansetron (ZOFRAN) 8 MG tablet Take 1 tablet by mouth 3 (Three) Times a Day As Needed for  Nausea or Vomiting. 30 tablet 5    traMADol (ULTRAM) 50 MG tablet Take 2 tablets by mouth Every 6 (Six) Hours As Needed for Moderate Pain. 340 tablet 0    Vitamin B Complex-C capsule       vitamin D3 125 MCG (5000 UT) capsule capsule Take 1 capsule by mouth Daily.      Zinc 50 MG tablet Take 1 tablet by mouth Daily.      Acetaminophen (ARTHRITIS PAIN RELIEF PO)       ascorbic acid (VITAMIN C) 1000 MG tablet       Ashwagandha 125 MG capsule       aspirin 81 MG EC tablet Daily.      benzonatate (TESSALON) 100 MG capsule Take 1 capsule by mouth 3 (Three) Times a Day As Needed for Cough.      coenzyme Q10 100 MG capsule Take 1 capsule by mouth Daily.      Creatinine powder       dexAMETHasone (DECADRON) 2 MG tablet Take 1 tablet by mouth Daily With Breakfast. Take 1 tablet by mouth daily with breakfast for one week then stop. If develop neurologic symptoms restart 1 tablet daily and notify our office. 30 tablet 0    diphenhydrAMINE (BENADRYL) 25 mg capsule Take 1 capsule by mouth Every 6 (Six) Hours As Needed for Itching.      doxycycline (MONODOX) 100 MG capsule Take 1 capsule by mouth Every 12 (Twelve) Hours.      Elderberry 500 MG capsule Take  by mouth.      fluocinonide (LIDEX) 0.05 % cream APPLY TO THE AFFECTED AREA(S) BY TOPICAL ROUTE 2 TIMES PER DAY      Misc Natural Products (SUPER GREENS PO) Take  by mouth.      Misc Natural Products (YUMVS BEET ROOT-TART CHERRY PO) Take  by mouth.      Turmeric 500 MG capsule Take  by mouth.      Ventolin  (90 Base) MCG/ACT inhaler INHALE 2 PUFFS BY MOUTH EVERY 4 TO 6 HOURS AS NEEDED FOR SHORTNESS OF BREATH FOR WHEEZING          ALLERGIES:    Allergies   Allergen Reactions    Codeine Other (See Comments)     headache          Social History     Socioeconomic History    Marital status:      Spouse name: Marivel   Tobacco Use    Smoking status: Former     Packs/day: .5     Types: Cigarettes     Start date: 1976     Quit date: 2020     Years since quitting: 3.8     Smokeless tobacco: Never   Vaping Use    Vaping Use: Never used   Substance and Sexual Activity    Alcohol use: Never    Drug use: Never    Sexual activity: Defer        Family History   Problem Relation Age of Onset    Brain cancer Mother     Lung cancer Sister         Review of Systems   Constitutional:  Positive for activity change, appetite change, fatigue and unexpected weight change. Negative for fever.   HENT: Negative.     Respiratory:  Positive for cough and shortness of breath. Negative for wheezing.    Cardiovascular:  Negative for palpitations and leg swelling.   Gastrointestinal:  Positive for nausea. Negative for diarrhea and vomiting.   Genitourinary:  Positive for decreased urine volume.   Musculoskeletal:  Positive for arthralgias and back pain.   Skin: Negative.    Neurological:  Positive for weakness and light-headedness. Negative for dizziness.   Hematological: Negative.    Psychiatric/Behavioral:  Positive for decreased concentration, dysphoric mood and sleep disturbance.           Objective     Vitals:    10/19/23 2321 10/20/23 0213 10/20/23 0623 10/20/23 0928   BP:  142/85 128/69 128/78   BP Location:  Right arm Right arm Right arm   Patient Position:  Lying Lying Lying   Pulse: 81 81 82 88   Resp: 18 18 18 18   Temp:  98.3 °F (36.8 °C) 97.5 °F (36.4 °C) 97.5 °F (36.4 °C)   TempSrc:  Oral Oral Oral   SpO2: 97% 94% 96% 93%   Weight:       Height:             10/5/2023     1:19 PM   Current Status   ECOG score 0       Physical Exam    CONSTITUTIONAL: Ill-appearing elderly man lying in bed  HEENT: no icterus, no thrush, dry membranes  LYMPH: no cervical or supraclavicular lad  CV: RRR, S1S2, no murmur  RESP: Breath sounds diminished no wheezing, no rales  GI: soft, non-tender, no splenomegaly, +bs  MUSC: no edema   NEURO: alert and oriented x3 but very weak and difficult to understand  PSYCH: Depressed mood/depressed/anxious affect  Skin: No ecchymoses or rashes      RECENT LABS:  Hematology WBC    Date Value Ref Range Status   10/20/2023 6.64 3.40 - 10.80 10*3/mm3 Final     RBC   Date Value Ref Range Status   10/20/2023 3.21 (L) 4.14 - 5.80 10*6/mm3 Final     Hemoglobin   Date Value Ref Range Status   10/20/2023 10.4 (L) 13.0 - 17.7 g/dL Final     Hematocrit   Date Value Ref Range Status   10/20/2023 29.9 (L) 37.5 - 51.0 % Final     Platelets   Date Value Ref Range Status   10/20/2023 132 (L) 140 - 450 10*3/mm3 Final        Lab Results   Component Value Date    GLUCOSE 96 10/20/2023    BUN 16 10/20/2023    CREATININE 0.84 10/20/2023    EGFR 89.8 10/20/2023    BCR 19.0 10/20/2023    K 4.2 10/20/2023    CO2 27.0 10/20/2023    CALCIUM 8.7 10/20/2023    ALBUMIN 3.4 (L) 10/20/2023    BILITOT 0.6 10/20/2023    AST 19 10/20/2023    ALT 20 10/20/2023     CT CAP:  IMPRESSION:  1. Interval development of hepatic metastases and likely new or  progression of bilateral adrenal metastases.  2. No significant change in the 1.4 cm right lower lobe pulmonary  nodule, but there has been increase in the right hilar lymphadenopathy.  Close follow-up is recommended for the 7 mm right middle lobe pulmonary  nodule which appears marginally larger. There are also stable tiny  bilateral pulmonary nodules.  3. The left paratracheal lymphadenopathy has decreased.  I personally reviewed the CT abdomen-there are new liver lesions and bilateral adrenal gland lesion/thickening    Assessment & Plan     *Extensive stage small cell lung cancer  Patient initially treated with 4 cycles of carboplatin/-16/atezolizumab completed 6/2/2023 6/19/2023-stable right lower lobe pulmonary nodule, increased mediastinal lymph nodes, stable metastatic liver and right adrenal lesions  Maintenance atezolizumab continued every 3 weeks, most recently given 10/5/2023  Brain metastases with progression by MRI 7/26/2023 treated with whole brain radiation  10/19/2023 CT chest abdomen pelvis-stable 1.4 cm right lower lobe pulmonary nodule, increased right hilar  lymph node, stable reticulonodular opacities right lower lobe, stable 7 mm right middle lobe nodule, new liver metastases up to 3.6 and 3.0 cm, significant increase in nodular enhancement of both adrenal glands      *Anemia of malignancy/chemotherapy-hemoglobin 10.4    *Poor appetite/volume depletion/weight loss  Patient has felt poorly 3 weeks with no appetite poor oral intake resulting in volume depletion/protein malnutrition (total protein 5.9)    Oncology plan/recommendations:  Scans indicating progression of disease reviewed with the patient's wife-since the patient's disease has progressed within 6 months of chemotherapy this would be considered primary refractory disease  Agree with rechecking MRI of the brain-if there has been progression of disease despite whole brain radiation this would portend a poor prognosis  Check cortisol/ACTH in the a.m. to rule out adrenal insufficiency as a cause of loss of appetite etc. (patient on immunotherapy)  Monitor CBC  Unclear if the patient will be a candidate for further palliative chemotherapy pending above evaluation and hospital course/PS

## 2023-10-20 NOTE — PLAN OF CARE
Goal Outcome Evaluation:            Patient to transfer to [facility]. A & O with periods of forgetfulness. PRN Zofran given x 2 and pain meds given. MRI to be done. Paperwork faxed. Report called to [facility]. Family at bedside. No s/s of distress noted.

## 2023-10-21 LAB
ALBUMIN SERPL-MCNC: 3.3 G/DL (ref 3.5–5.2)
ALBUMIN/GLOB SERPL: 1.3 G/DL
ALP SERPL-CCNC: 72 U/L (ref 39–117)
ALT SERPL W P-5'-P-CCNC: 18 U/L (ref 1–41)
AMMONIA BLD-SCNC: 14 UMOL/L (ref 16–60)
ANION GAP SERPL CALCULATED.3IONS-SCNC: 9.2 MMOL/L (ref 5–15)
AST SERPL-CCNC: 19 U/L (ref 1–40)
BASOPHILS # BLD AUTO: 0.03 10*3/MM3 (ref 0–0.2)
BASOPHILS NFR BLD AUTO: 0.5 % (ref 0–1.5)
BILIRUB SERPL-MCNC: 0.5 MG/DL (ref 0–1.2)
BUN SERPL-MCNC: 12 MG/DL (ref 8–23)
BUN/CREAT SERPL: 14.6 (ref 7–25)
CALCIUM SPEC-SCNC: 8.5 MG/DL (ref 8.6–10.5)
CHLORIDE SERPL-SCNC: 103 MMOL/L (ref 98–107)
CO2 SERPL-SCNC: 23.8 MMOL/L (ref 22–29)
CREAT SERPL-MCNC: 0.82 MG/DL (ref 0.76–1.27)
DEPRECATED RDW RBC AUTO: 47.8 FL (ref 37–54)
EGFRCR SERPLBLD CKD-EPI 2021: 90.5 ML/MIN/1.73
EOSINOPHIL # BLD AUTO: 0.04 10*3/MM3 (ref 0–0.4)
EOSINOPHIL NFR BLD AUTO: 0.6 % (ref 0.3–6.2)
ERYTHROCYTE [DISTWIDTH] IN BLOOD BY AUTOMATED COUNT: 13.8 % (ref 12.3–15.4)
GLOBULIN UR ELPH-MCNC: 2.5 GM/DL
GLUCOSE SERPL-MCNC: 101 MG/DL (ref 65–99)
HCT VFR BLD AUTO: 30.7 % (ref 37.5–51)
HGB BLD-MCNC: 10.7 G/DL (ref 13–17.7)
IMM GRANULOCYTES # BLD AUTO: 0.07 10*3/MM3 (ref 0–0.05)
IMM GRANULOCYTES NFR BLD AUTO: 1.1 % (ref 0–0.5)
LYMPHOCYTES # BLD AUTO: 0.68 10*3/MM3 (ref 0.7–3.1)
LYMPHOCYTES NFR BLD AUTO: 10.7 % (ref 19.6–45.3)
MAGNESIUM SERPL-MCNC: 2 MG/DL (ref 1.6–2.4)
MCH RBC QN AUTO: 33.1 PG (ref 26.6–33)
MCHC RBC AUTO-ENTMCNC: 34.9 G/DL (ref 31.5–35.7)
MCV RBC AUTO: 95 FL (ref 79–97)
MONOCYTES # BLD AUTO: 0.62 10*3/MM3 (ref 0.1–0.9)
MONOCYTES NFR BLD AUTO: 9.7 % (ref 5–12)
NEUTROPHILS NFR BLD AUTO: 4.92 10*3/MM3 (ref 1.7–7)
NEUTROPHILS NFR BLD AUTO: 77.4 % (ref 42.7–76)
NRBC BLD AUTO-RTO: 0 /100 WBC (ref 0–0.2)
PHOSPHATE SERPL-MCNC: 2.7 MG/DL (ref 2.5–4.5)
PLATELET # BLD AUTO: 153 10*3/MM3 (ref 140–450)
PMV BLD AUTO: 9 FL (ref 6–12)
POTASSIUM SERPL-SCNC: 3.9 MMOL/L (ref 3.5–5.2)
PROT SERPL-MCNC: 5.8 G/DL (ref 6–8.5)
PSA SERPL-MCNC: <0.014 NG/ML (ref 0–4)
RBC # BLD AUTO: 3.23 10*6/MM3 (ref 4.14–5.8)
SODIUM SERPL-SCNC: 136 MMOL/L (ref 136–145)
WBC NRBC COR # BLD: 6.36 10*3/MM3 (ref 3.4–10.8)

## 2023-10-21 PROCEDURE — 83735 ASSAY OF MAGNESIUM: CPT | Performed by: INTERNAL MEDICINE

## 2023-10-21 PROCEDURE — 80053 COMPREHEN METABOLIC PANEL: CPT | Performed by: INTERNAL MEDICINE

## 2023-10-21 PROCEDURE — 25010000002 ONDANSETRON PER 1 MG: Performed by: INTERNAL MEDICINE

## 2023-10-21 PROCEDURE — 63710000001 DRONABINOL PER 2.5 MG: Performed by: INTERNAL MEDICINE

## 2023-10-21 PROCEDURE — 25810000003 SODIUM CHLORIDE 0.9 % SOLUTION: Performed by: INTERNAL MEDICINE

## 2023-10-21 PROCEDURE — 97163 PT EVAL HIGH COMPLEX 45 MIN: CPT | Performed by: PHYSICAL THERAPIST

## 2023-10-21 PROCEDURE — 84100 ASSAY OF PHOSPHORUS: CPT | Performed by: INTERNAL MEDICINE

## 2023-10-21 PROCEDURE — 97166 OT EVAL MOD COMPLEX 45 MIN: CPT

## 2023-10-21 PROCEDURE — 99232 SBSQ HOSP IP/OBS MODERATE 35: CPT | Performed by: INTERNAL MEDICINE

## 2023-10-21 PROCEDURE — 84153 ASSAY OF PSA TOTAL: CPT | Performed by: INTERNAL MEDICINE

## 2023-10-21 PROCEDURE — 82140 ASSAY OF AMMONIA: CPT | Performed by: INTERNAL MEDICINE

## 2023-10-21 PROCEDURE — 85025 COMPLETE CBC W/AUTO DIFF WBC: CPT | Performed by: INTERNAL MEDICINE

## 2023-10-21 RX ORDER — BETHANECHOL CHLORIDE 25 MG/1
50 TABLET ORAL EVERY 12 HOURS SCHEDULED
Status: DISCONTINUED | OUTPATIENT
Start: 2023-10-21 | End: 2023-11-02 | Stop reason: HOSPADM

## 2023-10-21 RX ORDER — FLUTICASONE PROPIONATE 50 MCG
2 SPRAY, SUSPENSION (ML) NASAL DAILY
Status: DISCONTINUED | OUTPATIENT
Start: 2023-10-21 | End: 2023-11-02 | Stop reason: HOSPADM

## 2023-10-21 RX ORDER — PROCHLORPERAZINE EDISYLATE 5 MG/ML
5 INJECTION INTRAMUSCULAR; INTRAVENOUS EVERY 4 HOURS PRN
Status: DISCONTINUED | OUTPATIENT
Start: 2023-10-21 | End: 2023-11-02 | Stop reason: HOSPADM

## 2023-10-21 RX ORDER — DRONABINOL 2.5 MG/1
2.5 CAPSULE ORAL 2 TIMES DAILY
Status: COMPLETED | OUTPATIENT
Start: 2023-10-21 | End: 2023-10-28

## 2023-10-21 RX ORDER — SODIUM CHLORIDE 0.9 % (FLUSH) 0.9 %
20 SYRINGE (ML) INJECTION AS NEEDED
Status: DISCONTINUED | OUTPATIENT
Start: 2023-10-21 | End: 2023-11-02

## 2023-10-21 RX ORDER — TAMSULOSIN HYDROCHLORIDE 0.4 MG/1
0.4 CAPSULE ORAL DAILY
Status: DISCONTINUED | OUTPATIENT
Start: 2023-10-21 | End: 2023-11-02 | Stop reason: HOSPADM

## 2023-10-21 RX ADMIN — SENNOSIDES AND DOCUSATE SODIUM 2 TABLET: 50; 8.6 TABLET ORAL at 09:34

## 2023-10-21 RX ADMIN — BISACODYL 5 MG: 5 TABLET, COATED ORAL at 06:24

## 2023-10-21 RX ADMIN — FLUTICASONE PROPIONATE 2 SPRAY: 50 SPRAY, METERED NASAL at 14:07

## 2023-10-21 RX ADMIN — Medication 10 ML: at 20:42

## 2023-10-21 RX ADMIN — SENNOSIDES AND DOCUSATE SODIUM 2 TABLET: 50; 8.6 TABLET ORAL at 20:47

## 2023-10-21 RX ADMIN — Medication 1000 MCG: at 09:35

## 2023-10-21 RX ADMIN — METOPROLOL TARTRATE 12.5 MG: 25 TABLET, FILM COATED ORAL at 20:47

## 2023-10-21 RX ADMIN — BETHANECHOL CHLORIDE 50 MG: 25 TABLET ORAL at 15:50

## 2023-10-21 RX ADMIN — SODIUM CHLORIDE 100 ML/HR: 9 INJECTION, SOLUTION INTRAVENOUS at 03:45

## 2023-10-21 RX ADMIN — ASPIRIN 81 MG: 81 TABLET, COATED ORAL at 17:58

## 2023-10-21 RX ADMIN — TRAMADOL HYDROCHLORIDE 100 MG: 50 TABLET, COATED ORAL at 06:22

## 2023-10-21 RX ADMIN — ONDANSETRON 4 MG: 2 INJECTION INTRAMUSCULAR; INTRAVENOUS at 05:15

## 2023-10-21 RX ADMIN — SODIUM CHLORIDE 75 ML/HR: 9 INJECTION, SOLUTION INTRAVENOUS at 13:59

## 2023-10-21 RX ADMIN — FAMOTIDINE 20 MG: 20 TABLET ORAL at 20:48

## 2023-10-21 RX ADMIN — FAMOTIDINE 20 MG: 20 TABLET ORAL at 09:34

## 2023-10-21 RX ADMIN — TAMSULOSIN HYDROCHLORIDE 0.4 MG: 0.4 CAPSULE ORAL at 14:07

## 2023-10-21 RX ADMIN — TRAMADOL HYDROCHLORIDE 100 MG: 50 TABLET, COATED ORAL at 11:51

## 2023-10-21 RX ADMIN — DRONABINOL 2.5 MG: 2.5 CAPSULE ORAL at 20:47

## 2023-10-21 RX ADMIN — BETHANECHOL CHLORIDE 50 MG: 25 TABLET ORAL at 09:33

## 2023-10-21 RX ADMIN — METOPROLOL TARTRATE 12.5 MG: 25 TABLET, FILM COATED ORAL at 09:35

## 2023-10-21 NOTE — PLAN OF CARE
Goal Outcome Evaluation:  Plan of Care Reviewed With: patient, spouse      Mr. Ramírez is a 79 y/o male.  They were admitted to [facility] for progressive decline in function mobility.  His wife reports that PTA, he was independent with all household community mobility.  However over the last 3 weeks he has been requiring increasing needs for assist with all functional mobility.  He lives with his spouse, in a single floor dwelling, no stairs to enter, no stairs inside.  Today Mr. Ramírez reports he is extremely fatigued reports having sitting up in the chair for several hours and ready today.  He demonstrated poor tolerance to activity with physical therapy today noting extreme fatigue necessitating return to supine.  Mr. Ramírez is below functional mobility baseline, requiring mod assist x2 assist for bed mobility, unable to perform sit to stand transfer secondary to fatigue, and unable to perform ambulation secondary to fatigue.  Mr. Ramírez is a good candidate for acute skilled physical therapy to address below baseline mobility issues.  Anticipated discharge location SNF.            Anticipated Discharge Disposition (PT): skilled nursing facility

## 2023-10-21 NOTE — PROGRESS NOTES
Subjective .     REASONS FOR FOLLOWUP:   SCLC     Interval history:  October 21, 2023: Patient with extensive stage small cell lung cancer s/p carboplatin -16 Tecentriq followed by Dr. Conn in our office.  Followed by maintenance Tecentriq subsequently developed brain mets July 2023 treated with whole brain radiation.  Currently on October 19 CT chest abdomen pelvis shows stable 1.4 cm right lower lobe pulmonary nodule, increased right hilar lymph node, stable reticulonodular opacities and significant increase in enhancement of both adrenal glands and new liver mets    Patient has primary refractory disease.  Repeat MRI ordered and cortisol ACTH this morning  Hemoglobin 10.7 white count of 6.36, platelets 153.  Await MRI brain and discussed with Dr. Conn on Monday.    Pacemaker apparently MRI compatible but patient requires anesthesia.  He was scheduled next week for that.  He also had urinary retention about 700 cc.  Okay to do the catheter and patient not eating much.  Also has nausea and some mild headache.  Patient received tramadol today.  Will try to give Marinol 2.5 mg twice daily for nausea and appetite improvement.      HISTORY OF PRESENT ILLNESS:    This is a 78-year-old man followed in our practice by Dr. Cnon   with extensive stage small cell lung cancer previously treated   with 4 cycles of carboplatin/-16/Tecentriq with good response   followed by maintenance Tecentriq every 3 weeks most recently   given on 10/5/2023.  The patient developed brain metastases by   MRI brain 7/26/2023 treated with whole brain radiation with   improved findings per MRI brain 9/20/2023.      The patient was doing well until 3 weeks ago he developed cough   and shortness of breath treated for pneumonia in his hometown of   [facility].  He was treated with doxycycline but did not   improve and then received steroids and an alternate antibiotic   outpatient.  Since that time he has continued to have decline in    his performance status, not eating or drinking and has   subsequently become very weak and lightheaded.      He was in town yesterday for CT restaging and felt so ill he   presented to the ER for evaluation subsequently admitted.      His CTs yesterday unfortunately show progression of cancer with   enlarging mediastinal lymph nodes, progression of disease in the   liver, bilateral adrenal metastases.  MRI of the brain is   pending.      Labs are pertinent for hemoglobin 10.4, normal white count 6.6,   platelets 132, total protein 5.9/albumin 3.4.  TSH is normal   0.771.         Past Medical History:   Diagnosis Date    Acid reflux     Anemia     Arthritis     Coronary artery disease     CVA (cerebral vascular accident)     High cholesterol     History of atrial fibrillation     Hx of blood clots     Hypertension     Irregular heartbeat     Peptic ulcer disease     Prostate cancer     Rheumatoid arthritis     Skin cancer     Small cell lung cancer        ONCOLOGIC HISTORY:  (History from previous dates can be found in the separate document.)    No current facility-administered medications on file prior to encounter.     Current Outpatient Medications on File Prior to Encounter   Medication Sig Dispense Refill    bethanechol (URECHOLINE) 50 MG tablet Take 1 tablet by mouth Every 12 (Twelve) Hours.      cyanocobalamin (VITAMIN B-12) 1000 MCG tablet Take 1 tablet by mouth Daily.      famotidine (PEPCID) 20 MG tablet Take 1 tablet by mouth twice daily 60 tablet 0    LORazepam (Ativan) 1 MG tablet Take 1 tablet by mouth as needed 1 hour prior to CT scan. 1 tablet 0    Magnesium 250 MG tablet Take  by mouth.      metoprolol tartrate (LOPRESSOR) 25 MG tablet Take 0.5 tablets by mouth 2 (Two) Times a Day.      ondansetron (ZOFRAN) 8 MG tablet Take 1 tablet by mouth 3 (Three) Times a Day As Needed for Nausea or Vomiting. 30 tablet 5    traMADol (ULTRAM) 50 MG tablet Take 2 tablets by mouth Every 6 (Six) Hours As Needed for  Moderate Pain. 340 tablet 0    Vitamin B Complex-C capsule       vitamin D3 125 MCG (5000 UT) capsule capsule Take 1 capsule by mouth Daily.      Zinc 50 MG tablet Take 1 tablet by mouth Daily.      Acetaminophen (ARTHRITIS PAIN RELIEF PO)       ascorbic acid (VITAMIN C) 1000 MG tablet       Ashwagandha 125 MG capsule       aspirin 81 MG EC tablet Daily.      benzonatate (TESSALON) 100 MG capsule Take 1 capsule by mouth 3 (Three) Times a Day As Needed for Cough.      coenzyme Q10 100 MG capsule Take 1 capsule by mouth Daily.      Creatinine powder       dexAMETHasone (DECADRON) 2 MG tablet Take 1 tablet by mouth Daily With Breakfast. Take 1 tablet by mouth daily with breakfast for one week then stop. If develop neurologic symptoms restart 1 tablet daily and notify our office. 30 tablet 0    diphenhydrAMINE (BENADRYL) 25 mg capsule Take 1 capsule by mouth Every 6 (Six) Hours As Needed for Itching.      doxycycline (MONODOX) 100 MG capsule Take 1 capsule by mouth Every 12 (Twelve) Hours.      Elderberry 500 MG capsule Take  by mouth.      fluocinonide (LIDEX) 0.05 % cream APPLY TO THE AFFECTED AREA(S) BY TOPICAL ROUTE 2 TIMES PER DAY      Misc Natural Products (SUPER GREENS PO) Take  by mouth.      Misc Natural Products (YUMVS BEET ROOT-TART CHERRY PO) Take  by mouth.      Turmeric 500 MG capsule Take  by mouth.      Ventolin  (90 Base) MCG/ACT inhaler INHALE 2 PUFFS BY MOUTH EVERY 4 TO 6 HOURS AS NEEDED FOR SHORTNESS OF BREATH FOR WHEEZING         ALLERGIES:     Allergies   Allergen Reactions    Codeine Other (See Comments)     headache         Social History     Socioeconomic History    Marital status:      Spouse name: Marivel   Tobacco Use    Smoking status: Former     Packs/day: .5     Types: Cigarettes     Start date: 1976     Quit date: 2020     Years since quitting: 3.8    Smokeless tobacco: Never   Vaping Use    Vaping Use: Never used   Substance and Sexual Activity    Alcohol use: Never    Drug  use: Never    Sexual activity: Defer         Cancer-related family history includes Brain cancer in his mother; Lung cancer in his sister.     Review of Systems  A comprehensive 14 point review of systems was performed and was negative except as mentioned.    Objective      Vitals:    10/20/23 1831 10/20/23 2000 10/21/23 0339 10/21/23 0940   BP: 130/80 120/81 146/82 135/74   BP Location: Right arm Right arm Left arm Left arm   Patient Position: Lying Lying Lying Lying   Pulse: 84 88 94 91   Resp: 16 18 18 18   Temp: 98.2 °F (36.8 °C) 97.9 °F (36.6 °C) 97 °F (36.1 °C) 98.1 °F (36.7 °C)   TempSrc: Oral Oral Oral Oral   SpO2: 92% 97% 94% 94%   Weight:       Height:             10/5/2023     1:19 PM   Current Status   ECOG score 0       Physical Exam        This patient's ACP documentation is up to date, and there's nothing further left to document.    CONSTITUTIONAL:  Vital signs reviewed.  Alert and oriented x3  No distress, patient does appear weak.    RESPIRATORY:  Normal respiratory effort.  No rales  or wheezing, clear.   CARDIOVASCULAR:  Regular rate and rhythm, no murmur  No significant lower extremity edema.  Abdomen: Soft nontender positive bowel sounds no hepatosplenomegaly  SKIN: No wounds.  No rashes.  MUSCULOSKELETAL/EXTREMITIES: No clubbing or cyanosis.  No apparent unilateral weakness.  NEURO: CN 2-12 appear intact. No focal neurological deficits noted.  PSYCHIATRIC:  Normal judgment and insight.  Normal mood and affect.      RECENT LABS:  Hematology WBC   Date Value Ref Range Status   10/21/2023 6.36 3.40 - 10.80 10*3/mm3 Final     RBC   Date Value Ref Range Status   10/21/2023 3.23 (L) 4.14 - 5.80 10*6/mm3 Final     Hemoglobin   Date Value Ref Range Status   10/21/2023 10.7 (L) 13.0 - 17.7 g/dL Final     Hematocrit   Date Value Ref Range Status   10/21/2023 30.7 (L) 37.5 - 51.0 % Final     Platelets   Date Value Ref Range Status   10/21/2023 153 140 - 450 10*3/mm3 Final          CT  CAP:  IMPRESSION:  1. Interval development of hepatic metastases and likely new or  progression of bilateral adrenal metastases.  2. No significant change in the 1.4 cm right lower lobe pulmonary  nodule, but there has been increase in the right hilar lymphadenopathy.  Close follow-up is recommended for the 7 mm right middle lobe pulmonary  nodule which appears marginally larger. There are also stable tiny  bilateral pulmonary nodules.  3. The left paratracheal lymphadenopathy has decreased.  I personally reviewed the CT abdomen-there are new liver lesions and bilateral adrenal gland lesion/thickening       Assessment & Plan     Expand All Collapse All[]Expand All by Default          Subjective   REASON FOR CONSULTATION:  SCLC  Provide an opinion on any further workup or treatment                             REQUESTING PHYSICIAN:  Suhail     RECORDS OBTAINED:  Records of the patients history including those obtained from the referring provider were reviewed and summarized in detail.     History of Present Illness   This is a 78-year-old man followed in our practice by Dr. Conn with extensive stage small cell lung cancer previously treated with 4 cycles of carboplatin/-16/Tecentriq with good response followed by maintenance Tecentriq every 3 weeks most recently given on 10/5/2023.  The patient developed brain metastases by MRI brain 7/26/2023 treated with whole brain radiation with improved findings per MRI brain 9/20/2023.     The patient was doing well until 3 weeks ago he developed cough and shortness of breath treated for pneumonia in his hometown of [facility].  He was treated with doxycycline but did not improve and then received steroids and an alternate antibiotic outpatient.  Since that time he has continued to have decline in his performance status, not eating or drinking and has subsequently become very weak and lightheaded.     He was in town yesterday for CT restaging and felt so ill he  presented to the ER for evaluation subsequently admitted.     His CTs yesterday unfortunately show progression of cancer with enlarging mediastinal lymph nodes, progression of disease in the liver, bilateral adrenal metastases.  MRI of the brain is pending.     Labs are pertinent for hemoglobin 10.4, normal white count 6.6, platelets 132, total protein 5.9/albumin 3.4.  TSH is normal 0.771.     Medical History        Past Medical History:   Diagnosis Date    Acid reflux      Anemia      Arthritis      Coronary artery disease      CVA (cerebral vascular accident)      High cholesterol      History of atrial fibrillation      Hx of blood clots      Hypertension      Irregular heartbeat      Peptic ulcer disease      Prostate cancer      Rheumatoid arthritis      Skin cancer      Small cell lung cancer              Surgical History         Past Surgical History:   Procedure Laterality Date    ABDOMINAL AORTIC ANEURYSM REPAIR        BACK SURGERY         x2    BRONCHOSCOPY WITH ION ROBOTIC ASSIST N/A 03/13/2023     Procedure: BRONCHOSCOPY ,ENDOBRONCHIAL ULTRASOUND AND ROBOTIC NAVIGATIONAL BRONCHOSCOPY WITH FINE NEEDLE ASPIRATION, BIOPSY X1 AREA, AND BRONCHOALEOLAR LAVAGE;  Surgeon: Sonali Lara MD;  Location: [facility] ENDOSCOPY;  Service: Robotics - Pulmonary;  Laterality: N/A;  Post: LUNG CANCER    CARDIAC ABLATION         x2    COLON SURGERY        HERNIA REPAIR        INSERTION CENTRAL VENOUS ACCESS DEVICE W/ SUBCUTANEOUS PORT Right 03/20/2023    VENOUS ACCESS DEVICE (PORT) INSERTION Right 03/20/2023     Procedure: INSERTION VENOUS ACCESS DEVICE;  Surgeon: Javier Gonzalez MD PhD;  Location: [facility] OR;  Service: Thoracic;  Laterality: Right;                      Current Facility-Administered Medications on File Prior to Encounter   Medication Dose Route Frequency Provider Last Rate Last Admin    [DISCONTINUED] heparin injection 500 Units  500 Units Intravenous PRN Alexsander Conn MD        [DISCONTINUED]  sodium chloride 0.9 % flush 10 mL  10 mL Intravenous PRN Alexsander Conn MD                 Current Outpatient Medications on File Prior to Encounter   Medication Sig Dispense Refill    bethanechol (URECHOLINE) 50 MG tablet Take 1 tablet by mouth Every 12 (Twelve) Hours.        cyanocobalamin (VITAMIN B-12) 1000 MCG tablet Take 1 tablet by mouth Daily.        famotidine (PEPCID) 20 MG tablet Take 1 tablet by mouth twice daily 60 tablet 0    LORazepam (Ativan) 1 MG tablet Take 1 tablet by mouth as needed 1 hour prior to CT scan. 1 tablet 0    Magnesium 250 MG tablet Take  by mouth.        metoprolol tartrate (LOPRESSOR) 25 MG tablet metoprolol tartrate 25 mg tablet   TAKE 1 TABLET BY MOUTH TWICE DAILY.        ondansetron (ZOFRAN) 8 MG tablet Take 1 tablet by mouth 3 (Three) Times a Day As Needed for Nausea or Vomiting. 30 tablet 5    traMADol (ULTRAM) 50 MG tablet Take 2 tablets by mouth Every 6 (Six) Hours As Needed for Moderate Pain. 340 tablet 0    Vitamin B Complex-C capsule          vitamin D3 125 MCG (5000 UT) capsule capsule Take 1 capsule by mouth Daily.        Zinc 50 MG tablet Take 1 tablet by mouth Daily.        Acetaminophen (ARTHRITIS PAIN RELIEF PO)          ascorbic acid (VITAMIN C) 1000 MG tablet          Ashwagandha 125 MG capsule          aspirin 81 MG EC tablet Daily.        benzonatate (TESSALON) 100 MG capsule Take 1 capsule by mouth 3 (Three) Times a Day As Needed for Cough.        coenzyme Q10 100 MG capsule Take 1 capsule by mouth Daily.        Creatinine powder          dexAMETHasone (DECADRON) 2 MG tablet Take 1 tablet by mouth Daily With Breakfast. Take 1 tablet by mouth daily with breakfast for one week then stop. If develop neurologic symptoms restart 1 tablet daily and notify our office. 30 tablet 0    diphenhydrAMINE (BENADRYL) 25 mg capsule Take 1 capsule by mouth Every 6 (Six) Hours As Needed for Itching.        doxycycline (MONODOX) 100 MG capsule Take 1 capsule by mouth Every 12  (Twelve) Hours.        Elderberry 500 MG capsule Take  by mouth.        fluocinonide (LIDEX) 0.05 % cream APPLY TO THE AFFECTED AREA(S) BY TOPICAL ROUTE 2 TIMES PER DAY        Misc Natural Products (SUPER GREENS PO) Take  by mouth.        Misc Natural Products (YUMVS BEET ROOT-TART CHERRY PO) Take  by mouth.        Turmeric 500 MG capsule Take  by mouth.        Ventolin  (90 Base) MCG/ACT inhaler INHALE 2 PUFFS BY MOUTH EVERY 4 TO 6 HOURS AS NEEDED FOR SHORTNESS OF BREATH FOR WHEEZING             ALLERGIES:          Allergies   Allergen Reactions    Codeine Other (See Comments)       headache            Social History   Social History            Socioeconomic History    Marital status:        Spouse name: Marivel   Tobacco Use    Smoking status: Former       Packs/day: .5       Types: Cigarettes       Start date: 1976       Quit date: 2020       Years since quitting: 3.8    Smokeless tobacco: Never   Vaping Use    Vaping Use: Never used   Substance and Sexual Activity    Alcohol use: Never    Drug use: Never    Sexual activity: Defer                  Family History   Problem Relation Age of Onset    Brain cancer Mother      Lung cancer Sister           Review of Systems   Constitutional:  Positive for activity change, appetite change, fatigue and unexpected weight change. Negative for fever.   HENT: Negative.     Respiratory:  Positive for cough and shortness of breath. Negative for wheezing.    Cardiovascular:  Negative for palpitations and leg swelling.   Gastrointestinal:  Positive for nausea. Negative for diarrhea and vomiting.   Genitourinary:  Positive for decreased urine volume.   Musculoskeletal:  Positive for arthralgias and back pain.   Skin: Negative.    Neurological:  Positive for weakness and light-headedness. Negative for dizziness.   Hematological: Negative.    Psychiatric/Behavioral:  Positive for decreased concentration, dysphoric mood and sleep disturbance.                   Objective    Vitals          Vitals:     10/19/23 2321 10/20/23 0213 10/20/23 0623 10/20/23 0928   BP:   142/85 128/69 128/78   BP Location:   Right arm Right arm Right arm   Patient Position:   Lying Lying Lying   Pulse: 81 81 82 88   Resp: 18 18 18 18   Temp:   98.3 °F (36.8 °C) 97.5 °F (36.4 °C) 97.5 °F (36.4 °C)   TempSrc:   Oral Oral Oral   SpO2: 97% 94% 96% 93%   Weight:           Height:                      10/5/2023     1:19 PM   Current Status   ECOG score 0         Physical Exam    CONSTITUTIONAL: Ill-appearing elderly man lying in bed  HEENT: no icterus, no thrush, dry membranes  LYMPH: no cervical or supraclavicular lad  CV: RRR, S1S2, no murmur  RESP: Breath sounds diminished no wheezing, no rales  GI: soft, non-tender, no splenomegaly, +bs  MUSC: no edema   NEURO: alert and oriented x3 but very weak and difficult to understand  PSYCH: Depressed mood/depressed/anxious affect  Skin: No ecchymoses or rashes        RECENT LABS:  Hematology       WBC   Date Value Ref Range Status   10/20/2023 6.64 3.40 - 10.80 10*3/mm3 Final            RBC   Date Value Ref Range Status   10/20/2023 3.21 (L) 4.14 - 5.80 10*6/mm3 Final            Hemoglobin   Date Value Ref Range Status   10/20/2023 10.4 (L) 13.0 - 17.7 g/dL Final            Hematocrit   Date Value Ref Range Status   10/20/2023 29.9 (L) 37.5 - 51.0 % Final            Platelets   Date Value Ref Range Status   10/20/2023 132 (L) 140 - 450 10*3/mm3 Final               Lab Results   Component Value Date     GLUCOSE 96 10/20/2023     BUN 16 10/20/2023     CREATININE 0.84 10/20/2023     EGFR 89.8 10/20/2023     BCR 19.0 10/20/2023     K 4.2 10/20/2023     CO2 27.0 10/20/2023     CALCIUM 8.7 10/20/2023     ALBUMIN 3.4 (L) 10/20/2023     BILITOT 0.6 10/20/2023     AST 19 10/20/2023     ALT 20 10/20/2023      CT CAP:  IMPRESSION:  1. Interval development of hepatic metastases and likely new or  progression of bilateral adrenal metastases.  2. No significant change in the 1.4 cm  right lower lobe pulmonary  nodule, but there has been increase in the right hilar lymphadenopathy.  Close follow-up is recommended for the 7 mm right middle lobe pulmonary  nodule which appears marginally larger. There are also stable tiny  bilateral pulmonary nodules.  3. The left paratracheal lymphadenopathy has decreased.  I personally reviewed the CT abdomen-there are new liver lesions and bilateral adrenal gland lesion/thickening           Assessment & Plan  *Extensive stage small cell lung cancer  Patient initially treated with 4 cycles of carboplatin/-16/atezolizumab completed 6/2/2023 6/19/2023-stable right lower lobe pulmonary nodule, increased mediastinal lymph nodes, stable metastatic liver and right adrenal lesions  Maintenance atezolizumab continued every 3 weeks, most recently given 10/5/2023  Brain metastases with progression by MRI 7/26/2023 treated with whole brain radiation  10/19/2023 CT chest abdomen pelvis-stable 1.4 cm right lower lobe pulmonary nodule, increased right hilar lymph node, stable reticulonodular opacities right lower lobe, stable 7 mm right middle lobe nodule, new liver metastases up to 3.6 and 3.0 cm, significant increase in nodular enhancement of both adrenal glands  His pacemaker is MRI compatible.  But he requires anesthesia and we could consider doing MRI of the brain next week with anesthesia.  He was originally scheduled on Tuesday.          *Anemia of malignancy/chemotherapy-hemoglobin 10.4     *Poor appetite/volume depletion/weight loss  Patient has felt poorly 3 weeks with no appetite poor oral intake resulting in volume depletion/protein malnutrition (total protein 5.9)  Can consider Marinol low-dose for appetite stimulation as well as nausea    *Urinary retention, patient had 700 cc of urine after in and out cath.  Given that he may require River catheter     Oncology plan/recommendations:  Scans indicating progression of disease reviewed with the patient's  wife-since the patient's disease has progressed within 6 months of chemotherapy this would be considered primary refractory disease  Agree with rechecking MRI of the brain-if there has been progression of disease despite whole brain radiation this would portend a poor prognosis  Check cortisol/ACTH in the a.m. to rule out adrenal insufficiency as a cause of loss of appetite etc. (patient on immunotherapy)  Monitor CBC  Unclear if the patient will be a candidate for further palliative chemotherapy pending above evaluation and hospital course/PS  MRI to be done next week hopefully with anesthesia and his pacemaker is compatible with MRI  We will start Marinol 2.5 mg p.o. twice daily  Also will need catheterization as he had residual of 700 cc  Karlene Correa MD                         Cc:  Rosalva Chester MD

## 2023-10-21 NOTE — PLAN OF CARE
Goal Outcome Evaluation:  Plan of Care Reviewed With: patient, family           Outcome Evaluation: OT-Pt. admitted to [facility] with physical deconditioning, Hx small celll lung cancer with brain mets. PLOF required Nieves for self care and more recently needed more assist. . Pt. with family and just transferred bed to chair with nursing. Per family pt. did not sleep well last night and is nauseated. Pt. very lethargic during eval. with decrease alertness. Is oriented to name. Overall generalized weakness and functional endurance is poor.  Very limited BUE volitional or spontaneuos/ purposeful active mov't noted. Grooming dependent -hand over hand to wash face with little follow through. Self feeding: dependent. Hand over hand to hold spoon and feed self.  Encouaged increased participation with self feeding when pt. is less nauseated and family is there to assist. Pt.. may benefit from OT to address functional deficits. Anticipate pt. may benefit from cont. therapies at the next level of care.

## 2023-10-21 NOTE — THERAPY EVALUATION
Patient Name: Russell Ramírez Sr.  : 1945    MRN: [id number]                              Today's Date: 10/21/2023       Admit Date: 10/19/2023    Visit Dx:     ICD-10-CM ICD-9-CM   1. Physical deconditioning  R53.81 799.3     Patient Active Problem List   Diagnosis    Lung nodule    Lung cancer metastatic to brain    High risk medication use    Encounter for adjustment or management of vascular access device    Abdominal aortic aneurysm    Adenomatous polyp of ascending colon    Anxiety state    Atrial fibrillation    Atrial flutter    Back problem    Benign essential hypertension    Cervical spondyloarthritis    Chronic midline low back pain without sciatica    Depressive disorder    Edema    Erectile dysfunction    Gastroesophageal reflux disease    History of malignant neoplasm of prostate    Hx of endovascular stent graft for abdominal aortic aneurysm    Hypercholesteremia    Pure hypercholesterolemia    Hypertensive heart disease    Hypotestosteronism    Malignant neoplasm of skin    Neck pain    Obesity    Obstructive sleep apnea    Osteoarthritis    Prostate cancer    Hypertension    PVD (peripheral vascular disease)    Vascular disorder    Raynaud's phenomenon    CAD (coronary artery disease)    Coronary arteriosclerosis in native artery    Coronary arteriosclerosis    Coronary atherosclerosis    Renal artery atherosclerosis    S/P ablation of atrial fibrillation    S/P renal artery angioplasty    Swelling of limb    Tobacco dependence syndrome    Secondary malignant neoplasm of brain    Physical deconditioning     Past Medical History:   Diagnosis Date    Acid reflux     Anemia     Arthritis     Coronary artery disease     CVA (cerebral vascular accident)     High cholesterol     History of atrial fibrillation     Hx of blood clots     Hypertension     Irregular heartbeat     Peptic ulcer disease     Prostate cancer     Rheumatoid arthritis     Skin cancer     Small cell lung cancer      Past  Surgical History:   Procedure Laterality Date    ABDOMINAL AORTIC ANEURYSM REPAIR      BACK SURGERY      x2    BRONCHOSCOPY WITH ION ROBOTIC ASSIST N/A 03/13/2023    Procedure: BRONCHOSCOPY ,ENDOBRONCHIAL ULTRASOUND AND ROBOTIC NAVIGATIONAL BRONCHOSCOPY WITH FINE NEEDLE ASPIRATION, BIOPSY X1 AREA, AND BRONCHOALEOLAR LAVAGE;  Surgeon: Sonali Lara MD;  Location: [facility] ENDOSCOPY;  Service: Robotics - Pulmonary;  Laterality: N/A;  Post: LUNG CANCER    CARDIAC ABLATION      x2    COLON SURGERY      HERNIA REPAIR      INSERTION CENTRAL VENOUS ACCESS DEVICE W/ SUBCUTANEOUS PORT Right 03/20/2023    VENOUS ACCESS DEVICE (PORT) INSERTION Right 03/20/2023    Procedure: INSERTION VENOUS ACCESS DEVICE;  Surgeon: Javier Gonzalez MD PhD;  Location: [facility] OR;  Service: Thoracic;  Laterality: Right;      General Information       Row Name 10/21/23 1128          OT Time and Intention    Document Type evaluation  -CW     Mode of Treatment occupational therapy  -CW       Row Name 10/21/23 1128          General Information    Patient Profile Reviewed yes  -CW     Prior Level of Function ADL's;min assist:  Has a cane and rolling wx at home. Recently required more help.  -CW     Existing Precautions/Restrictions fall  enhanced droplet  -CW     Barriers to Rehab medically complex;cognitive status  -CW       Row Name 10/21/23 1138 10/21/23 1128       Cognition    Orientation Status (Cognition) --  Pt. lethargic. Decreased alertness.  -CW oriented to;person;verbal cues/prompts needed for orientation  -CW      Row Name 10/21/23 1128          Safety Issues, Functional Mobility    Safety Issues Affecting Function (Mobility) ability to follow commands;insight into deficits/self-awareness;awareness of need for assistance;safety precaution awareness;sequencing abilities;judgment  -CW     Impairments Affecting Function (Mobility) cognition;endurance/activity tolerance;strength;balance  -CW               User Key  (r) = Recorded By,  (t) = Taken By, (c) = Cosigned By      Initials Name Provider Type    CW Stephanie Rueda OTR Occupational Therapist                     Mobility/ADL's       Row Name 10/21/23 1137          Bed Mobility    Comment, (Bed Mobility) Pt. sitting up in chair.  -CW       Row Name 10/21/23 1137          Activities of Daily Living    BADL Assessment/Intervention grooming;feeding  -CW       Row Name 10/21/23 1137          Grooming Assessment/Training    [place] Level (Grooming) grooming skills;wash face, hands;dependent (less than 25% patient effort);set up  -CW     Position (Grooming) supported standing  -CW       Row Name 10/21/23 1139 10/21/23 1137       Self-Feeding Assessment/Training    [place] Level (Feeding) scoop food and bring to mouth;prepare tray/open items;set up;dependent (less than 25% patient effort)  -CW feeding skills  -CW    Position (Self-Feeding) supported sitting  -CW --    Comment, (Feeding) Hand over hand to feed self with very limited follow through. Pt. also nauseated.  -CW --              User Key  (r) = Recorded By, (t) = Taken By, (c) = Cosigned By      Initials Name Provider Type    CW Stephanie Rueda OTR Occupational Therapist                   Obj/Interventions       Row Name 10/21/23 1141          Range of Motion Comprehensive    General Range of Motion upper extremity range of motion deficits identified  -CW     Comment, General Range of Motion Generalized weakness. Very limited spontaneuous or volitional active mov't.  -CW       Row Name 10/21/23 1141          Strength Comprehensive (MMT)    General Manual Muscle Testing (MMT) Assessment upper extremity strength deficits identified  -CW       Row Name 10/21/23 1141          Motor Skills    Motor Skills functional endurance  -CW     Functional Endurance poor  -CW               User Key  (r) = Recorded By, (t) = Taken By, (c) = Cosigned By      Initials Name Provider Type    Stephanie Sanchez OTR Occupational Therapist                    Goals/Plan       Row Name 10/21/23 1159          Grooming Goal 1 (OT)    Activity/Device (Grooming Goal 1, OT) grooming skills, all;wash face, hands;oral care  -CW     [place] (Grooming Goal 1, OT) minimum assist (75% or more patient effort);set-up required  -CW     Time Frame (Grooming Goal 1, OT) 2 weeks  -CW       Row Name 10/21/23 1159          Self-Feeding Goal 1 (OT)    Activity/Device (Self-Feeding Goal 1, OT) self-feeding skills, all;scoop food and bring to mouth  -CW     [place] Level/Cues Needed (Self-Feeding Goal 1, OT) minimum assist (75% or more patient effort);verbal cues required;set-up required  -CW       Row Name 10/21/23 1150          Therapy Assessment/Plan (OT)    Planned Therapy Interventions (OT) activity tolerance training;BADL retraining;patient/caregiver education/training;transfer/mobility retraining  -CW               User Key  (r) = Recorded By, (t) = Taken By, (c) = Cosigned By      Initials Name Provider Type    CW Stephanie Rueda OTR Occupational Therapist                   Clinical Impression       Row Name 10/21/23 1146          Pain Assessment    Pretreatment Pain Rating 8/10  -CW     Posttreatment Pain Rating 8/10  -CW     Pain Location - head  -CW     Pain Intervention(s) Repositioned  -CW       Row Name 10/21/23 1144          Plan of Care Review    Plan of Care Reviewed With patient;family  -CW     Outcome Evaluation OT-Pt. admitted to [facility] with physical deconditioning, Hx small celll lung cancer with brain mets. PLOF required Nieves for self care and more recently needed more assist. . Pt. with family and just transferred bed to chair with nursing. Per family pt. did not sleep well last night and is nauseated. Pt. very lethargic during eval. with decrease alertness. Is oriented to name. Overall generalized weakness and functional endurance is poor.  Very limited BUE volitional or spontaneuos/ purposeful active mov't noted. Grooming dependent -hand over hand to wash  face with little follow through. Self feeding: dependent. Hand over hand to hold spoon and feed self.  Encouaged increased participation with self feeding when pt. is less nauseated and family is there to assist. Pt.. may benefit from OT to address functional deficits. Anticipate pt. may benefit from cont. therapies at the next level of care.  -CW       Row Name 10/21/23 1143          Therapy Assessment/Plan (OT)    Therapy Frequency (OT) 5 times/wk  -CW       Row Name 10/21/23 1143          Positioning and Restraints    Pre-Treatment Position sitting in chair/recliner  -CW     Post Treatment Position chair  -CW     In Chair encouraged to call for assist;with family/caregiver;call light within reach;exit alarm on  -CW               User Key  (r) = Recorded By, (t) = Taken By, (c) = Cosigned By      Initials Name Provider Type    Stephanie Sanchez OTR Occupational Therapist                   Outcome Measures       Row Name 10/21/23 1200          How much help from another is currently needed...    Putting on and taking off regular lower body clothing? 1  -CW     Bathing (including washing, rinsing, and drying) 1  -CW     Toileting (which includes using toilet bed pan or urinal) 1  -CW     Putting on and taking off regular upper body clothing 1  -CW     Taking care of personal grooming (such as brushing teeth) 1  -CW     Eating meals 1  -CW     AM-PAC 6 Clicks Score (OT) 6  -CW       Row Name 10/21/23 1200          Functional Assessment    Outcome Measure Options AM-PAC 6 Clicks Daily Activity (OT)  -CW               User Key  (r) = Recorded By, (t) = Taken By, (c) = Cosigned By      Initials Name Provider Type    Stephanie Sanchez OTR Occupational Therapist                    Occupational Therapy Education       Title: PT OT SLP Therapies (In Progress)       Topic: Occupational Therapy (In Progress)       Point: ADL training (In Progress)       Description:   Instruct learner(s) on proper safety adaptation and  remediation techniques during self care or transfers.   Instruct in proper use of assistive devices.                  Learning Progress Summary             Patient Acceptance, E, NR by CW at 10/21/2023 1201    Comment: Role of OT and poc.   Family Acceptance, E, NR by CW at 10/21/2023 1201    Comment: Role of OT and poc.                                         User Key       Initials Effective Dates Name Provider Type Discipline     06/16/21 -  Stephanie Rueda OTR Occupational Therapist OT                  OT Recommendation and Plan  Planned Therapy Interventions (OT): activity tolerance training, BADL retraining, patient/caregiver education/training, transfer/mobility retraining  Therapy Frequency (OT): 5 times/wk  Plan of Care Review  Plan of Care Reviewed With: patient, family  Outcome Evaluation: OT-Pt. admitted to [facility] with physical deconditioning, Hx small celll lung cancer with brain mets. PLOF required Nieves for self care and more recently needed more assist. . Pt. with family and just transferred bed to chair with nursing. Per family pt. did not sleep well last night and is nauseated. Pt. very lethargic during eval. with decrease alertness. Is oriented to name. Overall generalized weakness and functional endurance is poor.  Very limited BUE volitional or spontaneuos/ purposeful active mov't noted. Grooming dependent -hand over hand to wash face with little follow through. Self feeding: dependent. Hand over hand to hold spoon and feed self.  Encouaged increased participation with self feeding when pt. is less nauseated and family is there to assist. Pt.. may benefit from OT to address functional deficits. Anticipate pt. may benefit from cont. therapies at the next level of care.     Time Calculation:   Evaluation Complexity (OT)  Review Occupational Profile/Medical/Therapy History Complexity: expanded/moderate complexity  Assessment, Occupational Performance/Identification of Deficit Complexity: 3-5  performance deficits  Clinical Decision Making Complexity (OT): detailed assessment/moderate complexity  Overall Complexity of Evaluation (OT): moderate complexity     Time Calculation- OT       Row Name 10/21/23 1202             Time Calculation- OT    OT Start Time 1103  -CW      OT Stop Time 1123  -CW      OT Time Calculation (min) 20 min  -CW      Total Timed Code Minutes- OT 5 minute(s)  -CW      OT Received On 10/21/23  -CW      OT - Next Appointment 10/23/23  -CW      OT Goal Re-Cert Due Date 11/04/23  -CW         Timed Charges    39543 - OT Self Care/Mgmt Minutes 5  -CW         Total Minutes    Timed Charges Total Minutes 5  -CW       Total Minutes 5  -CW                User Key  (r) = Recorded By, (t) = Taken By, (c) = Cosigned By      Initials Name Provider Type    Stephanie Sanchez OTR Occupational Therapist                  Therapy Charges for Today       Code Description Service Date Service Provider Modifiers Qty    87402165061 HC OT EVAL MOD COMPLEXITY 2 10/21/2023 Stephanie Rueda OTR GO 1                 MILLI Forbes  10/21/2023

## 2023-10-21 NOTE — PROGRESS NOTES
Name: Russell Ramírez . ADMIT: 10/19/2023   : 1945  PCP: Morgan Sebastian DO    MRN: [id number] LOS: 1 days   AGE/SEX: 78 y.o. male  ROOM: [facility] [place]     Subjective   Subjective   Chief Complaint   Patient presents with    Fatigue     Having some nasal congestion but no dyspnea or cough. HA improving. No CP. No NVD. No BM recorded. Family reported high bladder volumes. Appears to be improving on repeat PVRs. They also report that for MRI he will need anesthesia and this was being arranged for Tuesday by oncology. Were asking about appetite stimulant. Discussed a few options but ultimately will defer to oncology if these are warranted. Discussed need to improve bowel function with bowel regimen as well.     Objective   Objective   Vital Signs  Temp:  [97 °F (36.1 °C)-98.2 °F (36.8 °C)] 97 °F (36.1 °C)  Heart Rate:  [77-94] 94  Resp:  [16-18] 18  BP: (120-146)/(80-87) 146/82  SpO2:  [92 %-97 %] 94 %  on  Flow (L/min):  [2] 2;   Device (Oxygen Therapy): nasal cannula  Body mass index is 28.38 kg/m².    Physical Exam  Vitals and nursing note reviewed.   Constitutional:       Appearance: He is ill-appearing. He is not diaphoretic.   HENT:      Head: Atraumatic.   Eyes:      Conjunctiva/sclera: Conjunctivae normal.      Pupils: Pupils are equal, round, and reactive to light.   Cardiovascular:      Rate and Rhythm: Normal rate and regular rhythm.      Pulses: Normal pulses.   Pulmonary:      Effort: Pulmonary effort is normal.      Breath sounds: Rhonchi present. No wheezing.   Abdominal:      General: There is no distension.      Palpations: Abdomen is soft.      Tenderness: There is no abdominal tenderness. There is no guarding or rebound.   Musculoskeletal:         General: No swelling or tenderness.   Skin:     General: Skin is warm and dry.   Neurological:      Mental Status: He is alert.      Motor: Weakness (generalized) present.   Psychiatric:         Mood and Affect: Mood normal.         Behavior:  "Behavior normal.       Results Review  I reviewed the patient's new clinical results.    Results from last 7 days   Lab Units 10/21/23  0350 10/20/23  0644 10/19/23  1137   WBC 10*3/mm3 6.36 6.64 7.05   HEMOGLOBIN g/dL 10.7* 10.4* 10.8*   PLATELETS 10*3/mm3 153 132* 150     Results from last 7 days   Lab Units 10/21/23  0350 10/20/23  0644 10/19/23  1137 10/19/23  1020   SODIUM mmol/L 136 137 134*  --    POTASSIUM mmol/L 3.9 4.2 4.1  --    CHLORIDE mmol/L 103 103 102  --    CO2 mmol/L 23.8 27.0 24.8  --    BUN mg/dL 12 16 20  --    CREATININE mg/dL 0.82 0.84 1.00 1.10   GLUCOSE mg/dL 101* 96 95  --    EGFR mL/min/1.73 90.5 89.8 77.5  --      Results from last 7 days   Lab Units 10/21/23  0350 10/20/23  0644 10/19/23  1137   ALBUMIN g/dL 3.3* 3.4* 3.3*   BILIRUBIN mg/dL 0.5 0.6 0.4   ALK PHOS U/L 72 74 71   AST (SGOT) U/L 19 19 18   ALT (SGPT) U/L 18 20 19     Results from last 7 days   Lab Units 10/21/23  0350 10/20/23  0644 10/19/23  1137   CALCIUM mg/dL 8.5* 8.7 8.7   ALBUMIN g/dL 3.3* 3.4* 3.3*   MAGNESIUM mg/dL 2.0  --   --    PHOSPHORUS mg/dL 2.7  --   --          No results found for: \"HGBA1C\", \"POCGLU\"    XR Chest 1 View    Result Date: 10/19/2023  No focal pulmonary consolidation. Redemonstration of right lower lung nodule.    This report was finalized on 10/19/2023 12:23 PM by Dr. Inderjit Galan M.D on Workstation: [id number]       I have personally reviewed all medications:  Scheduled Medications  aspirin, 81 mg, Oral, Q24H  bethanechol, 50 mg, Oral, Q12H  famotidine, 20 mg, Oral, BID  LORazepam, 0.5 mg, Intravenous, Once in imaging  metoprolol tartrate, 25 mg, Oral, Q12H  senna-docusate sodium, 2 tablet, Oral, BID  sodium chloride, 10 mL, Intravenous, Q12H  cyanocobalamin, 1,000 mcg, Oral, Daily      Infusions  sodium chloride, 100 mL/hr, Last Rate: 100 mL/hr (10/21/23 0931)      Diet  Diet: Regular/House Diet; Texture: Regular Texture (IDDSI 7); Fluid Consistency: Thin (IDDSI 0)    I have personally " reviewed:  [x]  Laboratory   []  Microbiology   []  Radiology   []  EKG/Telemetry  []  Cardiology/Vascular   []  Pathology    []  Records       Assessment/Plan     Active Hospital Problems    Diagnosis  POA    **Physical deconditioning [R53.81]  Yes    Coronary arteriosclerosis [I25.10]  Yes    History of malignant neoplasm of prostate [Z85.46]  Not Applicable    Depressive disorder [F32.A]  Yes    Lung cancer metastatic to brain [C34.90, 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       78 y.o. male admitted with Physical deconditioning.    Metastatic Lung Cancer/Hx Prostate Cancer: Liver and adrenal metastasis noted on CT. MRI brain likely to be done Monday or Tuesday since anesthesia will be needed. Oncology following.  Difficulty Urinating: PSA low. PVR improving some. Start flomax.  NV/Constipation: Continue bowel regimen. Did not have obstruction on CT.  Rhinitis: RVP ok. Recent abx. Start nasal spray.  AFib: His chads vasc was elevated. Defer anticoagulation v continued aspirin to oncology.  Deconditioning: PT/OT  PPX: per oncology  Disposition: TBD    Expected discharge date/ time has not been documented.     Myron Jang MD  [facility]  10/21/23  11:33 EDT    Dictated portions of note using Dragon dictation software.  Copied text in this note has been reviewed by me and remains accurate as of 10/21/23

## 2023-10-21 NOTE — PLAN OF CARE
Goal Outcome Evaluation:      Pt is remaining confused. Spouse is at bedside helping with care. He refused to go to MRI last night because he was too nauseated, and pt and family want to do MRI under the anesthesia. Pt also reported pain with urination which was new yesterday. BSC few times, pt is retaining urine, >360 cc, he was also incontinent twice with large output. Family refused in and out cath, but asked for River. Also his wife reported pt takes bethanechol at home which was not reordered on admission. All reported to PAULIE Gamino. Compazine was added PRN, bethanechol restarted. The remaining concerns will be addressed in am. Port dressing was changed. Will continue to monitor.

## 2023-10-21 NOTE — THERAPY EVALUATION
Patient Name: Russell Ramírez Sr.  : 1945    MRN: [id number]                              Today's Date: 10/21/2023       Admit Date: 10/19/2023    Visit Dx:     ICD-10-CM ICD-9-CM   1. Physical deconditioning  R53.81 799.3     Patient Active Problem List   Diagnosis    Lung nodule    Lung cancer metastatic to brain    High risk medication use    Encounter for adjustment or management of vascular access device    Abdominal aortic aneurysm    Adenomatous polyp of ascending colon    Anxiety state    Atrial fibrillation    Atrial flutter    Back problem    Benign essential hypertension    Cervical spondyloarthritis    Chronic midline low back pain without sciatica    Depressive disorder    Edema    Erectile dysfunction    Gastroesophageal reflux disease    History of malignant neoplasm of prostate    Hx of endovascular stent graft for abdominal aortic aneurysm    Hypercholesteremia    Pure hypercholesterolemia    Hypertensive heart disease    Hypotestosteronism    Malignant neoplasm of skin    Neck pain    Obesity    Obstructive sleep apnea    Osteoarthritis    Prostate cancer    Hypertension    PVD (peripheral vascular disease)    Vascular disorder    Raynaud's phenomenon    CAD (coronary artery disease)    Coronary arteriosclerosis in native artery    Coronary arteriosclerosis    Coronary atherosclerosis    Renal artery atherosclerosis    S/P ablation of atrial fibrillation    S/P renal artery angioplasty    Swelling of limb    Tobacco dependence syndrome    Secondary malignant neoplasm of brain    Physical deconditioning     Past Medical History:   Diagnosis Date    Acid reflux     Anemia     Arthritis     Coronary artery disease     CVA (cerebral vascular accident)     High cholesterol     History of atrial fibrillation     Hx of blood clots     Hypertension     Irregular heartbeat     Peptic ulcer disease     Prostate cancer     Rheumatoid arthritis     Skin cancer     Small cell lung cancer      Past  Surgical History:   Procedure Laterality Date    ABDOMINAL AORTIC ANEURYSM REPAIR      BACK SURGERY      x2    BRONCHOSCOPY WITH ION ROBOTIC ASSIST N/A 03/13/2023    Procedure: BRONCHOSCOPY ,ENDOBRONCHIAL ULTRASOUND AND ROBOTIC NAVIGATIONAL BRONCHOSCOPY WITH FINE NEEDLE ASPIRATION, BIOPSY X1 AREA, AND BRONCHOALEOLAR LAVAGE;  Surgeon: Sonali Lara MD;  Location: [facility] ENDOSCOPY;  Service: Robotics - Pulmonary;  Laterality: N/A;  Post: LUNG CANCER    CARDIAC ABLATION      x2    COLON SURGERY      HERNIA REPAIR      INSERTION CENTRAL VENOUS ACCESS DEVICE W/ SUBCUTANEOUS PORT Right 03/20/2023    VENOUS ACCESS DEVICE (PORT) INSERTION Right 03/20/2023    Procedure: INSERTION VENOUS ACCESS DEVICE;  Surgeon: Javier Gonzalez MD PhD;  Location: [facility] OR;  Service: Thoracic;  Laterality: Right;      General Information       Row Name 10/21/23 1643          Physical Therapy Time and Intention    Document Type evaluation  -GJ     Mode of Treatment individual therapy;physical therapy  -GJ       Row Name 10/21/23 1643          General Information    Patient Profile Reviewed yes  -GJ     Prior Level of Function min assist:;mod assist:;all household mobility;community mobility;bed mobility  Wife reports prior to 3 weeks ago patient was independent with household mobility.  However the last 3 weeks he required increasing levels of assistance for bed mobility transfers bathing and dressing  -GJ     Existing Precautions/Restrictions fall  -GJ     Barriers to Rehab medically complex  -GJ       Row Name 10/21/23 1643          Living Environment    People in Home spouse  -GJ       Row Name 10/21/23 1643          Home Main Entrance    Number of Stairs, Main Entrance none  -GJ       Row Name 10/21/23 1643          Stairs Within Home, Primary    Number of Stairs, Within Home, Primary none  -GJ       Row Name 10/21/23 1643          Cognition    Orientation Status (Cognition) oriented to;person;place  -GJ       Row Name 10/21/23  1643          Safety Issues, Functional Mobility    Safety Issues Affecting Function (Mobility) awareness of need for assistance;insight into deficits/self-awareness  -     Impairments Affecting Function (Mobility) balance;endurance/activity tolerance;strength  -               User Key  (r) = Recorded By, (t) = Taken By, (c) = Cosigned By      Initials Name Provider Type    Inderjit Malik, PT Physical Therapist                   Mobility       Row Name 10/21/23 1644          Bed Mobility    Bed Mobility supine-sit;sit-supine  -GJ     Supine-Sit [place] (Bed Mobility) set up;verbal cues;nonverbal cues (demo/gesture);moderate assist (50% patient effort);2 person assist  -GJ     Sit-Supine [place] (Bed Mobility) set up;verbal cues;nonverbal cues (demo/gesture);moderate assist (50% patient effort);2 person assist  -GJ     Assistive Device (Bed Mobility) bed rails;draw sheet;head of bed elevated  -       Row Name 10/21/23 1644          Sit-Stand Transfer    Sit-Stand [place] (Transfers) other (see comments)  Goal is to attend sit to stand transfer however patient too fatigued to perform requested to return to supine  -       Row Name 10/21/23 1644          Gait/Stairs (Locomotion)    [place] Level (Gait) not tested  Deferred secondary to patient fatigue weakness  -               User Key  (r) = Recorded By, (t) = Taken By, (c) = Cosigned By      Initials Name Provider Type    Inderjit Malik, PT Physical Therapist                   Obj/Interventions       Row Name 10/21/23 1645          Range of Motion Comprehensive    General Range of Motion bilateral upper extremity ROM WFL;bilateral lower extremity ROM WFL  -       Row Name 10/21/23 1645          Strength Comprehensive (MMT)    Comment, General Manual Muscle Testing (MMT) Assessment Patient grossly demonstrated 3 out of 5 muscle strength bilateral upper and lower quarters  -       Row Name 10/21/23 1645          Motor Skills     Therapeutic Exercise knee;ankle  -       Row Name 10/21/23 1645          Knee (Therapeutic Exercise)    Knee (Therapeutic Exercise) AROM (active range of motion)  -     Knee AROM (Therapeutic Exercise) bilateral;LAQ (long arc quad);3 repetitions  Patient too fatigued to complete waiting 3 rest bilaterally  -GJ       Row Name 10/21/23 1645          Ankle (Therapeutic Exercise)    Ankle (Therapeutic Exercise) AROM (active range of motion)  -     Ankle AROM (Therapeutic Exercise) bilateral;dorsiflexion;plantarflexion;5 repetitions  -       Row Name 10/21/23 1645          Balance    Balance Assessment --  Patient requires intermittent min assist for sitting edge of bed balance fatigues extremely quickly.  He sat edge of bed 5 to 6 minutes  -               User Key  (r) = Recorded By, (t) = Taken By, (c) = Cosigned By      Initials Name Provider Type    Inderjit Malik, PT Physical Therapist                   Goals/Plan       Row Name 10/21/23 1647          Bed Mobility Goal 1 (PT)    Activity/Assistive Device (Bed Mobility Goal 1, PT) sit to supine/supine to sit  -GJ     [place] Level/Cues Needed (Bed Mobility Goal 1, PT) modified independence  -GJ     Time Frame (Bed Mobility Goal 1, PT) long term goal (LTG);5 days  -       Row Name 10/21/23 1647          Transfer Goal 1 (PT)    Activity/Assistive Device (Transfer Goal 1, PT) sit-to-stand/stand-to-sit;walker, rolling  -GJ     [place] Level/Cues Needed (Transfer Goal 1, PT) modified independence  -GJ     Time Frame (Transfer Goal 1, PT) long term goal (LTG);5 days  -       Row Name 10/21/23 1647          Gait Training Goal 1 (PT)    Activity/Assistive Device (Gait Training Goal 1, PT) gait (walking locomotion);walker, rolling  -GJ     [place] Level (Gait Training Goal 1, PT) minimum assist (75% or more patient effort)  -GJ     Distance (Gait Training Goal 1, PT) 25  -GJ     Time Frame (Gait Training Goal 1, PT) long term goal (LTG);5 days   -GJ       Row Name 10/21/23 1647          Therapy Assessment/Plan (PT)    Planned Therapy Interventions (PT) balance training;bed mobility training;gait training;neuromuscular re-education;patient/family education;strengthening;transfer training  -GJ               User Key  (r) = Recorded By, (t) = Taken By, (c) = Cosigned By      Initials Name Provider Type    Inderjit Malik, PT Physical Therapist                   Clinical Impression       Row Name 10/21/23 1647          Plan of Care Review    Plan of Care Reviewed With patient;spouse  -GJ       Row Name 10/21/23 1647          Therapy Assessment/Plan (PT)    Rehab Potential (PT) good, to achieve stated therapy goals  -GJ     Criteria for Skilled Interventions Met (PT) yes;skilled treatment is necessary  -GJ     Therapy Frequency (PT) daily  -GJ     Predicted Duration of Therapy Intervention (PT) 5 days  -GJ       Row Name 10/21/23 1647          Positioning and Restraints    Pre-Treatment Position in bed  -GJ     Post Treatment Position bed  -GJ     In Bed notified nsg;side lying left;call light within reach;encouraged to call for assist;exit alarm on;with family/caregiver  -GJ               User Key  (r) = Recorded By, (t) = Taken By, (c) = Cosigned By      Initials Name Provider Type    Inderjit Malik, PT Physical Therapist                   Outcome Measures       Row Name 10/21/23 1646          How much help from another person do you currently need...    Turning from your back to your side while in flat bed without using bedrails? 3  -GJ     Moving from lying on back to sitting on the side of a flat bed without bedrails? 3  -GJ     Moving to and from a bed to a chair (including a wheelchair)? 2  -GJ     Standing up from a chair using your arms (e.g., wheelchair, bedside chair)? 2  -GJ     Climbing 3-5 steps with a railing? 2  -GJ     To walk in hospital room? 2  -GJ     AM-PAC 6 Clicks Score (PT) 14  -GJ     Highest level of mobility 4 --> Transferred  to chair/commode  -       Row Name 10/21/23 1648 10/21/23 1200       Functional Assessment    Outcome Measure Options AM-PAC 6 Clicks Basic Mobility (PT)  - AM-PAC 6 Clicks Daily Activity (OT)  -CW              User Key  (r) = Recorded By, (t) = Taken By, (c) = Cosigned By      Initials Name Provider Type     Inderjit Hayes, PT Physical Therapist    Stephanie Sanchez OTR Occupational Therapist                                 Physical Therapy Education       Title: PT OT SLP Therapies (In Progress)       Topic: Physical Therapy (In Progress)       Point: Mobility training (In Progress)       Learning Progress Summary             Patient Acceptance, E,TB, NR by  at 10/21/2023 1649    Comment: Discussed benefits of participation with therapy.  Answered questions from wife.   Significant Other Acceptance, E,TB, NR by  at 10/21/2023 1649    Comment: Discussed benefits of participation with therapy.  Answered questions from wife.                         Point: Home exercise program (In Progress)       Learning Progress Summary             Patient Acceptance, E,TB, NR by  at 10/21/2023 1649    Comment: Discussed benefits of participation with therapy.  Answered questions from wife.   Significant Other Acceptance, E,TB, NR by  at 10/21/2023 1649    Comment: Discussed benefits of participation with therapy.  Answered questions from wife.                         Point: Body mechanics (In Progress)       Learning Progress Summary             Patient Acceptance, E,TB, NR by  at 10/21/2023 1649    Comment: Discussed benefits of participation with therapy.  Answered questions from wife.   Significant Other Acceptance, E,TB, NR by  at 10/21/2023 1649    Comment: Discussed benefits of participation with therapy.  Answered questions from wife.                         Point: Precautions (In Progress)       Learning Progress Summary             Patient Acceptance, E,TB, NR by  at 10/21/2023 1649    Comment:  Discussed benefits of participation with therapy.  Answered questions from wife.   Significant Other Acceptance, E,TB, NR by  at 10/21/2023 1645    Comment: Discussed benefits of participation with therapy.  Answered questions from wife.                                         User Key       Initials Effective Dates Name Provider Type Discipline     07/11/23 -  Inderjit Hayes, PT Physical Therapist PT                  PT Recommendation and Plan  Planned Therapy Interventions (PT): balance training, bed mobility training, gait training, neuromuscular re-education, patient/family education, strengthening, transfer training  Plan of Care Reviewed With: patient, spouse     Time Calculation:         PT Charges       Row Name 10/21/23 1653             Time Calculation    Start Time 1429  -      Stop Time 1444  -      Time Calculation (min) 15 min  -      PT Received On 10/21/23  -      PT - Next Appointment 10/23/23  -                User Key  (r) = Recorded By, (t) = Taken By, (c) = Cosigned By      Initials Name Provider Type     Inderjit Hayes, PT Physical Therapist                  Therapy Charges for Today       Code Description Service Date Service Provider Modifiers Qty    [id number] HC PT EVAL HIGH COMPLEXITY 1 10/21/2023 Inderjit Hayes, PT GP 1            PT G-Codes  Outcome Measure Options: AM-PAC 6 Clicks Basic Mobility (PT)  AM-PAC 6 Clicks Score (PT): 14  AM-PAC 6 Clicks Score (OT): 6  PT Discharge Summary  Anticipated Discharge Disposition (PT): skilled nursing facility    Inderjit Hayes, PT  10/21/2023

## 2023-10-22 LAB
ALBUMIN SERPL-MCNC: 3.2 G/DL (ref 3.5–5.2)
ALBUMIN/GLOB SERPL: 1.6 G/DL
ALP SERPL-CCNC: 66 U/L (ref 39–117)
ALT SERPL W P-5'-P-CCNC: 20 U/L (ref 1–41)
AMYLASE SERPL-CCNC: 43 U/L (ref 28–100)
ANION GAP SERPL CALCULATED.3IONS-SCNC: 6 MMOL/L (ref 5–15)
AST SERPL-CCNC: 19 U/L (ref 1–40)
BASOPHILS # BLD AUTO: 0.03 10*3/MM3 (ref 0–0.2)
BASOPHILS NFR BLD AUTO: 0.5 % (ref 0–1.5)
BILIRUB SERPL-MCNC: 0.4 MG/DL (ref 0–1.2)
BUN SERPL-MCNC: 9 MG/DL (ref 8–23)
BUN/CREAT SERPL: 11.7 (ref 7–25)
CALCIUM SPEC-SCNC: 8.2 MG/DL (ref 8.6–10.5)
CHLORIDE SERPL-SCNC: 104 MMOL/L (ref 98–107)
CO2 SERPL-SCNC: 26 MMOL/L (ref 22–29)
CREAT SERPL-MCNC: 0.77 MG/DL (ref 0.76–1.27)
DEPRECATED RDW RBC AUTO: 48.9 FL (ref 37–54)
EGFRCR SERPLBLD CKD-EPI 2021: 92.2 ML/MIN/1.73
EOSINOPHIL # BLD AUTO: 0.06 10*3/MM3 (ref 0–0.4)
EOSINOPHIL NFR BLD AUTO: 1 % (ref 0.3–6.2)
ERYTHROCYTE [DISTWIDTH] IN BLOOD BY AUTOMATED COUNT: 14.2 % (ref 12.3–15.4)
GLOBULIN UR ELPH-MCNC: 2 GM/DL
GLUCOSE SERPL-MCNC: 102 MG/DL (ref 65–99)
HCT VFR BLD AUTO: 29.5 % (ref 37.5–51)
HGB BLD-MCNC: 10.3 G/DL (ref 13–17.7)
IMM GRANULOCYTES # BLD AUTO: 0.1 10*3/MM3 (ref 0–0.05)
IMM GRANULOCYTES NFR BLD AUTO: 1.6 % (ref 0–0.5)
LIPASE SERPL-CCNC: 23 U/L (ref 13–60)
LYMPHOCYTES # BLD AUTO: 0.81 10*3/MM3 (ref 0.7–3.1)
LYMPHOCYTES NFR BLD AUTO: 13.3 % (ref 19.6–45.3)
MCH RBC QN AUTO: 33.1 PG (ref 26.6–33)
MCHC RBC AUTO-ENTMCNC: 34.9 G/DL (ref 31.5–35.7)
MCV RBC AUTO: 94.9 FL (ref 79–97)
MONOCYTES # BLD AUTO: 0.74 10*3/MM3 (ref 0.1–0.9)
MONOCYTES NFR BLD AUTO: 12.1 % (ref 5–12)
NEUTROPHILS NFR BLD AUTO: 4.36 10*3/MM3 (ref 1.7–7)
NEUTROPHILS NFR BLD AUTO: 71.5 % (ref 42.7–76)
NRBC BLD AUTO-RTO: 0 /100 WBC (ref 0–0.2)
PLATELET # BLD AUTO: 130 10*3/MM3 (ref 140–450)
PMV BLD AUTO: 8.5 FL (ref 6–12)
POTASSIUM SERPL-SCNC: 3.7 MMOL/L (ref 3.5–5.2)
PROT SERPL-MCNC: 5.2 G/DL (ref 6–8.5)
RBC # BLD AUTO: 3.11 10*6/MM3 (ref 4.14–5.8)
SODIUM SERPL-SCNC: 136 MMOL/L (ref 136–145)
WBC NRBC COR # BLD: 6.1 10*3/MM3 (ref 3.4–10.8)

## 2023-10-22 PROCEDURE — 99232 SBSQ HOSP IP/OBS MODERATE 35: CPT | Performed by: INTERNAL MEDICINE

## 2023-10-22 PROCEDURE — 82150 ASSAY OF AMYLASE: CPT | Performed by: INTERNAL MEDICINE

## 2023-10-22 PROCEDURE — 80053 COMPREHEN METABOLIC PANEL: CPT | Performed by: INTERNAL MEDICINE

## 2023-10-22 PROCEDURE — 63710000001 DRONABINOL PER 2.5 MG: Performed by: INTERNAL MEDICINE

## 2023-10-22 PROCEDURE — 85025 COMPLETE CBC W/AUTO DIFF WBC: CPT | Performed by: INTERNAL MEDICINE

## 2023-10-22 PROCEDURE — 83690 ASSAY OF LIPASE: CPT | Performed by: INTERNAL MEDICINE

## 2023-10-22 PROCEDURE — 25810000003 SODIUM CHLORIDE 0.9 % SOLUTION: Performed by: INTERNAL MEDICINE

## 2023-10-22 RX ADMIN — FLUTICASONE PROPIONATE 2 SPRAY: 50 SPRAY, METERED NASAL at 09:22

## 2023-10-22 RX ADMIN — SENNOSIDES AND DOCUSATE SODIUM 2 TABLET: 50; 8.6 TABLET ORAL at 20:42

## 2023-10-22 RX ADMIN — DRONABINOL 2.5 MG: 2.5 CAPSULE ORAL at 09:21

## 2023-10-22 RX ADMIN — BETHANECHOL CHLORIDE 50 MG: 25 TABLET ORAL at 17:03

## 2023-10-22 RX ADMIN — Medication 1000 MCG: at 09:24

## 2023-10-22 RX ADMIN — TAMSULOSIN HYDROCHLORIDE 0.4 MG: 0.4 CAPSULE ORAL at 09:24

## 2023-10-22 RX ADMIN — TRAMADOL HYDROCHLORIDE 100 MG: 50 TABLET, COATED ORAL at 02:39

## 2023-10-22 RX ADMIN — ASPIRIN 81 MG: 81 TABLET, COATED ORAL at 18:00

## 2023-10-22 RX ADMIN — METOPROLOL TARTRATE 12.5 MG: 25 TABLET, FILM COATED ORAL at 09:23

## 2023-10-22 RX ADMIN — BETHANECHOL CHLORIDE 50 MG: 25 TABLET ORAL at 09:21

## 2023-10-22 RX ADMIN — Medication 10 ML: at 20:48

## 2023-10-22 RX ADMIN — SODIUM CHLORIDE 75 ML/HR: 9 INJECTION, SOLUTION INTRAVENOUS at 15:52

## 2023-10-22 RX ADMIN — FAMOTIDINE 20 MG: 20 TABLET ORAL at 09:22

## 2023-10-22 RX ADMIN — FAMOTIDINE 20 MG: 20 TABLET ORAL at 20:42

## 2023-10-22 RX ADMIN — METOPROLOL TARTRATE 12.5 MG: 25 TABLET, FILM COATED ORAL at 20:42

## 2023-10-22 RX ADMIN — DRONABINOL 2.5 MG: 2.5 CAPSULE ORAL at 20:42

## 2023-10-22 RX ADMIN — SENNOSIDES AND DOCUSATE SODIUM 2 TABLET: 50; 8.6 TABLET ORAL at 09:20

## 2023-10-22 RX ADMIN — SODIUM CHLORIDE 75 ML/HR: 9 INJECTION, SOLUTION INTRAVENOUS at 02:34

## 2023-10-22 RX ADMIN — TRAMADOL HYDROCHLORIDE 100 MG: 50 TABLET, COATED ORAL at 12:24

## 2023-10-22 NOTE — PROGRESS NOTES
Subjective .     REASONS FOR FOLLOWUP:   SCLC     Interval history:  October 21, 2023: Patient with extensive stage small cell lung cancer s/p carboplatin -16 Tecentriq followed by Dr. Conn in our office.  Followed by maintenance Tecentriq subsequently developed brain mets July 2023 treated with whole brain radiation.  Currently on October 19 CT chest abdomen pelvis shows stable 1.4 cm right lower lobe pulmonary nodule, increased right hilar lymph node, stable reticulonodular opacities and significant increase in enhancement of both adrenal glands and new liver mets    Patient has primary refractory disease.  Repeat MRI ordered and cortisol ACTH this morning  Hemoglobin 10.7 white count of 6.36, platelets 153.  Await MRI brain and discussed with Dr. Conn on Monday.    Pacemaker apparently MRI compatible but patient requires anesthesia.  He was scheduled next week for that.  He also had urinary retention about 700 cc.  Okay to do the catheter and patient not eating much.  Also has nausea and some mild headache.  Patient received tramadol today.  Will try to give Marinol 2.5 mg twice daily for nausea and appetite improvement.    October 22, 2023:  Patient is awaiting MRI of the brain to be done under anesthesia.  We started Marinol last night  A.m. cortisol was 27.  Slightly elevated.  Hemoglobin 10.3 white count of 6.1 and platelets today 130  He is afebrile    Patient's performance status is very poor.  Discussed with patient's wife and daughter.  His prognosis especially because he has recurred within 6 months of treatment.  Will need to discuss with Dr. Conn tomorrow and see if he will be candidate for palliative care/hospice.  Patient's wife understands that but patient's daughter wants to think about that.    HISTORY OF PRESENT ILLNESS:    This is a 78-year-old man followed in our practice by Dr. Conn   with extensive stage small cell lung cancer previously treated   with 4 cycles of  carboplatin/-16/Tecentriq with good response   followed by maintenance Tecentriq every 3 weeks most recently   given on 10/5/2023.  The patient developed brain metastases by   MRI brain 7/26/2023 treated with whole brain radiation with   improved findings per MRI brain 9/20/2023.      The patient was doing well until 3 weeks ago he developed cough   and shortness of breath treated for pneumonia in his hometown of   [facility].  He was treated with doxycycline but did not   improve and then received steroids and an alternate antibiotic   outpatient.  Since that time he has continued to have decline in   his performance status, not eating or drinking and has   subsequently become very weak and lightheaded.      He was in town yesterday for CT restaging and felt so ill he   presented to the ER for evaluation subsequently admitted.      His CTs yesterday unfortunately show progression of cancer with   enlarging mediastinal lymph nodes, progression of disease in the   liver, bilateral adrenal metastases.  MRI of the brain is   pending.      Labs are pertinent for hemoglobin 10.4, normal white count 6.6,   platelets 132, total protein 5.9/albumin 3.4.  TSH is normal   0.771.         Past Medical History:   Diagnosis Date    Acid reflux     Anemia     Arthritis     Coronary artery disease     CVA (cerebral vascular accident)     High cholesterol     History of atrial fibrillation     Hx of blood clots     Hypertension     Irregular heartbeat     Peptic ulcer disease     Prostate cancer     Rheumatoid arthritis     Skin cancer     Small cell lung cancer        ONCOLOGIC HISTORY:  (History from previous dates can be found in the separate document.)    No current facility-administered medications on file prior to encounter.     Current Outpatient Medications on File Prior to Encounter   Medication Sig Dispense Refill    bethanechol (URECHOLINE) 50 MG tablet Take 1 tablet by mouth Every 12 (Twelve) Hours.       cyanocobalamin (VITAMIN B-12) 1000 MCG tablet Take 1 tablet by mouth Daily.      famotidine (PEPCID) 20 MG tablet Take 1 tablet by mouth twice daily 60 tablet 0    LORazepam (Ativan) 1 MG tablet Take 1 tablet by mouth as needed 1 hour prior to CT scan. 1 tablet 0    Magnesium 250 MG tablet Take  by mouth.      metoprolol tartrate (LOPRESSOR) 25 MG tablet Take 0.5 tablets by mouth 2 (Two) Times a Day.      ondansetron (ZOFRAN) 8 MG tablet Take 1 tablet by mouth 3 (Three) Times a Day As Needed for Nausea or Vomiting. 30 tablet 5    traMADol (ULTRAM) 50 MG tablet Take 2 tablets by mouth Every 6 (Six) Hours As Needed for Moderate Pain. 340 tablet 0    Vitamin B Complex-C capsule       vitamin D3 125 MCG (5000 UT) capsule capsule Take 1 capsule by mouth Daily.      Zinc 50 MG tablet Take 1 tablet by mouth Daily.      Acetaminophen (ARTHRITIS PAIN RELIEF PO)       ascorbic acid (VITAMIN C) 1000 MG tablet       Ashwagandha 125 MG capsule       aspirin 81 MG EC tablet Daily.      benzonatate (TESSALON) 100 MG capsule Take 1 capsule by mouth 3 (Three) Times a Day As Needed for Cough.      coenzyme Q10 100 MG capsule Take 1 capsule by mouth Daily.      Creatinine powder       dexAMETHasone (DECADRON) 2 MG tablet Take 1 tablet by mouth Daily With Breakfast. Take 1 tablet by mouth daily with breakfast for one week then stop. If develop neurologic symptoms restart 1 tablet daily and notify our office. 30 tablet 0    diphenhydrAMINE (BENADRYL) 25 mg capsule Take 1 capsule by mouth Every 6 (Six) Hours As Needed for Itching.      doxycycline (MONODOX) 100 MG capsule Take 1 capsule by mouth Every 12 (Twelve) Hours.      Elderberry 500 MG capsule Take  by mouth.      fluocinonide (LIDEX) 0.05 % cream APPLY TO THE AFFECTED AREA(S) BY TOPICAL ROUTE 2 TIMES PER DAY      Misc Natural Products (SUPER GREENS PO) Take  by mouth.      Misc Natural Products (YUMVS BEET ROOT-TART CHERRY PO) Take  by mouth.      Turmeric 500 MG capsule Take  by  mouth.      Ventolin  (90 Base) MCG/ACT inhaler INHALE 2 PUFFS BY MOUTH EVERY 4 TO 6 HOURS AS NEEDED FOR SHORTNESS OF BREATH FOR WHEEZING         ALLERGIES:     Allergies   Allergen Reactions    Codeine Other (See Comments)     headache         Social History     Socioeconomic History    Marital status:      Spouse name: Marivel   Tobacco Use    Smoking status: Former     Packs/day: .5     Types: Cigarettes     Start date: 1976     Quit date: 2020     Years since quitting: 3.8    Smokeless tobacco: Never   Vaping Use    Vaping Use: Never used   Substance and Sexual Activity    Alcohol use: Never    Drug use: Never    Sexual activity: Defer         Cancer-related family history includes Brain cancer in his mother; Lung cancer in his sister.     Review of Systems  A comprehensive 14 point review of systems was performed and was negative except as mentioned.    Objective      Vitals:    10/21/23 1956 10/22/23 0517 10/22/23 0748 10/22/23 1239   BP: 123/71 117/69 111/76 129/91   BP Location: Left arm Left arm Left arm Left arm   Patient Position: Lying Lying Lying Sitting   Pulse: 87 80 88 80   Resp: 18 18 18 18   Temp: 96.8 °F (36 °C) 99.3 °F (37.4 °C) 98.6 °F (37 °C) 97.3 °F (36.3 °C)   TempSrc: Oral Oral Oral Oral   SpO2: 95% 96% 97% 98%   Weight:       Height:             10/5/2023     1:19 PM   Current Status   ECOG score 0       Physical Exam        This patient's ACP documentation is up to date, and there's nothing further left to document.    CONSTITUTIONAL:  Vital signs reviewed.  Alert and oriented x3  No distress, patient does appear weak.    RESPIRATORY:  Normal respiratory effort.  No rales  or wheezing, clear.   CARDIOVASCULAR:  Regular rate and rhythm, no murmur  No significant lower extremity edema.  Abdomen: Soft nontender positive bowel sounds no hepatosplenomegaly  SKIN: No wounds.  No rashes.  MUSCULOSKELETAL/EXTREMITIES: No clubbing or cyanosis.  No apparent unilateral weakness.  NEURO:  CN 2-12 appear intact. No focal neurological deficits noted.  PSYCHIATRIC:  Normal judgment and insight.  Normal mood and affect.  I have reexamined the patient and the results are consistent with the previously documented exam. Karlene Correa MD       RECENT LABS:  Hematology WBC   Date Value Ref Range Status   10/22/2023 6.10 3.40 - 10.80 10*3/mm3 Final     RBC   Date Value Ref Range Status   10/22/2023 3.11 (L) 4.14 - 5.80 10*6/mm3 Final     Hemoglobin   Date Value Ref Range Status   10/22/2023 10.3 (L) 13.0 - 17.7 g/dL Final     Hematocrit   Date Value Ref Range Status   10/22/2023 29.5 (L) 37.5 - 51.0 % Final     Platelets   Date Value Ref Range Status   10/22/2023 130 (L) 140 - 450 10*3/mm3 Final          CT CAP:  IMPRESSION:  1. Interval development of hepatic metastases and likely new or  progression of bilateral adrenal metastases.  2. No significant change in the 1.4 cm right lower lobe pulmonary  nodule, but there has been increase in the right hilar lymphadenopathy.  Close follow-up is recommended for the 7 mm right middle lobe pulmonary  nodule which appears marginally larger. There are also stable tiny  bilateral pulmonary nodules.  3. The left paratracheal lymphadenopathy has decreased.  I personally reviewed the CT abdomen-there are new liver lesions and bilateral adrenal gland lesion/thickening       Assessment & Plan       *Extensive stage small cell lung cancer  Patient initially treated with 4 cycles of carboplatin/-16/atezolizumab completed 6/2/2023 6/19/2023-stable right lower lobe pulmonary nodule, increased mediastinal lymph nodes, stable metastatic liver and right adrenal lesions  Maintenance atezolizumab continued every 3 weeks, most recently given 10/5/2023  Brain metastases with progression by MRI 7/26/2023 treated with whole brain radiation  10/19/2023 CT chest abdomen pelvis-stable 1.4 cm right lower lobe pulmonary nodule, increased right hilar lymph node, stable reticulonodular  opacities right lower lobe, stable 7 mm right middle lobe nodule, new liver metastases up to 3.6 and 3.0 cm, significant increase in nodular enhancement of both adrenal glands  His pacemaker is MRI compatible.  But he requires anesthesia and we could consider doing MRI of the brain next week with anesthesia.  He was originally scheduled on Tuesday.  Await MRI of the brain hopefully scheduled on Tuesday  Prognosis is very poor.  Discussion done with patient's wife and daughter to think about consideration of palliative care consult.  They want to think about it          *Anemia of malignancy/chemotherapy-hemoglobin 10.4     *Poor appetite/volume depletion/weight loss  Patient has felt poorly 3 weeks with no appetite poor oral intake resulting in volume depletion/protein malnutrition (total protein 5.9)  Can consider Marinol low-dose for appetite stimulation as well as nausea    *Urinary retention, patient had 700 cc of urine after in and out cath.  Given that he may require River catheter     Oncology plan/recommendations:  Scans indicating progression of disease reviewed with the patient's wife-since the patient's disease has progressed within 6 months of chemotherapy this would be considered primary refractory disease  Agree with rechecking MRI of the brain-if there has been progression of disease despite whole brain radiation this would portend a poor prognosis  Check cortisol/ACTH in the a.m. to rule out adrenal insufficiency as a cause of loss of appetite etc. (patient on immunotherapy)  Monitor CBC  Unclear if the patient will be a candidate for further palliative chemotherapy pending above evaluation and hospital course/PS  MRI to be done next week hopefully with anesthesia and his pacemaker is compatible with MRI  We will start Marinol 2.5 mg p.o. twice daily  Also will need catheterization as he had residual of 700 cc    Discussed in length about palliative care both with patient's wife and daughter.   Currently they want to wait though patient's wife thinks palliative care is appropriate but wants to think about it.  Need to discuss with Dr. Conn    Await MRI of the brain under anesthesia.  On Tuesday  Karlene Correa MD                         Cc:  Rosalva Chester MD

## 2023-10-22 NOTE — PROGRESS NOTES
Name: Russell Ramírez . ADMIT: 10/19/2023   : 1945  PCP: Morgan Sebastian DO    MRN: [id number] LOS: 2 days   AGE/SEX: 78 y.o. male  ROOM: [facility]/     Subjective   Subjective   Chief Complaint   Patient presents with    Fatigue     No shortness of breath reported.  No nausea currently.  Still with poor appetite.  Not reporting abdominal pain.    Family is concerned about his memory deficits and was asking about potential stroke or dementia.  I discussed that for step would be to get the MRI brain to check for any change in brain metastases but also discussed that with active malignancy he has increased risk for clotting/strokes depending on the type of malignancy.     Objective   Objective   Vital Signs  Temp:  [96.8 °F (36 °C)-99.3 °F (37.4 °C)] 97.3 °F (36.3 °C)  Heart Rate:  [80-88] 80  Resp:  [18] 18  BP: (111-129)/(69-91) 129/91  SpO2:  [95 %-98 %] 98 %  on  Flow (L/min):  [2] 2;   Device (Oxygen Therapy): nasal cannula  Body mass index is 28.38 kg/m².    Physical Exam  Vitals and nursing note reviewed.   Constitutional:       Appearance: He is ill-appearing. He is not diaphoretic.   HENT:      Head: Atraumatic.   Cardiovascular:      Rate and Rhythm: Normal rate and regular rhythm.      Pulses: Normal pulses.   Pulmonary:      Effort: Pulmonary effort is normal.      Breath sounds: No wheezing.   Abdominal:      General: There is no distension.      Palpations: Abdomen is soft.      Tenderness: There is no abdominal tenderness. There is no guarding or rebound.   Musculoskeletal:         General: No swelling or tenderness.   Skin:     General: Skin is warm and dry.   Neurological:      Mental Status: He is alert.      Motor: Weakness (generalized) present.   Psychiatric:         Mood and Affect: Mood normal.         Behavior: Behavior normal.       Results Review  I reviewed the patient's new clinical results.    Results from last 7 days   Lab Units 10/22/23  0658 10/21/23  0350 10/20/23  0644  "10/19/23  1137   WBC 10*3/mm3 6.10 6.36 6.64 7.05   HEMOGLOBIN g/dL 10.3* 10.7* 10.4* 10.8*   PLATELETS 10*3/mm3 130* 153 132* 150     Results from last 7 days   Lab Units 10/22/23  0658 10/21/23  0350 10/20/23  0644 10/19/23  1137   SODIUM mmol/L 136 136 137 134*   POTASSIUM mmol/L 3.7 3.9 4.2 4.1   CHLORIDE mmol/L 104 103 103 102   CO2 mmol/L 26.0 23.8 27.0 24.8   BUN mg/dL 9 12 16 20   CREATININE mg/dL 0.77 0.82 0.84 1.00   GLUCOSE mg/dL 102* 101* 96 95   EGFR mL/min/1.73 92.2 90.5 89.8 77.5     Results from last 7 days   Lab Units 10/22/23  0658 10/21/23  0350 10/20/23  0644 10/19/23  1137   ALBUMIN g/dL 3.2* 3.3* 3.4* 3.3*   BILIRUBIN mg/dL 0.4 0.5 0.6 0.4   ALK PHOS U/L 66 72 74 71   AST (SGOT) U/L 19 19 19 18   ALT (SGPT) U/L 20 18 20 19     Results from last 7 days   Lab Units 10/22/23  0658 10/21/23  0350 10/20/23  0644 10/19/23  1137   CALCIUM mg/dL 8.2* 8.5* 8.7 8.7   ALBUMIN g/dL 3.2* 3.3* 3.4* 3.3*   MAGNESIUM mg/dL  --  2.0  --   --    PHOSPHORUS mg/dL  --  2.7  --   --          No results found for: \"HGBA1C\", \"POCGLU\"    No radiology results for the last day    I have personally reviewed all medications:  Scheduled Medications  aspirin, 81 mg, Oral, Q24H  bethanechol, 50 mg, Oral, Q12H  dronabinol, 2.5 mg, Oral, BID  famotidine, 20 mg, Oral, BID  fluticasone, 2 spray, Each Nare, Daily  LORazepam, 0.5 mg, Intravenous, Once in imaging  metoprolol tartrate, 25 mg, Oral, Q12H  senna-docusate sodium, 2 tablet, Oral, BID  sodium chloride, 10 mL, Intravenous, Q12H  tamsulosin, 0.4 mg, Oral, Daily  cyanocobalamin, 1,000 mcg, Oral, Daily      Infusions  sodium chloride, 75 mL/hr, Last Rate: 75 mL/hr (10/22/23 0920)      Diet  Diet: Regular/House Diet; Texture: Regular Texture (IDDSI 7); Fluid Consistency: Thin (IDDSI 0)    I have personally reviewed:  [x]  Laboratory   []  Microbiology   []  Radiology   []  EKG/Telemetry  []  Cardiology/Vascular   []  Pathology    []  Records       Assessment/Plan     Active " Hospital Problems    Diagnosis  POA    **Physical deconditioning [R53.81]  Yes    Coronary arteriosclerosis [I25.10]  Yes    History of malignant neoplasm of prostate [Z85.46]  Not Applicable    Depressive disorder [F32.A]  Yes    Lung cancer metastatic to brain [C34.90, 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       78 y.o. male admitted with Physical deconditioning.    Metastatic Lung Cancer: Liver and adrenal metastasis noted on CT. MRI brain planned, will place consult for anesthesia in the morning so they can review and see about sedation. Oncology following.  Difficulty Urinating: PSA low.  UA was not infectious. Required cath.  On Flomax.  Can attempt voiding trial in few days  NV/Constipation: Continue bowel regimen. Did not have obstruction on CT. Since no bowel movement recorded, repeat KUB.  Rhinitis: RVP ok. Recent abx reported.  Continue nasal spray.  AFib: Anticoagulation v continued aspirin per oncology  Deconditioning: PT/OT  PPX: per oncology  Disposition: TBD    Expected discharge date/ time has not been documented.     Myron Jang MD  [facility]  10/22/23  13:33 EDT    Dictated portions of note using Dragon dictation software.  Copied text in this note has been reviewed by me and remains accurate as of 10/22/23

## 2023-10-23 ENCOUNTER — APPOINTMENT (OUTPATIENT)
Dept: GENERAL RADIOLOGY | Facility: HOSPITAL | Age: 78
DRG: 081 | End: 2023-10-23
Payer: MEDICARE

## 2023-10-23 LAB
ALBUMIN SERPL-MCNC: 3.3 G/DL (ref 3.5–5.2)
ALBUMIN/GLOB SERPL: 1.5 G/DL
ALP SERPL-CCNC: 72 U/L (ref 39–117)
ALT SERPL W P-5'-P-CCNC: 24 U/L (ref 1–41)
ANION GAP SERPL CALCULATED.3IONS-SCNC: 8.9 MMOL/L (ref 5–15)
AST SERPL-CCNC: 22 U/L (ref 1–40)
BILIRUB SERPL-MCNC: 0.4 MG/DL (ref 0–1.2)
BUN SERPL-MCNC: 7 MG/DL (ref 8–23)
BUN/CREAT SERPL: 9.7 (ref 7–25)
CALCIUM SPEC-SCNC: 8.7 MG/DL (ref 8.6–10.5)
CHLORIDE SERPL-SCNC: 103 MMOL/L (ref 98–107)
CO2 SERPL-SCNC: 27.1 MMOL/L (ref 22–29)
CREAT SERPL-MCNC: 0.72 MG/DL (ref 0.76–1.27)
DEPRECATED RDW RBC AUTO: 48.6 FL (ref 37–54)
EGFRCR SERPLBLD CKD-EPI 2021: 94.1 ML/MIN/1.73
ERYTHROCYTE [DISTWIDTH] IN BLOOD BY AUTOMATED COUNT: 14 % (ref 12.3–15.4)
GLOBULIN UR ELPH-MCNC: 2.2 GM/DL
GLUCOSE SERPL-MCNC: 91 MG/DL (ref 65–99)
HCT VFR BLD AUTO: 29.9 % (ref 37.5–51)
HGB BLD-MCNC: 10.4 G/DL (ref 13–17.7)
LYMPHOCYTES # BLD MANUAL: 0.63 10*3/MM3 (ref 0.7–3.1)
LYMPHOCYTES NFR BLD MANUAL: 5.9 % (ref 5–12)
MAGNESIUM SERPL-MCNC: 1.9 MG/DL (ref 1.6–2.4)
MCH RBC QN AUTO: 32.8 PG (ref 26.6–33)
MCHC RBC AUTO-ENTMCNC: 34.8 G/DL (ref 31.5–35.7)
MCV RBC AUTO: 94.3 FL (ref 79–97)
METAMYELOCYTES NFR BLD MANUAL: 1 % (ref 0–0)
MONOCYTES # BLD: 0.42 10*3/MM3 (ref 0.1–0.9)
MYELOCYTES NFR BLD MANUAL: 1 % (ref 0–0)
NEUTROPHILS # BLD AUTO: 5.92 10*3/MM3 (ref 1.7–7)
NEUTROPHILS NFR BLD MANUAL: 83.2 % (ref 42.7–76)
PLAT MORPH BLD: NORMAL
PLATELET # BLD AUTO: 143 10*3/MM3 (ref 140–450)
PMV BLD AUTO: 8.6 FL (ref 6–12)
POTASSIUM SERPL-SCNC: 3.3 MMOL/L (ref 3.5–5.2)
PROT SERPL-MCNC: 5.5 G/DL (ref 6–8.5)
RBC # BLD AUTO: 3.17 10*6/MM3 (ref 4.14–5.8)
RBC MORPH BLD: NORMAL
SODIUM SERPL-SCNC: 139 MMOL/L (ref 136–145)
VARIANT LYMPHS NFR BLD MANUAL: 8.9 % (ref 19.6–45.3)
WBC MORPH BLD: NORMAL
WBC NRBC COR # BLD: 7.12 10*3/MM3 (ref 3.4–10.8)

## 2023-10-23 PROCEDURE — 85027 COMPLETE CBC AUTOMATED: CPT | Performed by: INTERNAL MEDICINE

## 2023-10-23 PROCEDURE — 71045 X-RAY EXAM CHEST 1 VIEW: CPT

## 2023-10-23 PROCEDURE — 97530 THERAPEUTIC ACTIVITIES: CPT

## 2023-10-23 PROCEDURE — 63710000001 PREDNISONE PER 1 MG: Performed by: INTERNAL MEDICINE

## 2023-10-23 PROCEDURE — 99222 1ST HOSP IP/OBS MODERATE 55: CPT | Performed by: PHYSICIAN ASSISTANT

## 2023-10-23 PROCEDURE — 99233 SBSQ HOSP IP/OBS HIGH 50: CPT | Performed by: INTERNAL MEDICINE

## 2023-10-23 PROCEDURE — 82024 ASSAY OF ACTH: CPT | Performed by: INTERNAL MEDICINE

## 2023-10-23 PROCEDURE — 63710000001 DRONABINOL PER 2.5 MG: Performed by: INTERNAL MEDICINE

## 2023-10-23 PROCEDURE — 25810000003 SODIUM CHLORIDE 0.9 % SOLUTION: Performed by: INTERNAL MEDICINE

## 2023-10-23 PROCEDURE — 85007 BL SMEAR W/DIFF WBC COUNT: CPT | Performed by: INTERNAL MEDICINE

## 2023-10-23 PROCEDURE — 83735 ASSAY OF MAGNESIUM: CPT | Performed by: INTERNAL MEDICINE

## 2023-10-23 PROCEDURE — 80053 COMPREHEN METABOLIC PANEL: CPT | Performed by: INTERNAL MEDICINE

## 2023-10-23 PROCEDURE — 25010000002 POTASSIUM CHLORIDE 10 MEQ/100ML SOLUTION: Performed by: INTERNAL MEDICINE

## 2023-10-23 RX ORDER — POTASSIUM CHLORIDE 7.45 MG/ML
10 INJECTION INTRAVENOUS
Status: COMPLETED | OUTPATIENT
Start: 2023-10-23 | End: 2023-10-23

## 2023-10-23 RX ORDER — PREDNISONE 20 MG/1
20 TABLET ORAL
Status: DISCONTINUED | OUTPATIENT
Start: 2023-10-23 | End: 2023-11-02 | Stop reason: HOSPADM

## 2023-10-23 RX ORDER — BISACODYL 10 MG
10 SUPPOSITORY, RECTAL RECTAL DAILY
Status: DISCONTINUED | OUTPATIENT
Start: 2023-10-23 | End: 2023-11-02 | Stop reason: HOSPADM

## 2023-10-23 RX ADMIN — FAMOTIDINE 20 MG: 20 TABLET ORAL at 09:30

## 2023-10-23 RX ADMIN — SENNOSIDES AND DOCUSATE SODIUM 2 TABLET: 50; 8.6 TABLET ORAL at 21:32

## 2023-10-23 RX ADMIN — DRONABINOL 2.5 MG: 2.5 CAPSULE ORAL at 21:32

## 2023-10-23 RX ADMIN — Medication 1000 MCG: at 09:30

## 2023-10-23 RX ADMIN — METOPROLOL TARTRATE 12.5 MG: 25 TABLET, FILM COATED ORAL at 21:32

## 2023-10-23 RX ADMIN — Medication 10 ML: at 21:48

## 2023-10-23 RX ADMIN — ASPIRIN 81 MG: 81 TABLET, COATED ORAL at 21:32

## 2023-10-23 RX ADMIN — POTASSIUM CHLORIDE 10 MEQ: 7.46 INJECTION, SOLUTION INTRAVENOUS at 14:31

## 2023-10-23 RX ADMIN — SODIUM CHLORIDE 75 ML/HR: 9 INJECTION, SOLUTION INTRAVENOUS at 05:38

## 2023-10-23 RX ADMIN — BISACODYL 10 MG: 10 SUPPOSITORY RECTAL at 11:00

## 2023-10-23 RX ADMIN — FLUTICASONE PROPIONATE 2 SPRAY: 50 SPRAY, METERED NASAL at 09:30

## 2023-10-23 RX ADMIN — POTASSIUM CHLORIDE 10 MEQ: 7.46 INJECTION, SOLUTION INTRAVENOUS at 15:52

## 2023-10-23 RX ADMIN — METOPROLOL TARTRATE 12.5 MG: 25 TABLET, FILM COATED ORAL at 09:30

## 2023-10-23 RX ADMIN — FAMOTIDINE 20 MG: 20 TABLET ORAL at 21:33

## 2023-10-23 RX ADMIN — TAMSULOSIN HYDROCHLORIDE 0.4 MG: 0.4 CAPSULE ORAL at 09:30

## 2023-10-23 RX ADMIN — POTASSIUM CHLORIDE 10 MEQ: 7.46 INJECTION, SOLUTION INTRAVENOUS at 11:09

## 2023-10-23 RX ADMIN — BETHANECHOL CHLORIDE 50 MG: 25 TABLET ORAL at 10:52

## 2023-10-23 RX ADMIN — SENNOSIDES AND DOCUSATE SODIUM 2 TABLET: 50; 8.6 TABLET ORAL at 09:30

## 2023-10-23 RX ADMIN — PREDNISONE 20 MG: 20 TABLET ORAL at 15:52

## 2023-10-23 RX ADMIN — BETHANECHOL CHLORIDE 50 MG: 25 TABLET ORAL at 21:33

## 2023-10-23 RX ADMIN — POTASSIUM CHLORIDE 10 MEQ: 7.46 INJECTION, SOLUTION INTRAVENOUS at 12:17

## 2023-10-23 RX ADMIN — DRONABINOL 2.5 MG: 2.5 CAPSULE ORAL at 10:52

## 2023-10-23 RX ADMIN — Medication 10 ML: at 09:31

## 2023-10-23 NOTE — PLAN OF CARE
Goal Outcome Evaluation:  Plan of Care Reviewed With: patient, spouse, daughter, son        Progress: improving  Outcome Evaluation: Pt was seen for OT session this pm w/ wife, son and daughter present. Pt was observed sleeping and was difficult to awake. With family encouragement pt participated and agreeable to sitting UIC for a while but had decreased communication this session. Pt was MOD A x2 for rolling to the left and supine to sit EOB. MIN A x2 for STS w/ HHA x2 and CGA-MIN A x2 for bed-chair xfer. Pt able to take a few steps during xfer to chair, to increase ability to perfrom fxnl xfers during ADLs. Pt and family were educated and trained in proper STS and Bed-chair xfer tech to increase safety and (I) in prep for DC. Pt family demo's good understanding. DEP for washing face. Pt rec to DC to SNF. OT will continue to monitor.

## 2023-10-23 NOTE — PROGRESS NOTES
Subjective     CHIEF COMPLAINT:     SCLC    INTERVAL HISTORY:     Patient was seen with family at bedside. He was complaining of abdominal discomfort and constipation. No BM x 7 days. He was given a suppository and had a small BM.     Patient's wife reports that he has a poor appetite. He is not eating much. He declined PT this AM.     REVIEW OF SYSTEMS:  A comprehensive review of systems was obtained with pertinent positive findings as noted in the interval history above.  All other systems negative.    SCHEDULED MEDS:  aspirin, 81 mg, Oral, Q24H  bethanechol, 50 mg, Oral, Q12H  dronabinol, 2.5 mg, Oral, BID  famotidine, 20 mg, Oral, BID  fluticasone, 2 spray, Each Nare, Daily  LORazepam, 0.5 mg, Intravenous, Once in imaging  metoprolol tartrate, 12.5 mg, Oral, Q12H  senna-docusate sodium, 2 tablet, Oral, BID  sodium chloride, 10 mL, Intravenous, Q12H  tamsulosin, 0.4 mg, Oral, Daily  cyanocobalamin, 1,000 mcg, Oral, Daily      INFUSIONS:  sodium chloride, 75 mL/hr, Last Rate: 75 mL/hr (10/23/23 0538)      PRN MEDS:    acetaminophen **OR** acetaminophen **OR** acetaminophen    albuterol    senna-docusate sodium **AND** polyethylene glycol **AND** bisacodyl **AND** bisacodyl    Calcium Replacement - Follow Nurse / BPA Driven Protocol    Magnesium Standard Dose Replacement - Follow Nurse / BPA Driven Protocol    ondansetron    Phosphorus Replacement - Follow Nurse / BPA Driven Protocol    Potassium Replacement - Follow Nurse / BPA Driven Protocol    prochlorperazine    sodium chloride    sodium chloride    sodium chloride    traMADol       Objective   VITAL SIGNS:  Temp:  [97 °F (36.1 °C)-98.4 °F (36.9 °C)] 98.4 °F (36.9 °C)  Heart Rate:  [80-96] 96  Resp:  [18] 18  BP: (123-133)/(63-91) 133/82     PHYSICAL EXAMINATION:  GENERAL:  The patient appears weak, not in acute distress.  SKIN: No skin rash.   EYES:  No Jaundice. Pallor.   CHEST: Normal respiratory effort. Lungs clear to auscultation.   CARDIAC:  Normal S1 &  S2. No murmur.   ABDOMEN:  Soft. Mild paraumbilical tenderness. No Hepatomegaly. No Splenomegaly.   EXTREMITIES:  No noted deformity.     RESULT REVIEW:   Results from last 7 days   Lab Units 10/23/23  0538 10/22/23  0658 10/21/23  0350 10/20/23  0644 10/19/23  1137   WBC 10*3/mm3 7.12 6.10 6.36 6.64 7.05   NEUTROS ABS 10*3/mm3 5.92 4.36 4.92 4.76 4.91   LYMPHS ABS 10*3/mm3 0.63*  --   --   --   --    HEMOGLOBIN g/dL 10.4* 10.3* 10.7* 10.4* 10.8*   HEMATOCRIT % 29.9* 29.5* 30.7* 29.9* 31.2*   PLATELETS 10*3/mm3 143 130* 153 132* 150     Results from last 7 days   Lab Units 10/23/23  0538 10/22/23  0658 10/21/23  0350 10/20/23  0644 10/19/23  1137   SODIUM mmol/L 139 136 136 137 134*   POTASSIUM mmol/L 3.3* 3.7 3.9 4.2 4.1   CHLORIDE mmol/L 103 104 103 103 102   CO2 mmol/L 27.1 26.0 23.8 27.0 24.8   BUN mg/dL 7* 9 12 16 20   CREATININE mg/dL 0.72* 0.77 0.82 0.84 1.00   CALCIUM mg/dL 8.7 8.2* 8.5* 8.7 8.7   ALBUMIN g/dL 3.3* 3.2* 3.3* 3.4* 3.3*   BILIRUBIN mg/dL 0.4 0.4 0.5 0.6 0.4   ALK PHOS U/L 72 66 72 74 71   ALT (SGPT) U/L 24 20 18 20 19   AST (SGOT) U/L 22 19 19 19 18   MAGNESIUM mg/dL 1.9  --  2.0  --   --      CT chest abdomen pelvis on 10/19/2023:  CHEST: There is no significant change in the size of the 1.4 cm right  lower lobe pulmonary nodule. There has been interval increase in the  right hilar lymphadenopathy with 1 of the nodes measuring 2.2 x 1.5 cm,  previously approximately 1.4 x 1.1 cm. There has been significant  decrease in the left lower paratracheal lymphadenopathy. The cluster of  reticulonodular opacities at the anterolateral aspect of the right lower  lobe appears grossly unchanged and is likely related to bronchiolitis.  There is a 7 mm nodule at the superior aspect of the right middle lobe  along the minor fissure aspect of the right upper lobe which appears  marginally larger. There is no interval change in a few sub-6 mm faint  nodular opacities at the left lower lobe. There are no  pleural or  pericardial effusions.     ABDOMEN/PELVIS: There has been development of several liver metastases  with the 2 largest measuring 3.6 x 3.2 cm at the lateral hepatic segment  and 3.0 x 2.9 cm at the posterior hepatic segment. There has been  significant increase in the nodular enlargement of both adrenal glands,  suspicious for metastatic disease.     No acute abnormality is seen at the gallbladder, spleen, or kidneys.  Cortical thinning and scarring at the lower pole of the left kidney is  chronic and unchanged. There is extensive chronic pancreatitis, but no  evidence for acute pancreatitis. There is no acute bowel abnormality.  Aortic biiliac endovascular stent is noted in place.     IMPRESSION:  1. Interval development of hepatic metastases and likely new or  progression of bilateral adrenal metastases.  2. No significant change in the 1.4 cm right lower lobe pulmonary  nodule, but there has been increase in the right hilar lymphadenopathy.  Close follow-up is recommended for the 7 mm right middle lobe pulmonary  nodule which appears marginally larger. There are also stable tiny  bilateral pulmonary nodules.  3. The left paratracheal lymphadenopathy has decreased.    Assessment & Plan   *Extensive stage small cell lung cancer.  Patient follows with Dr. Conn at our office.  He was treated with 4 cycles of carboplatin etoposide and Tecentriq, completed on 6/2/2023.  6/19/2023: Increased mediastinal lymph nodes, stable metastatic liver and right adrenal lesions.  He was continued on maintenance Tecentriq every 3 weeks.  Last dose was on 10/5/2023.  10/19/2023: CT chest abdomen pelvis revealed stable 1.4 cm right lower pulmonary nodule.  There was increased right hilar lymph nodes.  There were new liver metastasis up to 3.6 and 3.0 cm.  There was significant increase in nodular enhancement of both adrenal glands, concerning for metastasis.  Based on the timing since completion of treatment being <6 months,  this was considered primary refractory disease.  I had a lengthy discussion with the patient's family and I reviewed the CT scan with them.  They asked about surgery as an option for treatment for the liver and adrenal masses. I explained that surgery is not considered an option due to the number of lesions and due to the lesions being metastatic rather than primary.  I explained that chemotherapy treatment is the main method of treatment if he is able to tolerate it.    *Brain metastasis.  S/p whole brain radiation therapy.  MRI brain requested.  Patient was previously unable to have MRI done under anesthesia.    Awaiting MRI to be done under anesthesia.    Plan to do this tomorrow.    *Poor appetite and weight loss.  Protein malnutrition - total protein 5.9.  Cortisol ACTH levels were obtained to evaluate for adrenal insufficiency as the cause.  A.m. cortisol 27.    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  10/23/2023: Hemoglobin 10.4.    *Deconditioning  Recommended PT and OT if he is able to participate.    *Constipation.   Minimal benefit from a suppository.    PLAN:    1.  Start Prednisone 20 mg daily.  2.  Fleet enema.  3.  Await MRI brain.  4.  PT and OT.     Discussed with the family at bedside.  Discussed with the RN.    I spent 60 minutes caring for Russell on this date of service. This time includes time spent by me in the following activities: preparing for the visit, reviewing tests, obtaining and/or reviewing a separately obtained history, performing a medically appropriate examination and/or evaluation, counseling and educating the patient/family/caregiver, ordering medications, tests, or procedures, documenting information in the medical record, and independently interpreting results and communicating that information with the patient/family/caregiver        Sarah Beth Kunz MD  10/23/23

## 2023-10-23 NOTE — THERAPY TREATMENT NOTE
Patient Name: Russell Ramírez Sr.  : 1945    MRN: [id number]                              Today's Date: 10/23/2023       Admit Date: 10/19/2023    Visit Dx:     ICD-10-CM ICD-9-CM   1. Physical deconditioning  R53.81 799.3     Patient Active Problem List   Diagnosis    Lung nodule    Lung cancer metastatic to brain    High risk medication use    Encounter for adjustment or management of vascular access device    Abdominal aortic aneurysm    Adenomatous polyp of ascending colon    Anxiety state    Atrial fibrillation    Atrial flutter    Back problem    Benign essential hypertension    Cervical spondyloarthritis    Chronic midline low back pain without sciatica    Depressive disorder    Edema    Erectile dysfunction    Gastroesophageal reflux disease    History of malignant neoplasm of prostate    Hx of endovascular stent graft for abdominal aortic aneurysm    Hypercholesteremia    Pure hypercholesterolemia    Hypertensive heart disease    Hypotestosteronism    Malignant neoplasm of skin    Neck pain    Obesity    Obstructive sleep apnea    Osteoarthritis    Prostate cancer    Hypertension    PVD (peripheral vascular disease)    Vascular disorder    Raynaud's phenomenon    CAD (coronary artery disease)    Coronary arteriosclerosis in native artery    Coronary arteriosclerosis    Coronary atherosclerosis    Renal artery atherosclerosis    S/P ablation of atrial fibrillation    S/P renal artery angioplasty    Swelling of limb    Tobacco dependence syndrome    Secondary malignant neoplasm of brain    Physical deconditioning     Past Medical History:   Diagnosis Date    Acid reflux     Anemia     Arthritis     Coronary artery disease     CVA (cerebral vascular accident)     High cholesterol     History of atrial fibrillation     Hx of blood clots     Hypertension     Irregular heartbeat     Peptic ulcer disease     Prostate cancer     Rheumatoid arthritis     Skin cancer     Small cell lung cancer      Past  Surgical History:   Procedure Laterality Date    ABDOMINAL AORTIC ANEURYSM REPAIR      BACK SURGERY      x2    BRONCHOSCOPY WITH ION ROBOTIC ASSIST N/A 03/13/2023    Procedure: BRONCHOSCOPY ,ENDOBRONCHIAL ULTRASOUND AND ROBOTIC NAVIGATIONAL BRONCHOSCOPY WITH FINE NEEDLE ASPIRATION, BIOPSY X1 AREA, AND BRONCHOALEOLAR LAVAGE;  Surgeon: Sonali Lara MD;  Location: [facility] ENDOSCOPY;  Service: Robotics - Pulmonary;  Laterality: N/A;  Post: LUNG CANCER    CARDIAC ABLATION      x2    COLON SURGERY      HERNIA REPAIR      INSERTION CENTRAL VENOUS ACCESS DEVICE W/ SUBCUTANEOUS PORT Right 03/20/2023    VENOUS ACCESS DEVICE (PORT) INSERTION Right 03/20/2023    Procedure: INSERTION VENOUS ACCESS DEVICE;  Surgeon: Javier Gonzalez MD PhD;  Location: [facility] OR;  Service: Thoracic;  Laterality: Right;      General Information       Row Name 10/23/23 1524          OT Time and Intention    Document Type therapy note (daily note)  -PP     Mode of Treatment individual therapy;occupational therapy  -PP       Row Name 10/23/23 1524          General Information    Patient Profile Reviewed yes  -PP     Existing Precautions/Restrictions fall  -PP     Barriers to Rehab medically complex  -PP       Row Name 10/23/23 1524          Cognition    Orientation Status (Cognition) oriented to;person;place  -PP       Row Name 10/23/23 1524          Safety Issues, Functional Mobility    Impairments Affecting Function (Mobility) balance;endurance/activity tolerance;strength  -PP     Comment, Safety Issues/Impairments (Mobility) Gait belt and non skid socks worn during session for safety  -PP               User Key  (r) = Recorded By, (t) = Taken By, (c) = Cosigned By      Initials Name Provider Type    PP Zuleika Garza OT Occupational Therapist                     Mobility/ADL's       Row Name 10/23/23 1526          Bed Mobility    Bed Mobility supine-sit;rolling left  -PP     Rolling Left [place] (Bed Mobility) moderate assist  (50% patient effort);2 person assist  -PP     Sit-Supine [place] (Bed Mobility) moderate assist (50% patient effort);2 person assist  -PP     Comment, (Bed Mobility) Pt req assist for trunk and LE during supine to sit EOB. Pt left sitting UIC. Vcs for sequencing  -PP       Row Name 10/23/23 1526          Transfers    Transfers sit-stand transfer;stand-sit transfer;bed-chair transfer  -PP       Row Name 10/23/23 1526          Bed-Chair Transfer    Bed-Chair [place] (Transfers) minimum assist (75% patient effort);2 person assist  -PP     Assistive Device (Bed-Chair Transfers) --  HHA x2  -PP     Comment, (Bed-Chair Transfer) Pt son and daughter assisted pt w/ xfer while OT educated and taught proper tech  -PP       Row Name 10/23/23 1526          Sit-Stand Transfer    Sit-Stand [place] (Transfers) minimum assist (75% patient effort);2 person assist;contact guard  -PP     Assistive Device (Sit-Stand Transfers) --  HHA x2  -PP       Row Name 10/23/23 1526          Functional Mobility    Functional Mobility- Comment Pt able to take a few steps during xfer to chair, to increase ability to perfrom fxnl xfers during ADLs.  -PP       Row Name 10/23/23 1526          Activities of Daily Living    BADL Assessment/Intervention grooming  -PP       Row Name 10/23/23 1526          Grooming Assessment/Training    [place] Level (Grooming) grooming skills;wash face, hands;dependent (less than 25% patient effort)  -PP     Assistive Devices (Grooming) hand over hand  -PP     Position (Grooming) supine  -PP               User Key  (r) = Recorded By, (t) = Taken By, (c) = Cosigned By      Initials Name Provider Type    PP Zuleika Garza, OT Occupational Therapist                   Obj/Interventions       Row Name 10/23/23 1531          Motor Skills    Motor Skills functional endurance  -PP     Functional Endurance poor  -PP       Row Name 10/23/23 1531          Balance    Balance Assessment sitting static  balance;sitting dynamic balance;sit to stand dynamic balance;standing static balance;standing dynamic balance  -PP     Static Sitting Balance standby assist  -PP     Dynamic Sitting Balance contact guard  -PP     Position, Sitting Balance unsupported;sitting edge of bed  -PP     Sit to Stand Dynamic Balance minimal assist;2-person assist  -PP     Static Standing Balance contact guard;2-person assist  -PP     Dynamic Standing Balance minimal assist;2-person assist  -PP     Position/Device Used, Standing Balance supported  HHA x2  -PP     Balance Interventions sitting;standing;sit to stand;supported;static;dynamic  -PP     Comment, Balance some unsteadiness noted in standing  -PP               User Key  (r) = Recorded By, (t) = Taken By, (c) = Cosigned By      Initials Name Provider Type    PP Zuleika Garza, RONALD Occupational Therapist                   Goals/Plan    No documentation.                  Clinical Impression       Row Name 10/23/23 1533          Pain Assessment    Pretreatment Pain Rating 0/10 - no pain  -PP     Posttreatment Pain Rating 0/10 - no pain  -PP     Pre/Posttreatment Pain Comment Difficult to assess but no c/o pain reported at this time  -PP     Pain Intervention(s) Rest;Repositioned  -PP       Row Name 10/23/23 1533          Plan of Care Review    Plan of Care Reviewed With patient;spouse;daughter;son  -PP     Progress improving  -PP     Outcome Evaluation Pt was seen for OT session this pm w/ wife, son and daughter present. Pt was observed sleeping and was difficult to awake. With family encouragement pt participated and agreeable to sitting UI for a while but had decreased communication this session. Pt was MOD A x2 for rolling to the left and supine to sit EOB. MIN A x2 for STS w/ HHA x2 and CGA-MIN A x2 for bed-chair xfer. Pt able to take a few steps during xfer to chair, to increase ability to perfrom fxnl xfers during ADLs. Pt and family were educated and trained in proper STS and  Bed-chair xfer tech to increase safety and (I) in prep for DC. Pt family demo's good understanding. DEP for washing face. Pt rec to DC to SNF. OT will continue to monitor.  -PP       Row Name 10/23/23 1533          Therapy Assessment/Plan (OT)    Rehab Potential (OT) fair, will monitor progress closely  -PP       Row Name 10/23/23 1533          Vital Signs    Pre Patient Position Supine  -PP     Intra Patient Position Standing  -PP     Post Patient Position Sitting  -PP       Row Name 10/23/23 1533          Positioning and Restraints    Pre-Treatment Position in bed  -PP     Post Treatment Position chair  -PP     In Chair notified nsg;with family/caregiver;sitting;call light within reach;encouraged to call for assist;exit alarm on  -PP               User Key  (r) = Recorded By, (t) = Taken By, (c) = Cosigned By      Initials Name Provider Type    PP Zuleika Garza OT Occupational Therapist                   Outcome Measures       Row Name 10/23/23 1538          How much help from another is currently needed...    Putting on and taking off regular lower body clothing? 1  -PP     Bathing (including washing, rinsing, and drying) 1  -PP     Toileting (which includes using toilet bed pan or urinal) 1  -PP     Putting on and taking off regular upper body clothing 1  -PP     Taking care of personal grooming (such as brushing teeth) 1  -PP     Eating meals 1  -PP     AM-PAC 6 Clicks Score (OT) 6  -PP       Row Name 10/23/23 1538          Functional Assessment    Outcome Measure Options AM-PAC 6 Clicks Daily Activity (OT)  -PP               User Key  (r) = Recorded By, (t) = Taken By, (c) = Cosigned By      Initials Name Provider Type    PP Zuleika Garza OT Occupational Therapist                    Occupational Therapy Education       Title: PT OT SLP Therapies (In Progress)       Topic: Occupational Therapy (In Progress)       Point: ADL training (In Progress)       Description:   Instruct learner(s) on proper  safety adaptation and remediation techniques during self care or transfers.   Instruct in proper use of assistive devices.                  Learning Progress Summary             Patient Acceptance, E,D, VU,DU by PP at 10/23/2023 1539    Comment: Pt and family were educated and taught proper STS and Bed-chair xfer tech to increase safety and (I) in prep for DC.    Acceptance, E,TB, NR by GJ at 10/21/2023 1649    Comment: Discussed benefits of participation with therapy.  Answered questions from wife.    Acceptance, E, NR by CW at 10/21/2023 1201    Comment: Role of OT and poc.   Family Acceptance, E, NR by CW at 10/21/2023 1201    Comment: Role of OT and poc.   Significant Other Acceptance, E,TB, NR by GJ at 10/21/2023 1649    Comment: Discussed benefits of participation with therapy.  Answered questions from wife.                         Point: Home exercise program (In Progress)       Description:   Instruct learner(s) on appropriate technique for monitoring, assisting and/or progressing therapeutic exercises/activities.                  Learning Progress Summary             Patient Acceptance, E,TB, NR by GJ at 10/21/2023 1649    Comment: Discussed benefits of participation with therapy.  Answered questions from wife.   Significant Other Acceptance, E,TB, NR by GJ at 10/21/2023 1649    Comment: Discussed benefits of participation with therapy.  Answered questions from wife.                         Point: Precautions (In Progress)       Description:   Instruct learner(s) on prescribed precautions during self-care and functional transfers.                  Learning Progress Summary             Patient Acceptance, E,D, VU,DU by PP at 10/23/2023 1539    Comment: Pt and family were educated and taught proper STS and Bed-chair xfer tech to increase safety and (I) in prep for DC.    Acceptance, E,TB, NR by GJ at 10/21/2023 1649    Comment: Discussed benefits of participation with therapy.  Answered questions from wife.    Significant Other Acceptance, E,TB, NR by  at 10/21/2023 1649    Comment: Discussed benefits of participation with therapy.  Answered questions from wife.                         Point: Body mechanics (In Progress)       Description:   Instruct learner(s) on proper positioning and spine alignment during self-care, functional mobility activities and/or exercises.                  Learning Progress Summary             Patient Acceptance, E,D, VU,DU by PP at 10/23/2023 1539    Comment: Pt and family were educated and taught proper STS and Bed-chair xfer tech to increase safety and (I) in prep for DC.    Acceptance, E,TB, NR by  at 10/21/2023 1649    Comment: Discussed benefits of participation with therapy.  Answered questions from wife.   Significant Other Acceptance, E,TB, NR by  at 10/21/2023 1649    Comment: Discussed benefits of participation with therapy.  Answered questions from wife.                                         User Key       Initials Effective Dates Name Provider Type Discipline     07/11/23 -  Inderjit Hayes, PT Physical Therapist PT    CW 06/16/21 -  Stephanie Rueda OTR Occupational Therapist OT    PP 06/09/23 -  Zuleika Garza OT Occupational Therapist OT                  OT Recommendation and Plan     Plan of Care Review  Plan of Care Reviewed With: patient, spouse, daughter, son  Progress: improving  Outcome Evaluation: Pt was seen for OT session this pm w/ wife, son and daughter present. Pt was observed sleeping and was difficult to awake. With family encouragement pt participated and agreeable to sitting UIC for a while but had decreased communication this session. Pt was MOD A x2 for rolling to the left and supine to sit EOB. MIN A x2 for STS w/ HHA x2 and CGA-MIN A x2 for bed-chair xfer. Pt able to take a few steps during xfer to chair, to increase ability to perfrom fxnl xfers during ADLs. Pt and family were educated and trained in proper STS and Bed-chair xfer tech to  increase safety and (I) in prep for DC. Pt family demo's good understanding. DEP for washing face. Pt rec to DC to SNF. OT will continue to monitor.     Time Calculation:         Time Calculation- OT       Row Name 10/23/23 1540             Time Calculation- OT    OT Start Time 1501  -PP      OT Stop Time 1521  -PP      OT Time Calculation (min) 20 min  -PP      Total Timed Code Minutes- OT 20 minute(s)  -PP      OT Received On 10/23/23  -PP      OT - Next Appointment 10/24/23  -PP         Timed Charges    09596 - OT Therapeutic Activity Minutes 20  -PP         Total Minutes    Timed Charges Total Minutes 20  -PP       Total Minutes 20  -PP                User Key  (r) = Recorded By, (t) = Taken By, (c) = Cosigned By      Initials Name Provider Type    PP Zuleika Garza, OT Occupational Therapist                  Therapy Charges for Today       Code Description Service Date Service Provider Modifiers Qty    [id number]  OT THERAPEUTIC ACT EA 15 MIN 10/23/2023 Zuleika Garza OT GO 1                 Zuleika Garza OT  10/23/2023

## 2023-10-23 NOTE — CONSULTS
[facility]  Initial Inpatient Consult Note    Referring Provider: Dr. Jang    Reason for Consultation: chronic pancreatitis    Subjective     History of present illness:    78 y.o. 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 He is being seen by oncology. It does seem like his metastatic cancer has progressed. He will be getting MRI of the brain.     GI is consulted for finding of chronic pancreatitis on CT scan. Wife at bedside who supplements history. Pt is awake, but unable to contribute much. Wife denies hx of ETOH abuse or hx of pancreatitis in the past. She denies patient ever having diarrhea and/or epigastric/LUQ pain (pt concurs). However, it does seem like patient is having pain in lower abdomen. Wife reports no BM for last 5-6 days. Pt unable to answer if straining/pushing. He denies abd pain w/ oral intake.     Past Medical History:  Past Medical History:   Diagnosis Date    Acid reflux     Anemia     Arthritis     Coronary artery disease     CVA (cerebral vascular accident)     High cholesterol     History of atrial fibrillation     Hx of blood clots     Hypertension     Irregular heartbeat     Peptic ulcer disease     Prostate cancer     Rheumatoid arthritis     Skin cancer     Small cell lung cancer      Past Surgical History:  Past Surgical History:   Procedure Laterality Date    ABDOMINAL AORTIC ANEURYSM REPAIR      BACK SURGERY      x2    BRONCHOSCOPY WITH ION ROBOTIC ASSIST N/A 03/13/2023    Procedure: BRONCHOSCOPY ,ENDOBRONCHIAL ULTRASOUND AND ROBOTIC NAVIGATIONAL BRONCHOSCOPY WITH FINE NEEDLE ASPIRATION, BIOPSY X1 AREA, AND BRONCHOALEOLAR LAVAGE;  Surgeon: Sonali Lara MD;  Location: [facility] ENDOSCOPY;  Service: Robotics - Pulmonary;  Laterality: N/A;  Post: LUNG  CANCER    CARDIAC ABLATION      x2    COLON SURGERY      HERNIA REPAIR      INSERTION CENTRAL VENOUS ACCESS DEVICE W/ SUBCUTANEOUS PORT Right 03/20/2023    VENOUS ACCESS DEVICE (PORT) INSERTION Right 03/20/2023    Procedure: INSERTION VENOUS ACCESS DEVICE;  Surgeon: Javier Gonzalez MD PhD;  Location: [facility] MAIN OR;  Service: Thoracic;  Laterality: Right;      Social History:   Social History     Tobacco Use    Smoking status: Former     Packs/day: .5     Types: Cigarettes     Start date: 1976     Quit date: 2020     Years since quitting: 3.8    Smokeless tobacco: Never   Substance Use Topics    Alcohol use: Never      Family History:  Family History   Problem Relation Age of Onset    Brain cancer Mother     Lung cancer Sister        Home Meds:  Medications Prior to Admission   Medication Sig Dispense Refill Last Dose    bethanechol (URECHOLINE) 50 MG tablet Take 1 tablet by mouth Every 12 (Twelve) Hours.       cyanocobalamin (VITAMIN B-12) 1000 MCG tablet Take 1 tablet by mouth Daily.       famotidine (PEPCID) 20 MG tablet Take 1 tablet by mouth twice daily 60 tablet 0     LORazepam (Ativan) 1 MG tablet Take 1 tablet by mouth as needed 1 hour prior to CT scan. 1 tablet 0     Magnesium 250 MG tablet Take  by mouth.       metoprolol tartrate (LOPRESSOR) 25 MG tablet Take 0.5 tablets by mouth 2 (Two) Times a Day.       ondansetron (ZOFRAN) 8 MG tablet Take 1 tablet by mouth 3 (Three) Times a Day As Needed for Nausea or Vomiting. 30 tablet 5     traMADol (ULTRAM) 50 MG tablet Take 2 tablets by mouth Every 6 (Six) Hours As Needed for Moderate Pain. 340 tablet 0     Vitamin B Complex-C capsule        vitamin D3 125 MCG (5000 UT) capsule capsule Take 1 capsule by mouth Daily.       Zinc 50 MG tablet Take 1 tablet by mouth Daily.       Acetaminophen (ARTHRITIS PAIN RELIEF PO)        ascorbic acid (VITAMIN C) 1000 MG tablet        Ashwagandha 125 MG capsule        aspirin 81 MG EC tablet Daily.       benzonatate  (TESSALON) 100 MG capsule Take 1 capsule by mouth 3 (Three) Times a Day As Needed for Cough.       coenzyme Q10 100 MG capsule Take 1 capsule by mouth Daily.       Creatinine powder        dexAMETHasone (DECADRON) 2 MG tablet Take 1 tablet by mouth Daily With Breakfast. Take 1 tablet by mouth daily with breakfast for one week then stop. If develop neurologic symptoms restart 1 tablet daily and notify our office. 30 tablet 0     diphenhydrAMINE (BENADRYL) 25 mg capsule Take 1 capsule by mouth Every 6 (Six) Hours As Needed for Itching.       doxycycline (MONODOX) 100 MG capsule Take 1 capsule by mouth Every 12 (Twelve) Hours.       Elderberry 500 MG capsule Take  by mouth.       fluocinonide (LIDEX) 0.05 % cream APPLY TO THE AFFECTED AREA(S) BY TOPICAL ROUTE 2 TIMES PER DAY       Misc Natural Products (SUPER GREENS PO) Take  by mouth.       Misc Natural Products (YUMVS BEET ROOT-TART CHERRY PO) Take  by mouth.       Turmeric 500 MG capsule Take  by mouth.       Ventolin  (90 Base) MCG/ACT inhaler INHALE 2 PUFFS BY MOUTH EVERY 4 TO 6 HOURS AS NEEDED FOR SHORTNESS OF BREATH FOR WHEEZING        Current Meds:   aspirin, 81 mg, Oral, Q24H  bethanechol, 50 mg, Oral, Q12H  dronabinol, 2.5 mg, Oral, BID  famotidine, 20 mg, Oral, BID  fluticasone, 2 spray, Each Nare, Daily  LORazepam, 0.5 mg, Intravenous, Once in imaging  metoprolol tartrate, 12.5 mg, Oral, Q12H  senna-docusate sodium, 2 tablet, Oral, BID  sodium chloride, 10 mL, Intravenous, Q12H  tamsulosin, 0.4 mg, Oral, Daily  cyanocobalamin, 1,000 mcg, Oral, Daily      Allergies:  Allergies   Allergen Reactions    Codeine Other (See Comments)     headache         Objective     Vital Signs  Temp:  [97 °F (36.1 °C)-97.3 °F (36.3 °C)] 97 °F (36.1 °C)  Heart Rate:  [80-88] 88  Resp:  [18] 18  BP: (123-131)/(63-91) 123/75  Physical Exam:  General Appearance:     Alert, cooperative, in no acute distress   Abdomen:     Normal bowel sounds, no masses, no organomegaly, soft      nontender, nondistended, no guarding, no rebound                 tenderness   Rectal:     Deferred       Results Review:   I reviewed the patient's new clinical results.    Results from last 7 days   Lab Units 10/23/23  0538 10/22/23  0658 10/21/23  0350   WBC 10*3/mm3 7.12 6.10 6.36   HEMOGLOBIN g/dL 10.4* 10.3* 10.7*   HEMATOCRIT % 29.9* 29.5* 30.7*   PLATELETS 10*3/mm3 143 130* 153     Results from last 7 days   Lab Units 10/23/23  0538 10/22/23  0658 10/21/23  0350   SODIUM mmol/L 139 136 136   POTASSIUM mmol/L 3.3* 3.7 3.9   CHLORIDE mmol/L 103 104 103   CO2 mmol/L 27.1 26.0 23.8   BUN mg/dL 7* 9 12   CREATININE mg/dL 0.72* 0.77 0.82   CALCIUM mg/dL 8.7 8.2* 8.5*   BILIRUBIN mg/dL 0.4 0.4 0.5   ALK PHOS U/L 72 66 72   ALT (SGPT) U/L 24 20 18   AST (SGOT) U/L 22 19 19   GLUCOSE mg/dL 91 102* 101*         Lab Results   Lab Value Date/Time    LIPASE 23 10/22/2023 0658       Radiology:  XR Chest 1 View   Final Result   No focal pulmonary consolidation. Redemonstration of right   lower lung nodule.               This report was finalized on 10/19/2023 12:23 PM by Dr. Inderjit Galan M.D on Workstation: [id number]          MRI Brain With & Without Contrast    (Results Pending)   XR Abdomen KUB    (Results Pending)         Assessment:   Metastatic lung cancer-to liver and adrenals; on chemotherapy; most recently mets to brain s/p radiation   Poor appetite  Chronic pancreatitis on imaging- lipase WNL. Unclear etiology, he is asymptomatic regarding this.   Constipation    Plan:   Despite extensive chronic pancreatitis on imaging, patient has no chronic epigastric/LUQ pain. There is no reported post prandial abd pain and/or post prandial diarrhea. Thus, will defer starting digestive enzymes. Low utility at this time  Typically low fat diet is recommended for chronic pancreatitis, but again, given his global picture, poor oral intake, and asymptomatic pancreatitis, recommend regular diet as tolerated  Continue  current bowel regimen, but will add dulcolax suppository     I discussed the patients findings and my recommendations with patient.         Drew Martinez PA-C  [facility]  [address] [place] [address]  Office: [phone]

## 2023-10-23 NOTE — PLAN OF CARE
Goal Outcome Evaluation:            Patient is alert and oriented but disoriented to situation, vitals stable, 2L O2 and CPAP at night, patient has a pacer and is MRI compatible, right chest port CDI with good blood return and fluids infusing, family at bedside, MRI ordered but family states he needs to be under anesthesia to be completed, anesthesia consult ordered as well as a GI and palliative consults, fisher in place, no BM this shift, safety precautions in use, plan of care ongoing.

## 2023-10-23 NOTE — PLAN OF CARE
Goal Outcome Evaluation:  Plan of Care Reviewed With: patient, spouse        Progress: declining  Outcome Evaluation: Pt UIC at start of the session and wife in room. Pt was assisted back to bed with use of RW, pt req's modA x2 and heavy VC/TC for sequencing with gait and use of AD. Pt needing significant amount of time in order to perform activity to safely transfer from chair to bed. Once sitting EOB, pt was assisted to sidelying postion with mod/maxA x2. Will continue to progress as able.

## 2023-10-23 NOTE — THERAPY TREATMENT NOTE
Patient Name: Russell Ramírez Sr.  : 1945    MRN: [id number]                              Today's Date: 10/23/2023       Admit Date: 10/19/2023    Visit Dx:     ICD-10-CM ICD-9-CM   1. Physical deconditioning  R53.81 799.3     Patient Active Problem List   Diagnosis    Lung nodule    Lung cancer metastatic to brain    High risk medication use    Encounter for adjustment or management of vascular access device    Abdominal aortic aneurysm    Adenomatous polyp of ascending colon    Anxiety state    Atrial fibrillation    Atrial flutter    Back problem    Benign essential hypertension    Cervical spondyloarthritis    Chronic midline low back pain without sciatica    Depressive disorder    Edema    Erectile dysfunction    Gastroesophageal reflux disease    History of malignant neoplasm of prostate    Hx of endovascular stent graft for abdominal aortic aneurysm    Hypercholesteremia    Pure hypercholesterolemia    Hypertensive heart disease    Hypotestosteronism    Malignant neoplasm of skin    Neck pain    Obesity    Obstructive sleep apnea    Osteoarthritis    Prostate cancer    Hypertension    PVD (peripheral vascular disease)    Vascular disorder    Raynaud's phenomenon    CAD (coronary artery disease)    Coronary arteriosclerosis in native artery    Coronary arteriosclerosis    Coronary atherosclerosis    Renal artery atherosclerosis    S/P ablation of atrial fibrillation    S/P renal artery angioplasty    Swelling of limb    Tobacco dependence syndrome    Secondary malignant neoplasm of brain    Physical deconditioning     Past Medical History:   Diagnosis Date    Acid reflux     Anemia     Arthritis     Coronary artery disease     CVA (cerebral vascular accident)     High cholesterol     History of atrial fibrillation     Hx of blood clots     Hypertension     Irregular heartbeat     Peptic ulcer disease     Prostate cancer     Rheumatoid arthritis     Skin cancer     Small cell lung cancer      Past  Surgical History:   Procedure Laterality Date    ABDOMINAL AORTIC ANEURYSM REPAIR      BACK SURGERY      x2    BRONCHOSCOPY WITH ION ROBOTIC ASSIST N/A 03/13/2023    Procedure: BRONCHOSCOPY ,ENDOBRONCHIAL ULTRASOUND AND ROBOTIC NAVIGATIONAL BRONCHOSCOPY WITH FINE NEEDLE ASPIRATION, BIOPSY X1 AREA, AND BRONCHOALEOLAR LAVAGE;  Surgeon: Sonali Lara MD;  Location: [facility] ENDOSCOPY;  Service: Robotics - Pulmonary;  Laterality: N/A;  Post: LUNG CANCER    CARDIAC ABLATION      x2    COLON SURGERY      HERNIA REPAIR      INSERTION CENTRAL VENOUS ACCESS DEVICE W/ SUBCUTANEOUS PORT Right 03/20/2023    VENOUS ACCESS DEVICE (PORT) INSERTION Right 03/20/2023    Procedure: INSERTION VENOUS ACCESS DEVICE;  Surgeon: Javier Gonzalez MD PhD;  Location: [facility] OR;  Service: Thoracic;  Laterality: Right;      General Information       Row Name 10/23/23 1701          Physical Therapy Time and Intention    Document Type therapy note (daily note)  -DB     Mode of Treatment individual therapy;physical therapy  -DB       Row Name 10/23/23 1701          General Information    Patient Profile Reviewed yes  -DB     Existing Precautions/Restrictions fall  -DB     Barriers to Rehab medically complex;cognitive status  -DB       Row Name 10/23/23 1701          Safety Issues, Functional Mobility    Safety Issues Affecting Function (Mobility) ability to follow commands;at risk behavior observed;awareness of need for assistance;problem-solving;positioning of assistive device;insight into deficits/self-awareness;sequencing abilities;safety precautions follow-through/compliance;safety precaution awareness;impulsivity  -DB               User Key  (r) = Recorded By, (t) = Taken By, (c) = Cosigned By      Initials Name Provider Type    DB Hanny Sharif PT Physical Therapist                   Mobility       Row Name 10/23/23 1702          Bed Mobility    Bed Mobility sit-supine;scooting/bridging  -DB     Scooting/Bridging [place] (Bed  Mobility) dependent (less than 25% patient effort);2 person assist;verbal cues;nonverbal cues (demo/gesture)  -DB     Sit-Supine [place] (Bed Mobility) maximum assist (25% patient effort);2 person assist;verbal cues;nonverbal cues (demo/gesture)  -DB     Assistive Device (Bed Mobility) bed rails;head of bed elevated;draw sheet  -DB       Row Name 10/23/23 1702          Sit-Stand Transfer    Sit-Stand [place] (Transfers) minimum assist (75% patient effort);2 person assist;contact guard;verbal cues;nonverbal cues (demo/gesture)  -DB     Assistive Device (Sit-Stand Transfers) walker, front-wheeled  -DB       Row Name 10/23/23 1412          Gait/Stairs (Locomotion)    [place] Level (Gait) moderate assist (50% patient effort);2 person assist;verbal cues;nonverbal cues (demo/gesture)  -DB     Assistive Device (Gait) walker, front-wheeled  -DB     Distance in Feet (Gait) 5'  -DB     Deviations/Abnormal Patterns (Gait) gait speed decreased;stride length decreased;darius decreased;festinating/shuffling  -DB     Bilateral Gait Deviations forward flexed posture;heel strike decreased  -DB     Comment, (Gait/Stairs) pt has difficulty following directions for sequencing with gait  -DB               User Key  (r) = Recorded By, (t) = Taken By, (c) = Cosigned By      Initials Name Provider Type    DB Hanny Sharif PT Physical Therapist                   Obj/Interventions       Row Name 10/23/23 4637          Balance    Balance Assessment sitting static balance;sitting dynamic balance;standing static balance;standing dynamic balance  -DB     Static Sitting Balance contact guard  -DB     Dynamic Sitting Balance contact guard  -DB     Position, Sitting Balance sitting edge of bed  -DB     Static Standing Balance minimal assist;2-person assist  -DB     Dynamic Standing Balance moderate assist;2-person assist  -DB     Position/Device Used, Standing Balance supported;walker, front-wheeled  -DB     Balance  Interventions sitting;standing;sit to stand  -DB               User Key  (r) = Recorded By, (t) = Taken By, (c) = Cosigned By      Initials Name Provider Type    Hanny Segura PT Physical Therapist                   Goals/Plan    No documentation.                  Clinical Impression       Row Name 10/23/23 1704          Pain    Pain Intervention(s) Ambulation/increased activity;Repositioned  -DB       Row Name 10/23/23 1704          Plan of Care Review    Plan of Care Reviewed With patient;spouse  -DB     Progress declining  -DB     Outcome Evaluation Pt UIC at start of the session and wife in room. Pt was assisted back to bed with use of RW, pt req's modA x2 and heavy VC/TC for sequencing with gait and use of AD. Pt needing significant amount of time in order to perform activity to safely transfer from chair to bed. Once sitting EOB, pt was assisted to sidelying postion with mod/maxA x2. Will continue to progress as able.  -DB       Row Name 10/23/23 1704          Therapy Assessment/Plan (PT)    Therapy Frequency (PT) 3 times/wk  -DB       Row Name 10/23/23 1704          Vital Signs    O2 Delivery Pre Treatment room air  -DB     O2 Delivery Intra Treatment room air  -DB     O2 Delivery Post Treatment room air  -DB     Pre Patient Position Sitting  -DB     Intra Patient Position Standing  -DB     Post Patient Position Side Lying  -DB       Row Name 10/23/23 1704          Positioning and Restraints    Pre-Treatment Position sitting in chair/recliner  -DB     Post Treatment Position bed  -DB     In Bed side lying left;call light within reach;encouraged to call for assist;exit alarm on;with family/caregiver;notified nsg  -DB               User Key  (r) = Recorded By, (t) = Taken By, (c) = Cosigned By      Initials Name Provider Type    Hanny Segura PT Physical Therapist                   Outcome Measures       Row Name 10/23/23 1707 10/23/23 0930       How much help from another person do you currently  need...    Turning from your back to your side while in flat bed without using bedrails? 3  -DB 3  -AR    Moving from lying on back to sitting on the side of a flat bed without bedrails? 2  -DB 3  -AR    Moving to and from a bed to a chair (including a wheelchair)? 2  -DB 2  -AR    Standing up from a chair using your arms (e.g., wheelchair, bedside chair)? 2  -DB 2  -AR    Climbing 3-5 steps with a railing? 1  -DB 2  -AR    To walk in hospital room? 2  -DB 2  -AR    AM-PAC 6 Clicks Score (PT) 12  -DB 14  -AR    Highest level of mobility 4 --> Transferred to chair/commode  -DB 4 --> Transferred to chair/commode  -AR      Row Name 10/23/23 1707 10/23/23 1538       Functional Assessment    Outcome Measure Options AM-PAC 6 Clicks Basic Mobility (PT)  -DB AM-PAC 6 Clicks Daily Activity (OT)  -PP              User Key  (r) = Recorded By, (t) = Taken By, (c) = Cosigned By      Initials Name Provider Type    Hanny Segura, PT Physical Therapist    Hilda Spivey, RN Registered Nurse    PP Zuleika Garza, OT Occupational Therapist                                 Physical Therapy Education       Title: PT OT SLP Therapies (In Progress)       Topic: Physical Therapy (In Progress)       Point: Mobility training (In Progress)       Learning Progress Summary             Patient Acceptance, E, NR by DB at 10/23/2023 1707    Acceptance, E,TB, NR by  at 10/21/2023 1649    Comment: Discussed benefits of participation with therapy.  Answered questions from wife.   Significant Other Acceptance, E,TB, NR by GJ at 10/21/2023 1649    Comment: Discussed benefits of participation with therapy.  Answered questions from wife.                         Point: Home exercise program (In Progress)       Learning Progress Summary             Patient Acceptance, E, NR by DB at 10/23/2023 1707    Acceptance, E,TB, NR by  at 10/21/2023 1649    Comment: Discussed benefits of participation with therapy.  Answered questions from wife.    Significant Other Acceptance, E,TB, NR by  at 10/21/2023 1649    Comment: Discussed benefits of participation with therapy.  Answered questions from wife.                         Point: Body mechanics (In Progress)       Learning Progress Summary             Patient Acceptance, E, NR by DB at 10/23/2023 1707    Acceptance, E,TB, NR by GJ at 10/21/2023 1649    Comment: Discussed benefits of participation with therapy.  Answered questions from wife.   Significant Other Acceptance, E,TB, NR by GJ at 10/21/2023 1649    Comment: Discussed benefits of participation with therapy.  Answered questions from wife.                         Point: Precautions (In Progress)       Learning Progress Summary             Patient Acceptance, E, NR by DB at 10/23/2023 1707    Acceptance, E,TB, NR by GJ at 10/21/2023 1649    Comment: Discussed benefits of participation with therapy.  Answered questions from wife.   Significant Other Acceptance, E,TB, NR by  at 10/21/2023 1649    Comment: Discussed benefits of participation with therapy.  Answered questions from wife.                                         User Key       Initials Effective Dates Name Provider Type Discipline     07/11/23 -  Inderjit Hayes, PT Physical Therapist PT     06/16/21 -  Hanny Sharif, PT Physical Therapist PT                  PT Recommendation and Plan     Plan of Care Reviewed With: patient, spouse  Progress: declining  Outcome Evaluation: Pt UIC at start of the session and wife in room. Pt was assisted back to bed with use of RW, pt req's modA x2 and heavy VC/TC for sequencing with gait and use of AD. Pt needing significant amount of time in order to perform activity to safely transfer from chair to bed. Once sitting EOB, pt was assisted to sidelying postion with mod/maxA x2. Will continue to progress as able.     Time Calculation:         PT Charges       Row Name 10/23/23 1707             Time Calculation    Start Time 1552  -DB      Stop Time  1615  -DB      Time Calculation (min) 23 min  -DB      PT Received On 10/23/23  -DB      PT - Next Appointment 10/25/23  -DB      PT Goal Re-Cert Due Date 10/28/23  -DB         Time Calculation- PT    Total Timed Code Minutes- PT 23 minute(s)  -DB                User Key  (r) = Recorded By, (t) = Taken By, (c) = Cosigned By      Initials Name Provider Type    DB Hanny Sharfi, PT Physical Therapist                  Therapy Charges for Today       Code Description Service Date Service Provider Modifiers Qty    [id number]  PT THERAPEUTIC ACT EA 15 MIN 10/23/2023 Hanny Sharif, BITA GP 2            PT G-Codes  Outcome Measure Options: AM-PAC 6 Clicks Basic Mobility (PT)  AM-PAC 6 Clicks Score (PT): 12  AM-PAC 6 Clicks Score (OT): 6       Hanny Sharif PT  10/23/2023

## 2023-10-23 NOTE — PROGRESS NOTES
Name: Russell Ramírez . ADMIT: 10/19/2023   : 1945  PCP: Morgan Sebastian DO    MRN: [id number] LOS: 3 days   AGE/SEX: 78 y.o. male  ROOM: hospitals/     Subjective   Subjective   Chief Complaint   Patient presents with    Fatigue     Patient is having abdominal pain which is new today. Pain is lower and related to straining with feeling need to defecate but unable. No nausea currently. No CP or SOA reported. Discussed with family at bedside. He could no lay flat for KUB earlier this morning so that is planned to be done with the MRI/sedation.     Objective   Objective   Vital Signs  Temp:  [97 °F (36.1 °C)-98.4 °F (36.9 °C)] 98.4 °F (36.9 °C)  Heart Rate:  [80-96] 96  Resp:  [18] 18  BP: (123-133)/(63-91) 133/82  SpO2:  [97 %-98 %] 97 %  on  Flow (L/min):  [2] 2;   Device (Oxygen Therapy): nasal cannula  Body mass index is 28.38 kg/m².    Physical Exam  Vitals and nursing note reviewed.   Constitutional:       General: He is in acute distress (pain).      Appearance: He is ill-appearing. He is not diaphoretic.   HENT:      Head: Atraumatic.   Cardiovascular:      Rate and Rhythm: Normal rate and regular rhythm.      Pulses: Normal pulses.   Pulmonary:      Effort: Pulmonary effort is normal.      Breath sounds: No wheezing.   Abdominal:      General: There is no distension.      Palpations: Abdomen is soft.      Tenderness: There is abdominal tenderness (BLQ). There is no guarding or rebound.   Musculoskeletal:         General: No swelling or tenderness.   Skin:     General: Skin is warm and dry.   Neurological:      Mental Status: He is alert.      Motor: Weakness (generalized) present.   Psychiatric:         Mood and Affect: Mood normal.         Behavior: Behavior normal.       Results Review  I reviewed the patient's new clinical results.    Results from last 7 days   Lab Units 10/23/23  0538 10/22/23  0658 10/21/23  0350 10/20/23  0644   WBC 10*3/mm3 7.12 6.10 6.36 6.64   HEMOGLOBIN g/dL 10.4*  "10.3* 10.7* 10.4*   PLATELETS 10*3/mm3 143 130* 153 132*     Results from last 7 days   Lab Units 10/23/23  0538 10/22/23  0658 10/21/23  0350 10/20/23  0644   SODIUM mmol/L 139 136 136 137   POTASSIUM mmol/L 3.3* 3.7 3.9 4.2   CHLORIDE mmol/L 103 104 103 103   CO2 mmol/L 27.1 26.0 23.8 27.0   BUN mg/dL 7* 9 12 16   CREATININE mg/dL 0.72* 0.77 0.82 0.84   GLUCOSE mg/dL 91 102* 101* 96   EGFR mL/min/1.73 94.1 92.2 90.5 89.8     Results from last 7 days   Lab Units 10/23/23  0538 10/22/23  0658 10/21/23  0350 10/20/23  0644   ALBUMIN g/dL 3.3* 3.2* 3.3* 3.4*   BILIRUBIN mg/dL 0.4 0.4 0.5 0.6   ALK PHOS U/L 72 66 72 74   AST (SGOT) U/L 22 19 19 19   ALT (SGPT) U/L 24 20 18 20     Results from last 7 days   Lab Units 10/23/23  0538 10/22/23  0658 10/21/23  0350 10/20/23  0644   CALCIUM mg/dL 8.7 8.2* 8.5* 8.7   ALBUMIN g/dL 3.3* 3.2* 3.3* 3.4*   MAGNESIUM mg/dL 1.9  --  2.0  --    PHOSPHORUS mg/dL  --   --  2.7  --          No results found for: \"HGBA1C\", \"POCGLU\"    No radiology results for the last day    I have personally reviewed all medications:  Scheduled Medications  aspirin, 81 mg, Oral, Q24H  bethanechol, 50 mg, Oral, Q12H  bisacodyl, 10 mg, Rectal, Daily  dronabinol, 2.5 mg, Oral, BID  famotidine, 20 mg, Oral, BID  fluticasone, 2 spray, Each Nare, Daily  LORazepam, 0.5 mg, Intravenous, Once in imaging  metoprolol tartrate, 12.5 mg, Oral, Q12H  potassium chloride, 10 mEq, Intravenous, Q1H  senna-docusate sodium, 2 tablet, Oral, BID  sodium chloride, 10 mL, Intravenous, Q12H  tamsulosin, 0.4 mg, Oral, Daily  cyanocobalamin, 1,000 mcg, Oral, Daily      Infusions  sodium chloride, 75 mL/hr, Last Rate: 75 mL/hr (10/23/23 0538)      Diet  Diet: Regular/House Diet; Texture: Regular Texture (IDDSI 7); Fluid Consistency: Thin (IDDSI 0)    I have personally reviewed:  [x]  Laboratory   []  Microbiology   []  Radiology   []  EKG/Telemetry  []  Cardiology/Vascular   []  Pathology    []  Records       Assessment/Plan "     Active Hospital Problems    Diagnosis  POA    **Physical deconditioning [R53.81]  Yes    Coronary arteriosclerosis [I25.10]  Yes    History of malignant neoplasm of prostate [Z85.46]  Not Applicable    Depressive disorder [F32.A]  Yes    Lung cancer metastatic to brain [C34.90, 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       78 y.o. male admitted with Physical deconditioning.    Metastatic Lung Cancer: Liver and adrenal metastasis noted on CT. MRI brain planned, anesthesia consult for sedation. Oncology following.  Difficulty Urinating: PSA low.  UA was not infectious. Required cath.  On Flomax.  Can attempt voiding trial in few days  NV/Constipation/Chronic Pancreatitis: Could not tolerate KUB this morning. Will check CXR but pain is lower abdomen/rectum. Will make enema and disimpaction available if needed. GI following.  Rhinitis: RVP was ok. Recent abx reported.  Continue nasal spray.  AFib: Anticoagulation v continued aspirin per oncology  Deconditioning: PT/OT  PPX: per oncology  Disposition: TBD    Expected discharge date/ time has not been documented.     Myron Jang MD  [facility]  10/23/23  12:30 EDT    Dictated portions of note using Dragon dictation software.  Copied text in this note has been reviewed by me and remains accurate as of 10/23/23

## 2023-10-23 NOTE — PLAN OF CARE
Goal Outcome Evaluation:      No Change   Pt alert to self and place, calm and cooperative. 2L NC. Take meds whole in apple sauce one at a time, see MAR. IVF at 75 ml/hr. Potassium replaced per protocol. Stool softener given, fleet enema given x2 large BM this shift. River output- clear yellow. Decreased oral intake. Spouse at bedside, attentive to pt. Plan is for MRI under sedation to be done tomorrow. Pt unable to lay flat for the KUB this morning due to vertigo. Physical therapy worked with pt. RN will continue to monitor.

## 2023-10-24 ENCOUNTER — ANESTHESIA (OUTPATIENT)
Dept: MRI IMAGING | Facility: HOSPITAL | Age: 78
End: 2023-10-24
Payer: MEDICARE

## 2023-10-24 ENCOUNTER — APPOINTMENT (OUTPATIENT)
Dept: GENERAL RADIOLOGY | Facility: HOSPITAL | Age: 78
DRG: 081 | End: 2023-10-24
Payer: MEDICARE

## 2023-10-24 ENCOUNTER — HOSPITAL ENCOUNTER (OUTPATIENT)
Dept: MRI IMAGING | Facility: HOSPITAL | Age: 78
Discharge: HOME | End: 2023-10-24
Payer: MEDICARE

## 2023-10-24 ENCOUNTER — APPOINTMENT (OUTPATIENT)
Dept: MRI IMAGING | Facility: HOSPITAL | Age: 78
DRG: 081 | End: 2023-10-24
Payer: MEDICARE

## 2023-10-24 ENCOUNTER — ANESTHESIA EVENT (OUTPATIENT)
Dept: MRI IMAGING | Facility: HOSPITAL | Age: 78
End: 2023-10-24
Payer: MEDICARE

## 2023-10-24 VITALS
DIASTOLIC BLOOD PRESSURE: 76 MMHG | SYSTOLIC BLOOD PRESSURE: 109 MMHG | RESPIRATION RATE: 24 BRPM | HEART RATE: 112 BPM | OXYGEN SATURATION: 93 %

## 2023-10-24 LAB
ACTH PLAS-MCNC: 19.4 PG/ML (ref 7.2–63.3)
ANION GAP SERPL CALCULATED.3IONS-SCNC: 8.2 MMOL/L (ref 5–15)
BASOPHILS # BLD AUTO: 0.02 10*3/MM3 (ref 0–0.2)
BASOPHILS NFR BLD AUTO: 0.3 % (ref 0–1.5)
BUN SERPL-MCNC: 8 MG/DL (ref 8–23)
BUN/CREAT SERPL: 11.1 (ref 7–25)
CALCIUM SPEC-SCNC: 8.7 MG/DL (ref 8.6–10.5)
CHLORIDE SERPL-SCNC: 103 MMOL/L (ref 98–107)
CO2 SERPL-SCNC: 27.8 MMOL/L (ref 22–29)
CREAT SERPL-MCNC: 0.72 MG/DL (ref 0.76–1.27)
DEPRECATED RDW RBC AUTO: 45.9 FL (ref 37–54)
EGFRCR SERPLBLD CKD-EPI 2021: 94.1 ML/MIN/1.73
EOSINOPHIL # BLD AUTO: 0.02 10*3/MM3 (ref 0–0.4)
EOSINOPHIL NFR BLD AUTO: 0.3 % (ref 0.3–6.2)
ERYTHROCYTE [DISTWIDTH] IN BLOOD BY AUTOMATED COUNT: 14 % (ref 12.3–15.4)
GLUCOSE SERPL-MCNC: 90 MG/DL (ref 65–99)
HCT VFR BLD AUTO: 31.3 % (ref 37.5–51)
HGB BLD-MCNC: 11.1 G/DL (ref 13–17.7)
IMM GRANULOCYTES # BLD AUTO: 0.1 10*3/MM3 (ref 0–0.05)
IMM GRANULOCYTES NFR BLD AUTO: 1.4 % (ref 0–0.5)
LYMPHOCYTES # BLD AUTO: 1.02 10*3/MM3 (ref 0.7–3.1)
LYMPHOCYTES NFR BLD AUTO: 14.7 % (ref 19.6–45.3)
MAGNESIUM SERPL-MCNC: 2.1 MG/DL (ref 1.6–2.4)
MCH RBC QN AUTO: 32.6 PG (ref 26.6–33)
MCHC RBC AUTO-ENTMCNC: 35.5 G/DL (ref 31.5–35.7)
MCV RBC AUTO: 91.8 FL (ref 79–97)
MONOCYTES # BLD AUTO: 0.79 10*3/MM3 (ref 0.1–0.9)
MONOCYTES NFR BLD AUTO: 11.4 % (ref 5–12)
NEUTROPHILS NFR BLD AUTO: 4.99 10*3/MM3 (ref 1.7–7)
NEUTROPHILS NFR BLD AUTO: 71.9 % (ref 42.7–76)
NRBC BLD AUTO-RTO: 0 /100 WBC (ref 0–0.2)
PLATELET # BLD AUTO: 156 10*3/MM3 (ref 140–450)
PMV BLD AUTO: 8.5 FL (ref 6–12)
POTASSIUM SERPL-SCNC: 3.5 MMOL/L (ref 3.5–5.2)
POTASSIUM SERPL-SCNC: 3.6 MMOL/L (ref 3.5–5.2)
RBC # BLD AUTO: 3.41 10*6/MM3 (ref 4.14–5.8)
SODIUM SERPL-SCNC: 139 MMOL/L (ref 136–145)
WBC NRBC COR # BLD: 6.94 10*3/MM3 (ref 3.4–10.8)

## 2023-10-24 PROCEDURE — 70553 MRI BRAIN STEM W/O & W/DYE: CPT

## 2023-10-24 PROCEDURE — 0 GADOBENATE DIMEGLUMINE 529 MG/ML SOLUTION: Performed by: INTERNAL MEDICINE

## 2023-10-24 PROCEDURE — 99232 SBSQ HOSP IP/OBS MODERATE 35: CPT | Performed by: PHYSICIAN ASSISTANT

## 2023-10-24 PROCEDURE — 63710000001 PREDNISONE PER 1 MG: Performed by: INTERNAL MEDICINE

## 2023-10-24 PROCEDURE — 25010000002 PROPOFOL 10 MG/ML EMULSION: Performed by: ANESTHESIOLOGY

## 2023-10-24 PROCEDURE — 25810000003 LACTATED RINGERS PER 1000 ML: Performed by: ANESTHESIOLOGY

## 2023-10-24 PROCEDURE — 25810000003 SODIUM CHLORIDE 0.9 % SOLUTION: Performed by: INTERNAL MEDICINE

## 2023-10-24 PROCEDURE — 80048 BASIC METABOLIC PNL TOTAL CA: CPT | Performed by: INTERNAL MEDICINE

## 2023-10-24 PROCEDURE — 84132 ASSAY OF SERUM POTASSIUM: CPT | Performed by: INTERNAL MEDICINE

## 2023-10-24 PROCEDURE — 85025 COMPLETE CBC W/AUTO DIFF WBC: CPT | Performed by: INTERNAL MEDICINE

## 2023-10-24 PROCEDURE — 83735 ASSAY OF MAGNESIUM: CPT | Performed by: INTERNAL MEDICINE

## 2023-10-24 PROCEDURE — A9577 INJ MULTIHANCE: HCPCS | Performed by: INTERNAL MEDICINE

## 2023-10-24 PROCEDURE — 63710000001 DRONABINOL PER 2.5 MG: Performed by: INTERNAL MEDICINE

## 2023-10-24 PROCEDURE — 74018 RADEX ABDOMEN 1 VIEW: CPT

## 2023-10-24 PROCEDURE — 25010000002 PHENYLEPHRINE 10 MG/ML SOLUTION: Performed by: ANESTHESIOLOGY

## 2023-10-24 RX ORDER — EPHEDRINE SULFATE 50 MG/ML
INJECTION, SOLUTION INTRAVENOUS AS NEEDED
Status: DISCONTINUED | OUTPATIENT
Start: 2023-10-24 | End: 2023-10-24 | Stop reason: SURG

## 2023-10-24 RX ORDER — BISACODYL 5 MG/1
5 TABLET, DELAYED RELEASE ORAL DAILY PRN
Status: DISCONTINUED | OUTPATIENT
Start: 2023-10-24 | End: 2023-11-02 | Stop reason: HOSPADM

## 2023-10-24 RX ORDER — DROPERIDOL 2.5 MG/ML
0.62 INJECTION, SOLUTION INTRAMUSCULAR; INTRAVENOUS
Status: DISCONTINUED | OUTPATIENT
Start: 2023-10-24 | End: 2023-11-02 | Stop reason: HOSPADM

## 2023-10-24 RX ORDER — NALOXONE HCL 0.4 MG/ML
0.2 VIAL (ML) INJECTION AS NEEDED
Status: DISCONTINUED | OUTPATIENT
Start: 2023-10-24 | End: 2023-11-02 | Stop reason: HOSPADM

## 2023-10-24 RX ORDER — PROMETHAZINE HYDROCHLORIDE 25 MG/1
25 SUPPOSITORY RECTAL ONCE AS NEEDED
Status: DISCONTINUED | OUTPATIENT
Start: 2023-10-24 | End: 2023-11-02 | Stop reason: HOSPADM

## 2023-10-24 RX ORDER — PROMETHAZINE HYDROCHLORIDE 25 MG/1
25 TABLET ORAL ONCE AS NEEDED
Status: DISCONTINUED | OUTPATIENT
Start: 2023-10-24 | End: 2023-11-02 | Stop reason: HOSPADM

## 2023-10-24 RX ORDER — DIPHENHYDRAMINE HYDROCHLORIDE 50 MG/ML
12.5 INJECTION INTRAMUSCULAR; INTRAVENOUS
Status: DISCONTINUED | OUTPATIENT
Start: 2023-10-24 | End: 2023-11-02 | Stop reason: HOSPADM

## 2023-10-24 RX ORDER — IPRATROPIUM BROMIDE AND ALBUTEROL SULFATE 2.5; .5 MG/3ML; MG/3ML
3 SOLUTION RESPIRATORY (INHALATION) ONCE AS NEEDED
Status: DISCONTINUED | OUTPATIENT
Start: 2023-10-24 | End: 2023-11-02 | Stop reason: HOSPADM

## 2023-10-24 RX ORDER — LIDOCAINE HYDROCHLORIDE 20 MG/ML
INJECTION, SOLUTION INFILTRATION; PERINEURAL AS NEEDED
Status: DISCONTINUED | OUTPATIENT
Start: 2023-10-24 | End: 2023-10-24 | Stop reason: SURG

## 2023-10-24 RX ORDER — AMOXICILLIN 250 MG
2 CAPSULE ORAL 2 TIMES DAILY
Status: DISCONTINUED | OUTPATIENT
Start: 2023-10-24 | End: 2023-11-02 | Stop reason: HOSPADM

## 2023-10-24 RX ORDER — ONDANSETRON 2 MG/ML
4 INJECTION INTRAMUSCULAR; INTRAVENOUS ONCE AS NEEDED
Status: DISCONTINUED | OUTPATIENT
Start: 2023-10-24 | End: 2023-11-02 | Stop reason: HOSPADM

## 2023-10-24 RX ORDER — LABETALOL HYDROCHLORIDE 5 MG/ML
5 INJECTION, SOLUTION INTRAVENOUS
Status: DISCONTINUED | OUTPATIENT
Start: 2023-10-24 | End: 2023-11-02 | Stop reason: HOSPADM

## 2023-10-24 RX ORDER — PROPOFOL 10 MG/ML
VIAL (ML) INTRAVENOUS AS NEEDED
Status: DISCONTINUED | OUTPATIENT
Start: 2023-10-24 | End: 2023-10-24 | Stop reason: SURG

## 2023-10-24 RX ORDER — HYDRALAZINE HYDROCHLORIDE 20 MG/ML
5 INJECTION INTRAMUSCULAR; INTRAVENOUS
Status: DISCONTINUED | OUTPATIENT
Start: 2023-10-24 | End: 2023-11-02 | Stop reason: HOSPADM

## 2023-10-24 RX ORDER — FLUMAZENIL 0.1 MG/ML
0.2 INJECTION INTRAVENOUS AS NEEDED
Status: DISCONTINUED | OUTPATIENT
Start: 2023-10-24 | End: 2023-11-02 | Stop reason: HOSPADM

## 2023-10-24 RX ORDER — POLYETHYLENE GLYCOL 3350 17 G/17G
17 POWDER, FOR SOLUTION ORAL DAILY
Status: DISCONTINUED | OUTPATIENT
Start: 2023-10-24 | End: 2023-11-02 | Stop reason: HOSPADM

## 2023-10-24 RX ORDER — BISACODYL 10 MG
10 SUPPOSITORY, RECTAL RECTAL DAILY PRN
Status: DISCONTINUED | OUTPATIENT
Start: 2023-10-24 | End: 2023-11-02 | Stop reason: HOSPADM

## 2023-10-24 RX ORDER — PHENYLEPHRINE HYDROCHLORIDE 10 MG/ML
INJECTION INTRAVENOUS AS NEEDED
Status: DISCONTINUED | OUTPATIENT
Start: 2023-10-24 | End: 2023-10-24 | Stop reason: SURG

## 2023-10-24 RX ORDER — EPHEDRINE SULFATE 50 MG/ML
5 INJECTION, SOLUTION INTRAVENOUS ONCE AS NEEDED
Status: DISCONTINUED | OUTPATIENT
Start: 2023-10-24 | End: 2023-11-02 | Stop reason: HOSPADM

## 2023-10-24 RX ORDER — SODIUM CHLORIDE, SODIUM LACTATE, POTASSIUM CHLORIDE, CALCIUM CHLORIDE 600; 310; 30; 20 MG/100ML; MG/100ML; MG/100ML; MG/100ML
INJECTION, SOLUTION INTRAVENOUS CONTINUOUS PRN
Status: DISCONTINUED | OUTPATIENT
Start: 2023-10-24 | End: 2023-10-24 | Stop reason: SURG

## 2023-10-24 RX ORDER — FENTANYL CITRATE 50 UG/ML
50 INJECTION, SOLUTION INTRAMUSCULAR; INTRAVENOUS
Status: DISCONTINUED | OUTPATIENT
Start: 2023-10-24 | End: 2023-11-02 | Stop reason: HOSPADM

## 2023-10-24 RX ADMIN — PHENYLEPHRINE HYDROCHLORIDE 300 MCG: 10 INJECTION INTRAVENOUS at 13:15

## 2023-10-24 RX ADMIN — Medication 10 ML: at 22:01

## 2023-10-24 RX ADMIN — PREDNISONE 20 MG: 20 TABLET ORAL at 15:02

## 2023-10-24 RX ADMIN — SODIUM CHLORIDE, POTASSIUM CHLORIDE, SODIUM LACTATE AND CALCIUM CHLORIDE: 600; 310; 30; 20 INJECTION, SOLUTION INTRAVENOUS at 13:01

## 2023-10-24 RX ADMIN — BETHANECHOL CHLORIDE 50 MG: 25 TABLET ORAL at 21:45

## 2023-10-24 RX ADMIN — SODIUM CHLORIDE 75 ML/HR: 9 INJECTION, SOLUTION INTRAVENOUS at 18:08

## 2023-10-24 RX ADMIN — Medication 1000 MCG: at 15:02

## 2023-10-24 RX ADMIN — TAMSULOSIN HYDROCHLORIDE 0.4 MG: 0.4 CAPSULE ORAL at 15:02

## 2023-10-24 RX ADMIN — FAMOTIDINE 20 MG: 20 TABLET ORAL at 21:46

## 2023-10-24 RX ADMIN — METOPROLOL TARTRATE 12.5 MG: 25 TABLET, FILM COATED ORAL at 21:45

## 2023-10-24 RX ADMIN — EPHEDRINE SULFATE 10 MG: 50 INJECTION INTRAVENOUS at 13:12

## 2023-10-24 RX ADMIN — PROPOFOL 100 MG: 10 INJECTION, EMULSION INTRAVENOUS at 13:08

## 2023-10-24 RX ADMIN — BETHANECHOL CHLORIDE 50 MG: 25 TABLET ORAL at 15:00

## 2023-10-24 RX ADMIN — DOCUSATE SODIUM 50MG AND SENNOSIDES 8.6MG 2 TABLET: 8.6; 5 TABLET, FILM COATED ORAL at 21:45

## 2023-10-24 RX ADMIN — FAMOTIDINE 20 MG: 20 TABLET ORAL at 08:24

## 2023-10-24 RX ADMIN — GADOBENATE DIMEGLUMINE 20 ML: 529 INJECTION, SOLUTION INTRAVENOUS at 14:03

## 2023-10-24 RX ADMIN — DRONABINOL 2.5 MG: 2.5 CAPSULE ORAL at 15:02

## 2023-10-24 RX ADMIN — POLYETHYLENE GLYCOL 3350 17 G: 17 POWDER, FOR SOLUTION ORAL at 15:01

## 2023-10-24 RX ADMIN — TRAMADOL HYDROCHLORIDE 100 MG: 50 TABLET, COATED ORAL at 15:01

## 2023-10-24 RX ADMIN — TRAMADOL HYDROCHLORIDE 100 MG: 50 TABLET, COATED ORAL at 08:24

## 2023-10-24 RX ADMIN — SODIUM CHLORIDE 75 ML/HR: 9 INJECTION, SOLUTION INTRAVENOUS at 01:36

## 2023-10-24 RX ADMIN — Medication 10 ML: at 08:25

## 2023-10-24 RX ADMIN — ASPIRIN 81 MG: 81 TABLET, COATED ORAL at 18:07

## 2023-10-24 RX ADMIN — METOPROLOL TARTRATE 12.5 MG: 25 TABLET, FILM COATED ORAL at 08:24

## 2023-10-24 RX ADMIN — DRONABINOL 2.5 MG: 2.5 CAPSULE ORAL at 21:45

## 2023-10-24 RX ADMIN — FLUTICASONE PROPIONATE 2 SPRAY: 50 SPRAY, METERED NASAL at 08:25

## 2023-10-24 RX ADMIN — LIDOCAINE HYDROCHLORIDE 40 MG: 20 INJECTION, SOLUTION INFILTRATION; PERINEURAL at 13:08

## 2023-10-24 NOTE — CASE MANAGEMENT/SOCIAL WORK
Continued Stay Note  [facility]     Patient Name: Russell Ramírez Sr.  MRN: [id number]  Today's Date: 10/24/2023    Admit Date: 10/19/2023    Plan: disposition pending treatment plan and progress with PT- CCP will follow up Monday   Discharge Plan Home with family.      Row Name 10/24/23 6459       Plan    Plan Comments Spoke with patient's spouse at bedside d/t patient being sedated for testing. Introduced self and explained CCP role. Verified face sheet and local pharmacy is [facility] family wishes to be enrolled in [facility], [employer] updated. Family denies difficulty with medication cost/ management. Spouse denies prior HH and SNF history. Patient has a cane, rollator and Cpap. Patient family states that patient is having difficulty with walking far distances and inquired about motorized scooter; referral placed with [facility]/ [facility]. Patient lives with spouse in s/s home with a ramp to enter. Patient recently changed PCP to [facility], [phone]. Patient's family plans for patient to return home with family to transport.                   Discharge Codes    No documentation.                 Expected Discharge Date and Time       Expected Discharge Date Expected Discharge Time    Oct 25, 2023               Caryn Romo RN

## 2023-10-24 NOTE — CONSULTS
Purpose of the visit was to evaluate for: goals of care/advanced care planning, support for patient/family, and hospice referral/discussion. Spoke with RN as well as family and discussed palliative care, goals of care, care options, and [facility].      Assessment:    Patient is palliative care appropriate for community based services with [facility] or SNF with palliative care (list reasons): Small cell lung cancer with brain, liver, and adrenal metastasis, atrial fibrillation, and HTN.    Recommendations/Plan: Continue current level of care and interventions. The goal is for the patient to gain enough strength and function to continue oncology treatments.    Other Comments: Met with Mr. Krishnamurthy wife, Marivel, and daughter, Syeda, at the bedside to discuss goals of care and provide support. Mr. Ramírez was getting an MRI at the time of my visit and has been intermittently confused per the RN report and charting. Marivel and Syeda have a good understanding of the patients medical condition after speaking with Dr. Kunz yesterday. They are hopeful that the patient's nutrition status and function will improve enough for him to continue with his cancer treatments with the goal of stabilization and recovery. We did discuss the role of palliative care versus hospice, they verbalized understanding and state they are not ready for these services. Of note, they have utilized hospice services in the past for other family members and are familiar with the services. No further needs at this time, palliative care will sign off but may be reconsulted for future needs.

## 2023-10-24 NOTE — ANESTHESIA POSTPROCEDURE EVALUATION
"Patient: Russell Ramírez Sr.    Procedure Summary       Date: 10/24/23 Room / Location: [facility] MRI    Anesthesia Start: 1252 Anesthesia Stop: 1404    Procedure: MRI BRAIN W WO CONTRAST Diagnosis:     Scheduled Providers:  Provider: Paco Walker MD    Anesthesia Type: general ASA Status: 3            Anesthesia Type: general    Vitals  Vitals Value Taken Time   /98 10/24/23 1430   Temp 36.9 °C (98.5 °F) 10/24/23 1435   Pulse 82 10/24/23 1439   Resp 18 10/24/23 1430   SpO2 95 % 10/24/23 1439   Vitals shown include unfiled device data.        Post Anesthesia Care and Evaluation    Patient location during evaluation: PACU  Patient participation: complete - patient cannot participate  Pain management: adequate    Airway patency: patent  Anesthetic complications: No anesthetic complications    Cardiovascular status: acceptable  Respiratory status: acceptable  Hydration status: acceptable    Comments: /92   Pulse 85   Temp 36.9 °C (98.5 °F) (Oral)   Resp 18   Ht 180.3 cm (71\")   Wt 92.3 kg (203 lb 7.8 oz)   SpO2 95%   BMI 28.38 kg/m²     Patient discharged before being seen by an Anesthesiologist.  No anesthetic complications noted per record.        "

## 2023-10-24 NOTE — PROGRESS NOTES
Medicated with Fentanyl for continued complaint right hip pain see mar. [facility]  Inpatient Progress Note    Reason for Follow Up:  Chronic pancreatitis, constipation    Subjective     Interval History:   Pt awake and reports he is doing okay. States he is no longer having lower abdominal pain. Wife at bedside.  He had two BMs yesterday after getting an enema.  He is NPO currently, planning to have KUB and MRI of the brain under anesthesia.       Current Facility-Administered Medications:     acetaminophen (TYLENOL) tablet 650 mg, 650 mg, Oral, Q4H PRN, 650 mg at 10/20/23 0854 **OR** acetaminophen (TYLENOL) 160 MG/5ML oral solution 650 mg, 650 mg, Oral, Q4H PRN **OR** acetaminophen (TYLENOL) suppository 650 mg, 650 mg, Rectal, Q4H PRN, Rosalva Chester MD    albuterol (PROVENTIL) nebulizer solution 0.083% 2.5 mg/3mL, 2.5 mg, Nebulization, Q6H PRN, Rosalva Chester MD, 2.5 mg at 10/19/23 2321    aspirin EC tablet 81 mg, 81 mg, Oral, Q24H, Rosalva Chester MD, 81 mg at 10/23/23 2132    bethanechol (URECHOLINE) tablet 50 mg, 50 mg, Oral, Q12H, Gilma Yao APRN, 50 mg at 10/23/23 2133    sennosides-docusate (PERICOLACE) 8.6-50 MG per tablet 2 tablet, 2 tablet, Oral, BID, 2 tablet at 10/23/23 2132 **AND** polyethylene glycol (MIRALAX) packet 17 g, 17 g, Oral, Daily PRN **AND** bisacodyl (DULCOLAX) EC tablet 5 mg, 5 mg, Oral, Daily PRN, 5 mg at 10/21/23 0624 **AND** bisacodyl (DULCOLAX) suppository 10 mg, 10 mg, Rectal, Daily PRN, Rosalva Chester MD    bisacodyl (DULCOLAX) suppository 10 mg, 10 mg, Rectal, Daily, Drew Martinez PA-C, 10 mg at 10/23/23 1100    Calcium Replacement - Follow Nurse / BPA Driven Protocol, , Does not apply, PRN, Myron Jang MD    dronabinol (MARINOL) capsule 2.5 mg, 2.5 mg, Oral, BID, Karlene Correa MD, 2.5 mg at 10/23/23 2132    famotidine (PEPCID) tablet 20 mg, 20 mg, Oral, BID, Rosalva Chester MD, 20 mg at 10/24/23 0824    fluticasone (FLONASE) 50 MCG/ACT nasal spray 2 spray, 2 spray, Each Nare, Daily, Myron Jang MD, 2  spray at 10/24/23 0825    LORazepam (ATIVAN) injection 0.5 mg, 0.5 mg, Intravenous, Once in imaging, Myron Jang MD    Magnesium Standard Dose Replacement - Follow Nurse / BPA Driven Protocol, , Does not apply, PRN, Myron Jang MD    metoprolol tartrate (LOPRESSOR) tablet 12.5 mg, 12.5 mg, Oral, Q12H, Myron Jang MD, 12.5 mg at 10/24/23 0824    ondansetron (ZOFRAN) injection 4 mg, 4 mg, Intravenous, Q4H PRN, Rosalva Chester MD, 4 mg at 10/21/23 0515    Phosphorus Replacement - Follow Nurse / BPA Driven Protocol, , Does not apply, PRN, Myron Jang MD    Potassium Replacement - Follow Nurse / BPA Driven Protocol, , Does not apply, PRN, Myron Jang MD    predniSONE (DELTASONE) tablet 20 mg, 20 mg, Oral, Daily With Breakfast, Sarah Beth Kunz MD, 20 mg at 10/23/23 1552    prochlorperazine (COMPAZINE) injection 5 mg, 5 mg, Intravenous, Q4H PRN, Gilma Yao APRN    sodium chloride 0.9 % flush 10 mL, 10 mL, Intravenous, Q12H, Rosalva Chester MD, 10 mL at 10/24/23 0825    sodium chloride 0.9 % flush 10 mL, 10 mL, Intravenous, PRN, Rosalva Chester MD    sodium chloride 0.9 % flush 20 mL, 20 mL, Intravenous, PRN, Myron Jang MD    sodium chloride 0.9 % infusion 40 mL, 40 mL, Intravenous, PRNHenrik Jawed, MD    sodium chloride 0.9 % infusion, 75 mL/hr, Intravenous, Continuous, Myron Jang MD, Last Rate: 75 mL/hr at 10/24/23 0136, 75 mL/hr at 10/24/23 0136    tamsulosin (FLOMAX) 24 hr capsule 0.4 mg, 0.4 mg, Oral, Daily, Myron Jang MD, 0.4 mg at 10/23/23 0930    traMADol (ULTRAM) tablet 100 mg, 100 mg, Oral, Q6H PRN, Rosalva Chester MD, 100 mg at 10/24/23 0824    vitamin B-12 (CYANOCOBALAMIN) tablet 1,000 mcg, 1,000 mcg, Oral, Daily, Rosalva Chester MD, 1,000 mcg at 10/23/23 0930        Objective     Vital Signs  Temp:  [98 °F (36.7 °C)-99 °F (37.2 °C)] 98.1 °F (36.7 °C)  Heart Rate:  [79-91] 88  Resp:  [18-22] 18  BP: (131-150)/(80-89) 148/81  Body mass index  is 28.38 kg/m².    Intake/Output Summary (Last 24 hours) at 10/24/2023 0858  Last data filed at 10/24/2023 0435  Gross per 24 hour   Intake 240 ml   Output 3450 ml   Net -3210 ml     No intake/output data recorded.     Physical Exam:   General: patient awake, cooperative   Eyes: Normal lids and lashes, no scleral icterus   Abdomen: soft, nontender, nondistended; normal bowel sounds      Results Review:     I reviewed the patient's new clinical results.    Results from last 7 days   Lab Units 10/24/23  0515 10/23/23  0538 10/22/23  0658   WBC 10*3/mm3 6.94 7.12 6.10   HEMOGLOBIN g/dL 11.1* 10.4* 10.3*   HEMATOCRIT % 31.3* 29.9* 29.5*   PLATELETS 10*3/mm3 156 143 130*     Results from last 7 days   Lab Units 10/24/23  0515 10/24/23  0135 10/23/23  0538 10/22/23  0658 10/21/23  0350   SODIUM mmol/L 139  --  139 136 136   POTASSIUM mmol/L 3.5 3.6 3.3* 3.7 3.9   CHLORIDE mmol/L 103  --  103 104 103   CO2 mmol/L 27.8  --  27.1 26.0 23.8   BUN mg/dL 8  --  7* 9 12   CREATININE mg/dL 0.72*  --  0.72* 0.77 0.82   CALCIUM mg/dL 8.7  --  8.7 8.2* 8.5*   BILIRUBIN mg/dL  --   --  0.4 0.4 0.5   ALK PHOS U/L  --   --  72 66 72   ALT (SGPT) U/L  --   --  24 20 18   AST (SGOT) U/L  --   --  22 19 19   GLUCOSE mg/dL 90  --  91 102* 101*         Lab Results   Lab Value Date/Time    LIPASE 23 10/22/2023 0658       Radiology:  XR Chest 1 View   Final Result   Only the superior most abdomen is within the upright   field-of-view. No free intraperitoneal air under the hemidiaphragms.       Right lower lobe pulmonary nodule better seen on recent CT chest.   Lingular calcified granuloma. No new focal consolidation. No pleural   effusion or pneumothorax. Normal size cardiomediastinal silhouette.   Right IJ port with tip at the superior cavoatrial junction. No focal   osseous abnormality.               This report was finalized on 10/23/2023 4:16 PM by Dr. Ollie Oro M.D on Workstation: [id number]          XR Chest 1 View   Final Result    No focal pulmonary consolidation. Redemonstration of right   lower lung nodule.               This report was finalized on 10/19/2023 12:23 PM by Dr. Inderjit Galan M.D on Workstation: [id number]          MRI Brain With & Without Contrast    (Results Pending)   XR Abdomen KUB    (Results Pending)       Assessment & Plan     Active Hospital Problems    Diagnosis     **Physical deconditioning     Coronary arteriosclerosis     History of malignant neoplasm of prostate     Depressive disorder     Lung cancer metastatic to brain     Chronic midline low back pain without sciatica     CAD (coronary artery disease)     Atrial fibrillation     Benign essential hypertension     Obstructive sleep apnea          Assessment:   Metastatic lung cancer-to liver and adrenals; on chemotherapy; most recently mets to brain s/p radiation   Poor appetite  Chronic pancreatitis on imaging- lipase WNL. Unclear etiology, he is asymptomatic regarding this.   Constipation    Plan:   Daily miralax instead of as needed; otherwise continue current bowel regimen  Diet as tolerated  Again, chronic pancreatitis that was seen on imaging seems to be asymptomatic. In absence of symptoms, there is no utility for pancreatic enzymes and/or low fat diet.   No further recs from GI, we will s/o, please call with any questions or concerns.     I discussed the patients findings and my recommendations with patient.         Drew Martinez PA-C  [facility]  [address] [place] [address]  Office: [phone]

## 2023-10-24 NOTE — ANESTHESIA PREPROCEDURE EVALUATION
Anesthesia Evaluation     Patient summary reviewed   NPO Solid Status: > 8 hours  NPO Liquid Status: > 2 hours           Airway   Mallampati: I  TM distance: >3 FB  Neck ROM: full  No difficulty expected  Dental - normal exam     Pulmonary - normal exam   (+) lung cancer,sleep apnea  Cardiovascular - normal exam  Exercise tolerance: poor (<4 METS)    (+) hypertension, CAD, dysrhythmias Atrial Flutter, hyperlipidemia      Neuro/Psych  (+) CVA  GI/Hepatic/Renal/Endo    (+) obesity, GERD, PUD    Musculoskeletal     (+) neck pain  Abdominal  - normal exam    Bowel sounds: normal.   Substance History      OB/GYN          Other   arthritis,   history of cancer active                Anesthesia Plan    ASA 3     general     intravenous induction     Anesthetic plan, risks, benefits, and alternatives have been provided, discussed and informed consent has been obtained with: patient.  Pre-procedure education provided    CODE STATUS:    Code Status (Patient has no pulse and is not breathing): CPR (Attempt to Resuscitate)  Medical Interventions (Patient has pulse or is breathing): Full Support

## 2023-10-24 NOTE — DISCHARGE PLACEMENT REQUEST
"Russell Crouch . (78 y.o. Male)       Date of Birth   1945    Social Security Number       Address   [address] [facility] [place] [address]    Home Phone   [phone]    MRN   [id number]       [employer]   [employer]    Marital Status                               Admission Date   10/19/23    Admission Type   Emergency    Admitting Provider   Rosalva Chester MD    Attending Provider   Myron Jang MD    Department, Room/Bed   [address], [address]/1       Discharge Date       Discharge Disposition       Discharge Destination                                 Attending Provider: Myron Jang MD    Allergies: Codeine    Isolation: None   Infection: None   Code Status: CPR    Ht: 180.3 cm (71\")   Wt: 92.3 kg (203 lb 7.8 oz)    Admission Cmt: None   Principal Problem: Physical deconditioning [R53.81]                   Active Insurance as of 10/19/2023       Primary Coverage       Payor Plan Insurance Group Employer/Plan Group    MEDICARE MEDICARE A & B        Payor Plan Address Payor Plan Phone Number Payor Plan Fax Number Effective Dates    [address] [phone]  12/1/1993 - None Entered    [facility] [id number]         Subscriber Name Subscriber Birth Date Member ID       RUSSELL CROUCH SR. 1945 [id number]               Secondary Coverage       Payor Plan Insurance Group Employer/Plan Group    AARP MC SUP AARP HEALTH CARE OPTIONS PLAN F       Payor Plan Address Payor Plan Phone Number Payor Plan Fax Number Effective Dates    [facility] [phone]  1/1/2016 - None Entered    [address]       [facility] [address]         Subscriber Name Subscriber Birth Date Member ID       RUSSELL CROUCH SR. 1945 [id number]                     Emergency Contacts        (Rel.) Home Phone Work Phone Mobile Phone    Marivel Crouch (Spouse) [phone] -- [phone]    WILLASARA (Daughter) [phone]      "

## 2023-10-24 NOTE — PLAN OF CARE
Goal Outcome Evaluation:                      Patient is alert but disoriented to time and situation, no complaints of pain, IVF infusing, vitals stable, 2L O2 and CPAP while sleeping, NPO since midnight due to having an MRI under anesthesia later today.  He will also need a KUB while under anesthesia due to not being able to tolerate being flat, fisher in use with good urine output, meds whole with applesauce, family at bedside, call light within reach, bed alarm activated, nursing will continue to monitor for the remainder of the shift, plan of care ongoing.

## 2023-10-24 NOTE — PROGRESS NOTES
Nutrition Services    Patient Name:  Russell Ramírez Sr.  YOB: 1945  MRN: [id number]  Admit Date:  10/19/2023  Assessment Date:  10/24/23    Summary:     Nutrition follow up completed. Visited pt and wife at bedside. Pt was sleeping at time of visit, spoke with wife. She endorses his appetite has been poor.. Eating 0% PO. She endorses he has been started on steroids and an appetite stimulant. Dislikes the vanilla Boost, will switch to chocolate. Has been having constipation. On bowel regimen. Wears CPAP while sleeping. NPO since midnight d/t having an MRI later today.   Labs: Creat 0.72  Meds: miralax, IVFs    REC:  Boost Plus chocolate TID   Will continue to encourage and monitor PO/ONS intake  Continue bowel regimen to help with constipation     RD to follow per protocol.    CLINICAL NUTRITION ASSESSMENT      Reason for Assessment Follow up protocol      Diagnosis/Problem   weakness, decrease po, due to metastatic lung cancer.   Medical/Surgical History Past Medical History:   Diagnosis Date    Acid reflux     Anemia     Arthritis     Coronary artery disease     CVA (cerebral vascular accident)     High cholesterol     History of atrial fibrillation     Hx of blood clots     Hypertension     Irregular heartbeat     Peptic ulcer disease     Prostate cancer     Rheumatoid arthritis     Skin cancer     Small cell lung cancer        Past Surgical History:   Procedure Laterality Date    ABDOMINAL AORTIC ANEURYSM REPAIR      BACK SURGERY      x2    BRONCHOSCOPY WITH ION ROBOTIC ASSIST N/A 03/13/2023    Procedure: BRONCHOSCOPY ,ENDOBRONCHIAL ULTRASOUND AND ROBOTIC NAVIGATIONAL BRONCHOSCOPY WITH FINE NEEDLE ASPIRATION, BIOPSY X1 AREA, AND BRONCHOALEOLAR LAVAGE;  Surgeon: Sonali Lara MD;  Location: [facility] ENDOSCOPY;  Service: Robotics - Pulmonary;  Laterality: N/A;  Post: LUNG CANCER    CARDIAC ABLATION      x2    COLON SURGERY      HERNIA REPAIR      INSERTION CENTRAL VENOUS ACCESS DEVICE W/  "SUBCUTANEOUS PORT Right 03/20/2023    VENOUS ACCESS DEVICE (PORT) INSERTION Right 03/20/2023    Procedure: INSERTION VENOUS ACCESS DEVICE;  Surgeon: Javier Gonzalez MD PhD;  Location: [facility];  Service: Thoracic;  Laterality: Right;        Anthropometrics        Current Height  Current Weight  BMI kg/m2 Height: 180.3 cm (71\")  Weight: 92.3 kg (203 lb 7.8 oz) (10/19/23 1525)  Body mass index is 28.38 kg/m².   Adjusted BMI (if applicable)    BMI Category Overweight (25 - 29.9)   Ideal Body Weight (IBW) 166   Usual Body Weight (UBW) 215-220   Weight Trend Loss   Weight History Wt Readings from Last 30 Encounters:   10/19/23 1525 92.3 kg (203 lb 7.8 oz)   10/19/23 1127 91.6 kg (202 lb)   10/05/23 1318 95 kg (209 lb 6.4 oz)   09/22/23 1141 99 kg (218 lb 4.1 oz)   09/20/23 1145 96.6 kg (213 lb)   09/14/23 1254 96.7 kg (213 lb 3.2 oz)   08/24/23 1020 98.2 kg (216 lb 9.6 oz)   08/15/23 1355 94.7 kg (208 lb 12.4 oz)   08/08/23 1406 96.7 kg (213 lb 3 oz)   08/03/23 0931 95.5 kg (210 lb 9.6 oz)   07/31/23 0957 95.3 kg (210 lb 1.6 oz)   07/26/23 1218 93 kg (205 lb)   07/13/23 1056 93.8 kg (206 lb 12.8 oz)   06/21/23 0813 95.2 kg (209 lb 12.8 oz)   06/02/23 1440 98 kg (216 lb)   06/01/23 1305 97.4 kg (214 lb 12.8 oz)   05/31/23 0800 96 kg (211 lb 11.2 oz)   05/10/23 1435 98.4 kg (217 lb)   05/09/23 1320 98.3 kg (216 lb 12.8 oz)   05/08/23 0854 95.8 kg (211 lb 4.8 oz)   05/05/23 1115 95.3 kg (210 lb)   04/19/23 1358 100 kg (221 lb)   04/18/23 1407 98.8 kg (217 lb 12.8 oz)   05/05/23 0913 100 kg (221 lb)   04/17/23 0817 98 kg (216 lb 1.6 oz)   04/11/23 1324 98.1 kg (216 lb 3.2 oz)   03/29/23 1418 99.8 kg (220 lb)   03/28/23 1347 98.4 kg (217 lb)   03/27/23 0955 98 kg (216 lb)   03/21/23 1045 96.6 kg (213 lb)   03/20/23 0726 97.7 kg (215 lb 6.2 oz)      --  Labs       Pertinent Labs    Results from last 7 days   Lab Units 10/24/23  0515 10/24/23  0135 10/23/23  0538 10/22/23  0658 10/21/23  0350   SODIUM mmol/L 139  --  " 139 136 136   POTASSIUM mmol/L 3.5 3.6 3.3* 3.7 3.9   CHLORIDE mmol/L 103  --  103 104 103   CO2 mmol/L 27.8  --  27.1 26.0 23.8   BUN mg/dL 8  --  7* 9 12   CREATININE mg/dL 0.72*  --  0.72* 0.77 0.82   CALCIUM mg/dL 8.7  --  8.7 8.2* 8.5*   BILIRUBIN mg/dL  --   --  0.4 0.4 0.5   ALK PHOS U/L  --   --  72 66 72   ALT (SGPT) U/L  --   --  24 20 18   AST (SGOT) U/L  --   --  22 19 19   GLUCOSE mg/dL 90  --  91 102* 101*     Results from last 7 days   Lab Units 10/24/23  0515 10/23/23  0538 10/22/23  0658 10/21/23  0350   MAGNESIUM mg/dL 2.1 1.9  --  2.0   PHOSPHORUS mg/dL  --   --   --  2.7   HEMOGLOBIN g/dL 11.1* 10.4*   < > 10.7*   HEMATOCRIT % 31.3* 29.9*   < > 30.7*   WBC 10*3/mm3 6.94 7.12   < > 6.36   ALBUMIN g/dL  --  3.3*   < > 3.3*    < > = values in this interval not displayed.     Results from last 7 days   Lab Units 10/24/23  0515 10/23/23  0538 10/22/23  0658 10/21/23  0350 10/20/23  0644   PLATELETS 10*3/mm3 156 143 130* 153 132*     COVID19   Date Value Ref Range Status   10/20/2023 Not Detected Not Detected - Ref. Range Final     Lab Results   Component Value Date    HGBA1C 5.6 09/20/2021          Medications           Scheduled Medications aspirin, 81 mg, Oral, Q24H  bethanechol, 50 mg, Oral, Q12H  bisacodyl, 10 mg, Rectal, Daily  dronabinol, 2.5 mg, Oral, BID  famotidine, 20 mg, Oral, BID  fluticasone, 2 spray, Each Nare, Daily  LORazepam, 0.5 mg, Intravenous, Once in imaging  metoprolol tartrate, 12.5 mg, Oral, Q12H  senna-docusate sodium, 2 tablet, Oral, BID   And  polyethylene glycol, 17 g, Oral, Daily  predniSONE, 20 mg, Oral, Daily With Breakfast  sodium chloride, 10 mL, Intravenous, Q12H  tamsulosin, 0.4 mg, Oral, Daily  cyanocobalamin, 1,000 mcg, Oral, Daily       Infusions sodium chloride, 75 mL/hr, Last Rate: 75 mL/hr (10/24/23 0136)       PRN Medications   acetaminophen **OR** acetaminophen **OR** acetaminophen    albuterol    senna-docusate sodium **AND** polyethylene glycol **AND**  bisacodyl **AND** bisacodyl    Calcium Replacement - Follow Nurse / BPA Driven Protocol    Magnesium Standard Dose Replacement - Follow Nurse / BPA Driven Protocol    ondansetron    Phosphorus Replacement - Follow Nurse / BPA Driven Protocol    Potassium Replacement - Follow Nurse / BPA Driven Protocol    prochlorperazine    sodium chloride    sodium chloride    sodium chloride    traMADol     Physical Findings          General Findings disoriented, CPAP   Oral/Mouth Cavity dental appliance   Edema  no edema   Gastrointestinal constipation, hypoactive bowel sounds, nausea, non-distended , last bowel movement: 10/23   Skin  skin intact   Tubes/Drains/Lines implantable port   NFPE No clinical signs of muscle wasting or fat loss   --  Current Nutrition Orders & Evaluation of Intake       Oral Nutrition     Food Allergies NKFA   Current PO Diet Diet: Regular/House Diet; Texture: Regular Texture (IDDSI 7); Fluid Consistency: Thin (IDDSI 0)   Supplement n/a   PO Evaluation     % PO Intake 0%     Factors Affecting Intake: constipation, decreased appetite, nausea   --  Estimated/Assessed Needs        Current Weight  Weight: 92.3 kg (203 lb 7.8 oz) (10/19/23 1525)       Energy Requirements    Weight for Calculation 92.3 kg   Method for Estimation  20 kcal/kg, 25 kcal/kg   EST Needs (kcal/day) 3317-1549       Protein Requirements    Weight for Calculation 92.3 kg   EST Protein Needs (g/kg) 1.0 gm/kg   EST Daily Needs (g/day) 92       Fluid Requirements     Method for Estimation 1 mL/kcal    EST Needs (mL/day)      PES STATEMENT / NUTRITION DIAGNOSIS      Nutrition Dx Problem  Problem: Unintentional Weight Loss and Inadequate Oral Intake  Etiology: Factors Affecting Nutrition - decrease appetite    Signs/Symptoms: PO intake, Unintended Weight Change, and Report/Observation     NUTRITION INTERVENTION / PLAN OF CARE      Intervention Goal(s) Maintain nutrition status, Reduce/improve symptoms, Meet estimated needs, Disease  management/therapy, Increase intake, Maintain weight, and PO intake goal %: 75+         RD Intervention/Action Encourage intake, Continue to monitor, and Care plan reviewed   --      Prescription/Orders:       PO Diet       Supplements Boost Plus chocolate TID       Enteral Nutrition       Parenteral Nutrition    New Prescription Ordered? Yes   --      Monitor/Evaluation Per protocol   Discharge Plan/Needs Pending clinical course   --    RD to follow per protocol.      Electronically signed by:  Tosin Gallego Dietitian Intern   10/24/23 10:04 EDT

## 2023-10-24 NOTE — PROGRESS NOTES
Subjective     CHIEF COMPLAINT:     SCLC    INTERVAL HISTORY:     Patient was seen with the wife at the bedside.  He ate a little bit more this morning.  He reported that he was feeling dizzy.  No abdominal pain today.  Abdominal symptoms improved after he had a bowel movement with the enema.    REVIEW OF SYSTEMS:  A comprehensive review of systems was obtained with pertinent positive findings as noted in the interval history above.  All other systems negative.    SCHEDULED MEDS:  aspirin, 81 mg, Oral, Q24H  bethanechol, 50 mg, Oral, Q12H  bisacodyl, 10 mg, Rectal, Daily  dronabinol, 2.5 mg, Oral, BID  famotidine, 20 mg, Oral, BID  fluticasone, 2 spray, Each Nare, Daily  LORazepam, 0.5 mg, Intravenous, Once in imaging  metoprolol tartrate, 12.5 mg, Oral, Q12H  senna-docusate sodium, 2 tablet, Oral, BID   And  polyethylene glycol, 17 g, Oral, Daily  predniSONE, 20 mg, Oral, Daily With Breakfast  sodium chloride, 10 mL, Intravenous, Q12H  tamsulosin, 0.4 mg, Oral, Daily  cyanocobalamin, 1,000 mcg, Oral, Daily      Objective   VITAL SIGNS:  Temp:  [97.7 °F (36.5 °C)-98.6 °F (37 °C)] 98.2 °F (36.8 °C)  Heart Rate:  [] 78  Resp:  [12-24] 18  BP: ()/(62-98) 121/85  FiO2 (%):  [21 %] 21 %     PHYSICAL EXAMINATION:   GENERAL:  Patient is weak. Not in acute distress.  SKIN: No skin rash.   EYES:  No Jaundice. Pallor.   CHEST: Normal respiratory effort. Lungs clear to auscultation.   CARDIAC:  Normal S1 & S2. No murmur.   ABDOMEN:  Soft. No tenderness.   EXTREMITIES:  No edema.    RESULT REVIEW:   Results from last 7 days   Lab Units 10/25/23  0536 10/24/23  0515 10/23/23  0538 10/22/23  0658 10/21/23  0350   WBC 10*3/mm3 7.53 6.94 7.12 6.10 6.36   NEUTROS ABS 10*3/mm3 5.85 4.99 5.92 4.36 4.92   LYMPHS ABS 10*3/mm3  --   --  0.63*  --   --    HEMOGLOBIN g/dL 11.2* 11.1* 10.4* 10.3* 10.7*   HEMATOCRIT % 31.4* 31.3* 29.9* 29.5* 30.7*   PLATELETS 10*3/mm3 147 156 143 130* 153     Results from last 7 days   Lab Units  10/25/23  0536 10/24/23  0515 10/24/23  0135 10/23/23  0538 10/22/23  0658 10/21/23  0350 10/20/23  0644 10/19/23  1137   SODIUM mmol/L 138 139  --  139 136 136 137 134*   POTASSIUM mmol/L 3.6 3.5 3.6 3.3* 3.7 3.9 4.2 4.1   CHLORIDE mmol/L 103 103  --  103 104 103 103 102   CO2 mmol/L 25.8 27.8  --  27.1 26.0 23.8 27.0 24.8   BUN mg/dL 9 8  --  7* 9 12 16 20   CREATININE mg/dL 0.71* 0.72*  --  0.72* 0.77 0.82 0.84 1.00   CALCIUM mg/dL 8.8 8.7  --  8.7 8.2* 8.5* 8.7 8.7   ALBUMIN g/dL  --   --   --  3.3* 3.2* 3.3* 3.4* 3.3*   BILIRUBIN mg/dL  --   --   --  0.4 0.4 0.5 0.6 0.4   ALK PHOS U/L  --   --   --  72 66 72 74 71   ALT (SGPT) U/L  --   --   --  24 20 18 20 19   AST (SGOT) U/L  --   --   --  22 19 19 19 18   MAGNESIUM mg/dL 2.1 2.1  --  1.9  --  2.0  --   --      MRI brain on 10/24/2023:  Previously seen left cerebellar lesion measuring 5 mm now measures 10 mm.  Left precentral lesion which previously measured 2 mm now measures 3 mm.  Unchanged 3 mm enhancing lesion in the left thalamus.  Unchanged 3 mm lesion in the right frontal vertex.  Previously seen left inferior frontal lobe lesion which measured 2 mm now   measures 4 mm  Previously seen left putaminal lesion no longer identified.    Tiny restricted diffusion again seen right frontal lobe.     IMPRESSION:           1.  Some lesions have increased in size while some appear stable.  2.  Evolving tiny infarct in the right frontal lobe.    X-ray abdomen-KUB  There is no significantly increased stool burden. The bowel  gas pattern is nonobstructive. An aortic stent graft is in place. Lung  nodules again demonstrated in the right lung base.     IMPRESSION:  No significantly increased stool burden    Assessment & Plan   *Extensive stage small cell lung cancer.  Patient follows with Dr. Conn at our office.  He was treated with 4 cycles of carboplatin etoposide and Tecentriq, completed on 6/2/2023.  6/19/2023: Increased mediastinal lymph nodes, stable metastatic  liver and right adrenal lesions.  He was continued on maintenance Tecentriq every 3 weeks.  Last dose was on 10/5/2023.  10/19/2023: CT chest abdomen pelvis revealed stable 1.4 cm right lower pulmonary nodule.  There was increased right hilar lymph nodes.  There were new liver metastasis up to 3.6 and 3.0 cm.  There was significant increase in nodular enhancement of both adrenal glands, concerning for metastasis.  Based on the timing since completion of treatment being <6 months, this was considered primary refractory disease.  I had a lengthy discussion with the patient's family and I reviewed the CT scan with them.  They asked about surgery as an option for treatment for the liver and adrenal masses. I explained that surgery is not considered an option due to the number of lesions and due to the lesions being metastatic rather than primary.  I explained that chemotherapy treatment is the main method of treatment.  I expressed my concern that he is currently weak and would now be able to tolerate it.    *Brain metastasis.  S/p whole brain radiation therapy.  MRI brain requested.  Patient was previously unable to have MRI done under anesthesia.    10/25/2023: Patient had MRI of the brain under anesthesia.  MRI revealed increase in the size of the left cerebellar lesion from 5 mm to 10 mm.    The left precentral lesion increased from 2 to 3 mm.    A lesion in the left inferior frontal lobe increased from 2 mm to 4 mm.    Other lesions were stable.  A lesion in the left putamen was no longer identified.    The lesion in the left cerebellum is likely contributing to dizziness.  I explained the difficult situation with the worsening despite undergoing whole brain radiation.  Patient's family interested in further radiation therapy if he can receive it.    *Poor appetite and weight loss.  Protein malnutrition - total protein 5.9.  Cortisol ACTH levels were obtained to evaluate for adrenal insufficiency as the  cause.  A.M. cortisol 27.  Very poor appetite is likely secondary to the disease progression.  Prednisone 20 mg daily started on 10/23/2023.    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  10/23/2023: Hemoglobin 10.4.  10/24/2023: Hemoglobin 11.1.  10/25/2023: Hemoglobin 11.3.    *Deconditioning  Recommended PT and OT if he is able to participate.    *Constipation.   Patient was having abdominal pain.  He had bowel movements after he had an enema on 10/23/2023.    Abdominal pain improved after he had the bowel movements.  KUB showed no significant stool burden.    PLAN:    1.  Radiation Oncology consultation.  2.  Discussed overall plan of care given the disease progression and his current status with significant weakness. Discussed pursuing palliative care but patient's family undecided at this point.   3.  Continue Prednisone 20 mg daily.     Discussed with family.  Discussed with Dr. Conn.    I spent 50 minutes caring for Russell on this date of service. This time includes time spent by me in the following activities: preparing for the visit, reviewing tests, obtaining and/or reviewing a separately obtained history, performing a medically appropriate examination and/or evaluation, counseling and educating the patient/family/caregiver, referring and communicating with other health care professionals, documenting information in the medical record, and independently interpreting results and communicating that information with the patient/family/caregiver       Sarah Beth Kunz MD  10/25/23

## 2023-10-24 NOTE — PROGRESS NOTES
Name: Russell Ramírez . ADMIT: 10/19/2023   : 1945  PCP: Morgan Sebastian DO    MRN: [id number] LOS: 4 days   AGE/SEX: 78 y.o. male  ROOM: [facility] [place]/     Subjective   Subjective   Chief Complaint   Patient presents with    Fatigue     He was able to pass stool yesterday and has had improvement in the lower abdominal pain.  He was resting today after not getting too much sleep and discussed with family at bedside.  Patient did awaken briefly during exam.  MRI with sedation planned.     Objective   Objective   Vital Signs  Temp:  [98 °F (36.7 °C)-99 °F (37.2 °C)] 98.1 °F (36.7 °C)  Heart Rate:  [79-91] 88  Resp:  [18-22] 18  BP: (131-150)/(80-89) 148/81  SpO2:  [96 %-98 %] 96 %  on   ;   Device (Oxygen Therapy): CPAP  Body mass index is 28.38 kg/m².    Physical Exam  Vitals and nursing note reviewed.   Constitutional:       General: He is not in acute distress.     Appearance: He is ill-appearing. He is not diaphoretic.   HENT:      Head: Atraumatic.   Cardiovascular:      Rate and Rhythm: Normal rate and regular rhythm.      Pulses: Normal pulses.   Pulmonary:      Effort: Pulmonary effort is normal.      Breath sounds: No wheezing.   Abdominal:      General: There is no distension.      Palpations: Abdomen is soft.      Tenderness: There is no abdominal tenderness. There is no guarding or rebound.   Musculoskeletal:         General: No swelling or tenderness.   Skin:     General: Skin is warm and dry.   Neurological:      Motor: Weakness (generalized) present.   Psychiatric:         Mood and Affect: Mood normal.         Behavior: Behavior normal.       Results Review  I reviewed the patient's new clinical results.    Results from last 7 days   Lab Units 10/24/23  0515 10/23/23  0538 10/22/23  0658 10/21/23  0350   WBC 10*3/mm3 6.94 7.12 6.10 6.36   HEMOGLOBIN g/dL 11.1* 10.4* 10.3* 10.7*   PLATELETS 10*3/mm3 156 143 130* 153     Results from last 7 days   Lab Units 10/24/23  0515 10/24/23  0135  "10/23/23  0538 10/22/23  0658 10/21/23  0350   SODIUM mmol/L 139  --  139 136 136   POTASSIUM mmol/L 3.5 3.6 3.3* 3.7 3.9   CHLORIDE mmol/L 103  --  103 104 103   CO2 mmol/L 27.8  --  27.1 26.0 23.8   BUN mg/dL 8  --  7* 9 12   CREATININE mg/dL 0.72*  --  0.72* 0.77 0.82   GLUCOSE mg/dL 90  --  91 102* 101*   EGFR mL/min/1.73 94.1  --  94.1 92.2 90.5     Results from last 7 days   Lab Units 10/23/23  0538 10/22/23  0658 10/21/23  0350 10/20/23  0644   ALBUMIN g/dL 3.3* 3.2* 3.3* 3.4*   BILIRUBIN mg/dL 0.4 0.4 0.5 0.6   ALK PHOS U/L 72 66 72 74   AST (SGOT) U/L 22 19 19 19   ALT (SGPT) U/L 24 20 18 20     Results from last 7 days   Lab Units 10/24/23  0515 10/23/23  0538 10/22/23  0658 10/21/23  0350 10/20/23  0644   CALCIUM mg/dL 8.7 8.7 8.2* 8.5* 8.7   ALBUMIN g/dL  --  3.3* 3.2* 3.3* 3.4*   MAGNESIUM mg/dL 2.1 1.9  --  2.0  --    PHOSPHORUS mg/dL  --   --   --  2.7  --          No results found for: \"HGBA1C\", \"POCGLU\"    XR Chest 1 View    Result Date: 10/23/2023  Only the superior most abdomen is within the upright field-of-view. No free intraperitoneal air under the hemidiaphragms.  Right lower lobe pulmonary nodule better seen on recent CT chest. Lingular calcified granuloma. No new focal consolidation. No pleural effusion or pneumothorax. Normal size cardiomediastinal silhouette. Right IJ port with tip at the superior cavoatrial junction. No focal osseous abnormality.    This report was finalized on 10/23/2023 4:16 PM by Dr. Ollie Oro M.D on Workstation: [id number]       I have personally reviewed all medications:  Scheduled Medications  aspirin, 81 mg, Oral, Q24H  bethanechol, 50 mg, Oral, Q12H  bisacodyl, 10 mg, Rectal, Daily  dronabinol, 2.5 mg, Oral, BID  famotidine, 20 mg, Oral, BID  fluticasone, 2 spray, Each Nare, Daily  LORazepam, 0.5 mg, Intravenous, Once in imaging  metoprolol tartrate, 12.5 mg, Oral, Q12H  senna-docusate sodium, 2 tablet, Oral, BID   And  polyethylene glycol, 17 g, Oral, " Daily  predniSONE, 20 mg, Oral, Daily With Breakfast  sodium chloride, 10 mL, Intravenous, Q12H  tamsulosin, 0.4 mg, Oral, Daily  cyanocobalamin, 1,000 mcg, Oral, Daily      Infusions  sodium chloride, 75 mL/hr, Last Rate: 75 mL/hr (10/24/23 0136)      Diet  Diet: Regular/House Diet; Texture: Regular Texture (IDDSI 7); Fluid Consistency: Thin (IDDSI 0)    I have personally reviewed:  [x]  Laboratory   []  Microbiology   [x]  Radiology   []  EKG/Telemetry  []  Cardiology/Vascular   []  Pathology    []  Records       Assessment/Plan     Active Hospital Problems    Diagnosis  POA    **Physical deconditioning [R53.81]  Yes    Coronary arteriosclerosis [I25.10]  Yes    History of malignant neoplasm of prostate [Z85.46]  Not Applicable    Depressive disorder [F32.A]  Yes    Lung cancer metastatic to brain [C34.90, 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       78 y.o. male admitted with Physical deconditioning.    Metastatic Lung Cancer: Liver and adrenal metastasis noted on CT. MRI brain planned, anesthesia consult for sedation. Oncology following.  Difficulty Urinating: PSA low.  UA was not infectious. Required cath.  On Flomax.  Can attempt voiding trial in few days.  NV/Constipation/Chronic Pancreatitis: No free air was noted on the chest x-ray.  He has had bowel movement.  Continue bowel regimen.  GI following.  Rhinitis: RVP was ok. Recent abx reported.  Continue nasal spray.  AFib: Anticoagulation v continued aspirin per oncology  Deconditioning: PT/OT  PPX: per oncology  Disposition: TBD    Expected Discharge Date: 10/25/2023; Expected Discharge Time:      Myron Jang MD  [facility]  10/24/23  11:27  EDT    Dictated portions of note using Dragon dictation software.  Copied text in this note has been reviewed by me and remains accurate as of 10/24/23

## 2023-10-24 NOTE — ANESTHESIA PROCEDURE NOTES
Airway  Urgency: elective    Date/Time: 10/24/2023 1:10 PM  Airway not difficult    General Information and Staff    Patient location during procedure: OR  Anesthesiologist: James Merino MD    Indications and Patient Condition  Indications for airway management: airway protection    Preoxygenated: yes  MILS maintained throughout  Mask difficulty assessment: 0 - not attempted    Final Airway Details  Final airway type: supraglottic airway      Successful airway: classic  Size 5     Cormack-Lehane Classification: grade I - full view of glottis  Number of attempts at approach: 1  Assessment: lips, teeth, and gum same as pre-op and atraumatic intubation

## 2023-10-25 PROBLEM — R63.0 POOR APPETITE: Status: ACTIVE | Noted: 2023-10-25

## 2023-10-25 PROBLEM — G93.6 VASOGENIC CEREBRAL EDEMA: Status: ACTIVE | Noted: 2023-10-25

## 2023-10-25 LAB
ANION GAP SERPL CALCULATED.3IONS-SCNC: 9.2 MMOL/L (ref 5–15)
BASOPHILS # BLD AUTO: 0.02 10*3/MM3 (ref 0–0.2)
BASOPHILS NFR BLD AUTO: 0.3 % (ref 0–1.5)
BUN SERPL-MCNC: 9 MG/DL (ref 8–23)
BUN/CREAT SERPL: 12.7 (ref 7–25)
CALCIUM SPEC-SCNC: 8.8 MG/DL (ref 8.6–10.5)
CHLORIDE SERPL-SCNC: 103 MMOL/L (ref 98–107)
CO2 SERPL-SCNC: 25.8 MMOL/L (ref 22–29)
CREAT SERPL-MCNC: 0.71 MG/DL (ref 0.76–1.27)
DEPRECATED RDW RBC AUTO: 46.4 FL (ref 37–54)
EGFRCR SERPLBLD CKD-EPI 2021: 94.5 ML/MIN/1.73
EOSINOPHIL # BLD AUTO: 0.02 10*3/MM3 (ref 0–0.4)
EOSINOPHIL NFR BLD AUTO: 0.3 % (ref 0.3–6.2)
ERYTHROCYTE [DISTWIDTH] IN BLOOD BY AUTOMATED COUNT: 14.2 % (ref 12.3–15.4)
GLUCOSE SERPL-MCNC: 103 MG/DL (ref 65–99)
HCT VFR BLD AUTO: 31.4 % (ref 37.5–51)
HGB BLD-MCNC: 11.2 G/DL (ref 13–17.7)
IMM GRANULOCYTES # BLD AUTO: 0.07 10*3/MM3 (ref 0–0.05)
IMM GRANULOCYTES NFR BLD AUTO: 0.9 % (ref 0–0.5)
LYMPHOCYTES # BLD AUTO: 0.84 10*3/MM3 (ref 0.7–3.1)
LYMPHOCYTES NFR BLD AUTO: 11.2 % (ref 19.6–45.3)
MAGNESIUM SERPL-MCNC: 2.1 MG/DL (ref 1.6–2.4)
MCH RBC QN AUTO: 32.7 PG (ref 26.6–33)
MCHC RBC AUTO-ENTMCNC: 35.7 G/DL (ref 31.5–35.7)
MCV RBC AUTO: 91.8 FL (ref 79–97)
MONOCYTES # BLD AUTO: 0.73 10*3/MM3 (ref 0.1–0.9)
MONOCYTES NFR BLD AUTO: 9.7 % (ref 5–12)
NEUTROPHILS NFR BLD AUTO: 5.85 10*3/MM3 (ref 1.7–7)
NEUTROPHILS NFR BLD AUTO: 77.6 % (ref 42.7–76)
NRBC BLD AUTO-RTO: 0 /100 WBC (ref 0–0.2)
PLATELET # BLD AUTO: 147 10*3/MM3 (ref 140–450)
PMV BLD AUTO: 8.7 FL (ref 6–12)
POTASSIUM SERPL-SCNC: 3.6 MMOL/L (ref 3.5–5.2)
RBC # BLD AUTO: 3.42 10*6/MM3 (ref 4.14–5.8)
SODIUM SERPL-SCNC: 138 MMOL/L (ref 136–145)
WBC NRBC COR # BLD: 7.53 10*3/MM3 (ref 3.4–10.8)

## 2023-10-25 PROCEDURE — 97110 THERAPEUTIC EXERCISES: CPT

## 2023-10-25 PROCEDURE — 85025 COMPLETE CBC W/AUTO DIFF WBC: CPT | Performed by: INTERNAL MEDICINE

## 2023-10-25 PROCEDURE — 80048 BASIC METABOLIC PNL TOTAL CA: CPT | Performed by: INTERNAL MEDICINE

## 2023-10-25 PROCEDURE — 99233 SBSQ HOSP IP/OBS HIGH 50: CPT | Performed by: INTERNAL MEDICINE

## 2023-10-25 PROCEDURE — 25810000003 SODIUM CHLORIDE 0.9 % SOLUTION: Performed by: INTERNAL MEDICINE

## 2023-10-25 PROCEDURE — 63710000001 PREDNISONE PER 1 MG: Performed by: INTERNAL MEDICINE

## 2023-10-25 PROCEDURE — 99223 1ST HOSP IP/OBS HIGH 75: CPT | Performed by: INTERNAL MEDICINE

## 2023-10-25 PROCEDURE — 97530 THERAPEUTIC ACTIVITIES: CPT

## 2023-10-25 PROCEDURE — 83735 ASSAY OF MAGNESIUM: CPT | Performed by: INTERNAL MEDICINE

## 2023-10-25 PROCEDURE — 63710000001 DRONABINOL PER 2.5 MG: Performed by: INTERNAL MEDICINE

## 2023-10-25 RX ORDER — ATORVASTATIN CALCIUM 20 MG/1
40 TABLET, FILM COATED ORAL NIGHTLY
Status: DISCONTINUED | OUTPATIENT
Start: 2023-10-25 | End: 2023-11-02 | Stop reason: HOSPADM

## 2023-10-25 RX ADMIN — BETHANECHOL CHLORIDE 50 MG: 25 TABLET ORAL at 21:55

## 2023-10-25 RX ADMIN — DRONABINOL 2.5 MG: 2.5 CAPSULE ORAL at 21:55

## 2023-10-25 RX ADMIN — SODIUM CHLORIDE 50 ML/HR: 9 INJECTION, SOLUTION INTRAVENOUS at 21:54

## 2023-10-25 RX ADMIN — Medication 1000 MCG: at 09:25

## 2023-10-25 RX ADMIN — POLYETHYLENE GLYCOL 3350 17 G: 17 POWDER, FOR SOLUTION ORAL at 09:24

## 2023-10-25 RX ADMIN — Medication 10 ML: at 09:49

## 2023-10-25 RX ADMIN — FAMOTIDINE 20 MG: 20 TABLET ORAL at 09:25

## 2023-10-25 RX ADMIN — DOCUSATE SODIUM 50MG AND SENNOSIDES 8.6MG 2 TABLET: 8.6; 5 TABLET, FILM COATED ORAL at 09:25

## 2023-10-25 RX ADMIN — DRONABINOL 2.5 MG: 2.5 CAPSULE ORAL at 09:25

## 2023-10-25 RX ADMIN — ASPIRIN 81 MG: 81 TABLET, COATED ORAL at 17:49

## 2023-10-25 RX ADMIN — METOPROLOL TARTRATE 12.5 MG: 25 TABLET, FILM COATED ORAL at 09:25

## 2023-10-25 RX ADMIN — BETHANECHOL CHLORIDE 50 MG: 25 TABLET ORAL at 09:26

## 2023-10-25 RX ADMIN — TAMSULOSIN HYDROCHLORIDE 0.4 MG: 0.4 CAPSULE ORAL at 09:26

## 2023-10-25 RX ADMIN — TRAMADOL HYDROCHLORIDE 100 MG: 50 TABLET, COATED ORAL at 09:25

## 2023-10-25 RX ADMIN — Medication 10 ML: at 21:56

## 2023-10-25 RX ADMIN — FAMOTIDINE 20 MG: 20 TABLET ORAL at 21:55

## 2023-10-25 RX ADMIN — ATORVASTATIN CALCIUM 40 MG: 20 TABLET, FILM COATED ORAL at 21:55

## 2023-10-25 RX ADMIN — SODIUM CHLORIDE 75 ML/HR: 9 INJECTION, SOLUTION INTRAVENOUS at 09:19

## 2023-10-25 RX ADMIN — PREDNISONE 20 MG: 20 TABLET ORAL at 09:25

## 2023-10-25 RX ADMIN — TRAMADOL HYDROCHLORIDE 100 MG: 50 TABLET, COATED ORAL at 15:45

## 2023-10-25 RX ADMIN — ACETAMINOPHEN 650 MG: 325 TABLET, FILM COATED ORAL at 17:49

## 2023-10-25 NOTE — PROGRESS NOTES
[facility] Medicine Services  PROGRESS NOTE    Patient Name: Russell Ramírez Sr.  : 1945  MRN: [id number]    Date of Admission: 10/19/2023  Primary Care Physician: Morgan Sebastian DO    Subjective   Subjective     CC:  Follow-up recent fatigue    Subjective:  Patient reports he is eating more.  Still some dizziness.  He had a bowel movement with an enema recently that improved his abdominal pain.  No other new complaints.    Review of Systems  No current fevers or chills  No current shortness of breath or cough  No current nausea, vomiting, or diarrhea  No current chest pain or palpitations      Objective   Objective     Vital Signs:   Temp:  [97.7 °F (36.5 °C)-98.6 °F (37 °C)] 98.4 °F (36.9 °C)  Heart Rate:  [76-91] 84  Resp:  [16-20] 18  BP: (111-141)/(75-99) 122/76        Physical Exam:  Constitutional:Awake, alert  HENT: NCAT, mucous membranes moist, neck supple  Respiratory: No cough or wheezing, nonlabored breathing  Cardiovascular: Pulse rate is normal, normal radial pulses  Gastrointestinal: soft, nontender, nondistended  Musculoskeletal: Generally weak and elderly and frail in appearance, BMI is 28, no lower extremity edema  Psychiatric: Appropriate affect, cooperative, conversational  Neurologic: No slurred speech or facial droop, follows commands  Skin: No rashes or jaundice, warm      Results Reviewed:  Results from last 7 days   Lab Units 10/25/23  0536 10/24/23  0515 10/23/23  0538   WBC 10*3/mm3 7.53 6.94 7.12   HEMOGLOBIN g/dL 11.2* 11.1* 10.4*   HEMATOCRIT % 31.4* 31.3* 29.9*   PLATELETS 10*3/mm3 147 156 143     Results from last 7 days   Lab Units 10/25/23  0536 10/24/23  0515 10/24/23  0135 10/23/23  0538 10/22/23  0658 10/21/23  0350 10/20/23  0644 10/19/23  1137   SODIUM mmol/L 138 139  --  139 136 136   < > 134*   POTASSIUM mmol/L 3.6 3.5 3.6 3.3* 3.7 3.9   < > 4.1   CHLORIDE mmol/L 103 103  --  103 104 103   < > 102   CO2 mmol/L 25.8 27.8  --  27.1 26.0 23.8   < > 24.8   BUN  mg/dL 9 8  --  7* 9 12   < > 20   CREATININE mg/dL 0.71* 0.72*  --  0.72* 0.77 0.82   < > 1.00   GLUCOSE mg/dL 103* 90  --  91 102* 101*   < > 95   CALCIUM mg/dL 8.8 8.7  --  8.7 8.2* 8.5*   < > 8.7   ALK PHOS U/L  --   --   --  72 66 72   < > 71   ALT (SGPT) U/L  --   --   --  24 20 18   < > 19   AST (SGOT) U/L  --   --   --  22 19 19   < > 18   HSTROP T ng/L  --   --   --   --   --   --   --  13    < > = values in this interval not displayed.     Estimated Creatinine Clearance: 101.2 mL/min (A) (by C-G formula based on SCr of 0.71 mg/dL (L)).    Microbiology Results Abnormal       Procedure Component Value - Date/Time    Respiratory Panel PCR w/COVID-19(SARS-CoV-2) SEYMOUR/GEORGI/YESENIA/PAD/COR/MAD/MANFRED In-House, NP Swab in UTM/VTM, 3-4 HR TAT - Swab, Nasopharynx [[id number]]  (Normal) Collected: 10/20/23 1236    Lab Status: Final result Specimen: Swab from Nasopharynx Updated: 10/20/23 1411     ADENOVIRUS, PCR Not Detected     Coronavirus 229E Not Detected     Coronavirus HKU1 Not Detected     Coronavirus NL63 Not Detected     Coronavirus OC43 Not Detected     COVID19 Not Detected     Human Metapneumovirus Not Detected     Human Rhinovirus/Enterovirus Not Detected     Influenza A PCR Not Detected     Influenza B PCR Not Detected     Parainfluenza Virus 1 Not Detected     Parainfluenza Virus 2 Not Detected     Parainfluenza Virus 3 Not Detected     Parainfluenza Virus 4 Not Detected     RSV, PCR Not Detected     Bordetella pertussis pcr Not Detected     Bordetella parapertussis PCR Not Detected     Chlamydophila pneumoniae PCR Not Detected     Mycoplasma pneumo by PCR Not Detected    Narrative:      In the setting of a positive respiratory panel with a viral infection PLUS a negative procalcitonin without other underlying concern for bacterial infection, consider observing off antibiotics or discontinuation of antibiotics and continue supportive care. If the respiratory panel is positive for atypical bacterial infection  (Bordetella pertussis, Chlamydophila pneumoniae, or Mycoplasma pneumoniae), consider antibiotic de-escalation to target atypical bacterial infection.            Imaging Results (Last 24 Hours)       Procedure Component Value Units Date/Time    MRI Brain With & Without Contrast [[id number]] Collected: 10/25/23 0141     Updated: 10/25/23 0141    Narrative:        Patient: LAURA CROUCH  Time Out: 01:40  Exam(s): MRI HEAD W WO Contrast     EXAM:    MR Head Without and With Intravenous Contrast    CLINICAL HISTORY:     Reason for exam: headache vertigo cancer.    TECHNIQUE:    Magnetic resonance images of the head brain without and with   intravenous contrast in multiple planes.    COMPARISON:    9 20 2023    FINDINGS:      Previously seen left cerebellar lesion measuring 5 mm now measures 10 mm.  Left precentral lesion which previously measured 2 mm now measures 3 mm.  Unchanged 3 mm enhancing lesion in the left thalamus.  Unchanged 3 mm lesion in the right frontal vertex.  Previously seen left inferior frontal lobe lesion which measured 2 mm now   measures 4 mm  Previously seen left putaminal lesion no longer identified.    Tiny restricted diffusion again seen right frontal lobe.    IMPRESSION:           1.  Some lesions have increased in size while some appear stable.  2.  Evolving tiny infarct in the right frontal lobe.      Impression:          Electronically signed by Celeste Dean MD on 10-25-23 at 0140            Results for orders placed during the hospital encounter of 03/14/23    Adult Stress Echo W/ Cont or Stress Agent if Necessary Per Protocol    Interpretation Summary    Left ventricular systolic function is normal. Calculated left ventricular EF = 58%    Left ventricular wall thickness is consistent with moderate septal asymmetric hypertrophy. There is no evidence for left ventricular outflow tract obstruction.    There is severe mitral calcification with mild mitral valve stenosis  present. The mitral  valve mean gradient is 4 mmHg.    Mild dilation Valsalva of sinus present measuring 4.1 cm    No ECG evidence of myocardial ischemia. Negative clinical evidence of myocardial ischemia. Findings consistent with a normal ECG stress test.    Normal dobutamine stress echo with no significant echocardiographic evidence for myocardial ischemia.      I have reviewed the medications:  Scheduled Meds:aspirin, 81 mg, Oral, Q24H  bethanechol, 50 mg, Oral, Q12H  bisacodyl, 10 mg, Rectal, Daily  dronabinol, 2.5 mg, Oral, BID  famotidine, 20 mg, Oral, BID  fluticasone, 2 spray, Each Nare, Daily  LORazepam, 0.5 mg, Intravenous, Once in imaging  metoprolol tartrate, 12.5 mg, Oral, Q12H  senna-docusate sodium, 2 tablet, Oral, BID   And  polyethylene glycol, 17 g, Oral, Daily  predniSONE, 20 mg, Oral, Daily With Breakfast  sodium chloride, 10 mL, Intravenous, Q12H  tamsulosin, 0.4 mg, Oral, Daily  cyanocobalamin, 1,000 mcg, Oral, Daily      Continuous Infusions:sodium chloride, 75 mL/hr, Last Rate: 75 mL/hr (10/25/23 0919)      PRN Meds:.  acetaminophen **OR** acetaminophen **OR** acetaminophen    albuterol    senna-docusate sodium **AND** polyethylene glycol **AND** bisacodyl **AND** bisacodyl    Calcium Replacement - Follow Nurse / BPA Driven Protocol    diphenhydrAMINE    droperidol **OR** droperidol    ePHEDrine    fentanyl    flumazenil    hydrALAZINE    ipratropium-albuterol    labetalol    Magnesium Standard Dose Replacement - Follow Nurse / BPA Driven Protocol    naloxone    ondansetron    ondansetron    Phosphorus Replacement - Follow Nurse / BPA Driven Protocol    Potassium Replacement - Follow Nurse / BPA Driven Protocol    prochlorperazine    promethazine **OR** promethazine    sodium chloride    sodium chloride    sodium chloride    traMADol    Assessment & Plan   Assessment & Plan     Active Hospital Problems    Diagnosis  POA    **Physical deconditioning [R53.81]  Yes    Vasogenic cerebral edema [G93.6]  Yes     Coronary arteriosclerosis [I25.10]  Yes    History of malignant neoplasm of prostate [Z85.46]  Not Applicable    Depressive disorder [F32.A]  Yes    Lung cancer metastatic to brain [C34.90, 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        Brief Hospital Course to date:  Russell Ramírez Sr. is a 78 y.o. male with extensive small cell lung cancer, brain metastasis recent poor appetite and weight loss, anemia secondary to chemotherapy presents to the hospital with failure to thrive and physical deconditioning and was found to have severe constipation.    Medical problems are new under my management today.  Labs, vitals, and imaging reviewed.  MRI from today shows increased lesions in the brain.  Patient also has evolving tiny right frontal infarct.  I reviewed the previous imaging and there was bright infarct as well.  For now patient did not wish for aggressive care.  Appropriate stroke care would require continued aspirin and will add atorvastatin.  Continue steroids.  Plan to follow-up on consult from radiation oncology and continue to discuss goals of care as patient could be a candidate for transition to more palliative focused care if that is what he wishes to pursue.  Short-term prognosis guarded.  Long-term prognosis is unfortunately poor.  Treatment plan discussed with the patient and his family at the bedside and they are in agreement with plan.    Metastatic Lung Cancer: Liver and adrenal metastasis noted on CT. MRI brain planned, anesthesia consult for sedation. Oncology following.  Difficulty Urinating: PSA low.  UA was not infectious. Required cath.  On Flomax.  Can attempt voiding trial in few days.  NV/Constipation/Chronic  Pancreatitis: No free air was noted on the chest x-ray.  He has had bowel movement.  Continue bowel regimen.  GI following.  Rhinitis: RVP was ok. Recent abx reported.  Continue nasal spray.  AFib: Anticoagulation v continued aspirin per oncology  Deconditioning: PT/OT  PPX: per oncology  Disposition: TBD      CODE STATUS:   Code Status and Medical Interventions:   Ordered at: 10/19/23 1752     Code Status (Patient has no pulse and is not breathing):    CPR (Attempt to Resuscitate)     Medical Interventions (Patient has pulse or is breathing):    Full Support       Gama Newman MD  10/25/23

## 2023-10-25 NOTE — PLAN OF CARE
Goal Outcome Evaluation:                 Patient is disoriented to situation, very pleasant, no complaints this shift, he did however somehow pull the needle out of his port. Charge nurse notified and he replaced needle and dressed port.  Port is CDI, good blood return, due to be changed November 1.  Vitals stable, room air, up with assist x 2. Wife at bedside, safety precautions in use, plan of care ongoing.

## 2023-10-25 NOTE — THERAPY TREATMENT NOTE
Patient Name: Russell Ramírez Sr.  : 1945    MRN: [id number]                              Today's Date: 10/25/2023       Admit Date: 10/19/2023    Visit Dx:     ICD-10-CM ICD-9-CM   1. Physical deconditioning  R53.81 799.3     Patient Active Problem List   Diagnosis    Lung nodule    Lung cancer metastatic to brain    High risk medication use    Encounter for adjustment or management of vascular access device    Abdominal aortic aneurysm    Adenomatous polyp of ascending colon    Anxiety state    Atrial fibrillation    Atrial flutter    Back problem    Benign essential hypertension    Cervical spondyloarthritis    Chronic midline low back pain without sciatica    Depressive disorder    Edema    Erectile dysfunction    Gastroesophageal reflux disease    History of malignant neoplasm of prostate    Hx of endovascular stent graft for abdominal aortic aneurysm    Hypercholesteremia    Pure hypercholesterolemia    Hypertensive heart disease    Hypotestosteronism    Malignant neoplasm of skin    Neck pain    Obesity    Obstructive sleep apnea    Osteoarthritis    Prostate cancer    Hypertension    PVD (peripheral vascular disease)    Vascular disorder    Raynaud's phenomenon    CAD (coronary artery disease)    Coronary arteriosclerosis in native artery    Coronary arteriosclerosis    Coronary atherosclerosis    Renal artery atherosclerosis    S/P ablation of atrial fibrillation    S/P renal artery angioplasty    Swelling of limb    Tobacco dependence syndrome    Secondary malignant neoplasm of brain    Physical deconditioning    Vasogenic cerebral edema     Past Medical History:   Diagnosis Date    Acid reflux     Anemia     Arthritis     Coronary artery disease     CVA (cerebral vascular accident)     High cholesterol     History of atrial fibrillation     Hx of blood clots     Hypertension     Irregular heartbeat     Peptic ulcer disease     Prostate cancer     Rheumatoid arthritis     Skin cancer     Small  cell lung cancer      Past Surgical History:   Procedure Laterality Date    ABDOMINAL AORTIC ANEURYSM REPAIR      BACK SURGERY      x2    BRONCHOSCOPY WITH ION ROBOTIC ASSIST N/A 03/13/2023    Procedure: BRONCHOSCOPY ,ENDOBRONCHIAL ULTRASOUND AND ROBOTIC NAVIGATIONAL BRONCHOSCOPY WITH FINE NEEDLE ASPIRATION, BIOPSY X1 AREA, AND BRONCHOALEOLAR LAVAGE;  Surgeon: Sonali Lara MD;  Location: [facility] ENDOSCOPY;  Service: Robotics - Pulmonary;  Laterality: N/A;  Post: LUNG CANCER    CARDIAC ABLATION      x2    COLON SURGERY      HERNIA REPAIR      INSERTION CENTRAL VENOUS ACCESS DEVICE W/ SUBCUTANEOUS PORT Right 03/20/2023    VENOUS ACCESS DEVICE (PORT) INSERTION Right 03/20/2023    Procedure: INSERTION VENOUS ACCESS DEVICE;  Surgeon: Javier Gonzalez MD PhD;  Location: [facility] OR;  Service: Thoracic;  Laterality: Right;      General Information       Row Name 10/25/23 1302          Physical Therapy Time and Intention    Document Type therapy note (daily note)  -EB     Mode of Treatment individual therapy;physical therapy  -EB       Row Name 10/25/23 7376          General Information    Patient Profile Reviewed yes  -EB     Existing Precautions/Restrictions fall  -EB       Row Name 10/25/23 1303          Cognition    Orientation Status (Cognition) oriented to;person;place  -EB       Row Name 10/25/23 1309          Safety Issues, Functional Mobility    Safety Issues Affecting Function (Mobility) ability to follow commands;awareness of need for assistance;insight into deficits/self-awareness;judgment;positioning of assistive device;problem-solving;safety precautions follow-through/compliance;sequencing abilities  pt very slow with following commands. Per wife pt having a harder time today than yesterday. RN present.  -EB     Impairments Affecting Function (Mobility) balance;endurance/activity tolerance;strength;cognition  -EB               User Key  (r) = Recorded By, (t) = Taken By, (c) = Cosigned By      Initials  Name Provider Type    Paula José PTA Physical Therapist Assistant                   Mobility       Row Name 10/25/23 1311          Bed Mobility    Supine-Sit [place] (Bed Mobility) moderate assist (50% patient effort);maximum assist (25% patient effort)  -     Sit-Supine [place] (Bed Mobility) not tested  -EB     Assistive Device (Bed Mobility) bed rails;head of bed elevated  -EB     Comment, (Bed Mobility) increased time. Assist with left LE and upper trunk  -EB       Row Name 10/25/23 1311          Bed-Chair Transfer    Bed-Chair [place] (Transfers) moderate assist (50% patient effort);2 person assist;verbal cues;nonverbal cues (demo/gesture)  -EB     Assistive Device (Bed-Chair Transfers) walker, front-wheeled  -EB     Comment, (Bed-Chair Transfer) pt very slow, difficulty following commands. Had difficult time backing up to the chair despite max verbal and tactile cueing.  -EB       Row Name 10/25/23 1311          Sit-Stand Transfer    Sit-Stand [place] (Transfers) moderate assist (50% patient effort);2 person assist  -EB     Assistive Device (Sit-Stand Transfers) walker, front-wheeled  -EB     Comment, (Sit-Stand Transfer) Attempted several times as pt not following commands. Pt able to stand fully upright with 3rd attempt.  -               User Key  (r) = Recorded By, (t) = Taken By, (c) = Cosigned By      Initials Name Provider Type    Paula José PTA Physical Therapist Assistant                   Obj/Interventions    No documentation.                  Goals/Plan    No documentation.                  Clinical Impression       Row Name 10/25/23 1321          Plan of Care Review    Plan of Care Reviewed With patient  -EB     Progress no change  -EB     Outcome Evaluation Pt seen for PT treatment today. Pt was very slow with all mobility today and difficulty following simple commands. Pt required ModA/MaxA with supine to sit. Once sitting pt needed CGA with sitting balance  while taking meds. Pt required ModAX2 with STS transfers, however took several attempts due to pt needing increased time to process. Pt also needed ModAX2 from bed to chair transfers. Pt was very slow but also fatiguing quickly. pt having difficulty with back up to the chair despite verbal and tactile cues. Will continue to progress pt as able.  -EB       Row Name 10/25/23 1321          Therapy Assessment/Plan (PT)    Therapy Frequency (PT) 3 times/wk  -EB       Row Name 10/25/23 1321          Positioning and Restraints    Pre-Treatment Position in bed  -EB     Post Treatment Position chair  -EB     In Chair reclined;call light within reach;encouraged to call for assist;exit alarm on  -EB               User Key  (r) = Recorded By, (t) = Taken By, (c) = Cosigned By      Initials Name Provider Type    Paula José PTA Physical Therapist Assistant                   Outcome Measures       Row Name 10/25/23 1325 10/25/23 0942       How much help from another person do you currently need...    Turning from your back to your side while in flat bed without using bedrails? 2  -EB 3  -AG    Moving from lying on back to sitting on the side of a flat bed without bedrails? 2  -EB 3  -AG    Moving to and from a bed to a chair (including a wheelchair)? 2  -EB 2  -AG    Standing up from a chair using your arms (e.g., wheelchair, bedside chair)? 2  -EB 2  -AG    Climbing 3-5 steps with a railing? 1  -EB 2  -AG    To walk in hospital room? 1  -EB 2  -AG    AM-PAC 6 Clicks Score (PT) 10  -EB 14  -AG    Highest level of mobility 4 --> Transferred to chair/commode  -EB 4 --> Transferred to chair/commode  -AG              User Key  (r) = Recorded By, (t) = Taken By, (c) = Cosigned By      Initials Name Provider Type    Paula José PTA Physical Therapist Assistant    Ana Hoffman RN Registered Nurse                                 Physical Therapy Education       Title: PT OT SLP Therapies (In Progress)       Topic:  Physical Therapy (In Progress)       Point: Mobility training (In Progress)       Learning Progress Summary             Patient Acceptance, E,D, NR by EB at 10/25/2023 1325    Acceptance, E, NR by DB at 10/23/2023 1707    Acceptance, E,TB, NR by GJ at 10/21/2023 1649    Comment: Discussed benefits of participation with therapy.  Answered questions from wife.   Significant Other Acceptance, E,TB, NR by GJ at 10/21/2023 1649    Comment: Discussed benefits of participation with therapy.  Answered questions from wife.                         Point: Home exercise program (In Progress)       Learning Progress Summary             Patient Acceptance, E, NR by DB at 10/23/2023 1707    Acceptance, E,TB, NR by GJ at 10/21/2023 1649    Comment: Discussed benefits of participation with therapy.  Answered questions from wife.   Significant Other Acceptance, E,TB, NR by GJ at 10/21/2023 1649    Comment: Discussed benefits of participation with therapy.  Answered questions from wife.                         Point: Body mechanics (In Progress)       Learning Progress Summary             Patient Acceptance, E,D, NR by EB at 10/25/2023 1325    Acceptance, E, NR by DB at 10/23/2023 1707    Acceptance, E,TB, NR by GJ at 10/21/2023 1649    Comment: Discussed benefits of participation with therapy.  Answered questions from wife.   Significant Other Acceptance, E,TB, NR by GJ at 10/21/2023 1649    Comment: Discussed benefits of participation with therapy.  Answered questions from wife.                         Point: Precautions (In Progress)       Learning Progress Summary             Patient Acceptance, E,D, NR by EB at 10/25/2023 1325    Acceptance, E, NR by DB at 10/23/2023 1707    Acceptance, E,TB, NR by GJ at 10/21/2023 1649    Comment: Discussed benefits of participation with therapy.  Answered questions from wife.   Significant Other Acceptance, E,TB, NR by GJ at 10/21/2023 1649    Comment: Discussed benefits of participation with  therapy.  Answered questions from wife.                                         User Key       Initials Effective Dates Name Provider Type Discipline    GJ 07/11/23 -  Inderjit Hayes, PT Physical Therapist PT    EB 02/14/23 -  Paula Madison PTA Physical Therapist Assistant PT    DB 06/16/21 -  Hanny Sharif PT Physical Therapist PT                  PT Recommendation and Plan     Plan of Care Reviewed With: patient  Progress: no change  Outcome Evaluation: Pt seen for PT treatment today. Pt was very slow with all mobility today and difficulty following simple commands. Pt required ModA/MaxA with supine to sit. Once sitting pt needed CGA with sitting balance while taking meds. Pt required ModAX2 with STS transfers, however took several attempts due to pt needing increased time to process. Pt also needed ModAX2 from bed to chair transfers. Pt was very slow but also fatiguing quickly. pt having difficulty with back up to the chair despite verbal and tactile cues. Will continue to progress pt as able.     Time Calculation:         PT Charges       Row Name 10/25/23 1305             Time Calculation    Start Time 0922  -EB      Stop Time 0955  -EB      Time Calculation (min) 33 min  -EB      PT Received On 10/25/23  -EB      PT - Next Appointment 10/27/23  -         Time Calculation- PT    Total Timed Code Minutes- PT 33 minute(s)  -EB                User Key  (r) = Recorded By, (t) = Taken By, (c) = Cosigned By      Initials Name Provider Type    EB Paula Madison PTA Physical Therapist Assistant                  Therapy Charges for Today       Code Description Service Date Service Provider Modifiers Qty    92430412242 HC PT THERAPEUTIC ACT EA 15 MIN 10/25/2023 Paula Madison, JUANIS GP 1    47437121214 HC PT THER PROC EA 15 MIN 10/25/2023 Paula Madison PTA GP 1    36925754868 HC PT THER SUPP EA 15 MIN 10/25/2023 Paula Madison, JUANIS GP 1            PT G-Codes  Outcome Measure Options: AM-PAC 6 Clicks Basic Mobility  (PT)  AM-PAC 6 Clicks Score (PT): 10  AM-PAC 6 Clicks Score (OT): 6       Paula Madison, JUANIS  10/25/2023

## 2023-10-25 NOTE — PLAN OF CARE
Goal Outcome Evaluation:  Plan of Care Reviewed With: patient        Progress: no change  Outcome Evaluation: Pt seen for PT treatment today. Pt was very slow with all mobility today and difficulty following simple commands. Pt required ModA/MaxA with supine to sit. Once sitting pt needed CGA with sitting balance while taking meds. Pt required ModAX2 with STS transfers, however took several attempts due to pt needing increased time to process. Pt also needed ModAX2 from bed to chair transfers. Pt was very slow but also fatiguing quickly. pt having difficulty with back up to the chair despite verbal and tactile cues. Will continue to progress pt as able.

## 2023-10-25 NOTE — CONSULTS
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       Diagnosis:     Lung cancer metastatic to brain  Create Overview []  Unprioritized   -6 days    Rosalva Chester MD        Details  Code: C34.90, C79.31Noted: 3/16/2023Share w/ Pt: []   Present on Admission?:   Yes      Cancer Staging   Date Classification Stage Status   3/27/2023 Clinical Stage IV (cT1c, cN2, cM1)                 Overall Stage   Stage IV, Extensive stage small cell lung cancer      Pacemaker: yes  Prior History of Radiation: yes, whole brain RT. 30 Gy in 10 fractions delivered by Dr. Angel Chambers at [facility]  Contraindications to Radiation: no  Patient Requires Pregnancy Test: No, patient is male    HPI:    Russell Ramírez  is a 78 y.o. male  with metastatic small cell carcinoma. He has progression of previously appreciated intracranial contrast enhancing lesions. He is now status post whole brain radiation, completed on 8/21/2023. He tolerated radiotherapy well with no apparent acute toxicities other than fatigue. He now presents with progressing brain lesions, progressive disease in the liver and adrenal glands, and significant fatigue and weakness.     His posttreatment MRI on 9/20/2023 showed interval response to therapy with reduction in size of supratentorial and infratentorial lesions.     CT Chest Abdomen and Pelvis from 10/19/2023 showed new or progressive disease in bilateral adrenal glands and hepatic metastases.     10/19/2023 The patient presented to the ED with complaints of weakness, fatigue, and recurrent falls. His weakness had been progressing at home. He was diagnosed with pneumonia 2 weeks prior to admission and had not been eating or drinking well since. He was admitted for further work-up and treatment.     During admission updated MRI was performed.     10/24/2023 MRI Brain showed some lesions have increased in size, most notably his left cerebellar  lesion while others appear stable.     Medical Oncology has been seeing the patient. They feel the patient is likely too weak at the moment for systemic therapy. The patient and family were interested in additional radiation for his progressing lesions in the brain.     Today I met with the patient. He was sitting in a chair, very tired and slow to respond during our meeting. His family says he was more awake this morning but has been resting this afternoon. They are interested in learning more about treatment options.     Imaging:      MRI Brain With & Without Contrast    Result Date: 10/25/2023  Patient: LAURA CROUCH  Time Out: 01:40 Exam(s): MRI HEAD W WO Contrast EXAM:   MR Head Without and With Intravenous Contrast CLINICAL HISTORY:    Reason for exam: headache vertigo cancer. TECHNIQUE:   Magnetic resonance images of the head brain without and with intravenous contrast in multiple planes. COMPARISON:   9 20 2023 FINDINGS:   Previously seen left cerebellar lesion measuring 5 mm now measures 10 mm. Left precentral lesion which previously measured 2 mm now measures 3 mm. Unchanged 3 mm enhancing lesion in the left thalamus. Unchanged 3 mm lesion in the right frontal vertex. Previously seen left inferior frontal lobe lesion which measured 2 mm now measures 4 mm Previously seen left putaminal lesion no longer identified.  Tiny restricted diffusion again seen right frontal lobe. IMPRESSION:       1.  Some lesions have increased in size while some appear stable. 2.  Evolving tiny infarct in the right frontal lobe.     Electronically signed by Celeste Dean MD on 10-25-23 at 0140    XR Abdomen KUB    Result Date: 10/24/2023  KUB  HISTORY: Follow-up constipation  COMPARISON: CT abdomen and pelvis 10/19/2023  FINDINGS: There is no significantly increased stool burden. The bowel gas pattern is nonobstructive. An aortic stent graft is in place. Lung nodules again demonstrated in the right lung base.      No  significantly increased stool burden    This report was finalized on 10/24/2023 1:38 PM by Dr. Morgan Childress M.D on Workstation: [id number]      XR Chest 1 View    Result Date: 10/23/2023  EXAM: XR CHEST 1 VW-  INDICATION: Abdominal pain. Unable to lay supine for KUB.  COMPARISON: CT abdomen pelvis 10/19/2023.       Only the superior most abdomen is within the upright field-of-view. No free intraperitoneal air under the hemidiaphragms.  Right lower lobe pulmonary nodule better seen on recent CT chest. Lingular calcified granuloma. No new focal consolidation. No pleural effusion or pneumothorax. Normal size cardiomediastinal silhouette. Right IJ port with tip at the superior cavoatrial junction. No focal osseous abnormality.    This report was finalized on 10/23/2023 4:16 PM by Dr. Ollie Oro M.D on Workstation: [id number]      CT Chest With Contrast Diagnostic    Result Date: 10/20/2023  CT CHEST, ABDOMEN, AND PELVIS WITH IV CONTRAST  HISTORY: 78-year-old male with metastatic lung cancer. Evaluate metastatic disease.  TECHNIQUE: Radiation dose reduction techniques were utilized, including automated exposure control and exposure modulation based on body size. 3 mm images were obtained through the chest, abdomen, and pelvis after the administration of IV contrast. Compared with previous CTs 06/19/2023.  FINDINGS: CHEST: There is no significant change in the size of the 1.4 cm right lower lobe pulmonary nodule. There has been interval increase in the right hilar lymphadenopathy with 1 of the nodes measuring 2.2 x 1.5 cm, previously approximately 1.4 x 1.1 cm. There has been significant decrease in the left lower paratracheal lymphadenopathy. The cluster of reticulonodular opacities at the anterolateral aspect of the right lower lobe appears grossly unchanged and is likely related to bronchiolitis. There is a 7 mm nodule at the superior aspect of the right middle lobe along the minor fissure aspect of the right upper  lobe which appears marginally larger. There is no interval change in a few sub-6 mm faint nodular opacities at the left lower lobe. There are no pleural or pericardial effusions.  ABDOMEN/PELVIS: There has been development of several liver metastases with the 2 largest measuring 3.6 x 3.2 cm at the lateral hepatic segment and 3.0 x 2.9 cm at the posterior hepatic segment. There has been significant increase in the nodular enlargement of both adrenal glands, suspicious for metastatic disease.  No acute abnormality is seen at the gallbladder, spleen, or kidneys. Cortical thinning and scarring at the lower pole of the left kidney is chronic and unchanged. There is extensive chronic pancreatitis, but no evidence for acute pancreatitis. There is no acute bowel abnormality. Aortic biiliac endovascular stent is noted in place.      1. Interval development of hepatic metastases and likely new or progression of bilateral adrenal metastases. 2. No significant change in the 1.4 cm right lower lobe pulmonary nodule, but there has been increase in the right hilar lymphadenopathy. Close follow-up is recommended for the 7 mm right middle lobe pulmonary nodule which appears marginally larger. There are also stable tiny bilateral pulmonary nodules. 3. The left paratracheal lymphadenopathy has decreased.  This report was finalized on 10/20/2023 8:11 AM by Dr. Gianna Flores M.D on Workstation: [id number]      CT Abdomen Pelvis With Contrast    Result Date: 10/20/2023  CT CHEST, ABDOMEN, AND PELVIS WITH IV CONTRAST  HISTORY: 78-year-old male with metastatic lung cancer. Evaluate metastatic disease.  TECHNIQUE: Radiation dose reduction techniques were utilized, including automated exposure control and exposure modulation based on body size. 3 mm images were obtained through the chest, abdomen, and pelvis after the administration of IV contrast. Compared with previous CTs 06/19/2023.  FINDINGS: CHEST: There is no significant change in  the size of the 1.4 cm right lower lobe pulmonary nodule. There has been interval increase in the right hilar lymphadenopathy with 1 of the nodes measuring 2.2 x 1.5 cm, previously approximately 1.4 x 1.1 cm. There has been significant decrease in the left lower paratracheal lymphadenopathy. The cluster of reticulonodular opacities at the anterolateral aspect of the right lower lobe appears grossly unchanged and is likely related to bronchiolitis. There is a 7 mm nodule at the superior aspect of the right middle lobe along the minor fissure aspect of the right upper lobe which appears marginally larger. There is no interval change in a few sub-6 mm faint nodular opacities at the left lower lobe. There are no pleural or pericardial effusions.  ABDOMEN/PELVIS: There has been development of several liver metastases with the 2 largest measuring 3.6 x 3.2 cm at the lateral hepatic segment and 3.0 x 2.9 cm at the posterior hepatic segment. There has been significant increase in the nodular enlargement of both adrenal glands, suspicious for metastatic disease.  No acute abnormality is seen at the gallbladder, spleen, or kidneys. Cortical thinning and scarring at the lower pole of the left kidney is chronic and unchanged. There is extensive chronic pancreatitis, but no evidence for acute pancreatitis. There is no acute bowel abnormality. Aortic biiliac endovascular stent is noted in place.      1. Interval development of hepatic metastases and likely new or progression of bilateral adrenal metastases. 2. No significant change in the 1.4 cm right lower lobe pulmonary nodule, but there has been increase in the right hilar lymphadenopathy. Close follow-up is recommended for the 7 mm right middle lobe pulmonary nodule which appears marginally larger. There are also stable tiny bilateral pulmonary nodules. 3. The left paratracheal lymphadenopathy has decreased.  This report was finalized on 10/20/2023 8:11 AM by Dr. Gianna Flores,  M.D on Workstation: [id number]      XR Chest 1 View    Result Date: 10/19/2023  XR CHEST 1 VW-  HISTORY: Male who is 78 years-old, infection  TECHNIQUE: Frontal view of the chest  COMPARISON: 3/20/2023  FINDINGS: Right chest port appears stable. Heart, mediastinum and pulmonary vasculature are unremarkable. A nodule is apparent at the level of the right anterior sixth rib, probably corresponding to previous CT demonstrated nodule on the exam from 6/19/2023, less well characterized radiographically. Old granulomatous disease is present. No focal pulmonary consolidation, pleural effusion, or pneumothorax. No acute osseous process.      No focal pulmonary consolidation. Redemonstration of right lower lung nodule.    This report was finalized on 10/19/2023 12:23 PM by Dr. Inderjit Galan M.D on Workstation: [id number]         Pathology:      Bronchoscopy from 3/13/2023  Final Diagnosis   Lung, right lower lobe, endobronchial biopsies:    Small cell carcinoma, see comment        Labs:    Lab Results   Component Value Date    CREATININE 0.71 (L) 10/25/2023                 Problem List:    Physical deconditioning    Lung cancer metastatic to brain    Atrial fibrillation    Benign essential hypertension    Chronic midline low back pain without sciatica    Depressive disorder    History of malignant neoplasm of prostate    Obstructive sleep apnea    CAD (coronary artery disease)    Coronary arteriosclerosis    Vasogenic cerebral edema         Medications:  No current facility-administered medications on file prior to encounter.     Current Outpatient Medications on File Prior to Encounter   Medication Sig Dispense Refill    bethanechol (URECHOLINE) 50 MG tablet Take 1 tablet by mouth Every 12 (Twelve) Hours.      cyanocobalamin (VITAMIN B-12) 1000 MCG tablet Take 1 tablet by mouth Daily.      famotidine (PEPCID) 20 MG tablet Take 1 tablet by mouth twice daily 60 tablet 0    LORazepam (Ativan) 1 MG tablet Take 1 tablet by  mouth as needed 1 hour prior to CT scan. 1 tablet 0    Magnesium 250 MG tablet Take  by mouth.      metoprolol tartrate (LOPRESSOR) 25 MG tablet Take 0.5 tablets by mouth 2 (Two) Times a Day.      ondansetron (ZOFRAN) 8 MG tablet Take 1 tablet by mouth 3 (Three) Times a Day As Needed for Nausea or Vomiting. 30 tablet 5    traMADol (ULTRAM) 50 MG tablet Take 2 tablets by mouth Every 6 (Six) Hours As Needed for Moderate Pain. 340 tablet 0    Vitamin B Complex-C capsule       vitamin D3 125 MCG (5000 UT) capsule capsule Take 1 capsule by mouth Daily.      Zinc 50 MG tablet Take 1 tablet by mouth Daily.      Acetaminophen (ARTHRITIS PAIN RELIEF PO)       ascorbic acid (VITAMIN C) 1000 MG tablet       Ashwagandha 125 MG capsule       aspirin 81 MG EC tablet Daily.      benzonatate (TESSALON) 100 MG capsule Take 1 capsule by mouth 3 (Three) Times a Day As Needed for Cough.      coenzyme Q10 100 MG capsule Take 1 capsule by mouth Daily.      Creatinine powder       dexAMETHasone (DECADRON) 2 MG tablet Take 1 tablet by mouth Daily With Breakfast. Take 1 tablet by mouth daily with breakfast for one week then stop. If develop neurologic symptoms restart 1 tablet daily and notify our office. 30 tablet 0    diphenhydrAMINE (BENADRYL) 25 mg capsule Take 1 capsule by mouth Every 6 (Six) Hours As Needed for Itching.      doxycycline (MONODOX) 100 MG capsule Take 1 capsule by mouth Every 12 (Twelve) Hours.      Elderberry 500 MG capsule Take  by mouth.      fluocinonide (LIDEX) 0.05 % cream APPLY TO THE AFFECTED AREA(S) BY TOPICAL ROUTE 2 TIMES PER DAY      Misc Natural Products (SUPER GREENS PO) Take  by mouth.      Misc Natural Products (YUMVS BEET ROOT-TART CHERRY PO) Take  by mouth.      Turmeric 500 MG capsule Take  by mouth.      Ventolin  (90 Base) MCG/ACT inhaler INHALE 2 PUFFS BY MOUTH EVERY 4 TO 6 HOURS AS NEEDED FOR SHORTNESS OF BREATH FOR WHEEZING            Allergies:  Allergies   Allergen Reactions    Codeine  "Other (See Comments)     headache           Family History:  Family History   Problem Relation Age of Onset    Brain cancer Mother     Lung cancer Sister            Social History:  Social History     Socioeconomic History    Marital status:      Spouse name: Marivel   Tobacco Use    Smoking status: Former     Packs/day: .5     Types: Cigarettes     Start date: 1976     Quit date: 2020     Years since quitting: 3.8    Smokeless tobacco: Never   Vaping Use    Vaping Use: Never used   Substance and Sexual Activity    Alcohol use: Never    Drug use: Never    Sexual activity: Defer         Distance From Clinic: 1-2 hours    Patient has someone who can assist with transportation: yes      Review of Systems:    Review of Systems   Constitutional:  Positive for activity change and fatigue.   Gastrointestinal:         Loss of appetite   Neurological:  Positive for weakness.   Psychiatric/Behavioral:  Positive for decreased concentration.          Vital Signs:  /99 (BP Location: Left arm, Patient Position: Sitting)   Pulse 76   Temp 97.9 °F (36.6 °C) (Oral)   Resp 20   Ht 180.3 cm (71\")   Wt 92.3 kg (203 lb 7.8 oz)   SpO2 96%   BMI 28.38 kg/m²   Estimated body mass index is 28.38 kg/m² as calculated from the following:    Height as of this encounter: 180.3 cm (71\").    Weight as of this encounter: 92.3 kg (203 lb 7.8 oz).  There were no vitals filed for this visit.      ECOG: Capable of only limited selfcare; confined to bed or chair more than 50% of waking hours = 3    Physical Exam  Constitutional:       General: He is not in acute distress.  HENT:      Head: Normocephalic and atraumatic.   Pulmonary:      Effort: Pulmonary effort is normal. No respiratory distress.   Neurological:      Comments: Patient lying in recliner chair. Slow to respond and not fully answering most questions.             Result Review :           Diagnoses and all orders for this visit:    1. Physical deconditioning " (Primary)    Other orders  -     Comprehensive Metabolic Panel; Standing  -     Urinalysis With Microscopic If Indicated (No Culture) - Urine, Clean Catch; Standing  -     TSH; Standing  -     ECG 12 Lead Other; weakness; Standing  -     Single High Sensitivity Troponin T; Standing  -     XR Chest 1 View; Standing  -     CBC & Differential; Standing  -     Comprehensive Metabolic Panel  -     Urinalysis With Microscopic If Indicated (No Culture) - Urine, Clean Catch  -     TSH  -     ECG 12 Lead Other; weakness  -     Single High Sensitivity Troponin T  -     XR Chest 1 View  -     CBC & Differential  -     LHA (on-call MD unless specified) Details; Standing  -     LHA (on-call MD unless specified) Details  -     sodium chloride 0.9 % bolus 1,000 mL  -     Initiate Observation Status; Standing  -     Initiate Observation Status  -     sodium chloride 0.9 % infusion  -     Inpatient Nutrition Consult; Standing  -     Diet: Regular/House Diet; Texture: Regular Texture (IDDSI 7); Fluid Consistency: Thin (IDDSI 0); Standing  -     Inpatient Nutrition Consult  -     Diet: Regular/House Diet; Texture: Regular Texture (IDDSI 7); Fluid Consistency: Thin (IDDSI 0)  -     albuterol (PROVENTIL) nebulizer solution 0.083% 2.5 mg/3mL  -     traMADol (ULTRAM) tablet 100 mg  -     Discontinue: metoprolol tartrate (LOPRESSOR) tablet 25 mg  -     famotidine (PEPCID) tablet 20 mg  -     vitamin B-12 (CYANOCOBALAMIN) tablet 1,000 mcg  -     aspirin EC tablet 81 mg  -     Vital Signs; Standing  -     Intake & Output; Standing  -     Weigh Patient; Standing  -     Oral Care; Standing  -     Insert Peripheral IV; Standing  -     Saline Lock & Maintain IV Access; Standing  -     sodium chloride 0.9 % flush 10 mL  -     sodium chloride 0.9 % flush 10 mL  -     sodium chloride 0.9 % infusion 40 mL  -     Discontinue: sennosides-docusate (PERICOLACE) 8.6-50 MG per tablet 2 tablet  -     Discontinue: polyethylene glycol (MIRALAX) packet 17  g  -     Discontinue: bisacodyl (DULCOLAX) EC tablet 5 mg  -     Discontinue: bisacodyl (DULCOLAX) suppository 10 mg  -     Code Status and Medical Interventions:; Standing  -     Place Sequential Compression Device; Standing  -     Maintain Sequential Compression Device; Standing  -     Cancel: Pulse Oximetry, Continuous; Standing  -     Comprehensive Metabolic Panel; Standing  -     CBC Auto Differential; Standing  -     acetaminophen (TYLENOL) tablet 650 mg  -     acetaminophen (TYLENOL) 160 MG/5ML oral solution 650 mg  -     acetaminophen (TYLENOL) suppository 650 mg  -     Discontinue: ondansetron (ZOFRAN) injection 4 mg  -     Vital Signs  -     Vital Signs  -     Intake & Output  -     Weigh Patient  -     Oral Care  -     Insert Peripheral IV  -     Saline Lock & Maintain IV Access  -     Code Status and Medical Interventions:  -     Place Sequential Compression Device  -     Maintain Sequential Compression Device  -     Cancel: Pulse Oximetry, Continuous  -     PT Consult: Eval & Treat Functional Mobility Below Baseline; Standing  -     OT Consult: Eval & Treat; Standing  -     PT Consult: Eval & Treat Functional Mobility Below Baseline  -     OT Consult: Eval & Treat  -     Comprehensive Metabolic Panel  -     CBC Auto Differential  -     Cancel: Inpatient Oncology Clinical Specialist Consult; Standing  -     Cancel: Inpatient Oncology Clinical Specialist Consult  -     Vital Signs  -     Vital Signs  -     Vital Signs  -     Vital Signs  -     Vital Signs  -     Vital Signs  -     Intake & Output  -     Intake & Output  -     Oral Care  -     Oral Care  -     Inpatient Oncology Clinical Specialist Consult; Standing  -     Inpatient Oncology Clinical Specialist Consult  -     Hematology & Oncology Inpatient Consult; Standing  -     Hematology & Oncology Inpatient Consult  -     Cancel: Dietary Nutrition Supplements Boost Plus (Ensure Enlive, Ensure Plus); Standing  -     Cancel: Dietary Nutrition  Supplements Boost Plus (Ensure Enlive, Ensure Plus)  -     Inpatient Case Management  Consult; Standing  -     Inpatient Case Management  Consult  -     Transfer Patient; Standing  -     Transfer Patient  -     Respiratory Panel PCR w/COVID-19(SARS-CoV-2) [facility]GI/YESENIA/PAD/[facility]AD/MANFRED In-House, NP Swab in UTM/VTM, 3-4 HR TAT - Swab, Nasopharynx; Standing  -     MRI Brain With & Without Contrast; Standing  -     Measure Post Void Residual; Standing  -     Respiratory Panel PCR w/COVID-19(SARS-CoV-2) [facility]/GE[facility]GI/YESENIA/PAD/COR/MAD/MANFRED In-House, NP Swab in UTM/VTM, 3-4 HR TAT - Swab, Nasopharynx  -     MRI Brain With & Without Contrast  -     Ammonia; Standing  -     CBC & Differential; Standing  -     Comprehensive Metabolic Panel; Standing  -     Magnesium; Standing  -     Phosphorus; Standing  -     PSA DIAGNOSTIC; Standing  -     Inpatient Admission; Standing  -     Inpatient Admission  -     ACTH; Standing  -     Cortisol; Standing  -     Cortisol  -     LORazepam (ATIVAN) injection 0.5 mg  -     Discontinue Patient Isolation; Standing  -     Discontinue Patient Isolation  -     ondansetron (ZOFRAN) injection 4 mg  -     Ammonia  -     CBC & Differential  -     Comprehensive Metabolic Panel  -     Magnesium  -     Phosphorus  -     PSA DIAGNOSTIC  -     ACTH  -     Vital Signs  -     Vital Signs  -     Vital Signs  -     Vital Signs  -     Vital Signs  -     Vital Signs  -     Intake & Output  -     Intake & Output  -     Oral Care  -     Oral Care  -     Cancel: Dietary Nutrition Supplements Boost Plus (Ensure Enlive, Ensure Plus)  -     Cancel: Dietary Nutrition Supplements Boost Plus (Ensure Enlive, Ensure Plus)  -     prochlorperazine (COMPAZINE) injection 5 mg  -     bethanechol (URECHOLINE) tablet 50 mg  -     Connectors / Hubs Must Be Scrubbed 15 Seconds Using 70% Alcohol Before Access - Allow to Dry Before Accessing Line; Standing  -     Change Dressing to IV Site As Needed When Damp,  Loose or Soiled; Standing  -     Change Needleless Connectors; Standing  -     sodium chloride 0.9 % flush 20 mL  -     Change Hicks Needle & Transparent Dressing Every 7 Days; Standing  -     Change Hicks Needle As Needed; Standing  -     Daily CHG Bath While Central Line in Place; Standing  -     Connectors / Hubs Must Be Scrubbed 15 Seconds Using 70% Alcohol Before Access - Allow to Dry Before Accessing Line  -     CBC & Differential; Standing  -     Cancel: Renal Function Panel; Standing  -     tamsulosin (FLOMAX) 24 hr capsule 0.4 mg  -     fluticasone (FLONASE) 50 MCG/ACT nasal spray 2 spray  -     Cancel: Basic Metabolic Panel; Standing  -     Insert Indwelling Urinary Catheter; Standing  -     Assess Need for Indwelling Urinary Catheter - Follow Removal Protocol; Standing  -     Urinary Catheter Care; Standing  -     Insert Indwelling Urinary Catheter  -     Assess Need for Indwelling Urinary Catheter - Follow Removal Protocol  -     Cancel: Urinary Catheter Care  -     Cancel: CBC & Differential; Standing  -     Comprehensive Metabolic Panel; Standing  -     dronabinol (MARINOL) capsule 2.5 mg  -     Please notify patient's hospitalist physician if okay to consider River catheter as he had urinary retention of 700 cc.  I have placed the order but make sure hospitalist knows that  Nursing Communication; Standing  -     Please notify patient's hospitalist physician if okay to consider River catheter as he had urinary retention of 700 cc.  I have placed the order but make sure hospitalist knows that  Nursing Communication  -     CBC & Differential  -     Cancel: Basic Metabolic Panel  -     Cancel: CBC & Differential  -     Comprehensive Metabolic Panel  -     Vital Signs  -     Vital Signs  -     Vital Signs  -     Vital Signs  -     Vital Signs  -     Vital Signs  -     Intake & Output  -     Intake & Output  -     Oral Care  -     Oral Care  -     Cancel: Dietary Nutrition Supplements Boost Plus (Ensure  Enlive, Ensure Plus)  -     Cancel: Dietary Nutrition Supplements Boost Plus (Ensure Enlive, Ensure Plus)  -     Daily CHG Bath While Central Line in Place  -     Cancel: Urinary Catheter Care  -     Cancel: Urinary Catheter Care  -     CBC & Differential; Standing  -     Comprehensive Metabolic Panel; Standing  -     Inpatient  Anesthesiology Physician Consult; Standing  -     Inpatient  Anesthesiology Physician Consult  -     XR Abdomen KUB; Standing  -     XR Abdomen KUB  -     metoprolol tartrate (LOPRESSOR) tablet 12.5 mg  -     Cancel: CBC & Differential; Standing  -     Cancel: Comprehensive Metabolic Panel; Standing  -     Inpatient Palliative Care Team Consult; Standing  -     Inpatient Palliative Care Team Consult  -     Inpatient Gastroenterology Consult; Standing  -     Inpatient Gastroenterology Consult  -     Cancel: Amylase; Standing  -     Cancel: Amylase  -     Cancel: Lipase; Standing  -     Cancel: Lipase  -     Amylase; Standing  -     Lipase; Standing  -     Amylase  -     Lipase  -     CBC & Differential  -     Comprehensive Metabolic Panel  -     Cancel: CBC & Differential  -     Cancel: Comprehensive Metabolic Panel  -     Vital Signs  -     Vital Signs  -     Vital Signs  -     Vital Signs  -     Vital Signs  -     Vital Signs  -     Intake & Output  -     Intake & Output  -     Oral Care  -     Oral Care  -     Cancel: Dietary Nutrition Supplements Boost Plus (Ensure Enlive, Ensure Plus)  -     Cancel: Dietary Nutrition Supplements Boost Plus (Ensure Enlive, Ensure Plus)  -     Daily CHG Bath While Central Line in Place  -     Cancel: Urinary Catheter Care  -     Cancel: Urinary Catheter Care  -     Magnesium; Standing  -     Magnesium  -     Potassium Replacement - Follow Nurse / BPA Driven Protocol  -     Magnesium Standard Dose Replacement - Follow Nurse / BPA Driven Protocol  -     Phosphorus Replacement - Follow Nurse / BPA Driven Protocol  -     Calcium Replacement - Follow Nurse /  BPA Driven Protocol  -     bisacodyl (DULCOLAX) suppository 10 mg  -     potassium chloride 10 mEq in 100 mL IVPB  -     Potassium; Standing  -     Fleet Enema; Standing  -     CBC & Differential; Standing  -     Basic Metabolic Panel; Standing  -     Magnesium; Standing  -     Disimpaction; Standing  -     XR Chest 1 View; Standing  -     XR Chest 1 View  -     Potassium  -     predniSONE (DELTASONE) tablet 20 mg  -     CBC & Differential  -     Basic Metabolic Panel  -     Magnesium  -     Vital Signs  -     Vital Signs  -     Vital Signs  -     Vital Signs  -     Vital Signs  -     Vital Signs  -     Intake & Output  -     Intake & Output  -     Oral Care  -     Oral Care  -     Cancel: Dietary Nutrition Supplements Boost Plus (Ensure Enlive, Ensure Plus)  -     Cancel: Dietary Nutrition Supplements Boost Plus (Ensure Enlive, Ensure Plus)  -     Daily CHG Bath While Central Line in Place  -     Cancel: Urinary Catheter Care  -     Cancel: Urinary Catheter Care  -     polyethylene glycol (MIRALAX) packet 17 g  -     sennosides-docusate (PERICOLACE) 8.6-50 MG per tablet 2 tablet  -     bisacodyl (DULCOLAX) EC tablet 5 mg  -     bisacodyl (DULCOLAX) suppository 10 mg  -     Dietary Nutrition Supplements Boost Plus (Ensure Enlive, Ensure Plus); chocolate; Standing  -     Dietary Nutrition Supplements Boost Plus (Ensure Enlive, Ensure Plus); chocolate  -     Dietary Nutrition Supplements Boost Plus (Ensure Enlive, Ensure Plus); chocolate  -     CBC & Differential; Standing  -     Basic Metabolic Panel; Standing  -     Magnesium; Standing  -     Oxygen Therapy- Nasal Cannula; Titrate 1-6 LPM Per SpO2; 90 - 95%; Standing  -     Pulse Oximetry, Continuous; Standing  -     Pulse Oximetry, Continuous  -     POC Glucose Once; Standing  -     Vital signs every 5 minutes for 15 minutes, every 15 minutes thereafter.; Standing  -     Call Anesthesiologist for additional IV Fluid bolus for Hypotension/Tachycardia; Standing  -      Notify Anesthesia of Any Acute Changes in Patient Condition; Standing  -     Notify Anesthesia for Unrelieved Pain; Standing  -     fentaNYL citrate (PF) (SUBLIMAZE) injection 50 mcg  -     naloxone (NARCAN) injection 0.2 mg  -     flumazenil (ROMAZICON) injection 0.2 mg  -     ondansetron (ZOFRAN) injection 4 mg  -     droperidol (INAPSINE) injection 0.625 mg  -     droperidol (INAPSINE) injection 0.625 mg  -     promethazine (PHENERGAN) suppository 25 mg  -     promethazine (PHENERGAN) tablet 25 mg  -     labetalol (NORMODYNE,TRANDATE) injection 5 mg  -     hydrALAZINE (APRESOLINE) injection 5 mg  -     ePHEDrine injection 5 mg  -     diphenhydrAMINE (BENADRYL) injection 12.5 mg  -     ipratropium-albuterol (DUO-NEB) nebulizer solution 3 mL  -     Once DC criteria to floor met, follow surgeon's orders.; Standing  -     Discharge patient from PACU when discharge criteria is met.; Standing  -     POC Glucose Once  -     Vital signs every 5 minutes for 15 minutes, every 15 minutes thereafter.  -     Call Anesthesiologist for additional IV Fluid bolus for Hypotension/Tachycardia  -     Notify Anesthesia of Any Acute Changes in Patient Condition  -     Notify Anesthesia for Unrelieved Pain  -     Once DC criteria to floor met, follow surgeon's orders.  -     Discharge patient from PACU when discharge criteria is met.  -     gadobenate dimeglumine (MULTIHANCE) injection 20 mL  -     CBC & Differential  -     Basic Metabolic Panel  -     Magnesium  -     Vital Signs  -     Vital Signs  -     Vital Signs  -     Vital Signs  -     Vital Signs  -     Vital Signs  -     Intake & Output  -     Intake & Output  -     Oral Care  -     Oral Care  -     Daily CHG Bath While Central Line in Place  -     Cancel: Urinary Catheter Care  -     Urinary Catheter Care  -     Dietary Nutrition Supplements Boost Plus (Ensure Enlive, Ensure Plus); chocolate  -     Dietary Nutrition Supplements Boost Plus (Ensure Enlive, Ensure Plus);  chocolate  -     Dietary Nutrition Supplements Boost Plus (Ensure Enlive, Ensure Plus); chocolate  -     Inpatient Radiation Oncology Consult; Standing  -     Inpatient Radiation Oncology Consult        Assessment:    Russell Ramírez . is a 78 y.o. male  with metastatic small cell carcinoma. He has progression of previously appreciated intracranial contrast enhancing lesions. He is now status post whole brain radiation, completed on 8/21/2023. He tolerated radiotherapy well with no apparent acute toxicities other than fatigue. He now presents with progressing brain lesions, progressive disease in the liver and adrenal glands, and significant fatigue and weakness. We were consulted to discuss radiation therapy options in his care.     Plan:    Recommendations:  - Outpatient follow-up to consider SRS if patient's disposition improves. Patient will follow-up with Dr. Chambers at [facility] after discharge.     We discussed the patient's prior treatment, recent imaging, and current situation. My view is he is currently too weak to consider any radiation treatment. We discussed radiation therapy options can be revisited should his situation improve. The patient is willing to meet with Dr. Chambers again to revisit. We had a thorough discussion of his recent imaging, potential options with radiation therapy, and the potential acute and late side effects of additional treatment. The patient and his family were able to ask questions and have them answered to their apparent satisfaction. They have my number and can follow-up as needed as an outpatient.        I spent 60 minutes caring for Russell on this date of service. This time includes time spent by me in the following activities:preparing for the visit, reviewing tests, obtaining and/or reviewing a separately obtained history, performing a medically appropriate examination and/or evaluation , counseling and educating the patient/family/caregiver, referring and  communicating with other health care professionals , documenting information in the medical record, and independently interpreting results and communicating that information with the patient/family/caregiver  Follow Up   No follow-ups on file.  Patient was given instructions and counseling regarding his condition or for health maintenance advice. Please see specific information pulled into the AVS if appropriate.     oJhnny Tejeda MD

## 2023-10-26 PROCEDURE — 99232 SBSQ HOSP IP/OBS MODERATE 35: CPT | Performed by: INTERNAL MEDICINE

## 2023-10-26 PROCEDURE — 63710000001 DRONABINOL PER 2.5 MG: Performed by: INTERNAL MEDICINE

## 2023-10-26 PROCEDURE — 25010000002 SODIUM CHLORIDE 0.9 % WITH KCL 20 MEQ 20-0.9 MEQ/L-% SOLUTION: Performed by: INTERNAL MEDICINE

## 2023-10-26 PROCEDURE — 63710000001 PREDNISONE PER 1 MG: Performed by: INTERNAL MEDICINE

## 2023-10-26 RX ORDER — SODIUM CHLORIDE AND POTASSIUM CHLORIDE 150; 900 MG/100ML; MG/100ML
50 INJECTION, SOLUTION INTRAVENOUS CONTINUOUS
Status: DISCONTINUED | OUTPATIENT
Start: 2023-10-26 | End: 2023-10-27

## 2023-10-26 RX ADMIN — Medication 10 ML: at 21:35

## 2023-10-26 RX ADMIN — DOCUSATE SODIUM 50MG AND SENNOSIDES 8.6MG 2 TABLET: 8.6; 5 TABLET, FILM COATED ORAL at 09:41

## 2023-10-26 RX ADMIN — BETHANECHOL CHLORIDE 50 MG: 25 TABLET ORAL at 09:41

## 2023-10-26 RX ADMIN — ATORVASTATIN CALCIUM 40 MG: 20 TABLET, FILM COATED ORAL at 21:34

## 2023-10-26 RX ADMIN — DRONABINOL 2.5 MG: 2.5 CAPSULE ORAL at 21:34

## 2023-10-26 RX ADMIN — BISACODYL 10 MG: 10 SUPPOSITORY RECTAL at 09:43

## 2023-10-26 RX ADMIN — FAMOTIDINE 20 MG: 20 TABLET ORAL at 21:35

## 2023-10-26 RX ADMIN — Medication 1000 MCG: at 09:41

## 2023-10-26 RX ADMIN — POTASSIUM CHLORIDE AND SODIUM CHLORIDE 50 ML/HR: 900; 150 INJECTION, SOLUTION INTRAVENOUS at 16:28

## 2023-10-26 RX ADMIN — FAMOTIDINE 20 MG: 20 TABLET ORAL at 09:41

## 2023-10-26 RX ADMIN — ASPIRIN 81 MG: 81 TABLET, COATED ORAL at 21:34

## 2023-10-26 RX ADMIN — METOPROLOL TARTRATE 12.5 MG: 25 TABLET, FILM COATED ORAL at 09:48

## 2023-10-26 RX ADMIN — TAMSULOSIN HYDROCHLORIDE 0.4 MG: 0.4 CAPSULE ORAL at 09:48

## 2023-10-26 RX ADMIN — DRONABINOL 2.5 MG: 2.5 CAPSULE ORAL at 09:48

## 2023-10-26 RX ADMIN — DOCUSATE SODIUM 50MG AND SENNOSIDES 8.6MG 2 TABLET: 8.6; 5 TABLET, FILM COATED ORAL at 21:34

## 2023-10-26 RX ADMIN — FLUTICASONE PROPIONATE 2 SPRAY: 50 SPRAY, METERED NASAL at 09:50

## 2023-10-26 RX ADMIN — PREDNISONE 20 MG: 20 TABLET ORAL at 09:41

## 2023-10-26 RX ADMIN — BETHANECHOL CHLORIDE 50 MG: 25 TABLET ORAL at 21:30

## 2023-10-26 RX ADMIN — Medication 10 ML: at 09:49

## 2023-10-26 RX ADMIN — POLYETHYLENE GLYCOL 3350 17 G: 17 POWDER, FOR SOLUTION ORAL at 09:41

## 2023-10-26 RX ADMIN — METOPROLOL TARTRATE 12.5 MG: 25 TABLET, FILM COATED ORAL at 21:30

## 2023-10-26 NOTE — CASE MANAGEMENT/SOCIAL WORK
Continued Stay Note  [facility]     Patient Name: Russell Ramírez Sr.  MRN: [id number]  Today's Date: 10/26/2023    Admit Date: 10/19/2023    Plan: DME with [employer]   Discharge Plan       Row Name 10/26/23 1054       Plan    Provided Post Acute Provider List? Yes    Post Acute Provider List Inpatient Rehab    Provided Post Acute Provider Quality & Resource List? Yes    Post Acute Provider Quality and Resource List Inpatient Rehab    Delivered To Support Person    Support Person Marivel    Method of Delivery In person    Plan Comments Gave patient and family list for SNF in [facility].      Row Name 10/26/23 0941       Plan    Plan DME with [employer]    Plan Comments Spoke with patient and family at bedside. Explained that [employer] is available to service their area and that they are checking on pricing for motorized scooter. Family voiced understanding and are agreeable. Spoke with Richard, will reach out to family.                   Discharge Codes    No documentation.                 Expected Discharge Date and Time       Expected Discharge Date Expected Discharge Time    Oct 28, 2023               Caryn Romo RN

## 2023-10-26 NOTE — PROGRESS NOTES
Subjective     CHIEF COMPLAINT:     SCLC    INTERVAL HISTORY:     Patient was seen with family at bedside.  He appears profoundly weak.  The wife reports that he is now eating more than before.  He had a large bowel movement and he is reporting less abdominal discomfort.    REVIEW OF SYSTEMS:  A comprehensive review of systems was obtained with pertinent positive findings as noted in the interval history above.  All other systems negative.    SCHEDULED MEDS:  aspirin, 81 mg, Oral, Q24H  atorvastatin, 40 mg, Oral, Nightly  bethanechol, 50 mg, Oral, Q12H  bisacodyl, 10 mg, Rectal, Daily  dronabinol, 2.5 mg, Oral, BID  famotidine, 20 mg, Oral, BID  fluticasone, 2 spray, Each Nare, Daily  LORazepam, 0.5 mg, Intravenous, Once in imaging  metoprolol tartrate, 12.5 mg, Oral, Q12H  senna-docusate sodium, 2 tablet, Oral, BID   And  polyethylene glycol, 17 g, Oral, Daily  predniSONE, 20 mg, Oral, Daily With Breakfast  sodium chloride, 10 mL, Intravenous, Q12H  tamsulosin, 0.4 mg, Oral, Daily  cyanocobalamin, 1,000 mcg, Oral, Daily      Objective   VITAL SIGNS:  Temp:  [97 °F (36.1 °C)-98.4 °F (36.9 °C)] 97.6 °F (36.4 °C)  Heart Rate:  [76-97] 85  Resp:  [18-20] 18  BP: ()/(57-99) 136/84     PHYSICAL EXAMINATION:   GENERAL: Profoundly weak.  SKIN: No skin rash.   EYES:  No Jaundice. Pallor.   CHEST: Normal respiratory effort.   CARDIAC:  Normal S1 & S2. No murmur.   ABDOMEN: Mildly distended.  EXTREMITIES:  No edema.    RESULT REVIEW:   Results from last 7 days   Lab Units 10/25/23  0536 10/24/23  0515 10/23/23  0538 10/22/23  0658 10/21/23  0350   WBC 10*3/mm3 7.53 6.94 7.12 6.10 6.36   NEUTROS ABS 10*3/mm3 5.85 4.99 5.92 4.36 4.92   LYMPHS ABS 10*3/mm3  --   --  0.63*  --   --    HEMOGLOBIN g/dL 11.2* 11.1* 10.4* 10.3* 10.7*   HEMATOCRIT % 31.4* 31.3* 29.9* 29.5* 30.7*   PLATELETS 10*3/mm3 147 156 143 130* 153     Results from last 7 days   Lab Units 10/25/23  0536 10/24/23  0515 10/24/23  0135 10/23/23  0538  10/22/23  0658 10/21/23  0350 10/20/23  0644 10/19/23  1137   SODIUM mmol/L 138 139  --  139 136 136 137 134*   POTASSIUM mmol/L 3.6 3.5 3.6 3.3* 3.7 3.9 4.2 4.1   CHLORIDE mmol/L 103 103  --  103 104 103 103 102   CO2 mmol/L 25.8 27.8  --  27.1 26.0 23.8 27.0 24.8   BUN mg/dL 9 8  --  7* 9 12 16 20   CREATININE mg/dL 0.71* 0.72*  --  0.72* 0.77 0.82 0.84 1.00   CALCIUM mg/dL 8.8 8.7  --  8.7 8.2* 8.5* 8.7 8.7   ALBUMIN g/dL  --   --   --  3.3* 3.2* 3.3* 3.4* 3.3*   BILIRUBIN mg/dL  --   --   --  0.4 0.4 0.5 0.6 0.4   ALK PHOS U/L  --   --   --  72 66 72 74 71   ALT (SGPT) U/L  --   --   --  24 20 18 20 19   AST (SGOT) U/L  --   --   --  22 19 19 19 18   MAGNESIUM mg/dL 2.1 2.1  --  1.9  --  2.0  --   --      MRI brain on 10/24/2023:  Previously seen left cerebellar lesion measuring 5 mm now measures 10 mm.  Left precentral lesion which previously measured 2 mm now measures 3 mm.  Unchanged 3 mm enhancing lesion in the left thalamus.  Unchanged 3 mm lesion in the right frontal vertex.  Previously seen left inferior frontal lobe lesion which measured 2 mm now   measures 4 mm  Previously seen left putaminal lesion no longer identified.    Tiny restricted diffusion again seen right frontal lobe.     IMPRESSION:           1.  Some lesions have increased in size while some appear stable.  2.  Evolving tiny infarct in the right frontal lobe.    Assessment & Plan   *Extensive stage small cell lung cancer.  Patient follows with Dr. Conn at our office.  He was treated with 4 cycles of carboplatin etoposide and Tecentriq, completed on 6/2/2023.  6/19/2023: Increased mediastinal lymph nodes, stable metastatic liver and right adrenal lesions.  He was continued on maintenance Tecentriq every 3 weeks.  Last dose was on 10/5/2023.  10/19/2023: CT chest abdomen pelvis revealed stable 1.4 cm right lower pulmonary nodule.  There was increased right hilar lymph nodes.  There were new liver metastasis up to 3.6 and 3.0 cm.  There was  significant increase in nodular enhancement of both adrenal glands, concerning for metastasis.  Based on the timing since completion of treatment being <6 months, this was considered primary refractory disease.  I had a lengthy discussion with the patient's family and I reviewed the CT scan with them.  They asked about surgery as an option for treatment for the liver and adrenal masses. I explained that surgery is not considered an option due to the number of lesions and due to the lesions being metastatic rather than primary.  I explained that chemotherapy treatment is the main method of treatment.  I expressed my concern that he is currently weak and would now be able to tolerate it.    *Brain metastasis.  S/p whole brain radiation therapy.  MRI brain requested.  Patient was previously unable to have MRI done under anesthesia.    10/25/2023: Patient had MRI of the brain under anesthesia.  MRI revealed increase in the size of the left cerebellar lesion from 5 mm to 10 mm.    The left precentral lesion increased from 2 to 3 mm.    A lesion in the left inferior frontal lobe increased from 2 mm to 4 mm.    Other lesions were stable.  A lesion in the left putamen was no longer identified.    The lesion in the left cerebellum is likely contributing to dizziness.  I explained the difficult situation with the worsening despite undergoing whole brain radiation.  Patient was seen by radiation oncology.  He was considered too weak to undergo radiation at this point.  The recommendation was to have follow-up with his radiation oncologist at [facility], Dr. Chambers, to revisit this.    *Poor appetite and weight loss.  Protein malnutrition - total protein 5.9.  Cortisol ACTH levels were obtained to evaluate for adrenal insufficiency as the cause.  A.M. cortisol 27.  Very poor appetite is likely secondary to the disease progression.  Prednisone 20 mg daily started on 10/23/2023.    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  10/23/2023: Hemoglobin 10.4.  10/24/2023: Hemoglobin 11.1.  10/25/2023: Hemoglobin 11.2.    *Deconditioning  Recommended PT and OT if he is able to participate.    *Constipation.   Patient was having abdominal pain.  He had bowel movements after he had an enema on 10/23/2023.    Abdominal pain improved after he had the bowel movements.  KUB showed no significant stool burden.    PLAN:    1.  Family does not want to pursue palliative care at this point and want to see if he would be able to receive radiation therapy.  Patient will need to see Dr. Chambers as an outpatient for reevaluation regarding additional radiation therapy for the brain metastasis.   2.  Continue prednisone 20 mg daily.   3.  Continue to work with PT OT.     Discussed with family at bedside.  Discussed with Dr. Newman and the patient's RN.        Sarah Beth Kunz MD  10/26/23

## 2023-10-26 NOTE — PLAN OF CARE
Problem: Fall Injury Risk  Goal: Absence of Fall and Fall-Related Injury  10/26/2023 0616 by Kim Sierra RN  Outcome: Ongoing, Progressing  10/26/2023 0615 by Kim Sierra RN  Outcome: Ongoing, Progressing  Intervention: Promote Injury-Free Environment  Recent Flowsheet Documentation  Taken 10/26/2023 0400 by Kim Sierra RN  Safety Promotion/Fall Prevention:   safety round/check completed   lighting adjusted   clutter free environment maintained   assistive device/personal items within reach  Taken 10/26/2023 0200 by Kim Sierra RN  Safety Promotion/Fall Prevention:   safety round/check completed   lighting adjusted   clutter free environment maintained   assistive device/personal items within reach  Taken 10/26/2023 0000 by Kim Sierra RN  Safety Promotion/Fall Prevention:   safety round/check completed   lighting adjusted   clutter free environment maintained   activity supervised   assistive device/personal items within reach  Taken 10/25/2023 2200 by Kim Sierra RN  Safety Promotion/Fall Prevention: activity supervised  Taken 10/25/2023 2000 by Kim Sierra RN  Safety Promotion/Fall Prevention: activity supervised     Problem: Skin Injury Risk Increased  Goal: Skin Health and Integrity  10/26/2023 0616 by Kim Sierra RN  Outcome: Ongoing, Progressing  10/26/2023 0615 by Kim Sierra RN  Outcome: Ongoing, Progressing     Problem: Pain Acute  Goal: Acceptable Pain Control and Functional Ability  Outcome: Ongoing, Progressing  Intervention: Optimize Psychosocial Wellbeing  Recent Flowsheet Documentation  Taken 10/25/2023 2200 by Kim Sierra RN  Diversional Activities: television     Problem: Coping Ineffective (Oncology Care)  Goal: Effective Coping  Outcome: Ongoing, Progressing  Intervention: Support and Enhance Coping Strategies  Recent Flowsheet Documentation  Taken 10/25/2023 2200 by Kim Sierra RN  Family/Support System Care:   support  Patient has given consent to record this visit for documentation in their clinical record.     provided   self-care encouraged     Problem: Fatigue (Oncology Care)  Goal: Improved Activity Tolerance  Outcome: Ongoing, Progressing     Problem: Oral Intake Altered (Oncology Care)  Goal: Optimal Oral Intake  Outcome: Ongoing, Progressing     Problem: Oral Mucositis (Oncology Care)  Goal: Improved Oral Mucous Membrane Integrity  Outcome: Ongoing, Progressing     Problem: Pain Acute (Oncology Care)  Goal: Optimal Pain Control  Outcome: Ongoing, Progressing  Intervention: Optimize Psychosocial Wellbeing  Recent Flowsheet Documentation  Taken 10/25/2023 2200 by Kim Sierra RN  Diversional Activities: television     Problem: Obstructive Sleep Apnea Risk or Actual Comorbidity Management  Goal: Unobstructed Breathing During Sleep  Outcome: Ongoing, Progressing   Goal Outcome Evaluation:     Ongoing progress  He is disoriented to situation, pleasant, no complaints, Chest wall Port is CDI, good blood return, due to be changed November 1.  VSS, room air, up with assist x 2. family at bedside, safety precautions in place, plan of care is ongoing

## 2023-10-26 NOTE — PROGRESS NOTES
[facility] Medicine Services  PROGRESS NOTE    Patient Name: Russell Ramírez Sr.  : 1945  MRN: [id number]    Date of Admission: 10/19/2023  Primary Care Physician: Morgan Sebastian DO    Subjective   Subjective     CC:  Follow-up recent fatigue    Subjective:  Patient reports he still does not want to eat much.  He is lethargic when I evaluated him.  His family was not currently at the bedside during my evaluation.  Still some intermittent abdominal pain.  Generally just feeling poor.  Now on 2 L nasal cannula.    Review of Systems  No current fevers or chills  No current nausea, vomiting, or diarrhea  No current chest pain or palpitations      Objective   Objective     Vital Signs:   Temp:  [97 °F (36.1 °C)-98.4 °F (36.9 °C)] 97.3 °F (36.3 °C)  Heart Rate:  [84-97] 88  Resp:  [16-18] 16  BP: ()/(57-84) 113/67        Physical Exam:  Constitutional:Awake, alert  HENT: NCAT, mucous membranes moist, neck supple  Respiratory: No cough or wheezing, nonlabored breathing  Cardiovascular: Pulse rate is normal, normal radial pulses  Gastrointestinal: soft, nontender, nondistended  Musculoskeletal: Generally weak and elderly and frail in appearance, BMI is 28, no lower extremity edema  Psychiatric: Appropriate affect, cooperative, conversational  Neurologic: No slurred speech or facial droop, follows commands  Skin: No rashes or jaundice, warm      Results Reviewed:  Results from last 7 days   Lab Units 10/25/23  0536 10/24/23  0515 10/23/23  0538   WBC 10*3/mm3 7.53 6.94 7.12   HEMOGLOBIN g/dL 11.2* 11.1* 10.4*   HEMATOCRIT % 31.4* 31.3* 29.9*   PLATELETS 10*3/mm3 147 156 143     Results from last 7 days   Lab Units 10/25/23  0536 10/24/23  0515 10/24/23  0135 10/23/23  0538 10/22/23  0658 10/21/23  0350   SODIUM mmol/L 138 139  --  139 136 136   POTASSIUM mmol/L 3.6 3.5 3.6 3.3* 3.7 3.9   CHLORIDE mmol/L 103 103  --  103 104 103   CO2 mmol/L 25.8 27.8  --  27.1 26.0 23.8   BUN mg/dL 9 8  --  7* 9 12    CREATININE mg/dL 0.71* 0.72*  --  0.72* 0.77 0.82   GLUCOSE mg/dL 103* 90  --  91 102* 101*   CALCIUM mg/dL 8.8 8.7  --  8.7 8.2* 8.5*   ALK PHOS U/L  --   --   --  72 66 72   ALT (SGPT) U/L  --   --   --  24 20 18   AST (SGOT) U/L  --   --   --  22 19 19     Estimated Creatinine Clearance: 101.2 mL/min (A) (by C-G formula based on SCr of 0.71 mg/dL (L)).    Microbiology Results Abnormal       Procedure Component Value - Date/Time    Respiratory Panel PCR w/COVID-19(SARS-CoV-2) SEYMOUR/GEORGI/YESENIA/PAD/COR/MAD/MANFRED In-House, NP Swab in UTM/VTM, 3-4 HR TAT - Swab, Nasopharynx [[id number]]  (Normal) Collected: 10/20/23 1236    Lab Status: Final result Specimen: Swab from Nasopharynx Updated: 10/20/23 1411     ADENOVIRUS, PCR Not Detected     Coronavirus 229E Not Detected     Coronavirus HKU1 Not Detected     Coronavirus NL63 Not Detected     Coronavirus OC43 Not Detected     COVID19 Not Detected     Human Metapneumovirus Not Detected     Human Rhinovirus/Enterovirus Not Detected     Influenza A PCR Not Detected     Influenza B PCR Not Detected     Parainfluenza Virus 1 Not Detected     Parainfluenza Virus 2 Not Detected     Parainfluenza Virus 3 Not Detected     Parainfluenza Virus 4 Not Detected     RSV, PCR Not Detected     Bordetella pertussis pcr Not Detected     Bordetella parapertussis PCR Not Detected     Chlamydophila pneumoniae PCR Not Detected     Mycoplasma pneumo by PCR Not Detected    Narrative:      In the setting of a positive respiratory panel with a viral infection PLUS a negative procalcitonin without other underlying concern for bacterial infection, consider observing off antibiotics or discontinuation of antibiotics and continue supportive care. If the respiratory panel is positive for atypical bacterial infection (Bordetella pertussis, Chlamydophila pneumoniae, or Mycoplasma pneumoniae), consider antibiotic de-escalation to target atypical bacterial infection.            Imaging Results (Last 24 Hours)        ** No results found for the last 24 hours. **            Results for orders placed during the hospital encounter of 03/14/23    Adult Stress Echo W/ Cont or Stress Agent if Necessary Per Protocol    Interpretation Summary    Left ventricular systolic function is normal. Calculated left ventricular EF = 58%    Left ventricular wall thickness is consistent with moderate septal asymmetric hypertrophy. There is no evidence for left ventricular outflow tract obstruction.    There is severe mitral calcification with mild mitral valve stenosis  present. The mitral valve mean gradient is 4 mmHg.    Mild dilation Valsalva of sinus present measuring 4.1 cm    No ECG evidence of myocardial ischemia. Negative clinical evidence of myocardial ischemia. Findings consistent with a normal ECG stress test.    Normal dobutamine stress echo with no significant echocardiographic evidence for myocardial ischemia.      I have reviewed the medications:  Scheduled Meds:aspirin, 81 mg, Oral, Q24H  atorvastatin, 40 mg, Oral, Nightly  bethanechol, 50 mg, Oral, Q12H  bisacodyl, 10 mg, Rectal, Daily  dronabinol, 2.5 mg, Oral, BID  famotidine, 20 mg, Oral, BID  fluticasone, 2 spray, Each Nare, Daily  LORazepam, 0.5 mg, Intravenous, Once in imaging  metoprolol tartrate, 12.5 mg, Oral, Q12H  senna-docusate sodium, 2 tablet, Oral, BID   And  polyethylene glycol, 17 g, Oral, Daily  predniSONE, 20 mg, Oral, Daily With Breakfast  sodium chloride, 10 mL, Intravenous, Q12H  tamsulosin, 0.4 mg, Oral, Daily  cyanocobalamin, 1,000 mcg, Oral, Daily      Continuous Infusions:sodium chloride 0.9 % with KCl 20 mEq, 50 mL/hr      PRN Meds:.  acetaminophen **OR** acetaminophen **OR** acetaminophen    albuterol    senna-docusate sodium **AND** polyethylene glycol **AND** bisacodyl **AND** bisacodyl    Calcium Replacement - Follow Nurse / BPA Driven Protocol    diphenhydrAMINE    droperidol **OR** droperidol    ePHEDrine    fentanyl    flumazenil     hydrALAZINE    ipratropium-albuterol    labetalol    Magnesium Standard Dose Replacement - Follow Nurse / BPA Driven Protocol    naloxone    ondansetron    ondansetron    Phosphorus Replacement - Follow Nurse / BPA Driven Protocol    Potassium Replacement - Follow Nurse / BPA Driven Protocol    prochlorperazine    promethazine **OR** promethazine    sodium chloride    sodium chloride    sodium chloride    traMADol    Assessment & Plan   Assessment & Plan     Active Hospital Problems    Diagnosis  POA    **Physical deconditioning [R53.81]  Yes    Vasogenic cerebral edema [G93.6]  Yes    Poor appetite [R63.0]  Yes    Coronary arteriosclerosis [I25.10]  Yes    History of malignant neoplasm of prostate [Z85.46]  Not Applicable    Depressive disorder [F32.A]  Yes    Lung cancer metastatic to brain [C34.90, 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        Brief Hospital Course to date:  Russell Ramírez . is a 78 y.o. male with extensive small cell lung cancer, brain metastasis recent poor appetite and weight loss, anemia secondary to chemotherapy presents to the hospital with failure to thrive and physical deconditioning and was found to have severe constipation.    Discussion/plan for today:  Clinically patient continues to appear to be doing poor with his metastatic small cell lung cancer.  MRI of brain completed and patient has increasing brain metastasis.  Vasogenic edema noted per my read.  This likely explains worsening mental status.  Currently trying to treat with steroid therapy.  Oncology recommending prednisone dosing.  Seen by radiation oncology however patient has already reportedly been treated with outpatient radiation.  Oncology  recommended to monitor for now and follow-up outpatient pending clinical course.  Subacute stroke that was noted on previous imaging also noted on this MRI.  Aspirin and atorvastatin for stroke treatment and prevention.  Plan to discuss with family as to whether they are willing to try a voiding trial today.  If unsuccessful we will have to replace River catheter.  Physical therapy as tolerated.  At this point I am uncertain if there can be anything else added to improve his prognosis.  Case discussed with oncology today.  I will continue to discuss goals of care as patient could be a candidate for transition to more palliative focused care if that is what he wishes to pursue.  Short-term prognosis guarded.  Long-term prognosis is unfortunately poor.  Treatment plan discussed with the patient and his family at the bedside and they are in agreement with plan.    Metastatic Lung Cancer: Liver and adrenal metastasis noted on CT. MRI brain shows worsening brain metastasis. Oncology following.  Difficulty Urinating: PSA low.  UA was not infectious. Required River catheter.  On Flomax.  Voiding trial  NV/Constipation/Chronic Pancreatitis: No free air was noted on the chest x-ray.  He has had bowel movement.  Continue bowel regimen.  GI following.  Supportive care and symptom treatment  Rhinitis: RVP was ok. Recent abx reported.  Continue nasal spray.  AFib: Anticoagulation v continued aspirin per oncology  Deconditioning: PT/OT  PPX: per oncology  Disposition: Anticipate to rehab facility      CODE STATUS:   Code Status and Medical Interventions:   Ordered at: 10/19/23 7894     Code Status (Patient has no pulse and is not breathing):    CPR (Attempt to Resuscitate)     Medical Interventions (Patient has pulse or is breathing):    Full Support       Gama Newman MD  10/26/23

## 2023-10-26 NOTE — CASE MANAGEMENT/SOCIAL WORK
Continued Stay Note  [facility]     Patient Name: Russell Ramírez Sr.  MRN: [id number]  Today's Date: 10/26/2023    Admit Date: 10/19/2023    Plan: DME with [employer]   Discharge Plan       Row Name 10/26/23 0941       Plan    Plan DME with [employer]    Plan Comments Spoke with patient and family at bedside. Explained that [employer] is available to service their area and that they are checking on pricing for motorized scooter. Family voiced understanding and are agreeable. Spoke with Richard, will reach out to family.                   Discharge Codes    No documentation.                 Expected Discharge Date and Time       Expected Discharge Date Expected Discharge Time    Oct 27, 2023               Caryn Romo RN

## 2023-10-26 NOTE — PLAN OF CARE
AAOXself and place at times. RA. Two large BM's this shift. Continues IV fluids. Changed to 0.9% NS with Kcl 20 mEq/L. Heels and buttocks blanchable.  Voiding trial initiated. Bladder scan - 0ml. Wife at bedside this shift.       Problem: Fall Injury Risk  Goal: Absence of Fall and Fall-Related Injury  Outcome: Ongoing, Progressing  Intervention: Identify and Manage Contributors  Recent Flowsheet Documentation  Taken 10/26/2023 1600 by Sandra Baird RN  Medication Review/Management: medications reviewed  Taken 10/26/2023 1400 by Sandra Baird RN  Medication Review/Management: medications reviewed  Taken 10/26/2023 1200 by Sandra Baird RN  Medication Review/Management: medications reviewed  Taken 10/26/2023 1000 by Sandra Baird RN  Medication Review/Management: medications reviewed  Taken 10/26/2023 0800 by Sandra aBird RN  Medication Review/Management: medications reviewed  Intervention: Promote Injury-Free Environment  Recent Flowsheet Documentation  Taken 10/26/2023 1600 by Sandra Baird RN  Safety Promotion/Fall Prevention:   safety round/check completed   room organization consistent   nonskid shoes/slippers when out of bed   lighting adjusted   clutter free environment maintained   assistive device/personal items within reach   activity supervised  Taken 10/26/2023 1400 by Sandra Baird RN  Safety Promotion/Fall Prevention:   safety round/check completed   room organization consistent   nonskid shoes/slippers when out of bed   lighting adjusted   clutter free environment maintained   assistive device/personal items within reach   activity supervised  Taken 10/26/2023 1200 by Sandra Baird RN  Safety Promotion/Fall Prevention:   safety round/check completed   room organization consistent   nonskid shoes/slippers when out of bed   lighting adjusted   clutter free environment maintained   assistive device/personal items within reach   activity supervised  Taken 10/26/2023 1000 by  Baird, Sandra, RN  Safety Promotion/Fall Prevention:   room organization consistent   safety round/check completed   nonskid shoes/slippers when out of bed   lighting adjusted   clutter free environment maintained   assistive device/personal items within reach   activity supervised  Taken 10/26/2023 0800 by Sandra Baird RN  Safety Promotion/Fall Prevention:   safety round/check completed   room organization consistent   nonskid shoes/slippers when out of bed   lighting adjusted   clutter free environment maintained   assistive device/personal items within reach   activity supervised     Problem: Skin Injury Risk Increased  Goal: Skin Health and Integrity  Outcome: Ongoing, Progressing  Intervention: Optimize Skin Protection  Recent Flowsheet Documentation  Taken 10/26/2023 1600 by Sandra Baird RN  Head of Bed (HOB) Positioning: (in chair) other (see comments)  Taken 10/26/2023 1500 by Sandra Baird RN  Head of Bed ([facility]) Positioning: (up in wheelchair) other (see comments)  Taken 10/26/2023 1400 by Sandra Baird RN  Pressure Reduction Techniques:   frequent weight shift encouraged   heels elevated off bed   pressure points protected   weight shift assistance provided  Head of Bed (HOB) Positioning: HOB elevated  Pressure Reduction Devices:   pressure-redistributing mattress utilized   alternating pressure pump (ADD)  Skin Protection:   adhesive use limited   incontinence pads utilized   tubing/devices free from skin contact   transparent dressing maintained   skin-to-skin areas padded   skin-to-device areas padded   protective footwear used  Taken 10/26/2023 1200 by Sandra Baird RN  Head of Bed (HOB) Positioning: HOB elevated  Taken 10/26/2023 1000 by Sandra Baird RN  Head of Bed (HOB) Positioning: HOB lowered  Taken 10/26/2023 0800 by Sandra Baird RN  Pressure Reduction Techniques:   frequent weight shift encouraged   heels elevated off bed   weight shift assistance provided  Head  of Bed (HOB) Positioning: HOB lowered  Pressure Reduction Devices:   pressure-redistributing mattress utilized   alternating pressure pump (ADD)  Skin Protection:   adhesive use limited   incontinence pads utilized   protective footwear used   tubing/devices free from skin contact   transparent dressing maintained     Problem: Pain Acute  Goal: Acceptable Pain Control and Functional Ability  Outcome: Ongoing, Progressing  Intervention: Prevent or Manage Pain  Recent Flowsheet Documentation  Taken 10/26/2023 1600 by Sandra Baird RN  Medication Review/Management: medications reviewed  Taken 10/26/2023 1400 by Sandra Baird RN  Sensory Stimulation Regulation: care clustered  Sleep/Rest Enhancement: natural light exposure provided  Medication Review/Management: medications reviewed  Taken 10/26/2023 1200 by Sandra Baird RN  Medication Review/Management: medications reviewed  Taken 10/26/2023 1000 by Sandra Baird RN  Medication Review/Management: medications reviewed  Taken 10/26/2023 0800 by Sandra Baird RN  Sensory Stimulation Regulation: care clustered  Bowel Elimination Promotion:   privacy promoted   adequate fluid intake promoted  Sleep/Rest Enhancement:   awakenings minimized   noise level reduced  Medication Review/Management: medications reviewed  Intervention: Optimize Psychosocial Wellbeing  Recent Flowsheet Documentation  Taken 10/26/2023 1400 by Sandra Baird RN  Supportive Measures:   verbalization of feelings encouraged   active listening utilized   positive reinforcement provided  Diversional Activities: television  Taken 10/26/2023 0800 by Sandra Baird RN  Supportive Measures:   active listening utilized   positive reinforcement provided   verbalization of feelings encouraged  Diversional Activities: television     Problem: Coping Ineffective (Oncology Care)  Goal: Effective Coping  Outcome: Ongoing, Progressing  Intervention: Support and Enhance Coping Strategies  Recent  Flowsheet Documentation  Taken 10/26/2023 1400 by Sandra Baird RN  Supportive Measures:   verbalization of feelings encouraged   active listening utilized   positive reinforcement provided  Environmental Support: calm environment promoted  Family/Support System Care:   support provided   self-care encouraged  Taken 10/26/2023 0800 by Sandra Baird RN  Supportive Measures:   active listening utilized   positive reinforcement provided   verbalization of feelings encouraged  Environmental Support:   calm environment promoted   rest periods encouraged  Family/Support System Care:   self-care encouraged   support provided     Problem: Fatigue (Oncology Care)  Goal: Improved Activity Tolerance  Outcome: Ongoing, Progressing  Intervention: Promote Improved Energy  Recent Flowsheet Documentation  Taken 10/26/2023 1600 by Sandra Baird RN  Activity Management: up in chair  Taken 10/26/2023 1500 by Sandra Baird RN  Activity Management: up in chair  Taken 10/26/2023 1400 by Sandra Baird RN  Fatigue Management:   paced activity encouraged   frequent rest breaks encouraged  Sleep/Rest Enhancement: natural light exposure provided  Taken 10/26/2023 1200 by Sandra Baird RN  Activity Management: bedrest  Taken 10/26/2023 1000 by Sandra Baird RN  Activity Management: bedrest  Taken 10/26/2023 0800 by Sandra Baird RN  Activity Management: bedrest  Fatigue Management:   activity assistance provided   frequent rest breaks encouraged  Sleep/Rest Enhancement:   awakenings minimized   noise level reduced     Problem: Oral Intake Altered (Oncology Care)  Goal: Optimal Oral Intake  Outcome: Ongoing, Progressing  Intervention: Minimize and Manage Barriers to Oral Intake  Recent Flowsheet Documentation  Taken 10/26/2023 1710 by Sandra Baird RN  Oral Care: teeth brushed - regular toothbrush     Problem: Oral Mucositis (Oncology Care)  Goal: Improved Oral Mucous Membrane Integrity  Outcome: Ongoing,  Progressing  Intervention: Promote Oral Comfort and Health  Recent Flowsheet Documentation  Taken 10/26/2023 1710 by Sandra Baird RN  Oral Care: teeth brushed - regular toothbrush     Problem: Pain Acute (Oncology Care)  Goal: Optimal Pain Control  Outcome: Ongoing, Progressing  Intervention: Prevent or Manage Pain  Recent Flowsheet Documentation  Taken 10/26/2023 1600 by Sandra Baird RN  Medication Review/Management: medications reviewed  Taken 10/26/2023 1400 by Sandra Baird RN  Sensory Stimulation Regulation: care clustered  Sleep/Rest Enhancement: natural light exposure provided  Medication Review/Management: medications reviewed  Taken 10/26/2023 1200 by Sandra Baird RN  Medication Review/Management: medications reviewed  Taken 10/26/2023 1000 by Sandra Baird RN  Medication Review/Management: medications reviewed  Taken 10/26/2023 0800 by Sandra Baird RN  Sensory Stimulation Regulation: care clustered  Bowel Elimination Promotion:   privacy promoted   adequate fluid intake promoted  Sleep/Rest Enhancement:   awakenings minimized   noise level reduced  Medication Review/Management: medications reviewed  Intervention: Optimize Psychosocial Wellbeing  Recent Flowsheet Documentation  Taken 10/26/2023 1400 by Sandra Baird RN  Supportive Measures:   verbalization of feelings encouraged   active listening utilized   positive reinforcement provided  Diversional Activities: television  Taken 10/26/2023 0800 by Sandra Baird RN  Supportive Measures:   active listening utilized   positive reinforcement provided   verbalization of feelings encouraged  Diversional Activities: television     Problem: Obstructive Sleep Apnea Risk or Actual Comorbidity Management  Goal: Unobstructed Breathing During Sleep  Outcome: Ongoing, Progressing

## 2023-10-27 LAB
DEPRECATED RDW RBC AUTO: 50.7 FL (ref 37–54)
ERYTHROCYTE [DISTWIDTH] IN BLOOD BY AUTOMATED COUNT: 14.6 % (ref 12.3–15.4)
HCT VFR BLD AUTO: 31.2 % (ref 37.5–51)
HGB BLD-MCNC: 11.1 G/DL (ref 13–17.7)
MCH RBC QN AUTO: 34.2 PG (ref 26.6–33)
MCHC RBC AUTO-ENTMCNC: 35.6 G/DL (ref 31.5–35.7)
MCV RBC AUTO: 96 FL (ref 79–97)
PLATELET # BLD AUTO: 119 10*3/MM3 (ref 140–450)
PMV BLD AUTO: 8.4 FL (ref 6–12)
RBC # BLD AUTO: 3.25 10*6/MM3 (ref 4.14–5.8)
WBC NRBC COR # BLD: 7.5 10*3/MM3 (ref 3.4–10.8)

## 2023-10-27 PROCEDURE — 63710000001 DRONABINOL PER 2.5 MG: Performed by: INTERNAL MEDICINE

## 2023-10-27 PROCEDURE — 97110 THERAPEUTIC EXERCISES: CPT

## 2023-10-27 PROCEDURE — 85027 COMPLETE CBC AUTOMATED: CPT | Performed by: INTERNAL MEDICINE

## 2023-10-27 PROCEDURE — 25010000002 SODIUM CHLORIDE 0.9 % WITH KCL 20 MEQ 20-0.9 MEQ/L-% SOLUTION: Performed by: INTERNAL MEDICINE

## 2023-10-27 PROCEDURE — 63710000001 PREDNISONE PER 1 MG: Performed by: INTERNAL MEDICINE

## 2023-10-27 PROCEDURE — 97530 THERAPEUTIC ACTIVITIES: CPT

## 2023-10-27 RX ORDER — SODIUM CHLORIDE AND POTASSIUM CHLORIDE 150; 900 MG/100ML; MG/100ML
50 INJECTION, SOLUTION INTRAVENOUS CONTINUOUS
Status: DISCONTINUED | OUTPATIENT
Start: 2023-10-27 | End: 2023-10-30

## 2023-10-27 RX ADMIN — Medication 1000 MCG: at 08:30

## 2023-10-27 RX ADMIN — FAMOTIDINE 20 MG: 20 TABLET ORAL at 21:38

## 2023-10-27 RX ADMIN — BETHANECHOL CHLORIDE 50 MG: 25 TABLET ORAL at 10:08

## 2023-10-27 RX ADMIN — DRONABINOL 2.5 MG: 2.5 CAPSULE ORAL at 08:30

## 2023-10-27 RX ADMIN — Medication 10 ML: at 21:41

## 2023-10-27 RX ADMIN — DOCUSATE SODIUM 50MG AND SENNOSIDES 8.6MG 2 TABLET: 8.6; 5 TABLET, FILM COATED ORAL at 08:31

## 2023-10-27 RX ADMIN — METOPROLOL TARTRATE 12.5 MG: 25 TABLET, FILM COATED ORAL at 08:31

## 2023-10-27 RX ADMIN — Medication 10 ML: at 08:32

## 2023-10-27 RX ADMIN — POTASSIUM CHLORIDE AND SODIUM CHLORIDE 50 ML/HR: 900; 150 INJECTION, SOLUTION INTRAVENOUS at 10:10

## 2023-10-27 RX ADMIN — BISACODYL 10 MG: 10 SUPPOSITORY RECTAL at 08:30

## 2023-10-27 RX ADMIN — TAMSULOSIN HYDROCHLORIDE 0.4 MG: 0.4 CAPSULE ORAL at 08:30

## 2023-10-27 RX ADMIN — BETHANECHOL CHLORIDE 50 MG: 25 TABLET ORAL at 21:39

## 2023-10-27 RX ADMIN — POLYETHYLENE GLYCOL 3350 17 G: 17 POWDER, FOR SOLUTION ORAL at 08:31

## 2023-10-27 RX ADMIN — DRONABINOL 2.5 MG: 2.5 CAPSULE ORAL at 21:38

## 2023-10-27 RX ADMIN — PREDNISONE 20 MG: 20 TABLET ORAL at 08:31

## 2023-10-27 RX ADMIN — ASPIRIN 81 MG: 81 TABLET, COATED ORAL at 21:38

## 2023-10-27 RX ADMIN — FAMOTIDINE 20 MG: 20 TABLET ORAL at 08:30

## 2023-10-27 RX ADMIN — METOPROLOL TARTRATE 12.5 MG: 25 TABLET, FILM COATED ORAL at 21:38

## 2023-10-27 RX ADMIN — FLUTICASONE PROPIONATE 2 SPRAY: 50 SPRAY, METERED NASAL at 08:32

## 2023-10-27 RX ADMIN — ATORVASTATIN CALCIUM 40 MG: 20 TABLET, FILM COATED ORAL at 21:38

## 2023-10-27 NOTE — CASE MANAGEMENT/SOCIAL WORK
Continued Stay Note  [facility]     Patient Name: Russell Ramírez Sr.  MRN: [id number]  Today's Date: 10/27/2023    Admit Date: 10/19/2023    [facility]   Discharge Plan       Row Name 10/27/23 1045       Plan    Plan Home with DME from [facility].    Roadmap to Recovery Yes    Plan Comments Spoke with patient's wife at bedside. Introduced self. Spouse states that she believes in patient's current state of health that she can take care of him. Spouse stated that she would need additional DME for this. Spoke with Phyllis/ Kb, stated that she will reach out to spouse, updated with patient's wife's cell number. Spouse is concerned that she is receiving contradicting information regarding 's care. Family could possible benefit from care conference.                   Discharge Codes    No documentation.                 Expected Discharge Date and Time       Expected Discharge Date Expected Discharge Time    Oct 28, 2023               Caryn Romo RN

## 2023-10-27 NOTE — PLAN OF CARE
Goal Outcome Evaluation:  Plan of Care Reviewed With: patient        Progress: no change  Outcome Evaluation: Pt seen for PT treatment today. Pt was up in chair when PT arrived and family request that pt stay up in the chair after tx. Pt had some difficulty staying awake and staying on task and following commands with simple exercises today. Pt needed increased processing time and max verbal and tactile cues to complete reps. Family had questions about HH PT vs SNF. Educated family on the difference and what PT reccomends SNF at this time but will continue to follow pt for d/c planning needs. Family also educated on assisting pt with exercises. Deferred transfers today as pt fell asleep in the chair and family wanted him to stay sitting up. Will continue to progress pt as able.

## 2023-10-27 NOTE — PLAN OF CARE
Problem: Fall Injury Risk  Goal: Absence of Fall and Fall-Related Injury  Outcome: Ongoing, Not Progressing  Intervention: Identify and Manage Contributors  Recent Flowsheet Documentation  Taken 10/26/2023 2000 by Kim Sierra RN  Medication Review/Management: medications reviewed  Intervention: Promote Injury-Free Environment  Recent Flowsheet Documentation  Taken 10/27/2023 0429 by Kim Sierra RN  Safety Promotion/Fall Prevention:   safety round/check completed   lighting adjusted   clutter free environment maintained   assistive device/personal items within reach  Taken 10/27/2023 0200 by Kim Sierra RN  Safety Promotion/Fall Prevention:   safety round/check completed   lighting adjusted   clutter free environment maintained   assistive device/personal items within reach  Taken 10/26/2023 2349 by Kim Sierra RN  Safety Promotion/Fall Prevention:   safety round/check completed   lighting adjusted   clutter free environment maintained   assistive device/personal items within reach  Taken 10/26/2023 2200 by Kim Sierra RN  Safety Promotion/Fall Prevention:   safety round/check completed   lighting adjusted   assistive device/personal items within reach  Taken 10/26/2023 2000 by Kim Sierra RN  Safety Promotion/Fall Prevention:   safety round/check completed   lighting adjusted   clutter free environment maintained   assistive device/personal items within reach     Problem: Skin Injury Risk Increased  Goal: Skin Health and Integrity  Outcome: Ongoing, Not Progressing     Problem: Coping Ineffective (Oncology Care)  Goal: Effective Coping  Outcome: Ongoing, Not Progressing  Intervention: Support and Enhance Coping Strategies  Recent Flowsheet Documentation  Taken 10/26/2023 2000 by Kim Sierra RN  Family/Support System Care:   support provided   self-care encouraged     Problem: Fatigue (Oncology Care)  Goal: Improved Activity Tolerance  Outcome: Ongoing, Not  Progressing  Intervention: Promote Improved Energy  Recent Flowsheet Documentation  Taken 10/26/2023 2000 by Kim Sierra, RN  Activity Management: back to bed   Goal Outcome Evaluation:      progressing   AO times 2, confused at times. On room air. Continues IV fluids to chest wall port; NS with Kcl 20 mEq/L. Voided incontinent; brief pad and bed soaking wet. Wife at bedside. Call light within reach.

## 2023-10-27 NOTE — PLAN OF CARE
Goal Outcome Evaluation:                 VSS. BP was elevated once during shift but corrected itself without intervention. Patient is alert to self only. Very weak. Trouble staying awake and following simple commands because he wants to rest. Sat in the chair for a little while, worked with PT. Continue to monitor.

## 2023-10-27 NOTE — CASE MANAGEMENT/SOCIAL WORK
Continued Stay Note  [facility]     Patient Name: Russell Ramírez Sr.  MRN: [id number]  Today's Date: 10/27/2023    Admit Date: 10/19/2023    Plan: Home with DME from [facility].   Discharge Plan       Row Name 10/27/23 1351       Plan    Plan Comments Called [facility] and [facility], do not service patient's area. Spoke with Ana Saunders is checking to see if they can service area. Aster with [facility] said they service his area, referral placed.                   Discharge Codes    No documentation.                 Expected Discharge Date and Time       Expected Discharge Date Expected Discharge Time    Oct 30, 2023               Caryn Romo RN

## 2023-10-27 NOTE — CASE MANAGEMENT/SOCIAL WORK
Continued Stay Note  [facility]     Patient Name: Russell Ramírez Sr.  MRN: [id number]  Today's Date: 10/27/2023    Admit Date: 10/19/2023    Plan: Home with D[facility] from [facility].   Discharge Plan       Row Name 10/27/23 1351       Plan    Plan Comments Called [facility], [facility], and [facility], do not service patient's area. Spoke with Ana Saunders is checking to see if they can service area.                   Discharge Codes    No documentation.                 Expected Discharge Date and Time       Expected Discharge Date Expected Discharge Time    Oct 30, 2023               Caryn Romo RN

## 2023-10-27 NOTE — PROGRESS NOTES
Nutrition Services    Patient Name:  Russell Ramírez Sr.  YOB: 1945  MRN: [id number]  Admit Date:  10/19/2023  Assessment Date:  10/27/23    Summary:     Nutrition follow up completed. Visited pt at bedside. Pt has been lethargic and not eating much by mouth. Wife endorses he is eating better today and does drink some of his Boost. He seems to tolerate softer foods better. Family does not want to pursue palliative care at this point.   Labs: platelets 119   Meds: IVFs, miralax, pericolace     REC:  Soft to chew diet if agreed upon by pt and family   Will continue to encourage and monitor PO/ONS intake  Continue current bowel regimen     RD to follow per protocol.    CLINICAL NUTRITION ASSESSMENT      Reason for Assessment Follow up protocol      Diagnosis/Problem   weakness, decrease po, due to metastatic lung cancer.   Medical/Surgical History Past Medical History:   Diagnosis Date    Acid reflux     Anemia     Arthritis     Coronary artery disease     CVA (cerebral vascular accident)     High cholesterol     History of atrial fibrillation     Hx of blood clots     Hypertension     Irregular heartbeat     Peptic ulcer disease     Prostate cancer     Rheumatoid arthritis     Skin cancer     Small cell lung cancer        Past Surgical History:   Procedure Laterality Date    ABDOMINAL AORTIC ANEURYSM REPAIR      BACK SURGERY      x2    BRONCHOSCOPY WITH ION ROBOTIC ASSIST N/A 03/13/2023    Procedure: BRONCHOSCOPY ,ENDOBRONCHIAL ULTRASOUND AND ROBOTIC NAVIGATIONAL BRONCHOSCOPY WITH FINE NEEDLE ASPIRATION, BIOPSY X1 AREA, AND BRONCHOALEOLAR LAVAGE;  Surgeon: Sonali Lara MD;  Location: [facility] ENDOSCOPY;  Service: Robotics - Pulmonary;  Laterality: N/A;  Post: LUNG CANCER    CARDIAC ABLATION      x2    COLON SURGERY      HERNIA REPAIR      INSERTION CENTRAL VENOUS ACCESS DEVICE W/ SUBCUTANEOUS PORT Right 03/20/2023    VENOUS ACCESS DEVICE (PORT) INSERTION Right 03/20/2023    Procedure: INSERTION  "VENOUS ACCESS DEVICE;  Surgeon: Javier Gonzalez MD PhD;  Location: [facility];  Service: Thoracic;  Laterality: Right;        Anthropometrics        Current Height  Current Weight  BMI kg/m2 Height: 180.3 cm (71\")  Weight: 92.3 kg (203 lb 7.8 oz) (10/19/23 1525)  Body mass index is 28.38 kg/m².   Adjusted BMI (if applicable)    BMI Category Overweight (25 - 29.9)   Ideal Body Weight (IBW) 166   Usual Body Weight (UBW) 215-220   Weight Trend Loss   Weight History Wt Readings from Last 30 Encounters:   10/19/23 1525 92.3 kg (203 lb 7.8 oz)   10/19/23 1127 91.6 kg (202 lb)   10/05/23 1318 95 kg (209 lb 6.4 oz)   09/22/23 1141 99 kg (218 lb 4.1 oz)   09/20/23 1145 96.6 kg (213 lb)   09/14/23 1254 96.7 kg (213 lb 3.2 oz)   08/24/23 1020 98.2 kg (216 lb 9.6 oz)   08/15/23 1355 94.7 kg (208 lb 12.4 oz)   08/08/23 1406 96.7 kg (213 lb 3 oz)   08/03/23 0931 95.5 kg (210 lb 9.6 oz)   07/31/23 0957 95.3 kg (210 lb 1.6 oz)   07/26/23 1218 93 kg (205 lb)   07/13/23 1056 93.8 kg (206 lb 12.8 oz)   06/21/23 0813 95.2 kg (209 lb 12.8 oz)   06/02/23 1440 98 kg (216 lb)   06/01/23 1305 97.4 kg (214 lb 12.8 oz)   05/31/23 0800 96 kg (211 lb 11.2 oz)   05/10/23 1435 98.4 kg (217 lb)   05/09/23 1320 98.3 kg (216 lb 12.8 oz)   05/08/23 0854 95.8 kg (211 lb 4.8 oz)   05/05/23 1115 95.3 kg (210 lb)   04/19/23 1358 100 kg (221 lb)   04/18/23 1407 98.8 kg (217 lb 12.8 oz)   05/05/23 0913 100 kg (221 lb)   04/17/23 0817 98 kg (216 lb 1.6 oz)   04/11/23 1324 98.1 kg (216 lb 3.2 oz)   03/29/23 1418 99.8 kg (220 lb)   03/28/23 1347 98.4 kg (217 lb)   03/27/23 0955 98 kg (216 lb)   03/21/23 1045 96.6 kg (213 lb)   03/20/23 0726 97.7 kg (215 lb 6.2 oz)      --  Labs       Pertinent Labs    Results from last 7 days   Lab Units 10/25/23  0536 10/24/23  0515 10/24/23  0135 10/23/23  0538 10/22/23  0658 10/21/23  0350   SODIUM mmol/L 138 139  --  139 136 136   POTASSIUM mmol/L 3.6 3.5 3.6 3.3* 3.7 3.9   CHLORIDE mmol/L 103 103  --  103 104 103 "   CO2 mmol/L 25.8 27.8  --  27.1 26.0 23.8   BUN mg/dL 9 8  --  7* 9 12   CREATININE mg/dL 0.71* 0.72*  --  0.72* 0.77 0.82   CALCIUM mg/dL 8.8 8.7  --  8.7 8.2* 8.5*   BILIRUBIN mg/dL  --   --   --  0.4 0.4 0.5   ALK PHOS U/L  --   --   --  72 66 72   ALT (SGPT) U/L  --   --   --  24 20 18   AST (SGOT) U/L  --   --   --  22 19 19   GLUCOSE mg/dL 103* 90  --  91 102* 101*     Results from last 7 days   Lab Units 10/27/23  1226 10/25/23  0536 10/24/23  0515 10/23/23  0538 10/22/23  0658 10/21/23  0350   MAGNESIUM mg/dL  --  2.1 2.1 1.9  --  2.0   PHOSPHORUS mg/dL  --   --   --   --   --  2.7   HEMOGLOBIN g/dL 11.1* 11.2* 11.1* 10.4*   < > 10.7*   HEMATOCRIT % 31.2* 31.4* 31.3* 29.9*   < > 30.7*   WBC 10*3/mm3 7.50 7.53 6.94 7.12   < > 6.36   ALBUMIN g/dL  --   --   --  3.3*   < > 3.3*    < > = values in this interval not displayed.     Results from last 7 days   Lab Units 10/27/23  1226 10/25/23  0536 10/24/23  0515 10/23/23  0538 10/22/23  0658   PLATELETS 10*3/mm3 119* 147 156 143 130*     COVID19   Date Value Ref Range Status   10/20/2023 Not Detected Not Detected - Ref. Range Final     Lab Results   Component Value Date    HGBA1C 5.6 09/20/2021          Medications           Scheduled Medications aspirin, 81 mg, Oral, Q24H  atorvastatin, 40 mg, Oral, Nightly  bethanechol, 50 mg, Oral, Q12H  bisacodyl, 10 mg, Rectal, Daily  dronabinol, 2.5 mg, Oral, BID  famotidine, 20 mg, Oral, BID  fluticasone, 2 spray, Each Nare, Daily  metoprolol tartrate, 12.5 mg, Oral, Q12H  senna-docusate sodium, 2 tablet, Oral, BID   And  polyethylene glycol, 17 g, Oral, Daily  predniSONE, 20 mg, Oral, Daily With Breakfast  sodium chloride, 10 mL, Intravenous, Q12H  tamsulosin, 0.4 mg, Oral, Daily  cyanocobalamin, 1,000 mcg, Oral, Daily       Infusions sodium chloride 0.9 % with KCl 20 mEq, 50 mL/hr, Last Rate: 50 mL/hr (10/27/23 1010)       PRN Medications   acetaminophen **OR** acetaminophen **OR** acetaminophen    albuterol     senna-docusate sodium **AND** polyethylene glycol **AND** bisacodyl **AND** bisacodyl    Calcium Replacement - Follow Nurse / BPA Driven Protocol    diphenhydrAMINE    droperidol **OR** droperidol    ePHEDrine    fentanyl    flumazenil    hydrALAZINE    ipratropium-albuterol    labetalol    Magnesium Standard Dose Replacement - Follow Nurse / BPA Driven Protocol    naloxone    ondansetron    ondansetron    Phosphorus Replacement - Follow Nurse / BPA Driven Protocol    Potassium Replacement - Follow Nurse / BPA Driven Protocol    prochlorperazine    promethazine **OR** promethazine    sodium chloride    sodium chloride    sodium chloride    traMADol     Physical Findings          General Findings disoriented, CPAP   Oral/Mouth Cavity dental appliance   Edema  no edema   Gastrointestinal fecal incontinence, hypoactive bowel sounds, non-distended , last bowel movement: 10/26   Skin  skin intact   Tubes/Drains/Lines implantable port   NFPE No clinical signs of muscle wasting or fat loss   --  Current Nutrition Orders & Evaluation of Intake       Oral Nutrition     Food Allergies NKFA   Current PO Diet Diet: Regular/House Diet; Texture: Regular Texture (IDDSI 7); Fluid Consistency: Thin (IDDSI 0)   Supplement Boost Plus everton TID    PO Evaluation     % PO Intake Not well documented     Factors Affecting Intake: decreased appetite   --  Estimated/Assessed Needs        Current Weight  Weight: 92.3 kg (203 lb 7.8 oz) (10/19/23 1525)       Energy Requirements    Weight for Calculation 92.3 kg   Method for Estimation  20 kcal/kg, 25 kcal/kg   EST Needs (kcal/day) 5775-2364       Protein Requirements    Weight for Calculation 92.3 kg   EST Protein Needs (g/kg) 1.0 gm/kg   EST Daily Needs (g/day) 92       Fluid Requirements     Method for Estimation 1 mL/kcal    EST Needs (mL/day)      PES STATEMENT / NUTRITION DIAGNOSIS      Nutrition Dx Problem  Problem: Unintentional Weight Loss and Inadequate Oral Intake  Etiology: Factors  Affecting Nutrition - decrease appetite    Signs/Symptoms: PO intake, Unintended Weight Change, and Report/Observation     NUTRITION INTERVENTION / PLAN OF CARE      Intervention Goal(s) Maintain nutrition status, Reduce/improve symptoms, Meet estimated needs, Disease management/therapy, Increase intake, Maintain weight, and PO intake goal %: 75+         RD Intervention/Action Encourage intake, Continue to monitor, and Care plan reviewed   --      Prescription/Orders:       PO Diet Soft to chew diet       Supplements Boost Plus chocolate TID       Enteral Nutrition       Parenteral Nutrition    New Prescription Ordered? Continue same per protocol, No changes at this time   --      Monitor/Evaluation Per protocol   Discharge Plan/Needs Pending clinical course   --    RD to follow per protocol.      Electronically signed by:  Tosin Gallego Dietitian Intern   10/27/23 13:38 EDT

## 2023-10-27 NOTE — PROGRESS NOTES
[facility] Medicine Services  PROGRESS NOTE    Patient Name: Russell Ramírez Sr.  : 1945  MRN: [id number]    Date of Admission: 10/19/2023  Primary Care Physician: Morgan Sebastian DO    Subjective   Subjective     CC:  Follow-up recent fatigue    Subjective:  I had a very careful and complex conversation with patient's wife Marivel today.  We discussed her overall understanding of his disease process.  She understands he has a terminal disease and has been receiving palliative cancer treatment to try and stabilize his disease.  She would like him to get more cancer treatment but understands he is not eating very well.  She did note at 1 point yesterday he was eating and drinking decently for a short period.  She plans to talk to the outpatient radiation oncologist in her near house.  I suggested we try to see how he does off IV fluid today to see if he can maintain his hydration off of IV fluid.  She was adamant she wants IV fluid continued for now and until the day of discharge.  We talked further about goals of care and ultimately we will continue fluid for now and continue to monitor and give him supportive care.  She seems to understand that there is no further work-up or planned adjustments to treatment while he remains in the hospital and we will see how his response to current medications and supportive care progresses.    Review of Systems  No current fevers or chills  No current nausea, vomiting, or diarrhea  No current chest pain or palpitations      Objective   Objective     Vital Signs:   Temp:  [98.1 °F (36.7 °C)-98.9 °F (37.2 °C)] 98.4 °F (36.9 °C)  Heart Rate:  [] 79  Resp:  [16] 16  BP: (107-125)/() 110/73        Physical Exam:  Constitutional: Alert but lethargic, chronically ill-appearing  HENT: NCAT, mucous membranes moist, neck supple  Respiratory: No cough or wheezing, nonlabored breathing  Cardiovascular: Pulse rate is normal, normal radial pulses  Gastrointestinal:  soft, nontender, nondistended  Musculoskeletal: Generally weak and elderly and frail in appearance, BMI is 28, no lower extremity edema  Psychiatric: Calm affect, cooperative, conversational  Neurologic: Poor historian, able to follow commands  Skin: No rashes or jaundice, warm      Results Reviewed:  Results from last 7 days   Lab Units 10/27/23  1226 10/25/23  0536 10/24/23  0515   WBC 10*3/mm3 7.50 7.53 6.94   HEMOGLOBIN g/dL 11.1* 11.2* 11.1*   HEMATOCRIT % 31.2* 31.4* 31.3*   PLATELETS 10*3/mm3 119* 147 156     Results from last 7 days   Lab Units 10/25/23  0536 10/24/23  0515 10/24/23  0135 10/23/23  0538 10/22/23  0658 10/21/23  0350   SODIUM mmol/L 138 139  --  139 136 136   POTASSIUM mmol/L 3.6 3.5 3.6 3.3* 3.7 3.9   CHLORIDE mmol/L 103 103  --  103 104 103   CO2 mmol/L 25.8 27.8  --  27.1 26.0 23.8   BUN mg/dL 9 8  --  7* 9 12   CREATININE mg/dL 0.71* 0.72*  --  0.72* 0.77 0.82   GLUCOSE mg/dL 103* 90  --  91 102* 101*   CALCIUM mg/dL 8.8 8.7  --  8.7 8.2* 8.5*   ALK PHOS U/L  --   --   --  72 66 72   ALT (SGPT) U/L  --   --   --  24 20 18   AST (SGOT) U/L  --   --   --  22 19 19     Estimated Creatinine Clearance: 101.2 mL/min (A) (by C-G formula based on SCr of 0.71 mg/dL (L)).    Microbiology Results Abnormal       Procedure Component Value - Date/Time    Respiratory Panel PCR w/COVID-19(SARS-CoV-2) SEYMOUR/GEORGI/YESENIA/PAD/COR/MAD/MANFRED In-House, NP Swab in UTM/VTM, 3-4 HR TAT - Swab, Nasopharynx [[id number]]  (Normal) Collected: 10/20/23 1236    Lab Status: Final result Specimen: Swab from Nasopharynx Updated: 10/20/23 1411     ADENOVIRUS, PCR Not Detected     Coronavirus 229E Not Detected     Coronavirus HKU1 Not Detected     Coronavirus NL63 Not Detected     Coronavirus OC43 Not Detected     COVID19 Not Detected     Human Metapneumovirus Not Detected     Human Rhinovirus/Enterovirus Not Detected     Influenza A PCR Not Detected     Influenza B PCR Not Detected     Parainfluenza Virus 1 Not Detected      Parainfluenza Virus 2 Not Detected     Parainfluenza Virus 3 Not Detected     Parainfluenza Virus 4 Not Detected     RSV, PCR Not Detected     Bordetella pertussis pcr Not Detected     Bordetella parapertussis PCR Not Detected     Chlamydophila pneumoniae PCR Not Detected     Mycoplasma pneumo by PCR Not Detected    Narrative:      In the setting of a positive respiratory panel with a viral infection PLUS a negative procalcitonin without other underlying concern for bacterial infection, consider observing off antibiotics or discontinuation of antibiotics and continue supportive care. If the respiratory panel is positive for atypical bacterial infection (Bordetella pertussis, Chlamydophila pneumoniae, or Mycoplasma pneumoniae), consider antibiotic de-escalation to target atypical bacterial infection.            Imaging Results (Last 24 Hours)       ** No results found for the last 24 hours. **            Results for orders placed during the hospital encounter of 03/14/23    Adult Stress Echo W/ Cont or Stress Agent if Necessary Per Protocol    Interpretation Summary    Left ventricular systolic function is normal. Calculated left ventricular EF = 58%    Left ventricular wall thickness is consistent with moderate septal asymmetric hypertrophy. There is no evidence for left ventricular outflow tract obstruction.    There is severe mitral calcification with mild mitral valve stenosis  present. The mitral valve mean gradient is 4 mmHg.    Mild dilation Valsalva of sinus present measuring 4.1 cm    No ECG evidence of myocardial ischemia. Negative clinical evidence of myocardial ischemia. Findings consistent with a normal ECG stress test.    Normal dobutamine stress echo with no significant echocardiographic evidence for myocardial ischemia.      I have reviewed the medications:  Scheduled Meds:aspirin, 81 mg, Oral, Q24H  atorvastatin, 40 mg, Oral, Nightly  bethanechol, 50 mg, Oral, Q12H  bisacodyl, 10 mg, Rectal,  Daily  dronabinol, 2.5 mg, Oral, BID  famotidine, 20 mg, Oral, BID  fluticasone, 2 spray, Each Nare, Daily  metoprolol tartrate, 12.5 mg, Oral, Q12H  senna-docusate sodium, 2 tablet, Oral, BID   And  polyethylene glycol, 17 g, Oral, Daily  predniSONE, 20 mg, Oral, Daily With Breakfast  sodium chloride, 10 mL, Intravenous, Q12H  tamsulosin, 0.4 mg, Oral, Daily  cyanocobalamin, 1,000 mcg, Oral, Daily      Continuous Infusions:sodium chloride 0.9 % with KCl 20 mEq, 50 mL/hr, Last Rate: 50 mL/hr (10/27/23 1010)      PRN Meds:.  acetaminophen **OR** acetaminophen **OR** acetaminophen    albuterol    senna-docusate sodium **AND** polyethylene glycol **AND** bisacodyl **AND** bisacodyl    Calcium Replacement - Follow Nurse / BPA Driven Protocol    diphenhydrAMINE    droperidol **OR** droperidol    ePHEDrine    fentanyl    flumazenil    hydrALAZINE    ipratropium-albuterol    labetalol    Magnesium Standard Dose Replacement - Follow Nurse / BPA Driven Protocol    naloxone    ondansetron    ondansetron    Phosphorus Replacement - Follow Nurse / BPA Driven Protocol    Potassium Replacement - Follow Nurse / BPA Driven Protocol    prochlorperazine    promethazine **OR** promethazine    sodium chloride    sodium chloride    sodium chloride    traMADol    Assessment & Plan   Assessment & Plan     Active Hospital Problems    Diagnosis  POA    **Physical deconditioning [R53.81]  Yes    Vasogenic cerebral edema [G93.6]  Yes    Poor appetite [R63.0]  Yes    Coronary arteriosclerosis [I25.10]  Yes    History of malignant neoplasm of prostate [Z85.46]  Not Applicable    Depressive disorder [F32.A]  Yes    Lung cancer metastatic to brain [C34.90, C79.31]  Yes    Chronic midline low back pain without sciatica [M54.50, G89.29]  Yes    CAD (coronary artery disease) [I25.10]  Yes    Atrial fibrillation [I48.91]  Yes    Benign essential hypertension [I10]  Yes    Obstructive sleep apnea [G47.33]  Yes      Resolved Hospital Problems   No  resolved problems to display.        Brief Hospital Course to date:  Russell Ramírez Sr. is a 78 y.o. male with extensive small cell lung cancer, brain metastasis recent poor appetite and weight loss, anemia secondary to chemotherapy presents to the hospital with failure to thrive and physical deconditioning and was found to have severe constipation.    Discussion/plan for today:  I had an extensive goals of care conversation with the patient's family today.  At this point they would like him to continue on IV fluid until the day of discharge.  There was some talk about skilled facility but now they tell case management they would like to take him home.  I am uncertain if they will be able to manage him in his current state at home.  We again reviewed all imaging they are aware that his MRI shows enlarging brain lesions and subacute stroke.  They agree with aspirin and statin therapy for stroke.  They agree with plan for prednisone.  They are hopeful he will get stronger with physical therapy but no he suffers from a very complicated disease with a poor prognosis.  They understand if at any point they would like to transition him to more comfort focused care such as hospice he is a candidate.  Consult case management to further discuss posthospitalization plans.  I have discussed case with oncology over the phone and plan is to continue treatment course, supportive care, and monitoring for the time being.    Metastatic Lung Cancer: Liver and adrenal metastasis noted on CT. MRI brain shows worsening brain metastasis. Oncology following.  Difficulty Urinating: PSA low.  UA was not infectious. Required River catheter.  On Flomax.  Voiding trial  NV/Constipation/Chronic Pancreatitis: No free air was noted on the chest x-ray.  He has had bowel movement.  Continue bowel regimen.  GI following.  Supportive care and symptom treatment  Rhinitis: RVP was ok. Recent abx reported.  Continue nasal spray.  AFib: Anticoagulation v  continued aspirin per oncology  Deconditioning: PT/OT  PPX: per oncology  Disposition: Anticipate to rehab facility      CODE STATUS:   Code Status and Medical Interventions:   Ordered at: 10/19/23 175     Code Status (Patient has no pulse and is not breathing):    CPR (Attempt to Resuscitate)     Medical Interventions (Patient has pulse or is breathing):    Full Support       Gama Newman MD  10/27/23

## 2023-10-27 NOTE — THERAPY TREATMENT NOTE
Patient Name: Russell Ramírez Sr.  : 1945    MRN: [id number]                              Today's Date: 10/27/2023       Admit Date: 10/19/2023    Visit Dx:     ICD-10-CM ICD-9-CM   1. Physical deconditioning  R53.81 799.3     Patient Active Problem List   Diagnosis    Lung nodule    Lung cancer metastatic to brain    High risk medication use    Encounter for adjustment or management of vascular access device    Abdominal aortic aneurysm    Adenomatous polyp of ascending colon    Anxiety state    Atrial fibrillation    Atrial flutter    Back problem    Benign essential hypertension    Cervical spondyloarthritis    Chronic midline low back pain without sciatica    Depressive disorder    Edema    Erectile dysfunction    Gastroesophageal reflux disease    History of malignant neoplasm of prostate    Hx of endovascular stent graft for abdominal aortic aneurysm    Hypercholesteremia    Pure hypercholesterolemia    Hypertensive heart disease    Hypotestosteronism    Malignant neoplasm of skin    Neck pain    Obesity    Obstructive sleep apnea    Osteoarthritis    Prostate cancer    Hypertension    PVD (peripheral vascular disease)    Vascular disorder    Raynaud's phenomenon    CAD (coronary artery disease)    Coronary arteriosclerosis in native artery    Coronary arteriosclerosis    Coronary atherosclerosis    Renal artery atherosclerosis    S/P ablation of atrial fibrillation    S/P renal artery angioplasty    Swelling of limb    Tobacco dependence syndrome    Secondary malignant neoplasm of brain    Physical deconditioning    Vasogenic cerebral edema    Poor appetite     Past Medical History:   Diagnosis Date    Acid reflux     Anemia     Arthritis     Coronary artery disease     CVA (cerebral vascular accident)     High cholesterol     History of atrial fibrillation     Hx of blood clots     Hypertension     Irregular heartbeat     Peptic ulcer disease     Prostate cancer     Rheumatoid arthritis     Skin  cancer     Small cell lung cancer      Past Surgical History:   Procedure Laterality Date    ABDOMINAL AORTIC ANEURYSM REPAIR      BACK SURGERY      x2    BRONCHOSCOPY WITH ION ROBOTIC ASSIST N/A 03/13/2023    Procedure: BRONCHOSCOPY ,ENDOBRONCHIAL ULTRASOUND AND ROBOTIC NAVIGATIONAL BRONCHOSCOPY WITH FINE NEEDLE ASPIRATION, BIOPSY X1 AREA, AND BRONCHOALEOLAR LAVAGE;  Surgeon: Sonali Lara MD;  Location: [facility] ENDOSCOPY;  Service: Robotics - Pulmonary;  Laterality: N/A;  Post: LUNG CANCER    CARDIAC ABLATION      x2    COLON SURGERY      HERNIA REPAIR      INSERTION CENTRAL VENOUS ACCESS DEVICE W/ SUBCUTANEOUS PORT Right 03/20/2023    VENOUS ACCESS DEVICE (PORT) INSERTION Right 03/20/2023    Procedure: INSERTION VENOUS ACCESS DEVICE;  Surgeon: Javier Gonzalez MD PhD;  Location: [facility] OR;  Service: Thoracic;  Laterality: Right;      General Information       Row Name 10/27/23 1620          Physical Therapy Time and Intention    Document Type therapy note (daily note)  -EB     Mode of Treatment individual therapy;physical therapy  -EB       Row Name 10/27/23 1620          General Information    Patient Profile Reviewed yes  -EB     Existing Precautions/Restrictions fall  -EB       Row Name 10/27/23 1620          Cognition    Orientation Status (Cognition) oriented to;person  -EB       Row Name 10/27/23 1620          Safety Issues, Functional Mobility    Safety Issues Affecting Function (Mobility) ability to follow commands;awareness of need for assistance;insight into deficits/self-awareness;judgment;problem-solving;sequencing abilities  -EB     Impairments Affecting Function (Mobility) endurance/activity tolerance;strength;cognition;range of motion (ROM)  -EB               User Key  (r) = Recorded By, (t) = Taken By, (c) = Cosigned By      Initials Name Provider Type    EB Paula Madison PTA Physical Therapist Assistant                   Mobility       Row Name 10/27/23 1621          Bed Mobility     Comment, (Bed Mobility) NT-pt sitting UI  -EB       Row Name 10/27/23 1621          Sit-Stand Transfer    Comment, (Sit-Stand Transfer) Deferred as pt had difficulty with following simple commands and needed increased time with completing exercises in the chair. Pt continued to fall asleep in the chair however family wanted pt to stay in the chair after tx.  -EB               User Key  (r) = Recorded By, (t) = Taken By, (c) = Cosigned By      Initials Name Provider Type    Paula José PTA Physical Therapist Assistant                   Obj/Interventions       Row Name 10/27/23 1622          Motor Skills    Therapeutic Exercise --  BLE: LAQs and seated marches (X10) BUE: ayala shoulder ER, Bicep curls (X10) max cueing and increased time to complete reps. very very very slow. All with red T-band  -EB       Row Name 10/27/23 1622          Balance    Balance Assessment sitting static balance  -EB     Static Sitting Balance supervision  -EB     Position, Sitting Balance sitting in chair  -EB     Comment, Balance Increased time with educating family on exercises with T-band and pt had questions about the difference of HH PT Vs. SNF. Lengthy discussion with pt's daughter and wife about rehab due to pt's  increased weakness but also my have some difficulty with SNF due to pt having a difficult time with following commands.  -EB               User Key  (r) = Recorded By, (t) = Taken By, (c) = Cosigned By      Initials Name Provider Type    Paula José PTA Physical Therapist Assistant                   Goals/Plan    No documentation.                  Clinical Impression       Row Name 10/27/23 1624          Plan of Care Review    Plan of Care Reviewed With patient  -EB     Progress no change  -EB     Outcome Evaluation Pt seen for PT treatment today. Pt was up in chair when PT arrived and family request that pt stay up in the chair after tx. Pt had some difficulty staying awake and staying on task and following  commands with simple exercises today. Pt needed increased processing time and max verbal and tactile cues to complete reps. Family had questions about HH PT vs SNF. Educated family on the difference and what PT reccomends SNF at this time but will continue to follow pt for d/c planning needs. Family also educated on assisting pt with exercises. Deferred transfers today as pt fell asleep in the chair and family wanted him to stay sitting up. Will continue to progress pt as able.  -EB       Row Name 10/27/23 1625          Therapy Assessment/Plan (PT)    Therapy Frequency (PT) 3 times/wk  -EB       Row Name 10/27/23 1625          Positioning and Restraints    Pre-Treatment Position sitting in chair/recliner  -EB     Post Treatment Position chair  -EB     In Chair reclined;call light within reach;encouraged to call for assist;exit alarm on;with family/caregiver  -EB               User Key  (r) = Recorded By, (t) = Taken By, (c) = Cosigned By      Initials Name Provider Type    EB Paula Madison PTA Physical Therapist Assistant                   Outcome Measures       Row Name 10/27/23 1630 10/27/23 0845       How much help from another person do you currently need...    Turning from your back to your side while in flat bed without using bedrails? 2  -EB 2  -AT    Moving from lying on back to sitting on the side of a flat bed without bedrails? 2  -EB 2  -AT    Moving to and from a bed to a chair (including a wheelchair)? 2  -EB 2  -AT    Standing up from a chair using your arms (e.g., wheelchair, bedside chair)? 2  -EB 2  -AT    Climbing 3-5 steps with a railing? 1  -EB 1  -AT    To walk in hospital room? 1  -EB 2  -AT    AM-PAC 6 Clicks Score (PT) 10  -EB 11  -AT    Highest level of mobility 4 --> Transferred to chair/commode  -EB 4 --> Transferred to chair/commode  -AT              User Key  (r) = Recorded By, (t) = Taken By, (c) = Cosigned By      Initials Name Provider Type    AT Lashell Baird RN Registered  Nurse    Paula José PTA Physical Therapist Assistant                                 Physical Therapy Education       Title: PT OT SLP Therapies (In Progress)       Topic: Physical Therapy (In Progress)       Point: Mobility training (In Progress)       Learning Progress Summary             Patient Acceptance, E, VU,NR by EB at 10/27/2023 1630    Acceptance, E,D, NR by EB at 10/25/2023 1325    Acceptance, E, NR by DB at 10/23/2023 1707    Acceptance, E,TB, NR by GJ at 10/21/2023 1649    Comment: Discussed benefits of participation with therapy.  Answered questions from wife.   Family Acceptance, E, VU,NR by EB at 10/27/2023 1630   Significant Other Acceptance, E,TB, NR by GJ at 10/21/2023 1649    Comment: Discussed benefits of participation with therapy.  Answered questions from wife.                         Point: Home exercise program (In Progress)       Learning Progress Summary             Patient Acceptance, E, VU,NR by EB at 10/27/2023 1630    Acceptance, E, NR by DB at 10/23/2023 1707    Acceptance, E,TB, NR by GJ at 10/21/2023 1649    Comment: Discussed benefits of participation with therapy.  Answered questions from wife.   Family Acceptance, E, VU,NR by EB at 10/27/2023 1630   Significant Other Acceptance, E,TB, NR by GJ at 10/21/2023 1649    Comment: Discussed benefits of participation with therapy.  Answered questions from wife.                         Point: Body mechanics (In Progress)       Learning Progress Summary             Patient Acceptance, E, VU,NR by EB at 10/27/2023 1630    Acceptance, E,D, NR by EB at 10/25/2023 1325    Acceptance, E, NR by DB at 10/23/2023 1707    Acceptance, E,TB, NR by GJ at 10/21/2023 1649    Comment: Discussed benefits of participation with therapy.  Answered questions from wife.   Family Acceptance, E, VU,NR by EB at 10/27/2023 1630   Significant Other Acceptance, E,TB, NR by GJ at 10/21/2023 1649    Comment: Discussed benefits of participation with therapy.   Answered questions from wife.                         Point: Precautions (In Progress)       Learning Progress Summary             Patient Acceptance, E, VU,NR by EB at 10/27/2023 1630    Acceptance, E,D, NR by EB at 10/25/2023 1325    Acceptance, E, NR by DB at 10/23/2023 1707    Acceptance, E,TB, NR by GJ at 10/21/2023 1649    Comment: Discussed benefits of participation with therapy.  Answered questions from wife.   Family Acceptance, E, VU,NR by EB at 10/27/2023 1630   Significant Other Acceptance, E,TB, NR by GJ at 10/21/2023 1649    Comment: Discussed benefits of participation with therapy.  Answered questions from wife.                                         User Key       Initials Effective Dates Name Provider Type Discipline     07/11/23 -  Inderjit Hayes, PT Physical Therapist PT    EB 02/14/23 -  Paula Madison PTA Physical Therapist Assistant PT    DB 06/16/21 -  Hanny Sharif, PT Physical Therapist PT                  PT Recommendation and Plan     Plan of Care Reviewed With: patient  Progress: no change  Outcome Evaluation: Pt seen for PT treatment today. Pt was up in chair when PT arrived and family request that pt stay up in the chair after tx. Pt had some difficulty staying awake and staying on task and following commands with simple exercises today. Pt needed increased processing time and max verbal and tactile cues to complete reps. Family had questions about HH PT vs SNF. Educated family on the difference and what PT reccomends SNF at this time but will continue to follow pt for d/c planning needs. Family also educated on assisting pt with exercises. Deferred transfers today as pt fell asleep in the chair and family wanted him to stay sitting up. Will continue to progress pt as able.     Time Calculation:         PT Charges       Row Name 10/27/23 1620             Time Calculation    Start Time 1401  -EB      Stop Time 1442  -EB      Time Calculation (min) 41 min  -EB      PT Received On  10/27/23  -EB      PT - Next Appointment 10/30/23  -         Time Calculation- PT    Total Timed Code Minutes- PT 38 minute(s)  -EB                User Key  (r) = Recorded By, (t) = Taken By, (c) = Cosigned By      Initials Name Provider Type    Paula José PTA Physical Therapist Assistant                  Therapy Charges for Today       Code Description Service Date Service Provider Modifiers Qty    17599001205 HC PT THER PROC EA 15 MIN 10/27/2023 Paula Madison PTA GP 2    69298986826 HC PT THERAPEUTIC ACT EA 15 MIN 10/27/2023 Paula Madison PTA GP 1            PT G-Codes  Outcome Measure Options: AM-PAC 6 Clicks Basic Mobility (PT)  AM-PAC 6 Clicks Score (PT): 10  AM-PAC 6 Clicks Score (OT): 6       Paula Madison PTA  10/27/2023

## 2023-10-28 LAB
ANION GAP SERPL CALCULATED.3IONS-SCNC: 6.6 MMOL/L (ref 5–15)
BUN SERPL-MCNC: 9 MG/DL (ref 8–23)
BUN/CREAT SERPL: 13.4 (ref 7–25)
CALCIUM SPEC-SCNC: 8.8 MG/DL (ref 8.6–10.5)
CHLORIDE SERPL-SCNC: 107 MMOL/L (ref 98–107)
CO2 SERPL-SCNC: 28.4 MMOL/L (ref 22–29)
CREAT SERPL-MCNC: 0.67 MG/DL (ref 0.76–1.27)
EGFRCR SERPLBLD CKD-EPI 2021: 96.2 ML/MIN/1.73
GLUCOSE SERPL-MCNC: 99 MG/DL (ref 65–99)
POTASSIUM SERPL-SCNC: 3.4 MMOL/L (ref 3.5–5.2)
POTASSIUM SERPL-SCNC: 4.3 MMOL/L (ref 3.5–5.2)
SODIUM SERPL-SCNC: 142 MMOL/L (ref 136–145)

## 2023-10-28 PROCEDURE — 63710000001 PREDNISONE PER 1 MG: Performed by: INTERNAL MEDICINE

## 2023-10-28 PROCEDURE — 63710000001 DRONABINOL PER 2.5 MG: Performed by: INTERNAL MEDICINE

## 2023-10-28 PROCEDURE — 80048 BASIC METABOLIC PNL TOTAL CA: CPT | Performed by: INTERNAL MEDICINE

## 2023-10-28 PROCEDURE — 84132 ASSAY OF SERUM POTASSIUM: CPT | Performed by: INTERNAL MEDICINE

## 2023-10-28 PROCEDURE — 25010000002 SODIUM CHLORIDE 0.9 % WITH KCL 20 MEQ 20-0.9 MEQ/L-% SOLUTION: Performed by: INTERNAL MEDICINE

## 2023-10-28 RX ORDER — POTASSIUM CHLORIDE 1.5 G/1.58G
40 POWDER, FOR SOLUTION ORAL EVERY 4 HOURS
Status: COMPLETED | OUTPATIENT
Start: 2023-10-28 | End: 2023-10-28

## 2023-10-28 RX ORDER — MIDODRINE HYDROCHLORIDE 2.5 MG/1
2.5 TABLET ORAL
Status: DISCONTINUED | OUTPATIENT
Start: 2023-10-28 | End: 2023-11-02 | Stop reason: HOSPADM

## 2023-10-28 RX ADMIN — DOCUSATE SODIUM 50MG AND SENNOSIDES 8.6MG 2 TABLET: 8.6; 5 TABLET, FILM COATED ORAL at 20:43

## 2023-10-28 RX ADMIN — POTASSIUM CHLORIDE 40 MEQ: 1.5 FOR SOLUTION ORAL at 11:51

## 2023-10-28 RX ADMIN — FAMOTIDINE 20 MG: 20 TABLET ORAL at 20:43

## 2023-10-28 RX ADMIN — DRONABINOL 2.5 MG: 2.5 CAPSULE ORAL at 09:38

## 2023-10-28 RX ADMIN — ASPIRIN 81 MG: 81 TABLET, COATED ORAL at 18:31

## 2023-10-28 RX ADMIN — POTASSIUM CHLORIDE AND SODIUM CHLORIDE 50 ML/HR: 900; 150 INJECTION, SOLUTION INTRAVENOUS at 06:37

## 2023-10-28 RX ADMIN — FAMOTIDINE 20 MG: 20 TABLET ORAL at 09:38

## 2023-10-28 RX ADMIN — Medication 10 ML: at 20:45

## 2023-10-28 RX ADMIN — Medication 1000 MCG: at 09:37

## 2023-10-28 RX ADMIN — BETHANECHOL CHLORIDE 50 MG: 25 TABLET ORAL at 21:14

## 2023-10-28 RX ADMIN — POTASSIUM CHLORIDE 40 MEQ: 1.5 FOR SOLUTION ORAL at 15:04

## 2023-10-28 RX ADMIN — METOPROLOL TARTRATE 12.5 MG: 25 TABLET, FILM COATED ORAL at 20:43

## 2023-10-28 RX ADMIN — BETHANECHOL CHLORIDE 50 MG: 25 TABLET ORAL at 10:13

## 2023-10-28 RX ADMIN — ATORVASTATIN CALCIUM 40 MG: 20 TABLET, FILM COATED ORAL at 20:43

## 2023-10-28 RX ADMIN — METOPROLOL TARTRATE 12.5 MG: 25 TABLET, FILM COATED ORAL at 09:38

## 2023-10-28 RX ADMIN — PREDNISONE 20 MG: 20 TABLET ORAL at 09:37

## 2023-10-28 RX ADMIN — Medication 10 ML: at 10:19

## 2023-10-28 RX ADMIN — TAMSULOSIN HYDROCHLORIDE 0.4 MG: 0.4 CAPSULE ORAL at 09:37

## 2023-10-28 RX ADMIN — CARBIDOPA AND LEVODOPA 2.5 MG: 50; 200 TABLET, EXTENDED RELEASE ORAL at 15:05

## 2023-10-28 NOTE — PROGRESS NOTES
[facility]  PROGRESS NOTE    Patient Name: Russell Ramírez Sr.  : 1945  MRN: [id number]    Date of Admission: 10/19/2023  Primary Care Physician: Morgan Sebastian DO    Subjective   Subjective     CC:  Follow-up recent fatigue    Subjective:  I had further conversation with patient's wife at the bedside today.  Patient remains quite lethargic this morning.  He is not oriented and cannot provide much history.  He is only speaking minimally.  Wife reports he did sit up in the chair some yesterday.  Wife requests he continue on IV fluid until the time of discharge.  He is reportedly eating and drinking some.  She now says she thinks she wants to take him to a skilled facility instead of home as she is not sure if she could take care of him at home.  She still plans to have him reevaluated with Dr. Chambers.    Review of Systems  No current fevers or chills  No current nausea, vomiting, or diarrhea  No current chest pain or palpitations      Objective   Objective     Vital Signs:   Temp:  [97.6 °F (36.4 °C)-98.8 °F (37.1 °C)] 98.8 °F (37.1 °C)  Heart Rate:  [79-92] 83  Resp:  [16] 16  BP: ()/(63-88) 98/63        Physical Exam:  Constitutional: Alert but lethargic, chronically ill-appearing  HENT: NCAT, mucous membranes moist, neck supple  Respiratory: No cough or wheezing, nonlabored breathing  Cardiovascular: Pulse rate is normal, normal radial pulses  Gastrointestinal: soft, nontender, nondistended  Musculoskeletal: Generally weak and elderly and frail in appearance, BMI is 28, no lower extremity edema  Psychiatric: Calm affect, cooperative, speaks a few words at times  Neurologic: Oriented to name only, poor historian, able to follow commands  Skin: No rashes or jaundice, warm      Results Reviewed:  Results from last 7 days   Lab Units 10/27/23  1226 10/25/23  0536 10/24/23  0515   WBC 10*3/mm3 7.50 7.53 6.94   HEMOGLOBIN g/dL 11.1* 11.2* 11.1*   HEMATOCRIT % 31.2* 31.4* 31.3*    PLATELETS 10*3/mm3 119* 147 156     Results from last 7 days   Lab Units 10/28/23  0636 10/25/23  0536 10/24/23  0515 10/24/23  0135 10/23/23  0538 10/22/23  0658   SODIUM mmol/L 142 138 139  --  139 136   POTASSIUM mmol/L 3.4* 3.6 3.5   < > 3.3* 3.7   CHLORIDE mmol/L 107 103 103  --  103 104   CO2 mmol/L 28.4 25.8 27.8  --  27.1 26.0   BUN mg/dL 9 9 8  --  7* 9   CREATININE mg/dL 0.67* 0.71* 0.72*  --  0.72* 0.77   GLUCOSE mg/dL 99 103* 90  --  91 102*   CALCIUM mg/dL 8.8 8.8 8.7  --  8.7 8.2*   ALK PHOS U/L  --   --   --   --  72 66   ALT (SGPT) U/L  --   --   --   --  24 20   AST (SGOT) U/L  --   --   --   --  22 19    < > = values in this interval not displayed.     Estimated Creatinine Clearance: 107.2 mL/min (A) (by C-G formula based on SCr of 0.67 mg/dL (L)).    Microbiology Results Abnormal       Procedure Component Value - Date/Time    Respiratory Panel PCR w/COVID-19(SARS-CoV-2) [facility]/YESENIA/[facility]FRED In-House, NP Swab in UTM/VTM, 3-4 HR TAT - Swab, Nasopharynx [[id number]]  (Normal) Collected: 10/20/23 1236    Lab Status: Final result Specimen: Swab from Nasopharynx Updated: 10/20/23 1411     ADENOVIRUS, PCR Not Detected     Coronavirus 229E Not Detected     Coronavirus HKU1 Not Detected     Coronavirus NL63 Not Detected     Coronavirus OC43 Not Detected     COVID19 Not Detected     Human Metapneumovirus Not Detected     Human Rhinovirus/Enterovirus Not Detected     Influenza A PCR Not Detected     Influenza B PCR Not Detected     Parainfluenza Virus 1 Not Detected     Parainfluenza Virus 2 Not Detected     Parainfluenza Virus 3 Not Detected     Parainfluenza Virus 4 Not Detected     RSV, PCR Not Detected     Bordetella pertussis pcr Not Detected     Bordetella parapertussis PCR Not Detected     Chlamydophila pneumoniae PCR Not Detected     Mycoplasma pneumo by PCR Not Detected    Narrative:      In the setting of a positive respiratory panel with a viral infection PLUS a negative procalcitonin  without other underlying concern for bacterial infection, consider observing off antibiotics or discontinuation of antibiotics and continue supportive care. If the respiratory panel is positive for atypical bacterial infection (Bordetella pertussis, Chlamydophila pneumoniae, or Mycoplasma pneumoniae), consider antibiotic de-escalation to target atypical bacterial infection.            Imaging Results (Last 24 Hours)       ** No results found for the last 24 hours. **            Results for orders placed during the hospital encounter of 03/14/23    Adult Stress Echo W/ Cont or Stress Agent if Necessary Per Protocol    Interpretation Summary  •  Left ventricular systolic function is normal. Calculated left ventricular EF = 58%  •  Left ventricular wall thickness is consistent with moderate septal asymmetric hypertrophy. There is no evidence for left ventricular outflow tract obstruction.  •  There is severe mitral calcification with mild mitral valve stenosis  present. The mitral valve mean gradient is 4 mmHg.  •  Mild dilation Valsalva of sinus present measuring 4.1 cm  •  No ECG evidence of myocardial ischemia. Negative clinical evidence of myocardial ischemia. Findings consistent with a normal ECG stress test.  •  Normal dobutamine stress echo with no significant echocardiographic evidence for myocardial ischemia.      I have reviewed the medications:  Scheduled Meds:aspirin, 81 mg, Oral, Q24H  atorvastatin, 40 mg, Oral, Nightly  bethanechol, 50 mg, Oral, Q12H  bisacodyl, 10 mg, Rectal, Daily  famotidine, 20 mg, Oral, BID  fluticasone, 2 spray, Each Nare, Daily  metoprolol tartrate, 12.5 mg, Oral, Q12H  senna-docusate sodium, 2 tablet, Oral, BID   And  polyethylene glycol, 17 g, Oral, Daily  potassium chloride, 40 mEq, Oral, Q4H  predniSONE, 20 mg, Oral, Daily With Breakfast  sodium chloride, 10 mL, Intravenous, Q12H  tamsulosin, 0.4 mg, Oral, Daily  cyanocobalamin, 1,000 mcg, Oral, Daily      Continuous  Infusions:sodium chloride 0.9 % with KCl 20 mEq, 50 mL/hr, Last Rate: 50 mL/hr (10/28/23 0637)      PRN Meds:.•  acetaminophen **OR** acetaminophen **OR** acetaminophen  •  albuterol  •  senna-docusate sodium **AND** polyethylene glycol **AND** bisacodyl **AND** bisacodyl  •  Calcium Replacement - Follow Nurse / BPA Driven Protocol  •  diphenhydrAMINE  •  droperidol **OR** droperidol  •  ePHEDrine  •  fentanyl  •  flumazenil  •  hydrALAZINE  •  ipratropium-albuterol  •  labetalol  •  Magnesium Standard Dose Replacement - Follow Nurse / BPA Driven Protocol  •  naloxone  •  ondansetron  •  ondansetron  •  Phosphorus Replacement - Follow Nurse / BPA Driven Protocol  •  Potassium Replacement - Follow Nurse / BPA Driven Protocol  •  prochlorperazine  •  promethazine **OR** promethazine  •  sodium chloride  •  sodium chloride  •  sodium chloride  •  traMADol    Assessment & Plan   Assessment & Plan     Active Hospital Problems    Diagnosis  POA   • **Physical deconditioning [R53.81]  Yes   • Vasogenic cerebral edema [G93.6]  Yes   • Poor appetite [R63.0]  Yes   • Coronary arteriosclerosis [I25.10]  Yes   • History of malignant neoplasm of prostate [Z85.46]  Not Applicable   • Depressive disorder [F32.A]  Yes   • Lung cancer metastatic to brain [C34.90, C79.31]  Yes   • Chronic midline low back pain without sciatica [M54.50, G89.29]  Yes   • CAD (coronary artery disease) [I25.10]  Yes   • Atrial fibrillation [I48.91]  Yes   • Benign essential hypertension [I10]  Yes   • Obstructive sleep apnea [G47.33]  Yes      Resolved Hospital Problems   No resolved problems to display.        Brief Hospital Course to date:  Russell Ramírez  is a 78 y.o. male with extensive small cell lung cancer, brain metastasis recent poor appetite and weight loss, anemia secondary to chemotherapy presents to the hospital with failure to thrive and physical deconditioning and was found to have severe constipation.    Discussion/plan for  today:  Replete potassium today.  Continue IV fluid for now per patient's wife's wishes.  Patient is very lethargic and remains a very poor historian.  Plan to continue supportive care and symptom treatment.  I have encouraged patient's wife to consider skilled facility for subacute rehabilitation as I am concerned the family may not be able to take care of him at home in his current condition.  She is agreeable to this idea today and agrees to talk to the  further about referrals.  We again discussed all of his medications and the recommendations from the oncologist.  Blood pressure is a little lower this morning.  Map is over 60 though.  Continue to monitor.  Continuing goals of care conversations and continuing to help family better understand his overall illness and prognosis.  These can be very difficult conversations unfortunately.  Labs ordered for tomorrow.    Metastatic Lung Cancer: Liver and adrenal metastasis noted on CT. MRI brain shows worsening brain metastasis. Oncology consult team followed.  Subacute stroke: Aspirin and statin and PT.  Difficulty Urinating: PSA low.  UA was not infectious. Required River catheter.  On Flomax.  Voiding trial completed  NV/Constipation/Chronic Pancreatitis: No free air was noted on the chest x-ray.  He has had bowel movement.  Continue bowel regimen.  GI following.  Supportive care and symptom treatment  Rhinitis: RVP was ok. Recent abx reported.  Continue nasal spray.  AFib: Anticoagulation v continued aspirin per oncology  Deconditioning: PT/OT  PPX: per oncology  Disposition: We are recommending SNF      CODE STATUS:   Code Status and Medical Interventions:   Ordered at: 10/19/23 2063     Code Status (Patient has no pulse and is not breathing):    CPR (Attempt to Resuscitate)     Medical Interventions (Patient has pulse or is breathing):    Full Support       Gama Newman MD  10/28/23

## 2023-10-28 NOTE — PLAN OF CARE
Goal Outcome Evaluation:      BP low this shift, pt complaining of dizziness upon standing, Midodrine ordered. BP stable at this time. Pt up in chair for majority of the shift. Pills whole or crushed in apple sauce. Family to remain at bedside.  Problem: Pain Acute  Goal: Acceptable Pain Control and Functional Ability  Outcome: Ongoing, Progressing  Intervention: Prevent or Manage Pain  Recent Flowsheet Documentation  Taken 10/28/2023 0940 by Kristin Figueroa, RN  Bowel Elimination Promotion: adequate fluid intake promoted  Intervention: Optimize Psychosocial Wellbeing  Recent Flowsheet Documentation  Taken 10/28/2023 0940 by Kristin Figueroa, RN  Diversional Activities: television

## 2023-10-28 NOTE — SIGNIFICANT NOTE
Patient with lower blood pressure today.  Likely multifactorial and due to his underlying illness.  Plan to try very lowest dose midodrine to see if this can increase blood pressure and monitor response.  I would like to avoid holding his beta-blocker due to risk of rapid atrial fibrillation.    Electronically signed by Gama Newman MD, 10/28/23, 12:25 PM EDT.

## 2023-10-29 LAB
ANION GAP SERPL CALCULATED.3IONS-SCNC: 7.9 MMOL/L (ref 5–15)
BUN SERPL-MCNC: 8 MG/DL (ref 8–23)
BUN/CREAT SERPL: 11.8 (ref 7–25)
CALCIUM SPEC-SCNC: 8.7 MG/DL (ref 8.6–10.5)
CHLORIDE SERPL-SCNC: 104 MMOL/L (ref 98–107)
CO2 SERPL-SCNC: 26.1 MMOL/L (ref 22–29)
CREAT SERPL-MCNC: 0.68 MG/DL (ref 0.76–1.27)
DEPRECATED RDW RBC AUTO: 49.6 FL (ref 37–54)
EGFRCR SERPLBLD CKD-EPI 2021: 95.7 ML/MIN/1.73
ERYTHROCYTE [DISTWIDTH] IN BLOOD BY AUTOMATED COUNT: 14.5 % (ref 12.3–15.4)
GLUCOSE SERPL-MCNC: 101 MG/DL (ref 65–99)
HCT VFR BLD AUTO: 32.5 % (ref 37.5–51)
HGB BLD-MCNC: 11.3 G/DL (ref 13–17.7)
MCH RBC QN AUTO: 33 PG (ref 26.6–33)
MCHC RBC AUTO-ENTMCNC: 34.8 G/DL (ref 31.5–35.7)
MCV RBC AUTO: 95 FL (ref 79–97)
PLATELET # BLD AUTO: 127 10*3/MM3 (ref 140–450)
PMV BLD AUTO: 8.8 FL (ref 6–12)
POTASSIUM SERPL-SCNC: 3.6 MMOL/L (ref 3.5–5.2)
RBC # BLD AUTO: 3.42 10*6/MM3 (ref 4.14–5.8)
SODIUM SERPL-SCNC: 138 MMOL/L (ref 136–145)
WBC NRBC COR # BLD: 8.18 10*3/MM3 (ref 3.4–10.8)

## 2023-10-29 PROCEDURE — 63710000001 PREDNISONE PER 1 MG: Performed by: INTERNAL MEDICINE

## 2023-10-29 PROCEDURE — 85027 COMPLETE CBC AUTOMATED: CPT | Performed by: INTERNAL MEDICINE

## 2023-10-29 PROCEDURE — 25010000002 SODIUM CHLORIDE 0.9 % WITH KCL 20 MEQ 20-0.9 MEQ/L-% SOLUTION: Performed by: INTERNAL MEDICINE

## 2023-10-29 PROCEDURE — 99232 SBSQ HOSP IP/OBS MODERATE 35: CPT | Performed by: INTERNAL MEDICINE

## 2023-10-29 PROCEDURE — 63710000001 DRONABINOL PER 2.5 MG: Performed by: INTERNAL MEDICINE

## 2023-10-29 PROCEDURE — 80048 BASIC METABOLIC PNL TOTAL CA: CPT | Performed by: INTERNAL MEDICINE

## 2023-10-29 RX ORDER — DRONABINOL 2.5 MG/1
2.5 CAPSULE ORAL 2 TIMES DAILY
Status: DISCONTINUED | OUTPATIENT
Start: 2023-10-29 | End: 2023-11-02 | Stop reason: HOSPADM

## 2023-10-29 RX ADMIN — FAMOTIDINE 20 MG: 20 TABLET ORAL at 09:40

## 2023-10-29 RX ADMIN — DOCUSATE SODIUM 50MG AND SENNOSIDES 8.6MG 2 TABLET: 8.6; 5 TABLET, FILM COATED ORAL at 09:40

## 2023-10-29 RX ADMIN — DRONABINOL 2.5 MG: 2.5 CAPSULE ORAL at 22:08

## 2023-10-29 RX ADMIN — POTASSIUM CHLORIDE AND SODIUM CHLORIDE 50 ML/HR: 900; 150 INJECTION, SOLUTION INTRAVENOUS at 06:24

## 2023-10-29 RX ADMIN — FAMOTIDINE 20 MG: 20 TABLET ORAL at 22:01

## 2023-10-29 RX ADMIN — METOPROLOL TARTRATE 12.5 MG: 25 TABLET, FILM COATED ORAL at 09:42

## 2023-10-29 RX ADMIN — TAMSULOSIN HYDROCHLORIDE 0.4 MG: 0.4 CAPSULE ORAL at 09:40

## 2023-10-29 RX ADMIN — Medication 10 ML: at 09:41

## 2023-10-29 RX ADMIN — POTASSIUM CHLORIDE AND SODIUM CHLORIDE 50 ML/HR: 900; 150 INJECTION, SOLUTION INTRAVENOUS at 22:32

## 2023-10-29 RX ADMIN — ATORVASTATIN CALCIUM 40 MG: 20 TABLET, FILM COATED ORAL at 22:01

## 2023-10-29 RX ADMIN — CARBIDOPA AND LEVODOPA 2.5 MG: 50; 200 TABLET, EXTENDED RELEASE ORAL at 11:53

## 2023-10-29 RX ADMIN — CARBIDOPA AND LEVODOPA 2.5 MG: 50; 200 TABLET, EXTENDED RELEASE ORAL at 17:19

## 2023-10-29 RX ADMIN — BETHANECHOL CHLORIDE 50 MG: 25 TABLET ORAL at 09:41

## 2023-10-29 RX ADMIN — Medication 1000 MCG: at 09:40

## 2023-10-29 RX ADMIN — ASPIRIN 81 MG: 81 TABLET, COATED ORAL at 17:20

## 2023-10-29 RX ADMIN — METOPROLOL TARTRATE 12.5 MG: 25 TABLET, FILM COATED ORAL at 21:59

## 2023-10-29 RX ADMIN — BETHANECHOL CHLORIDE 50 MG: 25 TABLET ORAL at 22:02

## 2023-10-29 RX ADMIN — Medication 10 ML: at 21:53

## 2023-10-29 RX ADMIN — PREDNISONE 20 MG: 20 TABLET ORAL at 09:40

## 2023-10-29 NOTE — PLAN OF CARE
Problem: Fall Injury Risk  Goal: Absence of Fall and Fall-Related Injury  Outcome: Ongoing, Progressing  Intervention: Promote Injury-Free Environment  Recent Flowsheet Documentation  Taken 10/29/2023 0600 by Kim Sierra RN  Safety Promotion/Fall Prevention:   safety round/check completed   lighting adjusted   clutter free environment maintained   assistive device/personal items within reach  Taken 10/29/2023 0410 by Kim Sierra RN  Safety Promotion/Fall Prevention:   safety round/check completed   lighting adjusted   clutter free environment maintained   assistive device/personal items within reach  Taken 10/29/2023 0200 by Kim Sierra RN  Safety Promotion/Fall Prevention:   safety round/check completed   lighting adjusted   clutter free environment maintained   assistive device/personal items within reach  Taken 10/29/2023 0000 by Kim Sierra RN  Safety Promotion/Fall Prevention:   safety round/check completed   lighting adjusted   clutter free environment maintained   assistive device/personal items within reach  Taken 10/28/2023 2200 by Kim Sierra RN  Safety Promotion/Fall Prevention:   safety round/check completed   lighting adjusted   clutter free environment maintained   assistive device/personal items within reach  Taken 10/28/2023 2000 by Kim Sierra RN  Safety Promotion/Fall Prevention:   safety round/check completed   lighting adjusted   clutter free environment maintained   assistive device/personal items within reach     Problem: Skin Injury Risk Increased  Goal: Skin Health and Integrity  Outcome: Ongoing, Progressing     Problem: Pain Acute  Goal: Acceptable Pain Control and Functional Ability  Outcome: Ongoing, Progressing  Intervention: Optimize Psychosocial Wellbeing  Recent Flowsheet Documentation  Taken 10/28/2023 2000 by Kim Sierra RN  Diversional Activities: television     Problem: Coping Ineffective (Oncology Care)  Goal: Effective Coping  Outcome:  Ongoing, Progressing  Intervention: Support and Enhance Coping Strategies  Recent Flowsheet Documentation  Taken 10/28/2023 2000 by Kim Sierra, RN  Family/Support System Care:   self-care encouraged   support provided     Problem: Fatigue (Oncology Care)  Goal: Improved Activity Tolerance  Outcome: Ongoing, Progressing     Problem: Oral Intake Altered (Oncology Care)  Goal: Optimal Oral Intake  Outcome: Ongoing, Progressing     Problem: Oral Mucositis (Oncology Care)  Goal: Improved Oral Mucous Membrane Integrity  Outcome: Ongoing, Progressing     Problem: Pain Acute (Oncology Care)  Goal: Optimal Pain Control  Outcome: Ongoing, Progressing  Intervention: Optimize Psychosocial Wellbeing  Recent Flowsheet Documentation  Taken 10/28/2023 2000 by Kim Sierra, RN  Diversional Activities: television     Problem: Obstructive Sleep Apnea Risk or Actual Comorbidity Management  Goal: Unobstructed Breathing During Sleep  Outcome: Ongoing, Progressing   Goal Outcome Evaluation:         Plan of care discussed with wife and daughter due to patient cognitive status; no change in condition  Alert and oriented to self and family, increasingly lethargic, difficulty swallowing pills whole in applesauce, he chews them up and holds them, incontinent of bowel this shift, assist times 2 to turn and clean up, wife is at bedside now daughter was here last evening. Patient denies pain when asked, no indicators of pain at this time, uses cpap at hs, ivf continue in chest wall port positive for blood return, labs obtained as ordered  Continue to monitor, safety precautions in place

## 2023-10-29 NOTE — PROGRESS NOTES
Subjective     CHIEF COMPLAINT:     SCLC    INTERVAL HISTORY:     Patient was seen with wife at bedside.  She reports that he ate okay yesterday but not much this morning.  He was on Marinol but took the last dose last night.  The wife felt that it helped him eat more.    Patient denies abdominal pain.  Bowel movements are regular.    REVIEW OF SYSTEMS:  A comprehensive review of systems was obtained with pertinent positive findings as noted in the interval history above.  All other systems negative.    SCHEDULED MEDS:  aspirin, 81 mg, Oral, Q24H  atorvastatin, 40 mg, Oral, Nightly  bethanechol, 50 mg, Oral, Q12H  bisacodyl, 10 mg, Rectal, Daily  dronabinol, 2.5 mg, Oral, BID  famotidine, 20 mg, Oral, BID  fluticasone, 2 spray, Each Nare, Daily  metoprolol tartrate, 12.5 mg, Oral, Q12H  midodrine, 2.5 mg, Oral, TID AC  senna-docusate sodium, 2 tablet, Oral, BID   And  polyethylene glycol, 17 g, Oral, Daily  predniSONE, 20 mg, Oral, Daily With Breakfast  sodium chloride, 10 mL, Intravenous, Q12H  tamsulosin, 0.4 mg, Oral, Daily  cyanocobalamin, 1,000 mcg, Oral, Daily      Objective   VITAL SIGNS:  Temp:  [97.6 °F (36.4 °C)-99.1 °F (37.3 °C)] 97.7 °F (36.5 °C)  Heart Rate:  [82-91] 82  Resp:  [20] 20  BP: ()/() 98/81     PHYSICAL EXAMINATION:   GENERAL: Patient is profoundly weak.  SKIN: No skin rash.   EYES: Pallor.  No jaundice.  CHEST: Normal respiratory effort.   CARDIAC:  Normal S1 & S2. No murmur.   ABDOMEN: Mildly distended.  EXTREMITIES:  No edema.    RESULT REVIEW:   Results from last 7 days   Lab Units 10/29/23  0623 10/27/23  1226 10/25/23  0536 10/24/23  0515 10/23/23  0538   WBC 10*3/mm3 8.18 7.50 7.53 6.94 7.12   NEUTROS ABS 10*3/mm3  --   --  5.85 4.99 5.92   LYMPHS ABS 10*3/mm3  --   --   --   --  0.63*   HEMOGLOBIN g/dL 11.3* 11.1* 11.2* 11.1* 10.4*   HEMATOCRIT % 32.5* 31.2* 31.4* 31.3* 29.9*   PLATELETS 10*3/mm3 127* 119* 147 156 143     Results from last 7 days   Lab Units  10/29/23  0623 10/28/23  2056 10/28/23  0636 10/25/23  0536 10/24/23  0515 10/24/23  0135 10/23/23  0538   SODIUM mmol/L 138  --  142 138 139  --  139   POTASSIUM mmol/L 3.6 4.3 3.4* 3.6 3.5   < > 3.3*   CHLORIDE mmol/L 104  --  107 103 103  --  103   CO2 mmol/L 26.1  --  28.4 25.8 27.8  --  27.1   BUN mg/dL 8  --  9 9 8  --  7*   CREATININE mg/dL 0.68*  --  0.67* 0.71* 0.72*  --  0.72*   CALCIUM mg/dL 8.7  --  8.8 8.8 8.7  --  8.7   ALBUMIN g/dL  --   --   --   --   --   --  3.3*   BILIRUBIN mg/dL  --   --   --   --   --   --  0.4   ALK PHOS U/L  --   --   --   --   --   --  72   ALT (SGPT) U/L  --   --   --   --   --   --  24   AST (SGOT) U/L  --   --   --   --   --   --  22   MAGNESIUM mg/dL  --   --   --  2.1 2.1  --  1.9    < > = values in this interval not displayed.     MRI brain on 10/24/2023:  Previously seen left cerebellar lesion measuring 5 mm now measures 10 mm.  Left precentral lesion which previously measured 2 mm now measures 3 mm.  Unchanged 3 mm enhancing lesion in the left thalamus.  Unchanged 3 mm lesion in the right frontal vertex.  Previously seen left inferior frontal lobe lesion which measured 2 mm now   measures 4 mm  Previously seen left putaminal lesion no longer identified.    Tiny restricted diffusion again seen right frontal lobe.     IMPRESSION:           1.  Some lesions have increased in size while some appear stable.  2.  Evolving tiny infarct in the right frontal lobe.    Assessment & Plan   *Extensive stage small cell lung cancer.  Patient follows with Dr. Conn at our office.  He was treated with 4 cycles of carboplatin etoposide and Tecentriq, completed on 6/2/2023.  6/19/2023: Increased mediastinal lymph nodes, stable metastatic liver and right adrenal lesions.  He was continued on maintenance Tecentriq every 3 weeks.  Last dose was on 10/5/2023.  10/19/2023: CT chest abdomen pelvis revealed stable 1.4 cm right lower pulmonary nodule.  There was increased right hilar lymph  nodes.  There were new liver metastasis up to 3.6 and 3.0 cm.  There was significant increase in nodular enhancement of both adrenal glands, concerning for metastasis.  Based on the timing since completion of treatment being <6 months, this was considered primary refractory disease.  I had a lengthy discussion with the patient's family and I reviewed the CT scan with them.  They asked about surgery as an option for treatment for the liver and adrenal masses. I explained that surgery is not considered an option due to the number of lesions and due to the lesions being metastatic rather than primary.  I explained that chemotherapy treatment is the main method of treatment.  I expressed my concern that he is currently weak and would now be able to tolerate it.  Exam today revealed no improvement in his overall status.    *Brain metastasis.  S/p whole brain radiation therapy.  MRI brain requested.  Patient was previously unable to have MRI done under anesthesia.    10/25/2023: Patient had MRI of the brain under anesthesia.  MRI revealed increase in the size of the left cerebellar lesion from 5 mm to 10 mm.    The left precentral lesion increased from 2 to 3 mm.    A lesion in the left inferior frontal lobe increased from 2 mm to 4 mm.    Other lesions were stable.  A lesion in the left putamen was no longer identified.    The lesion in the left cerebellum is likely contributing to dizziness.  I explained the difficult situation with the worsening despite undergoing whole brain radiation.  Patient was seen by radiation oncology.  He was considered too weak to undergo radiation at this point.  The recommendation was to have follow-up with his radiation oncologist at [facility], Dr. Chambers, to revisit this.  We explained to the patient's wife the current difficult status with his poor performance status making him not a good candidate for radiation or chemotherapy.  She is still hopeful that he will be able to be  discharged and see Dr. Chambres as outpatient for brain radiation.    *Poor appetite and weight loss.  Protein malnutrition - total protein 5.9.  Cortisol ACTH levels were obtained to evaluate for adrenal insufficiency as the cause.  A.M. cortisol 27.  Very poor appetite is likely secondary to the disease progression.  Prednisone 20 mg daily started on 10/23/2023.  He was on Marinol 2.5 mg twice daily x7 days.  He tolerated that and the wife felt that it was helping.    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  10/23/2023: Hemoglobin 10.4.  10/24/2023: Hemoglobin 11.1.  10/25/2023: Hemoglobin 11.2.  10/29/2023: Hemoglobin 11.3.    *Thrombocytopenia.  10/29/2023: Platelet count 127,000.    *Deconditioning  Recommended PT and OT if he is able to participate.    PLAN:    1.  Restart Marinol 2.5 mg twice daily.  2.  Continue prednisone 20 mg daily.  3.  He will continue to work with PT/OT.  4.  Of note is that the patient's family did not want to pursue palliative care and wanted to see if his status would improve to be able to receive radiation therapy to the brain.    Discussed with the patient's RN.      Sarah Beth Kunz MD  10/29/23

## 2023-10-29 NOTE — PROGRESS NOTES
[facility] Medicine Services  PROGRESS NOTE    Patient Name: Russell Ramírez Sr.  : 1945  MRN: [id number]    Date of Admission: 10/19/2023  Primary Care Physician: Morgan Sebastian DO    Subjective   Subjective     CC:  Follow-up recent fatigue    Subjective:  Patient was seen at 7:14 AM this morning.  He was a little more conversational this morning and denied any pain.  He remains a poor historian though is significantly debilitated from his illness.  His family was sleeping very soundly in the room.  No other new events reported.  Blood pressure is higher this morning.  He denies lightheadedness    Review of Systems  No current fevers or chills  No current nausea, vomiting, or diarrhea  No current chest pain or palpitations      Objective   Objective     Vital Signs:   Temp:  [97.6 °F (36.4 °C)-99.1 °F (37.3 °C)] 99.1 °F (37.3 °C)  Heart Rate:  [78-91] 91  Resp:  [16-20] 20  BP: ()/(58-92) 152/92        Physical Exam:  Constitutional: Alert but lethargic, chronically ill-appearing  HENT: NCAT, mucous membranes moist, neck supple  Respiratory: No cough or wheezing, nonlabored breathing  Cardiovascular: Pulse rate is normal, normal radial pulses  Gastrointestinal: soft, nontender, nondistended  Musculoskeletal: Generally weak and elderly and frail in appearance, BMI is 28, no lower extremity edema  Psychiatric: Calm affect, cooperative, speaks a few words at times  Neurologic: Oriented to name only, poor historian, able to follow commands  Skin: No rashes or jaundice, warm      Results Reviewed:  Results from last 7 days   Lab Units 10/29/23  0623 10/27/23  1226 10/25/23  0536   WBC 10*3/mm3 8.18 7.50 7.53   HEMOGLOBIN g/dL 11.3* 11.1* 11.2*   HEMATOCRIT % 32.5* 31.2* 31.4*   PLATELETS 10*3/mm3 127* 119* 147     Results from last 7 days   Lab Units 10/29/23  0623 10/28/23  2056 10/28/23  0636 10/25/23  0536 10/24/23  0135 10/23/23  0538   SODIUM mmol/L 138  --  142 138   < > 139   POTASSIUM  mmol/L 3.6 4.3 3.4* 3.6   < > 3.3*   CHLORIDE mmol/L 104  --  107 103   < > 103   CO2 mmol/L 26.1  --  28.4 25.8   < > 27.1   BUN mg/dL 8  --  9 9   < > 7*   CREATININE mg/dL 0.68*  --  0.67* 0.71*   < > 0.72*   GLUCOSE mg/dL 101*  --  99 103*   < > 91   CALCIUM mg/dL 8.7  --  8.8 8.8   < > 8.7   ALK PHOS U/L  --   --   --   --   --  72   ALT (SGPT) U/L  --   --   --   --   --  24   AST (SGOT) U/L  --   --   --   --   --  22    < > = values in this interval not displayed.     Estimated Creatinine Clearance: 105.6 mL/min (A) (by C-G formula based on SCr of 0.68 mg/dL (L)).    Microbiology Results Abnormal       Procedure Component Value - Date/Time    Respiratory Panel PCR w/COVID-19(SARS-CoV-2) SEYMOUR/GEORGI/YESENIA/PAD/COR/MAD/MANFRED In-House, NP Swab in UTM/VTM, 3-4 HR TAT - Swab, Nasopharynx [[id number]]  (Normal) Collected: 10/20/23 1236    Lab Status: Final result Specimen: Swab from Nasopharynx Updated: 10/20/23 1411     ADENOVIRUS, PCR Not Detected     Coronavirus 229E Not Detected     Coronavirus HKU1 Not Detected     Coronavirus NL63 Not Detected     Coronavirus OC43 Not Detected     COVID19 Not Detected     Human Metapneumovirus Not Detected     Human Rhinovirus/Enterovirus Not Detected     Influenza A PCR Not Detected     Influenza B PCR Not Detected     Parainfluenza Virus 1 Not Detected     Parainfluenza Virus 2 Not Detected     Parainfluenza Virus 3 Not Detected     Parainfluenza Virus 4 Not Detected     RSV, PCR Not Detected     Bordetella pertussis pcr Not Detected     Bordetella parapertussis PCR Not Detected     Chlamydophila pneumoniae PCR Not Detected     Mycoplasma pneumo by PCR Not Detected    Narrative:      In the setting of a positive respiratory panel with a viral infection PLUS a negative procalcitonin without other underlying concern for bacterial infection, consider observing off antibiotics or discontinuation of antibiotics and continue supportive care. If the respiratory panel is positive for  atypical bacterial infection (Bordetella pertussis, Chlamydophila pneumoniae, or Mycoplasma pneumoniae), consider antibiotic de-escalation to target atypical bacterial infection.            Imaging Results (Last 24 Hours)       ** No results found for the last 24 hours. **            Results for orders placed during the hospital encounter of 03/14/23    Adult Stress Echo W/ Cont or Stress Agent if Necessary Per Protocol    Interpretation Summary  •  Left ventricular systolic function is normal. Calculated left ventricular EF = 58%  •  Left ventricular wall thickness is consistent with moderate septal asymmetric hypertrophy. There is no evidence for left ventricular outflow tract obstruction.  •  There is severe mitral calcification with mild mitral valve stenosis  present. The mitral valve mean gradient is 4 mmHg.  •  Mild dilation Valsalva of sinus present measuring 4.1 cm  •  No ECG evidence of myocardial ischemia. Negative clinical evidence of myocardial ischemia. Findings consistent with a normal ECG stress test.  •  Normal dobutamine stress echo with no significant echocardiographic evidence for myocardial ischemia.      I have reviewed the medications:  Scheduled Meds:aspirin, 81 mg, Oral, Q24H  atorvastatin, 40 mg, Oral, Nightly  bethanechol, 50 mg, Oral, Q12H  bisacodyl, 10 mg, Rectal, Daily  famotidine, 20 mg, Oral, BID  fluticasone, 2 spray, Each Nare, Daily  metoprolol tartrate, 12.5 mg, Oral, Q12H  midodrine, 2.5 mg, Oral, TID AC  senna-docusate sodium, 2 tablet, Oral, BID   And  polyethylene glycol, 17 g, Oral, Daily  predniSONE, 20 mg, Oral, Daily With Breakfast  sodium chloride, 10 mL, Intravenous, Q12H  tamsulosin, 0.4 mg, Oral, Daily  cyanocobalamin, 1,000 mcg, Oral, Daily      Continuous Infusions:sodium chloride 0.9 % with KCl 20 mEq, 50 mL/hr, Last Rate: 50 mL/hr (10/29/23 0624)      PRN Meds:.•  acetaminophen **OR** acetaminophen **OR** acetaminophen  •  albuterol  •  senna-docusate sodium  **AND** polyethylene glycol **AND** bisacodyl **AND** bisacodyl  •  Calcium Replacement - Follow Nurse / BPA Driven Protocol  •  diphenhydrAMINE  •  droperidol **OR** droperidol  •  ePHEDrine  •  fentanyl  •  flumazenil  •  hydrALAZINE  •  ipratropium-albuterol  •  labetalol  •  Magnesium Standard Dose Replacement - Follow Nurse / BPA Driven Protocol  •  naloxone  •  ondansetron  •  ondansetron  •  Phosphorus Replacement - Follow Nurse / BPA Driven Protocol  •  Potassium Replacement - Follow Nurse / BPA Driven Protocol  •  prochlorperazine  •  promethazine **OR** promethazine  •  sodium chloride  •  sodium chloride  •  sodium chloride  •  traMADol    Assessment & Plan   Assessment & Plan     Active Hospital Problems    Diagnosis  POA   • **Physical deconditioning [R53.81]  Yes   • Vasogenic cerebral edema [G93.6]  Yes   • Poor appetite [R63.0]  Yes   • Coronary arteriosclerosis [I25.10]  Yes   • History of malignant neoplasm of prostate [Z85.46]  Not Applicable   • Depressive disorder [F32.A]  Yes   • Lung cancer metastatic to brain [C34.90, C79.31]  Yes   • Chronic midline low back pain without sciatica [M54.50, G89.29]  Yes   • CAD (coronary artery disease) [I25.10]  Yes   • Atrial fibrillation [I48.91]  Yes   • Benign essential hypertension [I10]  Yes   • Obstructive sleep apnea [G47.33]  Yes      Resolved Hospital Problems   No resolved problems to display.        Brief Hospital Course to date:  Russell Ramírez  is a 78 y.o. male with extensive small cell lung cancer, brain metastasis recent poor appetite and weight loss, anemia secondary to chemotherapy presents to the hospital with failure to thrive and physical deconditioning and was found to have severe constipation.    Discussion/plan for today:  Patient initiated on midodrine for soft blood pressures and lightheadedness yesterday.  This has resolved today.  I have added hold parameters to midodrine and will keep it at the very low-dose.  Plan to hold  for systolic blood pressures greater than 120.  Hopefully he can discontinue this medication soon.  Family still making decisions about discharge planning.  Case management has been reconsulted.  I would like the family to talk to case management further about referrals.  Continuing goals of care conversation with family.  Labs ordered for tomorrow.    Metastatic Lung Cancer: Liver and adrenal metastasis noted on CT. MRI brain shows worsening brain metastasis. Oncology consult team followed.  Subacute stroke: Aspirin and statin and PT.  Difficulty Urinating: PSA low.  UA was not infectious. Required River catheter.  On Flomax.  Voiding trial completed  NV/Constipation/Chronic Pancreatitis: No free air was noted on the chest x-ray.  He has had bowel movement.  Continue bowel regimen.  GI following.  Supportive care and symptom treatment  Rhinitis: RVP was ok. Recent abx reported.  Continue nasal spray.  AFib: Anticoagulation v continued aspirin per oncology  Deconditioning: PT/OT  PPX: per oncology  Disposition: We are recommending SNF      CODE STATUS:   Code Status and Medical Interventions:   Ordered at: 10/19/23 2001     Code Status (Patient has no pulse and is not breathing):    CPR (Attempt to Resuscitate)     Medical Interventions (Patient has pulse or is breathing):    Full Support       Gama Newman MD  10/29/23

## 2023-10-30 LAB
ANION GAP SERPL CALCULATED.3IONS-SCNC: 5 MMOL/L (ref 5–15)
BUN SERPL-MCNC: 10 MG/DL (ref 8–23)
BUN/CREAT SERPL: 13.7 (ref 7–25)
CALCIUM SPEC-SCNC: 9.1 MG/DL (ref 8.6–10.5)
CHLORIDE SERPL-SCNC: 106 MMOL/L (ref 98–107)
CO2 SERPL-SCNC: 28 MMOL/L (ref 22–29)
CREAT SERPL-MCNC: 0.73 MG/DL (ref 0.76–1.27)
DEPRECATED RDW RBC AUTO: 49.6 FL (ref 37–54)
EGFRCR SERPLBLD CKD-EPI 2021: 93.7 ML/MIN/1.73
ERYTHROCYTE [DISTWIDTH] IN BLOOD BY AUTOMATED COUNT: 14.7 % (ref 12.3–15.4)
GLUCOSE SERPL-MCNC: 94 MG/DL (ref 65–99)
HCT VFR BLD AUTO: 31.3 % (ref 37.5–51)
HGB BLD-MCNC: 10.8 G/DL (ref 13–17.7)
MCH RBC QN AUTO: 32.7 PG (ref 26.6–33)
MCHC RBC AUTO-ENTMCNC: 34.5 G/DL (ref 31.5–35.7)
MCV RBC AUTO: 94.8 FL (ref 79–97)
PLATELET # BLD AUTO: 116 10*3/MM3 (ref 140–450)
PMV BLD AUTO: 8.8 FL (ref 6–12)
POTASSIUM SERPL-SCNC: 3.8 MMOL/L (ref 3.5–5.2)
RBC # BLD AUTO: 3.3 10*6/MM3 (ref 4.14–5.8)
SODIUM SERPL-SCNC: 139 MMOL/L (ref 136–145)
WBC NRBC COR # BLD: 7.05 10*3/MM3 (ref 3.4–10.8)

## 2023-10-30 PROCEDURE — 85027 COMPLETE CBC AUTOMATED: CPT | Performed by: INTERNAL MEDICINE

## 2023-10-30 PROCEDURE — 63710000001 PREDNISONE PER 1 MG: Performed by: INTERNAL MEDICINE

## 2023-10-30 PROCEDURE — 63710000001 DRONABINOL PER 2.5 MG: Performed by: INTERNAL MEDICINE

## 2023-10-30 PROCEDURE — 97530 THERAPEUTIC ACTIVITIES: CPT

## 2023-10-30 PROCEDURE — 80048 BASIC METABOLIC PNL TOTAL CA: CPT | Performed by: INTERNAL MEDICINE

## 2023-10-30 PROCEDURE — 99231 SBSQ HOSP IP/OBS SF/LOW 25: CPT | Performed by: INTERNAL MEDICINE

## 2023-10-30 PROCEDURE — 97110 THERAPEUTIC EXERCISES: CPT

## 2023-10-30 RX ORDER — GUAIFENESIN 600 MG/1
600 TABLET, EXTENDED RELEASE ORAL EVERY 12 HOURS SCHEDULED
Status: DISCONTINUED | OUTPATIENT
Start: 2023-10-30 | End: 2023-10-31

## 2023-10-30 RX ADMIN — Medication 10 ML: at 22:41

## 2023-10-30 RX ADMIN — METOPROLOL TARTRATE 12.5 MG: 25 TABLET, FILM COATED ORAL at 08:02

## 2023-10-30 RX ADMIN — FLUTICASONE PROPIONATE 2 SPRAY: 50 SPRAY, METERED NASAL at 08:08

## 2023-10-30 RX ADMIN — TAMSULOSIN HYDROCHLORIDE 0.4 MG: 0.4 CAPSULE ORAL at 08:01

## 2023-10-30 RX ADMIN — BETHANECHOL CHLORIDE 50 MG: 25 TABLET ORAL at 22:41

## 2023-10-30 RX ADMIN — CARBIDOPA AND LEVODOPA 2.5 MG: 50; 200 TABLET, EXTENDED RELEASE ORAL at 08:01

## 2023-10-30 RX ADMIN — DRONABINOL 2.5 MG: 2.5 CAPSULE ORAL at 08:07

## 2023-10-30 RX ADMIN — PREDNISONE 20 MG: 20 TABLET ORAL at 08:01

## 2023-10-30 RX ADMIN — Medication 1000 MCG: at 08:02

## 2023-10-30 RX ADMIN — GUAIFENESIN 600 MG: 600 TABLET, EXTENDED RELEASE ORAL at 09:35

## 2023-10-30 RX ADMIN — DOCUSATE SODIUM 50MG AND SENNOSIDES 8.6MG 2 TABLET: 8.6; 5 TABLET, FILM COATED ORAL at 08:01

## 2023-10-30 RX ADMIN — ATORVASTATIN CALCIUM 40 MG: 20 TABLET, FILM COATED ORAL at 22:42

## 2023-10-30 RX ADMIN — FAMOTIDINE 20 MG: 20 TABLET ORAL at 22:41

## 2023-10-30 RX ADMIN — METOPROLOL TARTRATE 12.5 MG: 25 TABLET, FILM COATED ORAL at 22:41

## 2023-10-30 RX ADMIN — CARBIDOPA AND LEVODOPA 2.5 MG: 50; 200 TABLET, EXTENDED RELEASE ORAL at 17:18

## 2023-10-30 RX ADMIN — GUAIFENESIN 600 MG: 600 TABLET, EXTENDED RELEASE ORAL at 22:41

## 2023-10-30 RX ADMIN — Medication 10 ML: at 08:08

## 2023-10-30 RX ADMIN — DRONABINOL 2.5 MG: 2.5 CAPSULE ORAL at 22:42

## 2023-10-30 RX ADMIN — BETHANECHOL CHLORIDE 50 MG: 25 TABLET ORAL at 09:38

## 2023-10-30 RX ADMIN — ASPIRIN 81 MG: 81 TABLET, COATED ORAL at 17:20

## 2023-10-30 RX ADMIN — CARBIDOPA AND LEVODOPA 2.5 MG: 50; 200 TABLET, EXTENDED RELEASE ORAL at 12:30

## 2023-10-30 RX ADMIN — FAMOTIDINE 20 MG: 20 TABLET ORAL at 08:07

## 2023-10-30 NOTE — DISCHARGE PLACEMENT REQUEST
"Russell Crouch Sr. (78 y.o. Male)       Date of Birth   1945    Social Security Number       Address   [address] [facility] [place] [address]    Home Phone   [phone]    MRN   [id number]       Judaism   [employer]uc[employer] of [place]    Marital Status                               Admission Date   10/19/23    Admission Type   Emergency    Admitting Provider   Rosalva Chester MD    Attending Provider   Gama Newman MD    Department, Room/Bed   [address], [address]       Discharge Date       Discharge Disposition       Discharge Destination                                 Attending Provider: Gama Newman MD    Allergies: Codeine    Isolation: None   Infection: None   Code Status: CPR    Ht: 180.3 cm (71\")   Wt: 92.3 kg (203 lb 7.8 oz)    Admission Cmt: None   Principal Problem: Physical deconditioning [R53.81]                   Active Insurance as of 10/19/2023       Primary Coverage       Payor Plan Insurance Group Employer/Plan Group    MEDICARE MEDICARE A & B        Payor Plan Address Payor Plan Phone Number Payor Plan Fax Number Effective Dates    [address] [phone]  12/1/1993 - None Entered    [facility] [id number]         Subscriber Name Subscriber Birth Date Member ID       RUSSELL CROUCH SR. 1945 [id number]               Secondary Coverage       Payor Plan Insurance Group Employer/Plan Group    [facility] SUP [employer] OPTIONS PLAN F       Payor Plan Address Payor Plan Phone Number Payor Plan Fax Number Effective Dates    [facility] [phone]  1/1/2016 - None Entered    [address]       [facility] [address]         Subscriber Name Subscriber Birth Date Member ID       RUSSELL CROUCH BASILIO SR. 1945 [id number]                     Emergency Contacts        (Rel.) Home Phone Work Phone Mobile Phone    Marivel Crouch (Spouse) [phone] -- [phone]    WILLASARA (Daughter) [phone] " [phone]

## 2023-10-30 NOTE — THERAPY TREATMENT NOTE
Patient Name: Russell Ramírez Sr.  : 1945    MRN: [id number]                              Today's Date: 10/30/2023       Admit Date: 10/19/2023    Visit Dx:     ICD-10-CM ICD-9-CM   1. Physical deconditioning  R53.81 799.3     Patient Active Problem List   Diagnosis    Lung nodule    Lung cancer metastatic to brain    High risk medication use    Encounter for adjustment or management of vascular access device    Abdominal aortic aneurysm    Adenomatous polyp of ascending colon    Anxiety state    Atrial fibrillation    Atrial flutter    Back problem    Benign essential hypertension    Cervical spondyloarthritis    Chronic midline low back pain without sciatica    Depressive disorder    Edema    Erectile dysfunction    Gastroesophageal reflux disease    History of malignant neoplasm of prostate    Hx of endovascular stent graft for abdominal aortic aneurysm    Hypercholesteremia    Pure hypercholesterolemia    Hypertensive heart disease    Hypotestosteronism    Malignant neoplasm of skin    Neck pain    Obesity    Obstructive sleep apnea    Osteoarthritis    Prostate cancer    Hypertension    PVD (peripheral vascular disease)    Vascular disorder    Raynaud's phenomenon    CAD (coronary artery disease)    Coronary arteriosclerosis in native artery    Coronary arteriosclerosis    Coronary atherosclerosis    Renal artery atherosclerosis    S/P ablation of atrial fibrillation    S/P renal artery angioplasty    Swelling of limb    Tobacco dependence syndrome    Secondary malignant neoplasm of brain    Physical deconditioning    Vasogenic cerebral edema    Poor appetite     Past Medical History:   Diagnosis Date    Acid reflux     Anemia     Arthritis     Coronary artery disease     CVA (cerebral vascular accident)     High cholesterol     History of atrial fibrillation     Hx of blood clots     Hypertension     Irregular heartbeat     Peptic ulcer disease     Prostate cancer     Rheumatoid arthritis     Skin  cancer     Small cell lung cancer      Past Surgical History:   Procedure Laterality Date    ABDOMINAL AORTIC ANEURYSM REPAIR      BACK SURGERY      x2    BRONCHOSCOPY WITH ION ROBOTIC ASSIST N/A 03/13/2023    Procedure: BRONCHOSCOPY ,ENDOBRONCHIAL ULTRASOUND AND ROBOTIC NAVIGATIONAL BRONCHOSCOPY WITH FINE NEEDLE ASPIRATION, BIOPSY X1 AREA, AND BRONCHOALEOLAR LAVAGE;  Surgeon: Sonali Lara MD;  Location: [facility] ENDOSCOPY;  Service: Robotics - Pulmonary;  Laterality: N/A;  Post: LUNG CANCER    CARDIAC ABLATION      x2    COLON SURGERY      HERNIA REPAIR      INSERTION CENTRAL VENOUS ACCESS DEVICE W/ SUBCUTANEOUS PORT Right 03/20/2023    VENOUS ACCESS DEVICE (PORT) INSERTION Right 03/20/2023    Procedure: INSERTION VENOUS ACCESS DEVICE;  Surgeon: Javier Gonzalez MD PhD;  Location: [facility] OR;  Service: Thoracic;  Laterality: Right;      General Information       Row Name 10/30/23 1020          Physical Therapy Time and Intention    Document Type therapy note (daily note)  -EB     Mode of Treatment individual therapy;physical therapy  -EB       Row Name 10/30/23 1020          General Information    Patient Profile Reviewed yes  -EB     Existing Precautions/Restrictions fall  -EB       Row Name 10/30/23 1020          Cognition    Orientation Status (Cognition) oriented to;person  -EB       Row Name 10/30/23 1020          Safety Issues, Functional Mobility    Safety Issues Affecting Function (Mobility) ability to follow commands;awareness of need for assistance;at risk behavior observed;insight into deficits/self-awareness;judgment;positioning of assistive device;problem-solving;safety precaution awareness;safety precautions follow-through/compliance;sequencing abilities  -EB     Impairments Affecting Function (Mobility) balance;cognition;endurance/activity tolerance;strength;range of motion (ROM);postural/trunk control  -EB               User Key  (r) = Recorded By, (t) = Taken By, (c) = Cosigned By       Initials Name Provider Type     Paula Madison PTA Physical Therapist Assistant                   Mobility       Row Name 10/30/23 1021          Bed Mobility    Supine-Sit Pettis (Bed Mobility) moderate assist (50% patient effort);verbal cues;nonverbal cues (demo/gesture)  -EB     Sit-Supine Pettis (Bed Mobility) not tested  -EB     Assistive Device (Bed Mobility) bed rails;head of bed elevated  -EB     Comment, (Bed Mobility) increased time but following commands better initially  -EB       Row Name 10/30/23 1021          Sit-Stand Transfer    Sit-Stand Pettis (Transfers) moderate assist (50% patient effort)  -EB     Assistive Device (Sit-Stand Transfers) walker, front-wheeled  -EB       Row Name 10/30/23 1021          Gait/Stairs (Locomotion)    Pettis Level (Gait) minimum assist (75% patient effort);maximum assist (25% patient effort);2 person assist  -EB     Assistive Device (Gait) walker, front-wheeled  -EB     Distance in Feet (Gait) 5ft  -EB     Deviations/Abnormal Patterns (Gait) gait speed decreased;stride length decreased;darius decreased  -EB     Bilateral Gait Deviations forward flexed posture;heel strike decreased  -EB     Comment, (Gait/Stairs) pt initially following commands ok. Started to head for the chair as pt was getting tired and starting to lean forward. Tried to get pt to turn fully around to safely sit in the chair, however pt fatigued very quickly and leaning very far forward and to the left. Pt stoppted following commnands and required Max assist of 2 for standing balance and to get pt to sit safely in the chair.  -EB               User Key  (r) = Recorded By, (t) = Taken By, (c) = Cosigned By      Initials Name Provider Type    Paula José PTA Physical Therapist Assistant                   Obj/Interventions       Row Name 10/30/23 1025          Motor Skills    Therapeutic Exercise --  BUE: elbow flexion (X5) with T-band. Max cueing needed and AAROM needed.   -EB               User Key  (r) = Recorded By, (t) = Taken By, (c) = Cosigned By      Initials Name Provider Type    Paula José PTA Physical Therapist Assistant                   Goals/Plan    No documentation.                  Clinical Impression       Row Name 10/30/23 1135          Plan of Care Review    Plan of Care Reviewed With patient  -EB     Progress no change  -EB     Outcome Evaluation Pt seen for PT treatment today. Pt in bed and alert upon PT arrival.  Pt initially following commands better intially at the start of treatment. Pt required ModA with bed mobility and ModA with STS transfers. Pt was able to ambulate about 5ft with rwx, initially MinAX2 then MaxAX2. Pt started to fatigue about 3ft and had pt head towards the chair. As pt was fatiguing, pt  increasingly leaning forward and had more difficulty following commands. At about 5ft, pt was at the chair but needed to safely turn to sit. Pt had an extremely difficult time with command following and becoming more unsteady. RN present to move chair more behind pt in order for him to safely sit. After a seated rest break, pt completed 5reps of BUE exercises with difficulty. Unsure of family's d/c plan, SNF recommended but unsure if pt will be able to fully participate as pt fatigues very quickly and difficulty with command following. Will continue to  follow pt for d/c needs.  -EB       Row Name 10/30/23 1135          Therapy Assessment/Plan (PT)    Therapy Frequency (PT) 3 times/wk  -EB       Row Name 10/30/23 1137          Positioning and Restraints    Pre-Treatment Position in bed  -EB     Post Treatment Position chair  -EB     In Chair reclined;call light within reach;encouraged to call for assist;exit alarm on  -EB               User Key  (r) = Recorded By, (t) = Taken By, (c) = Cosigned By      Initials Name Provider Type    Paula José PTA Physical Therapist Assistant                   Outcome Measures       Row Name 10/30/23 1143  10/30/23 1027       How much help from another person do you currently need...    Turning from your back to your side while in flat bed without using bedrails? 2  -EB 2  -EB    Moving from lying on back to sitting on the side of a flat bed without bedrails? 2  -EB 2  -EB    Moving to and from a bed to a chair (including a wheelchair)? 2  -EB 2  -EB    Standing up from a chair using your arms (e.g., wheelchair, bedside chair)? 2  -EB 2  -EB    Climbing 3-5 steps with a railing? 1  -EB 1  -EB    To walk in hospital room? 2  -EB 2  -EB    AM-PAC 6 Clicks Score (PT) 11  -EB 11  -EB    Highest level of mobility 4 --> Transferred to chair/commode  -EB 4 --> Transferred to chair/commode  -EB      Row Name 10/30/23 0800          How much help from another person do you currently need...    Turning from your back to your side while in flat bed without using bedrails? 3  -JS     Moving from lying on back to sitting on the side of a flat bed without bedrails? 3  -JS     Moving to and from a bed to a chair (including a wheelchair)? 3  -JS     Standing up from a chair using your arms (e.g., wheelchair, bedside chair)? 3  -JS     Climbing 3-5 steps with a railing? 1  -JS     To walk in hospital room? 1  -JS     AM-PAC 6 Clicks Score (PT) 14  -JS     Highest level of mobility 4 --> Transferred to chair/commode  -JS               User Key  (r) = Recorded By, (t) = Taken By, (c) = Cosigned By      Initials Name Provider Type    Paula José PTA Physical Therapist Assistant    Aishwarya Iyer RN Registered Nurse                                 Physical Therapy Education       Title: PT OT SLP Therapies (In Progress)       Topic: Physical Therapy (In Progress)       Point: Mobility training (In Progress)       Learning Progress Summary             Patient Acceptance, E,D, NR,NL by EB at 10/30/2023 1027    Acceptance, E, VU,NR by EB at 10/27/2023 1630    Acceptance, E,D, NR by EB at 10/25/2023 1325    Acceptance, E, NR by DB at  10/23/2023 1707    Acceptance, E,TB, NR by GJ at 10/21/2023 1649    Comment: Discussed benefits of participation with therapy.  Answered questions from wife.   Family Acceptance, E, VU,NR by EB at 10/27/2023 1630   Significant Other Acceptance, E,D, NR,NL by EB at 10/30/2023 1027    Acceptance, E,TB, NR by GJ at 10/21/2023 1649    Comment: Discussed benefits of participation with therapy.  Answered questions from wife.                         Point: Home exercise program (In Progress)       Learning Progress Summary             Patient Acceptance, E,D, NR,NL by EB at 10/30/2023 1027    Acceptance, E, VU,NR by EB at 10/27/2023 1630    Acceptance, E, NR by DB at 10/23/2023 1707    Acceptance, E,TB, NR by GJ at 10/21/2023 1649    Comment: Discussed benefits of participation with therapy.  Answered questions from wife.   Family Acceptance, E, VU,NR by EB at 10/27/2023 1630   Significant Other Acceptance, E,D, NR,NL by EB at 10/30/2023 1027    Acceptance, E,TB, NR by GJ at 10/21/2023 1649    Comment: Discussed benefits of participation with therapy.  Answered questions from wife.                         Point: Body mechanics (In Progress)       Learning Progress Summary             Patient Acceptance, E,D, NR,NL by EB at 10/30/2023 1027    Acceptance, E, VU,NR by EB at 10/27/2023 1630    Acceptance, E,D, NR by EB at 10/25/2023 1325    Acceptance, E, NR by DB at 10/23/2023 1707    Acceptance, E,TB, NR by GJ at 10/21/2023 1649    Comment: Discussed benefits of participation with therapy.  Answered questions from wife.   Family Acceptance, E, VU,NR by EB at 10/27/2023 1630   Significant Other Acceptance, E,D, NR,NL by EB at 10/30/2023 1027    Acceptance, E,TB, NR by GJ at 10/21/2023 1649    Comment: Discussed benefits of participation with therapy.  Answered questions from wife.                         Point: Precautions (In Progress)       Learning Progress Summary             Patient Acceptance, E,D, NR,NL by EB at  10/30/2023 1027    Acceptance, E, VU,NR by EB at 10/27/2023 1630    Acceptance, E,D, NR by EB at 10/25/2023 1325    Acceptance, E, NR by DB at 10/23/2023 1707    Acceptance, E,TB, NR by GJ at 10/21/2023 1649    Comment: Discussed benefits of participation with therapy.  Answered questions from wife.   Family Acceptance, E, VU,NR by EB at 10/27/2023 1630   Significant Other Acceptance, E,D, NR,NL by EB at 10/30/2023 1027    Acceptance, E,TB, NR by GJ at 10/21/2023 1649    Comment: Discussed benefits of participation with therapy.  Answered questions from wife.                                         User Key       Initials Effective Dates Name Provider Type Discipline     07/11/23 -  Inderjit Hayes, PT Physical Therapist PT    EB 02/14/23 -  Paula Madison, PTA Physical Therapist Assistant PT    DB 06/16/21 -  Hanny Sharif, PT Physical Therapist PT                  PT Recommendation and Plan     Plan of Care Reviewed With: patient  Progress: no change  Outcome Evaluation: Pt seen for PT treatment today. Pt in bed and alert upon PT arrival.  Pt initially following commands better intially at the start of treatment. Pt required ModA with bed mobility and ModA with STS transfers. Pt was able to ambulate about 5ft with rwx, initially MinAX2 then MaxAX2. Pt started to fatigue about 3ft and had pt head towards the chair. As pt was fatiguing, pt  increasingly leaning forward and had more difficulty following commands. At about 5ft, pt was at the chair but needed to safely turn to sit. Pt had an extremely difficult time with command following and becoming more unsteady. RN present to move chair more behind pt in order for him to safely sit. After a seated rest break, pt completed 5reps of BUE exercises with difficulty. Unsure of family's d/c plan, SNF recommended but unsure if pt will be able to fully participate as pt fatigues very quickly and difficulty with command following. Will continue to  follow pt for d/c  needs.     Time Calculation:         PT Charges       Row Name 10/30/23 1020             Time Calculation    Start Time 0943  -EB      Stop Time 1010  -EB      Time Calculation (min) 27 min  -EB      PT Received On 10/30/23  -EB      PT - Next Appointment 11/01/23  -EB         Time Calculation- PT    Total Timed Code Minutes- PT 27 minute(s)  -EB                User Key  (r) = Recorded By, (t) = Taken By, (c) = Cosigned By      Initials Name Provider Type    EB Paula Madison PTA Physical Therapist Assistant                  Therapy Charges for Today       Code Description Service Date Service Provider Modifiers Qty    12380896392 HC PT THERAPEUTIC ACT EA 15 MIN 10/30/2023 Paula Madison, JUANIS GP 1    90665396579 HC PT THER PROC EA 15 MIN 10/30/2023 Paula Madison, JUNAIS GP 1    25751158991 HC PT THER SUPP EA 15 MIN 10/30/2023 Paula Madison PTA GP 1            PT G-Codes  Outcome Measure Options: AM-PAC 6 Clicks Basic Mobility (PT)  AM-PAC 6 Clicks Score (PT): 11  AM-PAC 6 Clicks Score (OT): 6       Paula Madison PTA  10/30/2023

## 2023-10-30 NOTE — PLAN OF CARE
Goal Outcome Evaluation:  Plan of Care Reviewed With: patient        Progress: no change  Outcome Evaluation: Pt seen for PT treatment today. Pt in bed and alert upon PT arrival.  Pt initially following commands better intially at the start of treatment. Pt required ModA with bed mobility and ModA with STS transfers. Pt was able to ambulate about 5ft with rwx, initially MinAX2 then MaxAX2. Pt started to fatigue about 3ft and had pt head towards the chair. As pt was fatiguing, pt  increasingly leaning forward and had more difficulty following commands. At about 5ft, pt was at the chair but needed to safely turn to sit. Pt had an extremely difficult time with command following and becoming more unsteady. RN present to move chair more behind pt in order for him to safely sit. After a seated rest break, pt completed 5reps of BUE exercises with difficulty. Unsure of family's d/c plan, SNF recommended but unsure if pt will be able to fully participate as pt fatigues very quickly and difficulty with command following. Will continue to  follow pt for d/c needs.

## 2023-10-30 NOTE — PROGRESS NOTES
Subjective     CHIEF COMPLAINT:     SCLC    INTERVAL HISTORY:     Patient was seen with wife at bedside.  She reports that he ate okay yesterday but not much this morning.  He was on Marinol but took the last dose last night.  The wife felt that it helped him eat more.    Patient denies abdominal pain.  Bowel movements are regular.    October 30, 2023: Patient with small cell lung cancer with brain mets currently considering radiation to the brain.  Patient is to go for skilled nursing facility.  Family decided to consider radiation to the brain  MRI brain showed that some lesions have increased in size while some appears stable and there was evolving tiny infarct in the right frontal lobe.  Previously seen left cerebellar lesion measured 5 mm now measures 10 mm.  Previously seen left inferior frontal lobe lesion which measured 2 mm now measures 4 mm.  Apparently patient was to have the radiation with Dr. Chambers.  On discussion with the hospitalist team, since patient does not want to accept palliative care, and they do not want skilled nursing facility, hospitalist team wants to discharge patient to home and subsequently patient can be seen by Dr. Chambers.    REVIEW OF SYSTEMS:  A comprehensive review of systems was obtained with pertinent positive findings as noted in the interval history above.  All other systems negative.    SCHEDULED MEDS:  aspirin, 81 mg, Oral, Q24H  atorvastatin, 40 mg, Oral, Nightly  bethanechol, 50 mg, Oral, Q12H  bisacodyl, 10 mg, Rectal, Daily  dronabinol, 2.5 mg, Oral, BID  famotidine, 20 mg, Oral, BID  fluticasone, 2 spray, Each Nare, Daily  guaiFENesin, 600 mg, Oral, Q12H  metoprolol tartrate, 12.5 mg, Oral, Q12H  midodrine, 2.5 mg, Oral, TID AC  senna-docusate sodium, 2 tablet, Oral, BID   And  polyethylene glycol, 17 g, Oral, Daily  predniSONE, 20 mg, Oral, Daily With Breakfast  sodium chloride, 10 mL, Intravenous, Q12H  tamsulosin, 0.4 mg, Oral, Daily  cyanocobalamin, 1,000 mcg,  Oral, Daily      Objective   VITAL SIGNS:  Temp:  [98 °F (36.7 °C)-99.5 °F (37.5 °C)] 99.5 °F (37.5 °C)  Heart Rate:  [79-91] 91  Resp:  [18] 18  BP: ()/(58-94) 98/58     PHYSICAL EXAMINATION:   GENERAL: Patient is profoundly weak.  SKIN: No skin rash.   EYES: Pallor.  No jaundice.  CHEST: Normal respiratory effort.   CARDIAC:  Normal S1 & S2. No murmur.   ABDOMEN: Mildly distended.  EXTREMITIES:  No edema.    RESULT REVIEW:   Results from last 7 days   Lab Units 10/30/23  0559 10/29/23  0623 10/27/23  1226 10/25/23  0536 10/24/23  0515   WBC 10*3/mm3 7.05 8.18 7.50 7.53 6.94   NEUTROS ABS 10*3/mm3  --   --   --  5.85 4.99   HEMOGLOBIN g/dL 10.8* 11.3* 11.1* 11.2* 11.1*   HEMATOCRIT % 31.3* 32.5* 31.2* 31.4* 31.3*   PLATELETS 10*3/mm3 116* 127* 119* 147 156     Results from last 7 days   Lab Units 10/30/23  0559 10/29/23  0623 10/28/23  2056 10/28/23  0636 10/25/23  0536 10/24/23  0515   SODIUM mmol/L 139 138  --  142 138 139   POTASSIUM mmol/L 3.8 3.6 4.3 3.4* 3.6 3.5   CHLORIDE mmol/L 106 104  --  107 103 103   CO2 mmol/L 28.0 26.1  --  28.4 25.8 27.8   BUN mg/dL 10 8  --  9 9 8   CREATININE mg/dL 0.73* 0.68*  --  0.67* 0.71* 0.72*   CALCIUM mg/dL 9.1 8.7  --  8.8 8.8 8.7   MAGNESIUM mg/dL  --   --   --   --  2.1 2.1     MRI brain on 10/24/2023:  Previously seen left cerebellar lesion measuring 5 mm now measures 10 mm.  Left precentral lesion which previously measured 2 mm now measures 3 mm.  Unchanged 3 mm enhancing lesion in the left thalamus.  Unchanged 3 mm lesion in the right frontal vertex.  Previously seen left inferior frontal lobe lesion which measured 2 mm now   measures 4 mm  Previously seen left putaminal lesion no longer identified.    Tiny restricted diffusion again seen right frontal lobe.     IMPRESSION:           1.  Some lesions have increased in size while some appear stable.  2.  Evolving tiny infarct in the right frontal lobe.    Assessment & Plan   *Extensive stage small cell lung  cancer.  Patient follows with Dr. Conn at our office.  He was treated with 4 cycles of carboplatin etoposide and Tecentriq, completed on 6/2/2023.  6/19/2023: Increased mediastinal lymph nodes, stable metastatic liver and right adrenal lesions.  He was continued on maintenance Tecentriq every 3 weeks.  Last dose was on 10/5/2023.  10/19/2023: CT chest abdomen pelvis revealed stable 1.4 cm right lower pulmonary nodule.  There was increased right hilar lymph nodes.  There were new liver metastasis up to 3.6 and 3.0 cm.  There was significant increase in nodular enhancement of both adrenal glands, concerning for metastasis.  Based on the timing since completion of treatment being <6 months, this was considered primary refractory disease.  I had a lengthy discussion with the patient's family and I reviewed the CT scan with them.  They asked about surgery as an option for treatment for the liver and adrenal masses. I explained that surgery is not considered an option due to the number of lesions and due to the lesions being metastatic rather than primary.  I explained that chemotherapy treatment is the main method of treatment.  I expressed my concern that he is currently weak and would now be able to tolerate it.  Exam today revealed no improvement in his overall status.  October 30, 2023: Patient is still weak though family thinks he is little improved.  He is eating better slightly but still very weak.  He is not a good candidate for any chemotherapy.  Dr Kunz also had discussed with the patient and patient's family that he is not a good candidate for chemotherapy or radiation to the brain.  But family was hopeful to be discharged and then follow-up with Dr. Chambers for consideration of reirradiation to the brain.    *Brain metastasis.  S/p whole brain radiation therapy.  MRI brain requested.  Patient was previously unable to have MRI done under anesthesia.    10/25/2023: Patient had MRI of the brain under  anesthesia.  MRI revealed increase in the size of the left cerebellar lesion from 5 mm to 10 mm.    The left precentral lesion increased from 2 to 3 mm.    A lesion in the left inferior frontal lobe increased from 2 mm to 4 mm.    Other lesions were stable.  A lesion in the left putamen was no longer identified.    The lesion in the left cerebellum is likely contributing to dizziness.  I explained the difficult situation with the worsening despite undergoing whole brain radiation.  Patient was seen by radiation oncology.  He was considered too weak to undergo radiation at this point.  The recommendation was to have follow-up with his radiation oncologist at [facility], Dr. Chambers, to revisit this.  We explained to the patient's wife the current difficult status with his poor performance status making him not a good candidate for radiation or chemotherapy.  She is still hopeful that he will be able to be discharged and see Dr. Chambers as outpatient for brain radiation.    *Poor appetite and weight loss.  Protein malnutrition - total protein 5.9.  Cortisol ACTH levels were obtained to evaluate for adrenal insufficiency as the cause.  A.M. cortisol 27.  Very poor appetite is likely secondary to the disease progression.  Prednisone 20 mg daily started on 10/23/2023.  He was on Marinol 2.5 mg twice daily x7 days.  He tolerated that and the wife felt that it was helping.    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  10/23/2023: Hemoglobin 10.4.  10/24/2023: Hemoglobin 11.1.  10/25/2023: Hemoglobin 11.2.  10/29/2023: Hemoglobin 11.3.    *Thrombocytopenia.  10/29/2023: Platelet count 127,000.    *Deconditioning  Recommended PT and OT if he is able to participate.    PLAN:    1.  Restart Marinol 2.5 mg twice daily.  2.  Continue prednisone 20 mg daily.  3.  He will continue to work with PT/OT.  4.  Of note is that the patient's family did not want to pursue palliative care and wanted to see if his status would  improve to be able to receive radiation therapy to the brain.  5.  On discussion with hospitalist team, since patient not willing to consider palliative care or go to a skilled nursing facility, the will need to discharge patient home in which case if patient's family wants to pursue Dr. Chambers as outpatient they can.    Discussed with the patient's RN.      Karlene Correa MD  10/30/23

## 2023-10-30 NOTE — PLAN OF CARE
Goal Outcome Evaluation:      Patient is alert and oriented to self only.  PT has worked with and his family has cared for him entirely.  They are anxious for him to return home and want to demonstrate that they can care for him fully.    He is on room, has eaten most of the day and can indicate his needs.  BROOKE.

## 2023-10-30 NOTE — CASE MANAGEMENT/SOCIAL WORK
"Continued Stay Note  [facility]     Patient Name: Russell Ramírez Sr.  MRN: [id number]  Today's Date: 10/30/2023    Admit Date: 10/19/2023    Plan: Referral faxed for SNF.   Discharge Plan       Row Name 10/30/23 1356       Plan    Plan Referral faxed for SNF.    Patient/Family in Agreement with Plan yes    Plan Comments Spoke with patient's spouse at bedside. Introduced self. Spoke with spouse about SNF choices; spouse is unsure of plan. Spouse stated that she does not think he is ready to leave because he has been \"coughing up phlegm\". Family concerned that patient would not be monitored at SNF; CCP explained that lab, vitals, assessments, etc. can all be continued at SNF if ordered by MD. CCP explained that patient's main goal is to get stronger to continue treatments, explained that patient could receive more therapy going to SNF if ordered. Family is agreeable to referral to [facility]. Referral placed in EPIC. Spoke with Sadia/ Frannie Carbajal, faxed referral to [phone].                   Discharge Codes    No documentation.                 Expected Discharge Date and Time       Expected Discharge Date Expected Discharge Time    Oct 30, 2023               Caryn Romo RN    "

## 2023-10-30 NOTE — PROGRESS NOTES
[facility] Medicine Services  PROGRESS NOTE    Patient Name: Russell Ramírez Sr.  : 1945  MRN: [id number]    Date of Admission: 10/19/2023  Primary Care Physician: Morgan Sebastian DO    Subjective   Subjective     CC:  Follow-up recent fatigue    Subjective:  Patient was evaluated this morning at approximately 820.  Patient was resting in bed slightly sitting up and his wife was feeding him some eggs and potatoes for breakfast.  He was eating a little bit better this morning.  Family states they have decided at this point they think he could benefit from a skilled nursing facility for subacute rehabilitation.  I told him I would talk to case management and have them speak to him later and the family plans to have them send referrals today.  His wife feels he is eating and drinking better and agrees we will see how he does off of the IV fluids regarding hydration.  IV fluids discontinued this morning.  No new complaints or events or concerns reported.    Review of Systems  No current fevers or chills  No current nausea, vomiting, or diarrhea  No current chest pain or palpitations      Objective   Objective     Vital Signs:   Temp:  [97 °F (36.1 °C)-98.8 °F (37.1 °C)] 98.8 °F (37.1 °C)  Heart Rate:  [79-87] 79  Resp:  [18] 18  BP: (101-130)/(67-94) 106/67        Physical Exam:  Constitutional: Alert but lethargic, chronically ill-appearing  HENT: NCAT, mucous membranes moist, neck supple  Respiratory: No cough or wheezing, nonlabored breathing  Cardiovascular: Pulse rate is normal, normal radial pulses  Gastrointestinal: soft, nontender, nondistended  Musculoskeletal: Generally weak and elderly and frail in appearance, BMI is 28, no lower extremity edema  Psychiatric: Calm affect, cooperative, speaks a few words at times  Neurologic: Lethargic but oriented to name only, poor historian, able to follow commands  Skin: No rashes or jaundice, warm      Results Reviewed:  Results from last 7 days   Lab  Units 10/30/23  0559 10/29/23  0623 10/27/23  1226   WBC 10*3/mm3 7.05 8.18 7.50   HEMOGLOBIN g/dL 10.8* 11.3* 11.1*   HEMATOCRIT % 31.3* 32.5* 31.2*   PLATELETS 10*3/mm3 116* 127* 119*     Results from last 7 days   Lab Units 10/30/23  0559 10/29/23  0623 10/28/23  2056 10/28/23  0636   SODIUM mmol/L 139 138  --  142   POTASSIUM mmol/L 3.8 3.6 4.3 3.4*   CHLORIDE mmol/L 106 104  --  107   CO2 mmol/L 28.0 26.1  --  28.4   BUN mg/dL 10 8  --  9   CREATININE mg/dL 0.73* 0.68*  --  0.67*   GLUCOSE mg/dL 94 101*  --  99   CALCIUM mg/dL 9.1 8.7  --  8.8     Estimated Creatinine Clearance: 98.4 mL/min (A) (by C-G formula based on SCr of 0.73 mg/dL (L)).    Microbiology Results Abnormal       Procedure Component Value - Date/Time    Respiratory Panel PCR w/COVID-19(SARS-CoV-2) SEYMOUR/GEORGI/YESENIA/PAD/COR/MAD/MANFRED In-House, NP Swab in [facility], 3-4 HR TAT - Swab, Nasopharynx [[id number]]  (Normal) Collected: 10/20/23 1236    Lab Status: Final result Specimen: Swab from Nasopharynx Updated: 10/20/23 1411     ADENOVIRUS, PCR Not Detected     Coronavirus 229E Not Detected     Coronavirus HKU1 Not Detected     Coronavirus NL63 Not Detected     Coronavirus OC43 Not Detected     COVID19 Not Detected     Human Metapneumovirus Not Detected     Human Rhinovirus/Enterovirus Not Detected     Influenza A PCR Not Detected     Influenza B PCR Not Detected     Parainfluenza Virus 1 Not Detected     Parainfluenza Virus 2 Not Detected     Parainfluenza Virus 3 Not Detected     Parainfluenza Virus 4 Not Detected     RSV, PCR Not Detected     Bordetella pertussis pcr Not Detected     Bordetella parapertussis PCR Not Detected     Chlamydophila pneumoniae PCR Not Detected     Mycoplasma pneumo by PCR Not Detected    Narrative:      In the setting of a positive respiratory panel with a viral infection PLUS a negative procalcitonin without other underlying concern for bacterial infection, consider observing off antibiotics or discontinuation of antibiotics  and continue supportive care. If the respiratory panel is positive for atypical bacterial infection (Bordetella pertussis, Chlamydophila pneumoniae, or Mycoplasma pneumoniae), consider antibiotic de-escalation to target atypical bacterial infection.            Imaging Results (Last 24 Hours)       ** No results found for the last 24 hours. **            Results for orders placed during the hospital encounter of 03/14/23    Adult Stress Echo W/ Cont or Stress Agent if Necessary Per Protocol    Interpretation Summary  •  Left ventricular systolic function is normal. Calculated left ventricular EF = 58%  •  Left ventricular wall thickness is consistent with moderate septal asymmetric hypertrophy. There is no evidence for left ventricular outflow tract obstruction.  •  There is severe mitral calcification with mild mitral valve stenosis  present. The mitral valve mean gradient is 4 mmHg.  •  Mild dilation Valsalva of sinus present measuring 4.1 cm  •  No ECG evidence of myocardial ischemia. Negative clinical evidence of myocardial ischemia. Findings consistent with a normal ECG stress test.  •  Normal dobutamine stress echo with no significant echocardiographic evidence for myocardial ischemia.      I have reviewed the medications:  Scheduled Meds:aspirin, 81 mg, Oral, Q24H  atorvastatin, 40 mg, Oral, Nightly  bethanechol, 50 mg, Oral, Q12H  bisacodyl, 10 mg, Rectal, Daily  dronabinol, 2.5 mg, Oral, BID  famotidine, 20 mg, Oral, BID  fluticasone, 2 spray, Each Nare, Daily  guaiFENesin, 600 mg, Oral, Q12H  metoprolol tartrate, 12.5 mg, Oral, Q12H  midodrine, 2.5 mg, Oral, TID AC  senna-docusate sodium, 2 tablet, Oral, BID   And  polyethylene glycol, 17 g, Oral, Daily  predniSONE, 20 mg, Oral, Daily With Breakfast  sodium chloride, 10 mL, Intravenous, Q12H  tamsulosin, 0.4 mg, Oral, Daily  cyanocobalamin, 1,000 mcg, Oral, Daily      Continuous Infusions:     PRN Meds:.•  acetaminophen **OR** acetaminophen **OR**  acetaminophen  •  albuterol  •  senna-docusate sodium **AND** polyethylene glycol **AND** bisacodyl **AND** bisacodyl  •  Calcium Replacement - Follow Nurse / BPA Driven Protocol  •  diphenhydrAMINE  •  droperidol **OR** droperidol  •  ePHEDrine  •  fentanyl  •  flumazenil  •  hydrALAZINE  •  ipratropium-albuterol  •  labetalol  •  Magnesium Standard Dose Replacement - Follow Nurse / BPA Driven Protocol  •  naloxone  •  ondansetron  •  ondansetron  •  Phosphorus Replacement - Follow Nurse / BPA Driven Protocol  •  Potassium Replacement - Follow Nurse / BPA Driven Protocol  •  prochlorperazine  •  promethazine **OR** promethazine  •  sodium chloride  •  sodium chloride  •  sodium chloride  •  traMADol    Assessment & Plan   Assessment & Plan     Active Hospital Problems    Diagnosis  POA   • **Physical deconditioning [R53.81]  Yes   • Vasogenic cerebral edema [G93.6]  Yes   • Poor appetite [R63.0]  Yes   • Coronary arteriosclerosis [I25.10]  Yes   • History of malignant neoplasm of prostate [Z85.46]  Not Applicable   • Depressive disorder [F32.A]  Yes   • Lung cancer metastatic to brain [C34.90, C79.31]  Yes   • Chronic midline low back pain without sciatica [M54.50, G89.29]  Yes   • CAD (coronary artery disease) [I25.10]  Yes   • Atrial fibrillation [I48.91]  Yes   • Benign essential hypertension [I10]  Yes   • Obstructive sleep apnea [G47.33]  Yes      Resolved Hospital Problems   No resolved problems to display.        Brief Hospital Course to date:  Russell Ramírez  is a 78 y.o. male with extensive small cell lung cancer, brain metastasis recent poor appetite and weight loss, anemia secondary to chemotherapy presents to the hospital with failure to thrive and physical deconditioning and was found to have severe constipation.    Discussion/plan for today:  Case discussed with patient and the family.  They have now decided they are unable to care for him at home in his current state and would like him to go to  skilled nursing facility for subacute rehabilitation.  Stop IV fluid today after discussing further with wife she is in agreement with this plan and wants to see how he can hydrate himself and eat off of the fluid.  I have spoken to case management and they are going to come and speak to patient's family regarding referrals for skilled facility.  From my standpoint patient has received maximum medical therapy from acute inpatient hospital stay and unfortunately have no current treatment changes planned that well affect the course of his illness.  There is no testing further planned at this point.  I have spoken to oncologist over the phone who agrees with this assessment and feels he is cleared to discharge today point.  Long-term prognosis unfortunate is poor due to the advanced nature and type of his cancer.  We have had extensive goals of care conversation and family and patient have been provided with resources if they ultimately do decide to transition him to more palliative focused care.  Their current long-term plan is to follow-up outpatient with his outpatient radiation oncologist for reevaluation to see if there are any further treatment options available from a radiation standpoint.  Treatment plan reviewed at length with his wife who is agreement with plan.    Metastatic Lung Cancer: Liver and adrenal metastasis noted on CT. MRI brain shows worsening brain metastasis. Oncology consult team followed.  Subacute stroke: Aspirin and statin and PT.  Difficulty Urinating: PSA low.  UA was not infectious. Required River catheter.  On Flomax.  Voiding trial completed  NV/Constipation/Chronic Pancreatitis: No free air was noted on the chest x-ray.  He has had bowel movement.  Continue bowel regimen.  GI following.  Supportive care and symptom treatment  Rhinitis: RVP was ok. Recent abx reported.  Continue nasal spray.  AFib: Anticoagulation v continued aspirin per oncology  Deconditioning: PT/OT  PPX: per  oncology  Disposition: SNF, ready to transfer      CODE STATUS:   Code Status and Medical Interventions:   Ordered at: 10/19/23 1752     Code Status (Patient has no pulse and is not breathing):    CPR (Attempt to Resuscitate)     Medical Interventions (Patient has pulse or is breathing):    Full Support       Gama Newman MD  10/30/23

## 2023-10-31 PROBLEM — R62.7 FAILURE TO THRIVE IN ADULT: Status: ACTIVE | Noted: 2023-10-31

## 2023-10-31 LAB
ALBUMIN SERPL-MCNC: 3.5 G/DL (ref 3.5–5.2)
ALBUMIN/GLOB SERPL: 1.5 G/DL
ALP SERPL-CCNC: 75 U/L (ref 39–117)
ALT SERPL W P-5'-P-CCNC: 26 U/L (ref 1–41)
ANION GAP SERPL CALCULATED.3IONS-SCNC: 9 MMOL/L (ref 5–15)
AST SERPL-CCNC: 23 U/L (ref 1–40)
BILIRUB SERPL-MCNC: 0.6 MG/DL (ref 0–1.2)
BUN SERPL-MCNC: 14 MG/DL (ref 8–23)
BUN/CREAT SERPL: 16.5 (ref 7–25)
CALCIUM SPEC-SCNC: 9.1 MG/DL (ref 8.6–10.5)
CHLORIDE SERPL-SCNC: 103 MMOL/L (ref 98–107)
CO2 SERPL-SCNC: 26 MMOL/L (ref 22–29)
CREAT SERPL-MCNC: 0.85 MG/DL (ref 0.76–1.27)
DEPRECATED RDW RBC AUTO: 49.8 FL (ref 37–54)
EGFRCR SERPLBLD CKD-EPI 2021: 89.5 ML/MIN/1.73
EOSINOPHIL # BLD MANUAL: 0.09 10*3/MM3 (ref 0–0.4)
EOSINOPHIL NFR BLD MANUAL: 1.2 % (ref 0.3–6.2)
ERYTHROCYTE [DISTWIDTH] IN BLOOD BY AUTOMATED COUNT: 14.4 % (ref 12.3–15.4)
GLOBULIN UR ELPH-MCNC: 2.3 GM/DL
GLUCOSE SERPL-MCNC: 99 MG/DL (ref 65–99)
HCT VFR BLD AUTO: 32.4 % (ref 37.5–51)
HGB BLD-MCNC: 11.1 G/DL (ref 13–17.7)
LYMPHOCYTES # BLD MANUAL: 0.87 10*3/MM3 (ref 0.7–3.1)
LYMPHOCYTES NFR BLD MANUAL: 9.3 % (ref 5–12)
MCH RBC QN AUTO: 32.6 PG (ref 26.6–33)
MCHC RBC AUTO-ENTMCNC: 34.3 G/DL (ref 31.5–35.7)
MCV RBC AUTO: 95.3 FL (ref 79–97)
MONOCYTES # BLD: 0.69 10*3/MM3 (ref 0.1–0.9)
NEUTROPHILS # BLD AUTO: 5.79 10*3/MM3 (ref 1.7–7)
NEUTROPHILS NFR BLD MANUAL: 77.9 % (ref 42.7–76)
PLAT MORPH BLD: NORMAL
PLATELET # BLD AUTO: 129 10*3/MM3 (ref 140–450)
PMV BLD AUTO: 8.8 FL (ref 6–12)
POTASSIUM SERPL-SCNC: 3.9 MMOL/L (ref 3.5–5.2)
PROT SERPL-MCNC: 5.8 G/DL (ref 6–8.5)
RBC # BLD AUTO: 3.4 10*6/MM3 (ref 4.14–5.8)
RBC MORPH BLD: NORMAL
SMUDGE CELLS BLD QL SMEAR: ABNORMAL
SODIUM SERPL-SCNC: 138 MMOL/L (ref 136–145)
VARIANT LYMPHS NFR BLD MANUAL: 1.2 % (ref 0–5)
VARIANT LYMPHS NFR BLD MANUAL: 10.5 % (ref 19.6–45.3)
WBC NRBC COR # BLD: 7.43 10*3/MM3 (ref 3.4–10.8)

## 2023-10-31 PROCEDURE — 63710000001 DRONABINOL PER 2.5 MG: Performed by: INTERNAL MEDICINE

## 2023-10-31 PROCEDURE — 99232 SBSQ HOSP IP/OBS MODERATE 35: CPT | Performed by: INTERNAL MEDICINE

## 2023-10-31 PROCEDURE — 94761 N-INVAS EAR/PLS OXIMETRY MLT: CPT

## 2023-10-31 PROCEDURE — 94799 UNLISTED PULMONARY SVC/PX: CPT

## 2023-10-31 PROCEDURE — 63710000001 PREDNISONE PER 1 MG: Performed by: INTERNAL MEDICINE

## 2023-10-31 PROCEDURE — 85007 BL SMEAR W/DIFF WBC COUNT: CPT | Performed by: INTERNAL MEDICINE

## 2023-10-31 PROCEDURE — 85027 COMPLETE CBC AUTOMATED: CPT | Performed by: INTERNAL MEDICINE

## 2023-10-31 PROCEDURE — 80053 COMPREHEN METABOLIC PANEL: CPT | Performed by: INTERNAL MEDICINE

## 2023-10-31 RX ORDER — ACETAMINOPHEN 325 MG/1
650 TABLET ORAL EVERY 6 HOURS PRN
Start: 2023-10-31

## 2023-10-31 RX ORDER — PROMETHAZINE HYDROCHLORIDE 25 MG/1
25 SUPPOSITORY RECTAL ONCE AS NEEDED
Start: 2023-10-31

## 2023-10-31 RX ORDER — TRAMADOL HYDROCHLORIDE 50 MG/1
50 TABLET ORAL EVERY 6 HOURS PRN
Qty: 20 TABLET | Refills: 0 | Status: SHIPPED | OUTPATIENT
Start: 2023-10-31

## 2023-10-31 RX ORDER — FLUTICASONE PROPIONATE 50 MCG
2 SPRAY, SUSPENSION (ML) NASAL DAILY
Start: 2023-11-01

## 2023-10-31 RX ORDER — ALBUTEROL SULFATE 2.5 MG/3ML
2.5 SOLUTION RESPIRATORY (INHALATION) EVERY 6 HOURS PRN
Start: 2023-10-31

## 2023-10-31 RX ORDER — GUAIFENESIN 600 MG/1
1200 TABLET, EXTENDED RELEASE ORAL EVERY 12 HOURS SCHEDULED
Start: 2023-10-31

## 2023-10-31 RX ORDER — BISACODYL 10 MG
10 SUPPOSITORY, RECTAL RECTAL DAILY
Start: 2023-11-01

## 2023-10-31 RX ORDER — ATORVASTATIN CALCIUM 40 MG/1
40 TABLET, FILM COATED ORAL NIGHTLY
Qty: 90 TABLET | Refills: 0 | Status: SHIPPED | OUTPATIENT
Start: 2023-10-31

## 2023-10-31 RX ORDER — MIDODRINE HYDROCHLORIDE 2.5 MG/1
2.5 TABLET ORAL
Start: 2023-10-31

## 2023-10-31 RX ORDER — GUAIFENESIN 600 MG/1
1200 TABLET, EXTENDED RELEASE ORAL EVERY 12 HOURS SCHEDULED
Status: DISCONTINUED | OUTPATIENT
Start: 2023-10-31 | End: 2023-11-02 | Stop reason: HOSPADM

## 2023-10-31 RX ORDER — DRONABINOL 2.5 MG/1
2.5 CAPSULE ORAL 2 TIMES DAILY
Qty: 10 CAPSULE | Refills: 0 | Status: SHIPPED | OUTPATIENT
Start: 2023-10-31 | End: 2023-11-05

## 2023-10-31 RX ORDER — PREDNISONE 20 MG/1
20 TABLET ORAL
Start: 2023-11-01

## 2023-10-31 RX ADMIN — ASPIRIN 81 MG: 81 TABLET, COATED ORAL at 17:36

## 2023-10-31 RX ADMIN — DRONABINOL 2.5 MG: 2.5 CAPSULE ORAL at 21:46

## 2023-10-31 RX ADMIN — Medication 1000 MCG: at 08:02

## 2023-10-31 RX ADMIN — METOPROLOL TARTRATE 12.5 MG: 25 TABLET, FILM COATED ORAL at 21:47

## 2023-10-31 RX ADMIN — PREDNISONE 20 MG: 20 TABLET ORAL at 07:59

## 2023-10-31 RX ADMIN — GUAIFENESIN 1200 MG: 600 TABLET, EXTENDED RELEASE ORAL at 21:47

## 2023-10-31 RX ADMIN — BETHANECHOL CHLORIDE 50 MG: 25 TABLET ORAL at 21:47

## 2023-10-31 RX ADMIN — Medication 10 ML: at 08:09

## 2023-10-31 RX ADMIN — FAMOTIDINE 20 MG: 20 TABLET ORAL at 21:46

## 2023-10-31 RX ADMIN — FAMOTIDINE 20 MG: 20 TABLET ORAL at 08:05

## 2023-10-31 RX ADMIN — DRONABINOL 2.5 MG: 2.5 CAPSULE ORAL at 08:05

## 2023-10-31 RX ADMIN — Medication 10 ML: at 21:00

## 2023-10-31 RX ADMIN — TRAMADOL HYDROCHLORIDE 100 MG: 50 TABLET, COATED ORAL at 21:47

## 2023-10-31 RX ADMIN — DOCUSATE SODIUM 50MG AND SENNOSIDES 8.6MG 2 TABLET: 8.6; 5 TABLET, FILM COATED ORAL at 08:03

## 2023-10-31 RX ADMIN — TAMSULOSIN HYDROCHLORIDE 0.4 MG: 0.4 CAPSULE ORAL at 08:03

## 2023-10-31 RX ADMIN — CARBIDOPA AND LEVODOPA 2.5 MG: 50; 200 TABLET, EXTENDED RELEASE ORAL at 17:36

## 2023-10-31 RX ADMIN — BETHANECHOL CHLORIDE 50 MG: 25 TABLET ORAL at 11:37

## 2023-10-31 RX ADMIN — FLUTICASONE PROPIONATE 2 SPRAY: 50 SPRAY, METERED NASAL at 08:07

## 2023-10-31 RX ADMIN — ATORVASTATIN CALCIUM 40 MG: 20 TABLET, FILM COATED ORAL at 21:46

## 2023-10-31 RX ADMIN — GUAIFENESIN 600 MG: 600 TABLET, EXTENDED RELEASE ORAL at 08:03

## 2023-10-31 NOTE — PLAN OF CARE
Goal Outcome Evaluation:         Patient is alert to self only.  Wife has been at the bedside all day.  Various family members have been in and out.  Patient's wife delayed discharge so that the facility where the patient had been placed could be toured.      Patient has been up in the recliner.  He denies pain or distress.  WCTM.

## 2023-10-31 NOTE — PLAN OF CARE
Goal Outcome Evaluation:     Patient had low grade fever overnight, 99.5F Tmax, remains on CPAP, and R port remains patent and saline locked.  Patient remains oriented to self only, with family at bedside overnight.  Patient requires 2-3 providers to ambulate to chair.  Patient remains incontinent of both bowel and bladder.  No new issues overnight.

## 2023-10-31 NOTE — DISCHARGE SUMMARY
[facility] Medicine Services  DISCHARGE SUMMARY    Patient Name: Russell Ramírez Sr.  : 1945  MRN: [id number]    Date of Admission: 10/19/2023 11:19 AM  Date of Discharge: 2023  Primary Care Physician: Morgan Sebastian, DO    Consults       Date and Time Order Name Status Description    10/25/2023 10:49 AM Inpatient Radiation Oncology Consult Completed     10/22/2023  4:44 PM Inpatient Gastroenterology Consult Completed     10/22/2023  1:35 PM Inpatient  Anesthesiology Physician Consult      10/20/2023  7:47 AM Hematology & Oncology Inpatient Consult Completed     10/19/2023  1:41 PM [facility] (on-call MD unless specified) Details              Hospital Course       Active Hospital Problems    Diagnosis  POA    **Physical deconditioning [R53.81]  Yes    Failure to thrive in adult [R62.7]  Yes    Vasogenic cerebral edema [G93.6]  Yes    Poor appetite [R63.0]  Yes    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]  Yes    Coronary arteriosclerosis [I25.10]  Yes    History of malignant neoplasm of prostate [Z85.46]  Not Applicable    Depressive disorder [F32.A]  Yes    Hypertension [I10]  Yes    Lung cancer metastatic to brain [C34.90, C79.31]  Yes    Chronic midline low back pain without sciatica [M54.50, G89.29]  Yes    CAD (coronary artery disease) [I25.10]  Yes    Raynaud's phenomenon [I73.00]  Yes    PVD (peripheral vascular disease) [I73.9]  Yes    Atrial fibrillation [I48.91]  Yes    Benign essential hypertension [I10]  Yes    Obstructive sleep apnea [G47.33]  Yes      Resolved Hospital Problems   No resolved problems to display.          Hospital Course:  Russell Ramírez Sr. is a 78 y.o. male with extensive small cell lung cancer, brain metastasis recent poor appetite and weight loss, anemia secondary to chemotherapy presents to the hospital with failure to thrive and physical deconditioning and was found to have severe constipation.  MRI of the brain showed subacute stroke and enlarging brain  metastasis with per my read vasogenic edema likely contributing to his issues.        Metastatic Lung Cancer: Liver and adrenal metastasis noted on CT felt to be consistent with metastasis. MRI brain shows worsening brain metastasis. Oncology consult team followed.  Please see her note for full details.  Patient was evaluated by them and radiation oncology.  Ultimately it was decided the only option was to follow-up with outpatient radiation oncologist in the future to see if he was a candidate for any further treatment which the family understands may or may not be possible  Subacute stroke: Aspirin and statin and PT. physical therapy is recommending SNF.  Patient has been accepted to SNF and is stable to transfer.  Due to vasogenic edema and brain metastasis his mental status tends to wax and wane.  Difficulty Urinating: PSA low.  UA was not infectious. Required River catheter.  On Flomax.  Voiding trial completed now voiding  NV/Constipation/Chronic Pancreatitis: No free air was noted on the chest x-ray.  He has had bowel movement.  Continue bowel regimen.  GI following.  Supportive care and symptom treatment.  Patient is tolerating more diet recently and drinking more recently.  He does have chronic poor appetite.  He is tolerating nutritional supplements and recommend to continue nutritional supplements at facility.  This was discussed with his family who is agreeable.  Rhinitis: RVP was ok. Recent abx reported.  Continue nasal spray.  Mucinex added.  AFib:aspirin per oncology, not currently on anticoagulation due to risk versus benefit assessment  Poor appetite: Oncology did add Marinol which family feels has increased his appetite and symptoms.  Hypotension with history of essential hypertension: Currently on midodrine for blood pressure support.  Deconditioning: PT/OT, transfer to SNF  Disposition: SNF, ready to transfer    Discussion/plan for today:  Case discussed with patient and the family.  They have  now decided they are unable to care for him at home in his current state and would like him to go to skilled nursing facility for subacute rehabilitation.  They had been indecisive and deciding back-and-forth whether he should go home or to skilled.  I think he would be better served and supported at skilled facility if they want more aggressive care.  I am concerned they could not take care of him at home.  Patient seems to be hydrating well off of IV fluid now.  His wife is very nervous and feels he strongly needs IV fluid.  I think this is reasonable and may help him hydrate additionally while he is at the skilled facility but is not absolutely necessary.  Case management reached out to the facility and they said they were able to give him fluid through his port.  I recommend normal saline with 20 KCl to give 1 L Monday Wednesday and Friday 3 times weekly.  I explained this to his wife who really like the plan and feels this will make her feel more comfortable in the long run to know that he is getting extra hydration.  Again she wants as aggressive care as possible at this point.  Oncology recommends to continue prednisone.  From my standpoint patient has received maximum medical therapy from acute inpatient hospital stay and unfortunately have no current treatment changes planned that well affect the course of his illness.  There is no testing further planned at this point.  I have spoken to oncologist over the phone who agrees with this assessment and feels he is cleared to discharge today point.  Long-term prognosis unfortunate is poor due to the advanced nature and type of his cancer.  We have had extensive goals of care conversation and family and patient have been provided with resources if they ultimately do decide to transition him to more palliative focused care.  Patient's family is aware they can contact hospice at any point if they wish to transition him to hospice care.  Their current long-term plan  "is to follow-up outpatient with his outpatient radiation oncologist for reevaluation to see if there are any further treatment options available from a radiation standpoint.  Treatment plan reviewed at length with his wife who is agreement with discharge today per my conversation with her.  She is going to review the facility early this afternoon before he transfers.  At the time of discharge patient was told to take all medications as prescribed, keep all follow-up appointments, and call their doctor or return to the hospital with any worsening or concerning symptoms.    Please note that this note was made using Dragon voice recognition software      As written per oncology note on 10/30/2023  \"Extensive stage small cell lung cancer.  Patient follows with Dr. Conn at our office.  He was treated with 4 cycles of carboplatin etoposide and Tecentriq, completed on 6/2/2023.  6/19/2023: Increased mediastinal lymph nodes, stable metastatic liver and right adrenal lesions.  He was continued on maintenance Tecentriq every 3 weeks.  Last dose was on 10/5/2023.  10/19/2023: CT chest abdomen pelvis revealed stable 1.4 cm right lower pulmonary nodule.  There was increased right hilar lymph nodes.  There were new liver metastasis up to 3.6 and 3.0 cm.  There was significant increase in nodular enhancement of both adrenal glands, concerning for metastasis.  Based on the timing since completion of treatment being <6 months, this was considered primary refractory disease.  I had a lengthy discussion with the patient's family and I reviewed the CT scan with them.  They asked about surgery as an option for treatment for the liver and adrenal masses. I explained that surgery is not considered an option due to the number of lesions and due to the lesions being metastatic rather than primary.  I explained that chemotherapy treatment is the main method of treatment.  I expressed my concern that he is currently weak and would now be able " "to tolerate it.  Exam today revealed no improvement in his overall status.  October 30, 2023: Patient is still weak though family thinks he is little improved.  He is eating better slightly but still very weak.  He is not a good candidate for any chemotherapy.  Dr Kunz also had discussed with the patient and patient's family that he is not a good candidate for chemotherapy or radiation to the brain.  But family was hopeful to be discharged and then follow-up with Dr. Chambers for consideration of reirradiation to the brain.  Karlene Correa MD  10/30/23\"    Addendum: 11/1:  Patient remained stable for transfer and has achieved maximum medical benefit from acute inpatient hospital stay.  I spoke with case management and patient will require medical wheelchair van for transportation.  The hospital is arranging this and unfortunately there is no transportation available until 11/2.  Wife is agreeable with plan to transfer and approves of SNF.  As per above we still recommend IV fluid 3 times weekly with normal saline with 20 KCl to be given 1 L on Monday Wednesday Friday via his port at the facility.  This is not absolutely needed but definitely helps his wife with the comfort as she feels much better when he is receiving extra hydration and his oral intake continues to be up and down which we expect to continue long-term.    Addendum: 11/2:  No new events overnight and discharge was held yesterday mainly due to transportation arrangements.  Transportation is available today and patient had no new events overnight and no changes in physical exam and no change in prior plans.  Keep all follow-up appointments given.    Day of Discharge     Subjective:  Patient tells me that he is agreeable to transfer.  He is mostly oriented today.  He is eating with the help of his wife.  He denies any uncontrolled pain or new neurologic deficits.  He reports he is eating little better and drinking some supplements.    Vital Signs: " "  Temp:  [97.9 °F (36.6 °C)-99.3 °F (37.4 °C)] 98.6 °F (37 °C)  Heart Rate:  [83-98] 98  Resp:  [16-19] 16  BP: (105-127)/(67-85) 105/72     Physical Exam:    Constitutional: Alert and less lethargic, chronically ill-appearing, no acute distress  HENT: NCAT, mucous membranes moist, neck supple  Respiratory: No cough or wheezing, nonlabored breathing  Cardiovascular: Pulse rate is normal, normal radial pulses  Gastrointestinal: soft, nontender, nondistended  Musculoskeletal: Generally weak and elderly and frail in appearance, BMI is 28, no lower extremity edema  Psychiatric: Calm affect, cooperative, speaks a few words at times  Neurologic: Slightly less lethargic and partially oriented, poor historian, able to follow commands  Skin: No rashes or jaundice, warm    Pertinent  and/or Most Recent Results     Results from last 7 days   Lab Units 10/31/23  0529 10/30/23  0559 10/29/23  0623 10/28/23  2056 10/28/23  0636 10/27/23  1226   WBC 10*3/mm3 7.43 7.05 8.18  --   --  7.50   HEMOGLOBIN g/dL 11.1* 10.8* 11.3*  --   --  11.1*   HEMATOCRIT % 32.4* 31.3* 32.5*  --   --  31.2*   PLATELETS 10*3/mm3 129* 116* 127*  --   --  119*   SODIUM mmol/L 138 139 138  --  142  --    POTASSIUM mmol/L 3.9 3.8 3.6 4.3 3.4*  --    CHLORIDE mmol/L 103 106 104  --  107  --    CO2 mmol/L 26.0 28.0 26.1  --  28.4  --    BUN mg/dL 14 10 8  --  9  --    CREATININE mg/dL 0.85 0.73* 0.68*  --  0.67*  --    GLUCOSE mg/dL 99 94 101*  --  99  --    CALCIUM mg/dL 9.1 9.1 8.7  --  8.8  --      Results from last 7 days   Lab Units 10/31/23  0529   BILIRUBIN mg/dL 0.6   ALK PHOS U/L 75   ALT (SGPT) U/L 26   AST (SGOT) U/L 23           Invalid input(s): \"TG\", \"LDLCALC\", \"LDLREALC\"        Brief Urine Lab Results  (Last result in the past 365 days)        Color   Clarity   Blood   Leuk Est   Nitrite   Protein   CREAT   Urine HCG        10/19/23 1649 Yellow   Clear   Negative   Negative   Negative   Negative                   Microbiology Results Abnormal  "      Procedure Component Value - Date/Time    Respiratory Panel PCR w/COVID-19(SARS-CoV-2) [facility]GI/YESENIA/[facility] In-House, NP Swab in UTM/VTM, 3-4 HR TAT - Swab, Nasopharynx [[id number]]  (Normal) Collected: 10/20/23 1236    Lab Status: Final result Specimen: Swab from Nasopharynx Updated: 10/20/23 1411     ADENOVIRUS, PCR Not Detected     Coronavirus 229E Not Detected     Coronavirus HKU1 Not Detected     Coronavirus NL63 Not Detected     Coronavirus OC43 Not Detected     COVID19 Not Detected     Human Metapneumovirus Not Detected     Human Rhinovirus/Enterovirus Not Detected     Influenza A PCR Not Detected     Influenza B PCR Not Detected     Parainfluenza Virus 1 Not Detected     Parainfluenza Virus 2 Not Detected     Parainfluenza Virus 3 Not Detected     Parainfluenza Virus 4 Not Detected     RSV, PCR Not Detected     Bordetella pertussis pcr Not Detected     Bordetella parapertussis PCR Not Detected     Chlamydophila pneumoniae PCR Not Detected     Mycoplasma pneumo by PCR Not Detected    Narrative:      In the setting of a positive respiratory panel with a viral infection PLUS a negative procalcitonin without other underlying concern for bacterial infection, consider observing off antibiotics or discontinuation of antibiotics and continue supportive care. If the respiratory panel is positive for atypical bacterial infection (Bordetella pertussis, Chlamydophila pneumoniae, or Mycoplasma pneumoniae), consider antibiotic de-escalation to target atypical bacterial infection.            Imaging Results (All)       Procedure Component Value Units Date/Time    MRI Brain With & Without Contrast [[id number]] Collected: 10/25/23 0141     Updated: 10/25/23 0141    Narrative:        Patient: LAURA CROUCH  Time Out: 01:40  Exam(s): MRI HEAD W WO Contrast     EXAM:    MR Head Without and With Intravenous Contrast    CLINICAL HISTORY:     Reason for exam: headache vertigo cancer.    TECHNIQUE:    Magnetic resonance  images of the head brain without and with   intravenous contrast in multiple planes.    COMPARISON:    9 20 2023    FINDINGS:      Previously seen left cerebellar lesion measuring 5 mm now measures 10 mm.  Left precentral lesion which previously measured 2 mm now measures 3 mm.  Unchanged 3 mm enhancing lesion in the left thalamus.  Unchanged 3 mm lesion in the right frontal vertex.  Previously seen left inferior frontal lobe lesion which measured 2 mm now   measures 4 mm  Previously seen left putaminal lesion no longer identified.    Tiny restricted diffusion again seen right frontal lobe.    IMPRESSION:           1.  Some lesions have increased in size while some appear stable.  2.  Evolving tiny infarct in the right frontal lobe.      Impression:          Electronically signed by Celeste Dean MD on 10-25-23 at 0140    XR Abdomen KUB [[id number]] Collected: 10/24/23 1337     Updated: 10/24/23 1341    Narrative:      KUB     HISTORY: Follow-up constipation     COMPARISON: CT abdomen and pelvis 10/19/2023     FINDINGS: There is no significantly increased stool burden. The bowel  gas pattern is nonobstructive. An aortic stent graft is in place. Lung  nodules again demonstrated in the right lung base.       Impression:      No significantly increased stool burden           This report was finalized on 10/24/2023 1:38 PM by Dr. Morgan Childress M.D on Workstation: [id number]       XR Chest 1 View [[id number]] Collected: 10/23/23 1613     Updated: 10/23/23 1619    Narrative:      EXAM: XR CHEST 1 VW-     INDICATION: Abdominal pain. Unable to lay supine for KUB.     COMPARISON: CT abdomen pelvis 10/19/2023.          Impression:      Only the superior most abdomen is within the upright  field-of-view. No free intraperitoneal air under the hemidiaphragms.     Right lower lobe pulmonary nodule better seen on recent CT chest.  Lingular calcified granuloma. No new focal consolidation. No pleural  effusion or pneumothorax.  Normal size cardiomediastinal silhouette.  Right IJ port with tip at the superior cavoatrial junction. No focal  osseous abnormality.           This report was finalized on 10/23/2023 4:16 PM by Dr. Ollie Oro M.D on Workstation: [id number]       XR Chest 1 View [[id number]] Collected: 10/19/23 1214     Updated: 10/19/23 1226    Narrative:      XR CHEST 1 VW-     HISTORY: Male who is 78 years-old, infection     TECHNIQUE: Frontal view of the chest     COMPARISON: 3/20/2023     FINDINGS: Right chest port appears stable. Heart, mediastinum and  pulmonary vasculature are unremarkable. A nodule is apparent at the  level of the right anterior sixth rib, probably corresponding to  previous CT demonstrated nodule on the exam from 6/19/2023, less well  characterized radiographically. Old granulomatous disease is present. No  focal pulmonary consolidation, pleural effusion, or pneumothorax. No  acute osseous process.       Impression:      No focal pulmonary consolidation. Redemonstration of right  lower lung nodule.           This report was finalized on 10/19/2023 12:23 PM by Dr. Inderjit Galan M.D on Workstation: [id number]                       Results for orders placed during the hospital encounter of 03/14/23    Adult Stress Echo W/ Cont or Stress Agent if Necessary Per Protocol    Interpretation Summary    Left ventricular systolic function is normal. Calculated left ventricular EF = 58%    Left ventricular wall thickness is consistent with moderate septal asymmetric hypertrophy. There is no evidence for left ventricular outflow tract obstruction.    There is severe mitral calcification with mild mitral valve stenosis  present. The mitral valve mean gradient is 4 mmHg.    Mild dilation Valsalva of sinus present measuring 4.1 cm    No ECG evidence of myocardial ischemia. Negative clinical evidence of myocardial ischemia. Findings consistent with a normal ECG stress test.    Normal dobutamine stress echo with no  significant echocardiographic evidence for myocardial ischemia.        Discharge Details     Discharge recommendations:  Recommend IV fluid 3 times weekly with normal saline with 20 KCl to be given 1 L on Monday Wednesday Friday via his port at the facility.   they discussed this with facility and they are able to provide the service       Discharge Medications        New Medications        Instructions Start Date   acetaminophen 325 MG tablet  Commonly known as: TYLENOL  Replaces: ARTHRITIS PAIN RELIEF PO   650 mg, Oral, Every 6 Hours PRN      albuterol (2.5 MG/3ML) 0.083% nebulizer solution  Commonly known as: PROVENTIL  Replaces: Ventolin  (90 Base) MCG/ACT inhaler   2.5 mg, Nebulization, Every 6 Hours PRN      atorvastatin 40 MG tablet  Commonly known as: LIPITOR   40 mg, Oral, Nightly      bisacodyl 10 MG suppository  Commonly known as: DULCOLAX   10 mg, Rectal, Daily, Hold for diarrhea      dronabinol 2.5 MG capsule  Commonly known as: MARINOL   2.5 mg, Oral, 2 Times Daily      fluticasone 50 MCG/ACT nasal spray  Commonly known as: FLONASE   2 sprays, Each Nare, Daily      guaiFENesin 600 MG 12 hr tablet  Commonly known as: MUCINEX   1,200 mg, Oral, Every 12 Hours Scheduled      midodrine 2.5 MG tablet  Commonly known as: PROAMATINE   2.5 mg, Oral, 3 Times Daily Before Meals      predniSONE 20 MG tablet  Commonly known as: DELTASONE   20 mg, Oral, Daily With Breakfast      promethazine 25 MG suppository  Commonly known as: PHENERGAN   25 mg, Rectal, Once As Needed      sodium chloride 0.9 % with KCl 20 mEq 20-0.9 MEQ/L-% infusion   125 mL/hr (125 mL/hr), Intravenous, Continuous, Give 1 L total normal saline with 20MEQ/L KCL,  3 times weekly Monday Wednesday Friday via port             Changes to Medications        Instructions Start Date   traMADol 50 MG tablet  Commonly known as: ULTRAM  What changed:   how much to take  reasons to take this   50 mg, Oral, Every 6 Hours PRN              Continue These Medications        Instructions Start Date   ascorbic acid 1000 MG tablet  Commonly known as: VITAMIN C       aspirin 81 MG EC tablet   Every 24 Hours      bethanechol 50 MG tablet  Commonly known as: URECHOLINE   1 tablet, Oral, Every 12 Hours Scheduled      coenzyme Q10 100 MG capsule   100 mg, Oral, Daily      cyanocobalamin 1000 MCG tablet  Commonly known as: VITAMIN B-12   1,000 mcg, Oral, Daily      famotidine 20 MG tablet  Commonly known as: PEPCID   Take 1 tablet by mouth twice daily      Magnesium 250 MG tablet   Oral      metoprolol tartrate 25 MG tablet  Commonly known as: LOPRESSOR   Take 0.5 tablets by mouth 2 (Two) Times a Day.      ondansetron 8 MG tablet  Commonly known as: ZOFRAN   8 mg, Oral, 3 Times Daily PRN      Vitamin B Complex-C capsule   No dose, route, or frequency recorded.      vitamin D3 125 MCG (5000 UT) capsule capsule   5,000 Units, Oral, Daily             Stop These Medications      ARTHRITIS PAIN RELIEF PO  Replaced by: acetaminophen 325 MG tablet     Ashwagandha 125 MG capsule     benzonatate 100 MG capsule  Commonly known as: TESSALON     Creatinine powder     dexAMETHasone 2 MG tablet  Commonly known as: DECADRON     diphenhydrAMINE 25 mg capsule  Commonly known as: BENADRYL     doxycycline 100 MG capsule  Commonly known as: MONODOX     Elderberry 500 MG capsule     fluocinonide 0.05 % cream  Commonly known as: LIDEX     LORazepam 1 MG tablet  Commonly known as: Ativan     SUPER GREENS PO     Turmeric 500 MG capsule     Ventolin  (90 Base) MCG/ACT inhaler  Generic drug: albuterol sulfate HFA  Replaced by: albuterol (2.5 MG/3ML) 0.083% nebulizer solution     YUMVS BEET ROOT-TART CHERRY PO     Zinc 50 MG tablet              Allergies   Allergen Reactions    Codeine Other (See Comments)     headache           Discharge Disposition:  Skilled Nursing Facility (DC - External)    Diet:  Hospital:  Diet Order   Procedures    Diet: Regular/House Diet; Texture:  Regular Texture (IDDSI 7); Fluid Consistency: Thin (IDDSI 0)       Activity:  Activity Instructions       Activity as Tolerated                   CODE STATUS:    Code Status and Medical Interventions:   Ordered at: 10/19/23 3742     Code Status (Patient has no pulse and is not breathing):    CPR (Attempt to Resuscitate)     Medical Interventions (Patient has pulse or is breathing):    Full Support       Additional Instructions for the Follow-ups that You Need to Schedule       Discharge Follow-up with PCP   As directed       Currently Documented PCP:    Morgan Sebastian DO    PCP Phone Number:    [phone]     Follow Up Details: Recommend primary care provider follow-up within 1 week after discharge from subacute rehab/skilled facility        Discharge Follow-up with Specified Provider: Follow-up with your outpatient oncologist as instructed.  Please call the clinic with any questions   As directed      To: Follow-up with your outpatient oncologist as instructed.  Please call the clinic with any questions        Discharge Follow-up with Specified Provider: Follow-up with your radiation oncologist as instructed.  Please call your radiation oncologist with questions   As directed      To: Follow-up with your radiation oncologist as instructed.  Please call your radiation oncologist with questions               Contact information for follow-up providers       Morgan Sebastian DO .    Specialty: Family Medicine  Why: Recommend primary care provider follow-up within 1 week after discharge from subacute rehab/skilled facility  Contact information:  [address] [place] [address]  [phone]                       Contact information for after-discharge care       Durable Medical Equipment       [facility] .    Service: Durable Medical Equipment  Contact information:  [address] [address]  [phone]                                       Gama Levin  MD Paty  11/01/23      Time Spent on Discharge:  I spent greater than 45 minutes on this discharge activity which included: face-to-face encounter with the patient, reviewing the data in the system, coordination of the care with the nursing staff as well as consultants, documentation, and entering orders.

## 2023-10-31 NOTE — PROGRESS NOTES
[facility] INPATIENT PROGRESS NOTE    Length of Stay:  11 days    CHIEF COMPLAINT:  Extensive stage small cell lung cancer with progressive brain metastases    SUBJECTIVE:   Patient sitting in a chair, alert, oriented but somewhat withdrawn in conversation.  He has been able to stand at the side of the bed today.  He is able to transfer from the bed to the recliner and to the bedside commode.  Patient's wife is at the bedside today.    ROS:  Review of Systems   A comprehensive review of systems was obtained with pertinent positive findings as noted in the interval history above.  All other systems negative.    OBJECTIVE:  Vitals:    10/30/23 1531 10/30/23 1956 10/31/23 0435 10/31/23 0734   BP: 105/76 98/58 130/72 96/72   BP Location: Left arm Right arm Right arm Left arm   Patient Position: Sitting Lying Lying Sitting   Pulse: 84 91 83 90   Resp: 18 18 18 20   Temp: 99 °F (37.2 °C) 99.5 °F (37.5 °C)  98.4 °F (36.9 °C)   TempSrc: Oral Oral  Oral   SpO2: 96% 99% 98% 97%   Weight:       Height:             PHYSICAL EXAMINATION:  General: Alert and oriented x3 no distress  Chest/Lungs: Clear to auscultation bilaterally anteriorly  Heart: Regular rate and rhythm no murmurs Or rubs  Abdomen/GI: Soft nontender nondistended bowel sounds present  Extremities: No edema    DIAGNOSTIC DATA:  Results Review:     I reviewed the patient's new clinical results.    Results from last 7 days   Lab Units 10/31/23  0529 10/30/23  0559 10/29/23  0623   WBC 10*3/mm3 7.43 7.05 8.18   HEMOGLOBIN g/dL 11.1* 10.8* 11.3*   HEMATOCRIT % 32.4* 31.3* 32.5*   PLATELETS 10*3/mm3 129* 116* 127*      Results from last 7 days   Lab Units 10/31/23  0529 10/30/23  0559 10/29/23  0623   SODIUM mmol/L 138 139 138   POTASSIUM mmol/L 3.9 3.8 3.6   CHLORIDE mmol/L 103 106 104   CO2 mmol/L 26.0 28.0 26.1   BUN mg/dL 14 10 8   CREATININE mg/dL 0.85 0.73* 0.68*   CALCIUM mg/dL 9.1 9.1 8.7   BILIRUBIN mg/dL 0.6  --   --    ALK PHOS U/L 75  --   --     ALT (SGPT) U/L 26  --   --    AST (SGOT) U/L 23  --   --    GLUCOSE mg/dL 99 94 101*      Lab Results   Component Value Date    NEUTROABS 5.79 10/31/2023         Results from last 7 days   Lab Units 10/25/23  0536   MAGNESIUM mg/dL 2.1           Assessment & Plan   ASSESSMENT/PLAN:  This is a 78 y.o. male with:     *Extensive stage small cell lung cancer.  Patient follows with Dr. Conn at our office.  He was treated with 4 cycles of carboplatin etoposide and Tecentriq, completed on 6/2/2023.  6/19/2023: Increased mediastinal lymph nodes, stable metastatic liver and right adrenal lesions.  He was continued on maintenance Tecentriq every 3 weeks.  Last dose was on 10/5/2023.  10/19/2023: CT chest abdomen pelvis revealed stable 1.4 cm right lower pulmonary nodule.  There was increase in right hilar lymph nodes, new liver metastasis up to 3.6 and 3.0 cm, significant increase in nodular enhancement of both adrenal glands, concerning for metastasis.  Based on the timing of progression since completion of treatment being <6 months, this was considered primary refractory disease.  Scan findings were reviewed with patient extensively.  Reviewed that palliative chemotherapy would be the main course of treatment and patient would not be a good candidate to receive treatment due to high risk of detrimental effects with compromised ECOG performance status.  I was asked to see the patient today from my group.  Patient is being discharged to subacute rehab facility today.  Patient's wife is appealing the discharge as they have not seen the facility and are reluctant to go.  We had a lengthy discussion today and reviewed his previous treatment and recent evidence of disease progression on CT scans 10/19/2023 with concerning findings of new hepatic and adrenal metastases.  We also reviewed evidence of progression of brain metastasis on MRI.  He has received previous whole brain radiation.  He remains very weak, ECOG performance  status of 3, able to transfer from bed to the chair and to the bedside commode and stand at the side of the bed currently.  We discussed the palliative intent of any further systemic chemotherapy and the increased risk of adverse effects from chemotherapy given his current compromised performance status.  The patient and his wife expressed understanding of this.  We discussed that if systemic therapy cannot be provided to control his disease, we would likely not pursue further stereotactic radiation to progressive brain lesions.  He would like to proceed to subacute rehab and attempt to regain some strength in order to be a candidate for further systemic therapy.  We did discuss that most likely he would not be able to regain enough functional status to pursue this.  We discussed the possibility that he may decline over time and if that is the case we would recommend pursuit of palliative/supportive care alone.  The patient and his wife agreed.  If however he was able to regain strength, he would like to at least keep the possibility of further treatment.  We therefore discussed proceeding on to subacute rehab as planned and we will schedule a tentative follow-up visit in our office with Dr. Conn in 2 to 3 weeks.  If he declines further in rehab however we can cancel that visit and move more towards palliative/supportive care.  I did answer their questions to their satisfaction and provided support.     *Brain metastasis.  S/p whole brain radiation therapy.   MRI brain 10/25/2023 performed under anesthesia and revealed increase in the size of the left cerebellar lesion from 5 mm to 10 mm.  A left precentral lesion increased from 2 to 3 mm.  A lesion in the left inferior frontal lobe increased from 2 mm to 4 mm.  Other lesions were stable.  A lesion in the left putamen was no longer identified.    If the patient is not able to receive further palliative systemic therapy, would not recommend pursuit of further  stereotactic radiation to progressive brain lesions.     *Poor appetite and weight loss.  Protein malnutrition - total protein 5.9.  Labs on 10/20/2023 with a.m. cortisol 27, ACTH on 10/23/2023 was 19.4, TSH on 10/19/2023 was 0.771  Very poor appetite is likely secondary to the disease progression.  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  Hemoglobin has remained in the high 10 to low 11 range     *Thrombocytopenia.  Platelet count has remained in the low to mid 100,000 range     *Deconditioning  Continue with physical therapy and Occupational Therapy    *Disposition  Patient is being discharged to subacute rehab today    PLAN:  Note plans for discharge to subacute rehab today  Given his current compromised ECOG performance status of 3, patient is not a candidate for further palliative systemic chemotherapy  If the patient declines in subacute rehab, patient and his wife were in agreement that we would pursue palliative/supportive care alone  If the patient improves in subacute rehab, we will have a tentative follow-up visit scheduled with Dr. Conn in 2 to 3 weeks for follow-up.    Discussed with patient and wife at bedside.  Discussed with Dr. Newman.           Javid Hicks MD

## 2023-11-01 PROCEDURE — 94799 UNLISTED PULMONARY SVC/PX: CPT

## 2023-11-01 PROCEDURE — 25010000002 SODIUM CHLORIDE 0.9 % WITH KCL 20 MEQ 20-0.9 MEQ/L-% SOLUTION: Performed by: INTERNAL MEDICINE

## 2023-11-01 PROCEDURE — 94664 DEMO&/EVAL PT USE INHALER: CPT

## 2023-11-01 PROCEDURE — 94760 N-INVAS EAR/PLS OXIMETRY 1: CPT

## 2023-11-01 PROCEDURE — 99232 SBSQ HOSP IP/OBS MODERATE 35: CPT | Performed by: INTERNAL MEDICINE

## 2023-11-01 PROCEDURE — 63710000001 DRONABINOL PER 2.5 MG: Performed by: INTERNAL MEDICINE

## 2023-11-01 PROCEDURE — 63710000001 PREDNISONE PER 1 MG: Performed by: INTERNAL MEDICINE

## 2023-11-01 RX ORDER — SODIUM CHLORIDE AND POTASSIUM CHLORIDE 150; 900 MG/100ML; MG/100ML
125 INJECTION, SOLUTION INTRAVENOUS CONTINUOUS
Status: DISPENSED | OUTPATIENT
Start: 2023-11-01 | End: 2023-11-01

## 2023-11-01 RX ORDER — SODIUM CHLORIDE AND POTASSIUM CHLORIDE 150; 900 MG/100ML; MG/100ML
125 INJECTION, SOLUTION INTRAVENOUS CONTINUOUS
Start: 2023-11-01

## 2023-11-01 RX ADMIN — GUAIFENESIN 1200 MG: 600 TABLET, EXTENDED RELEASE ORAL at 09:54

## 2023-11-01 RX ADMIN — ATORVASTATIN CALCIUM 40 MG: 20 TABLET, FILM COATED ORAL at 22:05

## 2023-11-01 RX ADMIN — DOCUSATE SODIUM 50MG AND SENNOSIDES 8.6MG 2 TABLET: 8.6; 5 TABLET, FILM COATED ORAL at 09:55

## 2023-11-01 RX ADMIN — Medication 10 ML: at 22:06

## 2023-11-01 RX ADMIN — CARBIDOPA AND LEVODOPA 2.5 MG: 50; 200 TABLET, EXTENDED RELEASE ORAL at 09:54

## 2023-11-01 RX ADMIN — FLUTICASONE PROPIONATE 2 SPRAY: 50 SPRAY, METERED NASAL at 09:56

## 2023-11-01 RX ADMIN — Medication 1000 MCG: at 09:54

## 2023-11-01 RX ADMIN — BETHANECHOL CHLORIDE 50 MG: 25 TABLET ORAL at 22:12

## 2023-11-01 RX ADMIN — DRONABINOL 2.5 MG: 2.5 CAPSULE ORAL at 22:06

## 2023-11-01 RX ADMIN — Medication 10 ML: at 09:56

## 2023-11-01 RX ADMIN — DRONABINOL 2.5 MG: 2.5 CAPSULE ORAL at 12:19

## 2023-11-01 RX ADMIN — METOPROLOL TARTRATE 12.5 MG: 25 TABLET, FILM COATED ORAL at 22:06

## 2023-11-01 RX ADMIN — CARBIDOPA AND LEVODOPA 2.5 MG: 50; 200 TABLET, EXTENDED RELEASE ORAL at 12:19

## 2023-11-01 RX ADMIN — TRAMADOL HYDROCHLORIDE 100 MG: 50 TABLET, COATED ORAL at 15:07

## 2023-11-01 RX ADMIN — FAMOTIDINE 20 MG: 20 TABLET ORAL at 22:06

## 2023-11-01 RX ADMIN — FAMOTIDINE 20 MG: 20 TABLET ORAL at 09:55

## 2023-11-01 RX ADMIN — POTASSIUM CHLORIDE AND SODIUM CHLORIDE 125 ML/HR: 900; 150 INJECTION, SOLUTION INTRAVENOUS at 15:06

## 2023-11-01 RX ADMIN — DOCUSATE SODIUM 50MG AND SENNOSIDES 8.6MG 2 TABLET: 8.6; 5 TABLET, FILM COATED ORAL at 22:06

## 2023-11-01 RX ADMIN — GUAIFENESIN 1200 MG: 600 TABLET, EXTENDED RELEASE ORAL at 22:05

## 2023-11-01 RX ADMIN — PREDNISONE 20 MG: 20 TABLET ORAL at 09:54

## 2023-11-01 RX ADMIN — BETHANECHOL CHLORIDE 50 MG: 25 TABLET ORAL at 09:55

## 2023-11-01 RX ADMIN — ASPIRIN 81 MG: 81 TABLET, COATED ORAL at 18:39

## 2023-11-01 RX ADMIN — METOPROLOL TARTRATE 12.5 MG: 25 TABLET, FILM COATED ORAL at 09:55

## 2023-11-01 RX ADMIN — TAMSULOSIN HYDROCHLORIDE 0.4 MG: 0.4 CAPSULE ORAL at 09:54

## 2023-11-01 NOTE — CASE MANAGEMENT/SOCIAL WORK
Continued Stay Note  [facility]     Patient Name: Russell Ramírez Sr.  MRN: [id number]  Today's Date: 11/1/2023    Admit Date: 10/19/2023    Plan: [facility], transportation TBD.   Discharge Plan       Row Name 11/01/23 1310       Plan    Plan [facility], transportation TBD.    Patient/Family in Agreement with Plan yes    Plan Comments Spoke with patient's wife at bedside. Patient's family toured facility yesterday and are agreeable to patient transfer to [facility]. Patient's family had planned to drive patient prior but is unable to at this time. [facility]CP to arrange transport. Spoke with Emigdio martienz [facility] for stretcher van, first available transport is 11/2 @ 1100 booking number is [id number]; cost is 835. Spoke with Amber SANCHEZ CCP manager for approval on cost. Awaiting decision. Spoke with Araseli/ [facility] scheduled patient for first available transport on 11/3 @ 0730. If [facility] has a call-in run will be cancelled d/t length of run. Spoke with patient's family at bedside; would still like to see if they can safely transport. RN notified and will reach out to PT to assess for patient safe transport.                   Discharge Codes    No documentation.                 Expected Discharge Date and Time       Expected Discharge Date Expected Discharge Time    Nov 1, 2023               Caryn Romo RN

## 2023-11-01 NOTE — PROGRESS NOTES
[facility] Medicine Services  PROGRESS NOTE    Patient Name: Russell Ramírez Sr.  : 1945  MRN: [id number]    Date of Admission: 10/19/2023  Primary Care Physician: Morgan Sebastian DO    Subjective   Subjective     CC:  Follow-up recent fatigue    Subjective:  No new complaints.  Patient sitting up in chair during my evaluation    Review of Systems  No current fevers or chills  No current nausea, vomiting, or diarrhea  No current chest pain or palpitations      Objective   Objective     Vital Signs:   Temp:  [97.9 °F (36.6 °C)-99.3 °F (37.4 °C)] 98.6 °F (37 °C)  Heart Rate:  [83-98] 98  Resp:  [16-19] 16  BP: (105-127)/(67-85) 105/72        Physical Exam:  Constitutional: Alert but lethargic, chronically ill-appearing  HENT: NCAT, mucous membranes moist, neck supple  Respiratory: No cough or wheezing, nonlabored breathing  Cardiovascular: Pulse rate is normal, normal radial pulses  Gastrointestinal: soft, nontender, nondistended  Musculoskeletal: Generally weak and elderly and frail in appearance, BMI is 28, no lower extremity edema  Psychiatric: Calm affect, cooperative, speaks a few words at times  Neurologic: Lethargic but oriented to name only, poor historian, able to follow commands  Skin: No rashes or jaundice, warm  Clinically appears about the same    Results Reviewed:  Results from last 7 days   Lab Units 10/31/23  0529 10/30/23  0559 10/29/23  0623   WBC 10*3/mm3 7.43 7.05 8.18   HEMOGLOBIN g/dL 11.1* 10.8* 11.3*   HEMATOCRIT % 32.4* 31.3* 32.5*   PLATELETS 10*3/mm3 129* 116* 127*     Results from last 7 days   Lab Units 10/31/23  0529 10/30/23  0559 10/29/23  0623   SODIUM mmol/L 138 139 138   POTASSIUM mmol/L 3.9 3.8 3.6   CHLORIDE mmol/L 103 106 104   CO2 mmol/L 26.0 28.0 26.1   BUN mg/dL 14 10 8   CREATININE mg/dL 0.85 0.73* 0.68*   GLUCOSE mg/dL 99 94 101*   CALCIUM mg/dL 9.1 9.1 8.7   ALK PHOS U/L 75  --   --    ALT (SGPT) U/L 26  --   --    AST (SGOT) U/L 23  --   --      Estimated  Creatinine Clearance: 84.5 mL/min (by C-G formula based on SCr of 0.85 mg/dL).    Microbiology Results Abnormal       Procedure Component Value - Date/Time    Respiratory Panel PCR w/COVID-19(SARS-CoV-2) SEYMOUR/GEORGI/YESENIA/PAD/COR/MAD/MANFRED In-House, NP Swab in UTM/VTM, 3-4 HR TAT - Swab, Nasopharynx [[id number]]  (Normal) Collected: 10/20/23 1236    Lab Status: Final result Specimen: Swab from Nasopharynx Updated: 10/20/23 1411     ADENOVIRUS, PCR Not Detected     Coronavirus 229E Not Detected     Coronavirus HKU1 Not Detected     Coronavirus NL63 Not Detected     Coronavirus OC43 Not Detected     COVID19 Not Detected     Human Metapneumovirus Not Detected     Human Rhinovirus/Enterovirus Not Detected     Influenza A PCR Not Detected     Influenza B PCR Not Detected     Parainfluenza Virus 1 Not Detected     Parainfluenza Virus 2 Not Detected     Parainfluenza Virus 3 Not Detected     Parainfluenza Virus 4 Not Detected     RSV, PCR Not Detected     Bordetella pertussis pcr Not Detected     Bordetella parapertussis PCR Not Detected     Chlamydophila pneumoniae PCR Not Detected     Mycoplasma pneumo by PCR Not Detected    Narrative:      In the setting of a positive respiratory panel with a viral infection PLUS a negative procalcitonin without other underlying concern for bacterial infection, consider observing off antibiotics or discontinuation of antibiotics and continue supportive care. If the respiratory panel is positive for atypical bacterial infection (Bordetella pertussis, Chlamydophila pneumoniae, or Mycoplasma pneumoniae), consider antibiotic de-escalation to target atypical bacterial infection.            Imaging Results (Last 24 Hours)       ** No results found for the last 24 hours. **            Results for orders placed during the hospital encounter of 03/14/23    Adult Stress Echo W/ Cont or Stress Agent if Necessary Per Protocol    Interpretation Summary    Left ventricular systolic function is normal.  Calculated left ventricular EF = 58%    Left ventricular wall thickness is consistent with moderate septal asymmetric hypertrophy. There is no evidence for left ventricular outflow tract obstruction.    There is severe mitral calcification with mild mitral valve stenosis  present. The mitral valve mean gradient is 4 mmHg.    Mild dilation Valsalva of sinus present measuring 4.1 cm    No ECG evidence of myocardial ischemia. Negative clinical evidence of myocardial ischemia. Findings consistent with a normal ECG stress test.    Normal dobutamine stress echo with no significant echocardiographic evidence for myocardial ischemia.      I have reviewed the medications:  Scheduled Meds:aspirin, 81 mg, Oral, Q24H  atorvastatin, 40 mg, Oral, Nightly  bethanechol, 50 mg, Oral, Q12H  bisacodyl, 10 mg, Rectal, Daily  dronabinol, 2.5 mg, Oral, BID  famotidine, 20 mg, Oral, BID  fluticasone, 2 spray, Each Nare, Daily  guaiFENesin, 1,200 mg, Oral, Q12H  metoprolol tartrate, 12.5 mg, Oral, Q12H  midodrine, 2.5 mg, Oral, TID AC  senna-docusate sodium, 2 tablet, Oral, BID   And  polyethylene glycol, 17 g, Oral, Daily  predniSONE, 20 mg, Oral, Daily With Breakfast  sodium chloride, 10 mL, Intravenous, Q12H  tamsulosin, 0.4 mg, Oral, Daily  cyanocobalamin, 1,000 mcg, Oral, Daily      Continuous Infusions:sodium chloride 0.9 % with KCl 20 mEq, 125 mL/hr        PRN Meds:.  acetaminophen **OR** acetaminophen **OR** acetaminophen    albuterol    senna-docusate sodium **AND** polyethylene glycol **AND** bisacodyl **AND** bisacodyl    Calcium Replacement - Follow Nurse / BPA Driven Protocol    diphenhydrAMINE    droperidol **OR** droperidol    ePHEDrine    fentanyl    flumazenil    hydrALAZINE    ipratropium-albuterol    labetalol    Magnesium Standard Dose Replacement - Follow Nurse / BPA Driven Protocol    naloxone    ondansetron    ondansetron    Phosphorus Replacement - Follow Nurse / BPA Driven Protocol    Potassium Replacement - Follow  Nurse / BPA Driven Protocol    prochlorperazine    promethazine **OR** promethazine    sodium chloride    sodium chloride    sodium chloride    traMADol    Assessment & Plan   Assessment & Plan     Active Hospital Problems    Diagnosis  POA    **Physical deconditioning [R53.81]  Yes    Failure to thrive in adult [R62.7]  Yes    Vasogenic cerebral edema [G93.6]  Yes    Poor appetite [R63.0]  Yes    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]  Yes    Coronary arteriosclerosis [I25.10]  Yes    History of malignant neoplasm of prostate [Z85.46]  Not Applicable    Depressive disorder [F32.A]  Yes    Hypertension [I10]  Yes    Lung cancer metastatic to brain [C34.90, C79.31]  Yes    Chronic midline low back pain without sciatica [M54.50, G89.29]  Yes    CAD (coronary artery disease) [I25.10]  Yes    Raynaud's phenomenon [I73.00]  Yes    PVD (peripheral vascular disease) [I73.9]  Yes    Atrial fibrillation [I48.91]  Yes    Benign essential hypertension [I10]  Yes    Obstructive sleep apnea [G47.33]  Yes      Resolved Hospital Problems   No resolved problems to display.        Brief Hospital Course to date:  Russell Ramírez . is a 78 y.o. male with extensive small cell lung cancer, brain metastasis recent poor appetite and weight loss, anemia secondary to chemotherapy presents to the hospital with failure to thrive and physical deconditioning and was found to have severe constipation.    Discussion/plan for today:  Discharge plan remains unchanged.  Please see addendum to discharge summary.  I have worked with case management and they are arranging medical transportation for the family tomorrow.  Initially the wife was plan to transport.  I explained to her that due to his weakness this would likely be too complicated for her family to transport him this long distance.  Hospital team is going to help with this and ultimately case management reports she is agreeable.  Please see discharge report for final detail.  Per  my conversation with case management patient can still receive IV fluid at the facility.  Recommend 1 L 3 times weekly.  This is not absolutely needed but his wife feels he benefits from the extra hydration due to his inconsistent oral intake and it definitely provides him with some comfort which she is interested in.  His wife feels that this may help him long-term.  We discussed the vitamins and supplements that his wife would like him to have at discharge.  Case has been discussed with oncology over the phone.  Otherwise per discharge summary    CODE STATUS:   Code Status and Medical Interventions:   Ordered at: 10/19/23 1752     Code Status (Patient has no pulse and is not breathing):    CPR (Attempt to Resuscitate)     Medical Interventions (Patient has pulse or is breathing):    Full Support       Gama Newman MD  11/01/23

## 2023-11-01 NOTE — PROGRESS NOTES
[facility] INPATIENT PROGRESS NOTE    Length of Stay:  12 days    CHIEF COMPLAINT:  Extensive stage small cell lung cancer with progressive brain metastases    SUBJECTIVE:   Patient lying in bed today, somnolent, responds minimally to questions.  Patient's wife and daughter are in the room today as well.  Discharged yesterday did not occur as patient's family wanted time to investigate the subacute nursing facility and their questions regarding transportation.  Patient has had difficulty with urinary retention today, straight cath planned.  He is having some pain in his left hip after lying on his side.    ROS:  Review of Systems   A comprehensive review of systems was obtained with pertinent positive findings as noted in the interval history above.  All other systems negative.    OBJECTIVE:  Vitals:    10/31/23 1925 10/31/23 2000 11/01/23 0405 11/01/23 0730   BP:  118/76 127/85 111/67   BP Location:  Left arm Left arm Left arm   Patient Position:  Sitting Lying Lying   Pulse: 87 92 83 97   Resp: 19 18 18 16   Temp:  99.3 °F (37.4 °C)  98.6 °F (37 °C)   TempSrc:  Oral  Oral   SpO2: 96% 96% 94% 94%   Weight:       Height:             PHYSICAL EXAMINATION:  General: Patient lying in bed, intermittently somnolent, minimal response to questions.  Chest/Lungs: Clear to auscultation bilaterally anteriorly  Heart: Regular rate and rhythm no murmurs Or rubs  Abdomen/GI: Soft nontender nondistended bowel sounds present  Extremities: No edema        DIAGNOSTIC DATA:  Results Review:     I reviewed the patient's new clinical results.    Results from last 7 days   Lab Units 10/31/23  0529 10/30/23  0559 10/29/23  0623   WBC 10*3/mm3 7.43 7.05 8.18   HEMOGLOBIN g/dL 11.1* 10.8* 11.3*   HEMATOCRIT % 32.4* 31.3* 32.5*   PLATELETS 10*3/mm3 129* 116* 127*      Results from last 7 days   Lab Units 10/31/23  0529 10/30/23  0559 10/29/23  0623   SODIUM mmol/L 138 139 138   POTASSIUM mmol/L 3.9 3.8 3.6   CHLORIDE mmol/L  103 106 104   CO2 mmol/L 26.0 28.0 26.1   BUN mg/dL 14 10 8   CREATININE mg/dL 0.85 0.73* 0.68*   CALCIUM mg/dL 9.1 9.1 8.7   BILIRUBIN mg/dL 0.6  --   --    ALK PHOS U/L 75  --   --    ALT (SGPT) U/L 26  --   --    AST (SGOT) U/L 23  --   --    GLUCOSE mg/dL 99 94 101*      Lab Results   Component Value Date    NEUTROABS 5.79 10/31/2023                     Assessment & Plan   ASSESSMENT/PLAN:  This is a 78 y.o. male with:     *Extensive stage small cell lung cancer.  Patient follows with Dr. Conn at our office.  He was treated with 4 cycles of carboplatin etoposide and Tecentriq, completed on 6/2/2023.  6/19/2023: Increased mediastinal lymph nodes, stable metastatic liver and right adrenal lesions.  He was continued on maintenance Tecentriq every 3 weeks.  Last dose was on 10/5/2023.  10/19/2023: CT chest abdomen pelvis revealed stable 1.4 cm right lower pulmonary nodule.  There was increase in right hilar lymph nodes, new liver metastasis up to 3.6 and 3.0 cm, significant increase in nodular enhancement of both adrenal glands, concerning for metastasis.  Based on the timing of progression since completion of treatment being <6 months, this was considered primary refractory disease.  Scan findings were reviewed with patient extensively.  Reviewed that palliative chemotherapy would be the main course of treatment and patient would not be a good candidate to receive treatment due to high risk of detrimental effects with compromised ECOG performance status.  Patient was seen on 10/31/2023 prior to intended discharged to subacute rehab facility.  Lengthy discussion with patient and wife.  Reviewed his previous treatment and recent evidence of disease progression on CT scans 10/19/2023 with concerning findings of new hepatic and adrenal metastases.  Also reviewed evidence of progression of brain metastasis on MRI.  He has received previous whole brain radiation.  He remains very weak, ECOG performance status of 3, able  to transfer from bed to the chair and to the bedside commode and stand at the side of the bed currently.  Discussed the palliative intent of any further systemic chemotherapy and the increased risk of adverse effects from chemotherapy given his current compromised performance status.  The patient and his wife expressed understanding of this.  Discussed that if systemic therapy cannot be provided to control his disease, we would likely not pursue further stereotactic radiation to progressive brain lesions.  He would like to proceed to subacute rehab and attempt to regain some strength in order to be a candidate for further systemic therapy.  Discussed that most likely he would not be able to regain enough functional status to pursue this.  Discussed the possibility that he may decline over time and if that is the case we would recommend pursuit of palliative/supportive care alone.  The patient and his wife agreed.  If however he was able to regain strength, he would like to at least keep the possibility of further treatment.  Therefore discussed proceeding on to subacute rehab as planned and scheduled a tentative follow-up visit in our office with Dr. Conn in 2 to 3 weeks.  If he declines further in rehab however we can cancel that visit and move more towards palliative/supportive care.   Today, patient is somewhat more lethargic, lying in bed.  He is having some difficulty with urinary retention and pain in his left hip after lying on his side.  Awaiting potential transfer to subacute rehab pending transportation issues.     *Brain metastasis.  S/p whole brain radiation therapy.   MRI brain 10/25/2023 performed under anesthesia and revealed increase in the size of the left cerebellar lesion from 5 mm to 10 mm.  A left precentral lesion increased from 2 to 3 mm.  A lesion in the left inferior frontal lobe increased from 2 mm to 4 mm.  Other lesions were stable.  A lesion in the left putamen was no longer identified.     If the patient is not able to receive further palliative systemic therapy, would not recommend pursuit of further stereotactic radiation to progressive brain lesions.     *Poor appetite and weight loss.  Protein malnutrition - total protein 5.9.  Labs on 10/20/2023 with a.m. cortisol 27, ACTH on 10/23/2023 was 19.4, TSH on 10/19/2023 was 0.771  Very poor appetite is likely secondary to the disease progression.  Prednisone 20 mg daily started on 10/23/2023 to assist with fatigue and anorexia  Patient continuing on Marinol 2.5 mg twice daily  Patient does feel that prednisone and Marinol have helped  If patient's lethargy continues, consider discontinuation of Marinol.     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  Hemoglobin has remained in the high 10 to low 11 range     *Thrombocytopenia.  Platelet count has remained in the low to mid 100,000 range     *Deconditioning  Continue with physical therapy and Occupational Therapy    *Disposition  Discharge to subacute rehab pending transportation issues.    PLAN:    Patient currently awaits transportation to subacute rehab  Given his current compromised ECOG performance status of 3, patient is not a candidate for further palliative systemic chemotherapy  If the patient declines in subacute rehab, patient and his wife were in agreement that we would pursue palliative/supportive care alone  If the patient improves in subacute rehab, we will have a tentative follow-up visit scheduled with Dr. Conn in 2 to 3 weeks for follow-up.    Discussed with patient at bedside as well as with patient's wife and daughter in the room today.  Discussed with Dr. Newman.  Discussed with Dr. Conn.           Javid Hicks MD

## 2023-11-01 NOTE — PLAN OF CARE
Goal Outcome Evaluation:           Problem: Fall Injury Risk  Goal: Absence of Fall and Fall-Related Injury  Outcome: Ongoing, Progressing  Intervention: Promote Injury-Free Environment  Recent Flowsheet Documentation  Taken 11/1/2023 1844 by Kristin Figueroa RN  Safety Promotion/Fall Prevention:   safety round/check completed   clutter free environment maintained   nonskid shoes/slippers when out of bed  Taken 11/1/2023 1630 by Kristin Figueroa RN  Safety Promotion/Fall Prevention:   safety round/check completed   clutter free environment maintained   nonskid shoes/slippers when out of bed  Taken 11/1/2023 1422 by Kristin Figueroa RN  Safety Promotion/Fall Prevention: safety round/check completed  Taken 11/1/2023 1250 by Kristin Figueroa RN  Safety Promotion/Fall Prevention: safety round/check completed  Taken 11/1/2023 1047 by Kristin Figueroa RN  Safety Promotion/Fall Prevention: safety round/check completed  Taken 11/1/2023 0810 by Kristin Figueroa RN  Safety Promotion/Fall Prevention: safety round/check completed     Problem: Skin Injury Risk Increased  Goal: Skin Health and Integrity  Outcome: Ongoing, Progressing     Problem: Pain Acute  Goal: Acceptable Pain Control and Functional Ability  Outcome: Ongoing, Progressing  Intervention: Prevent or Manage Pain  Recent Flowsheet Documentation  Taken 11/1/2023 0958 by Kristin Figueroa RN  Bowel Elimination Promotion: adequate fluid intake promoted  Intervention: Optimize Psychosocial Wellbeing  Recent Flowsheet Documentation  Taken 11/1/2023 0958 by Kristin Figueroa RN  Diversional Activities: television     Problem: Coping Ineffective (Oncology Care)  Goal: Effective Coping  Outcome: Ongoing, Progressing  Intervention: Support and Enhance Coping Strategies  Recent Flowsheet Documentation  Taken 11/1/2023 0958 by Kristin Figueroa RN  Family/Support System Care: support provided     Problem: Fatigue (Oncology Care)  Goal: Improved Activity Tolerance  Outcome: Ongoing,  Progressing     Problem: Oral Intake Altered (Oncology Care)  Goal: Optimal Oral Intake  Outcome: Ongoing, Progressing     Problem: Oral Mucositis (Oncology Care)  Goal: Improved Oral Mucous Membrane Integrity  Outcome: Ongoing, Progressing     Problem: Pain Acute (Oncology Care)  Goal: Optimal Pain Control  Outcome: Ongoing, Progressing  Intervention: Prevent or Manage Pain  Recent Flowsheet Documentation  Taken 11/1/2023 0958 by Kristin Figueroa, RN  Bowel Elimination Promotion: adequate fluid intake promoted  Intervention: Optimize Psychosocial Wellbeing  Recent Flowsheet Documentation  Taken 11/1/2023 0958 by Kristin Figueroa, RN  Diversional Activities: television     Problem: Obstructive Sleep Apnea Risk or Actual Comorbidity Management  Goal: Unobstructed Breathing During Sleep  Outcome: Ongoing, Progressing      Pt to be discharged to rehab as soon as safe transport can be managed. Pt was unable follow direction this shift to ambulate to the bedside commode. Family agreed with this nurse that in his current state, they would be unable to safely transport the patient. Possible [facility] transport tomorrow or Friday. Port dressing and needle changed this shift, due to be changed 11/8. Pt struggling to urinate this shift, bladder scan and straight cath performed with adequate output. C/o hip pain, Tramadol given. Family at bedside.

## 2023-11-01 NOTE — PLAN OF CARE
Problem: Fall Injury Risk  Goal: Absence of Fall and Fall-Related Injury  Outcome: Ongoing, Progressing  Intervention: Promote Injury-Free Environment  Recent Flowsheet Documentation  Taken 11/1/2023 0400 by Kim Sierra RN  Safety Promotion/Fall Prevention:   safety round/check completed   assistive device/personal items within reach   clutter free environment maintained   lighting adjusted  Taken 11/1/2023 0159 by Kim Sierra RN  Safety Promotion/Fall Prevention:   safety round/check completed   lighting adjusted   clutter free environment maintained   assistive device/personal items within reach  Taken 11/1/2023 0000 by Kim Sierra RN  Safety Promotion/Fall Prevention:   safety round/check completed   lighting adjusted   clutter free environment maintained   assistive device/personal items within reach  Taken 10/31/2023 2217 by Kim Sierra RN  Safety Promotion/Fall Prevention:   safety round/check completed   lighting adjusted   clutter free environment maintained   assistive device/personal items within reach  Taken 10/31/2023 2015 by Kim Sierra RN  Safety Promotion/Fall Prevention:   safety round/check completed   lighting adjusted   clutter free environment maintained   assistive device/personal items within reach   Goal Outcome Evaluation:                   Goal Outcome Evaluation: plan of care discussed with wife and daughter   Patient is alert to self only.  Wife and daughter at bedside until daughter left at 2200   Patient's wife delayed discharge so that the facility where the patient had been placed could be toured. Family will transport patient this morning, right port patent, assist times 2 to bsc and in and out of bed, call light within reach no acute distress noted

## 2023-11-02 VITALS
SYSTOLIC BLOOD PRESSURE: 112 MMHG | TEMPERATURE: 98.4 F | HEIGHT: 71 IN | DIASTOLIC BLOOD PRESSURE: 71 MMHG | HEART RATE: 92 BPM | BODY MASS INDEX: 28.49 KG/M2 | OXYGEN SATURATION: 91 % | RESPIRATION RATE: 18 BRPM | WEIGHT: 203.48 LBS

## 2023-11-02 PROCEDURE — 25010000002 HEPARIN LOCK FLUSH PER 10 UNITS: Performed by: INTERNAL MEDICINE

## 2023-11-02 PROCEDURE — 63710000001 DRONABINOL PER 2.5 MG: Performed by: INTERNAL MEDICINE

## 2023-11-02 PROCEDURE — 63710000001 PREDNISONE PER 1 MG: Performed by: INTERNAL MEDICINE

## 2023-11-02 RX ORDER — HEPARIN SODIUM (PORCINE) LOCK FLUSH IV SOLN 100 UNIT/ML 100 UNIT/ML
5 SOLUTION INTRAVENOUS AS NEEDED
Status: DISCONTINUED | OUTPATIENT
Start: 2023-11-02 | End: 2023-11-02 | Stop reason: HOSPADM

## 2023-11-02 RX ADMIN — METOPROLOL TARTRATE 12.5 MG: 25 TABLET, FILM COATED ORAL at 09:18

## 2023-11-02 RX ADMIN — CARBIDOPA AND LEVODOPA 2.5 MG: 50; 200 TABLET, EXTENDED RELEASE ORAL at 09:12

## 2023-11-02 RX ADMIN — BETHANECHOL CHLORIDE 50 MG: 25 TABLET ORAL at 09:12

## 2023-11-02 RX ADMIN — DRONABINOL 2.5 MG: 2.5 CAPSULE ORAL at 09:20

## 2023-11-02 RX ADMIN — PREDNISONE 20 MG: 20 TABLET ORAL at 09:12

## 2023-11-02 RX ADMIN — HEPARIN 500 UNITS: 100 SYRINGE at 11:28

## 2023-11-02 RX ADMIN — FAMOTIDINE 20 MG: 20 TABLET ORAL at 09:21

## 2023-11-02 RX ADMIN — Medication 1000 MCG: at 09:12

## 2023-11-02 RX ADMIN — TAMSULOSIN HYDROCHLORIDE 0.4 MG: 0.4 CAPSULE ORAL at 09:12

## 2023-11-02 RX ADMIN — Medication 10 ML: at 09:00

## 2023-11-02 RX ADMIN — TRAMADOL HYDROCHLORIDE 100 MG: 50 TABLET, COATED ORAL at 09:17

## 2023-11-02 NOTE — PLAN OF CARE
Goal Outcome Evaluation:     A&OX1, disoriented to self, time, and situation, able to tell where he is at. Up to BSC with 3 assist and gait belt. Port site CDI, good blood return noted. De accessed. VSS, no distress noted. Wife at bedside, son to transfer to facility. Informed of risk of transporting pt, family agrees and stated they are able to drive him.

## 2023-11-02 NOTE — CASE MANAGEMENT/SOCIAL WORK
Continued Stay Note  [facility]     Patient Name: Russell Ramírez Sr.  MRN: [id number]  Today's Date: 11/2/2023    Admit Date: 10/19/2023    Plan: [facility] [employer] stretcher van @ 1100   Discharge Plan       Row Name 11/02/23 0939       Plan    Plan [facility] [employer] stretcher van @ 1100    Patient/Family in Agreement with Plan yes    Plan Comments Spoke with wife at bedside. Explained [facility] will be covering cost of [employer] stretcher van today at 1100. Wife expressed understanding and agreeable to discharge plan. Update [facility]/ [facility] that patient will be leaving at 1100; stated she will update MD. Packet in patient's cubby and d/c summary faxed to facility.                   Discharge Codes    No documentation.                 Expected Discharge Date and Time       Expected Discharge Date Expected Discharge Time    Nov 1, 2023               Caryn Romo RN

## 2023-11-02 NOTE — PLAN OF CARE
Goal Outcome Evaluation:   Pt lying in bed very stiff when trying to move for care, incontinent of urine and needs to be changed often.  Wife at bedside, very supportive of pt and hands on with care. Pt's meds were crushed and put in applesauce--had some difficulty getting pt to take but, succeeded with wifes assistance ---pt just wanted to stay on side with eyes shut.  Wife states pt gets dizzy easily, VSS.

## 2023-11-07 ENCOUNTER — PATIENT OUTREACH (OUTPATIENT)
Dept: OTHER | Facility: HOSPITAL | Age: 78
End: 2023-11-07
Payer: MEDICARE

## 2023-11-07 NOTE — PROGRESS NOTES
Reviewed chart. IP 10/19-11/2/23, Discharged to [facility].    Called wife, explained that I am just checking in to see how the patient was doing post hospitalization.  She said she was at an appt and requested that I call back.    I will try to call in the next few days.

## 2023-11-09 ENCOUNTER — PATIENT OUTREACH (OUTPATIENT)
Dept: OTHER | Facility: HOSPITAL | Age: 78
End: 2023-11-09
Payer: MEDICARE

## 2023-11-09 NOTE — PROGRESS NOTES
Called patient/wife. Left message that I was just touching base to see how he was doing. Wanted to know if they had any questions/concerns or resource/supportive care needs; requested cb    Call from patient's wife. He is currently in rehab.  They have an appt with Dr. Conn on 11/22.  His wife is not sure what the treatment plan will be going forward.      She has no other questions/concerns.  I will f/u in several weeks; encouraged patient/wife to call as needed.

## 2023-11-22 ENCOUNTER — INFUSION (OUTPATIENT)
Dept: ONCOLOGY | Facility: HOSPITAL | Age: 78
End: 2023-11-22
Payer: MEDICARE

## 2023-11-22 ENCOUNTER — OFFICE VISIT (OUTPATIENT)
Dept: ONCOLOGY | Facility: CLINIC | Age: 78
End: 2023-11-22
Payer: MEDICARE

## 2023-11-22 VITALS
TEMPERATURE: 97.3 F | BODY MASS INDEX: 26.42 KG/M2 | HEIGHT: 71 IN | HEART RATE: 79 BPM | RESPIRATION RATE: 18 BRPM | OXYGEN SATURATION: 97 % | WEIGHT: 188.7 LBS

## 2023-11-22 DIAGNOSIS — C34.90 SMALL CELL LUNG CANCER: ICD-10-CM

## 2023-11-22 DIAGNOSIS — Z45.2 ENCOUNTER FOR ADJUSTMENT OR MANAGEMENT OF VASCULAR ACCESS DEVICE: Primary | ICD-10-CM

## 2023-11-22 DIAGNOSIS — C34.90 SMALL CELL LUNG CANCER: Primary | ICD-10-CM

## 2023-11-22 LAB
ALBUMIN SERPL-MCNC: 3.5 G/DL (ref 3.5–5.2)
ALBUMIN/GLOB SERPL: 1.5 G/DL
ALP SERPL-CCNC: 75 U/L (ref 39–117)
ALT SERPL W P-5'-P-CCNC: 27 U/L (ref 1–41)
ANION GAP SERPL CALCULATED.3IONS-SCNC: 12.2 MMOL/L (ref 5–15)
AST SERPL-CCNC: 28 U/L (ref 1–40)
BASOPHILS # BLD AUTO: 0.02 10*3/MM3 (ref 0–0.2)
BASOPHILS NFR BLD AUTO: 0.2 % (ref 0–1.5)
BILIRUB SERPL-MCNC: 0.5 MG/DL (ref 0–1.2)
BUN SERPL-MCNC: 18 MG/DL (ref 8–23)
BUN/CREAT SERPL: 16.4 (ref 7–25)
CALCIUM SPEC-SCNC: 8.8 MG/DL (ref 8.6–10.5)
CHLORIDE SERPL-SCNC: 102 MMOL/L (ref 98–107)
CO2 SERPL-SCNC: 23.8 MMOL/L (ref 22–29)
CREAT SERPL-MCNC: 1.1 MG/DL (ref 0.7–1.3)
DEPRECATED RDW RBC AUTO: 52.5 FL (ref 37–54)
EGFRCR SERPLBLD CKD-EPI 2021: 69.1 ML/MIN/1.73
EOSINOPHIL # BLD AUTO: 0.04 10*3/MM3 (ref 0–0.4)
EOSINOPHIL NFR BLD AUTO: 0.4 % (ref 0.3–6.2)
ERYTHROCYTE [DISTWIDTH] IN BLOOD BY AUTOMATED COUNT: 14.4 % (ref 12.3–15.4)
GLOBULIN UR ELPH-MCNC: 2.3 GM/DL
GLUCOSE SERPL-MCNC: 94 MG/DL (ref 65–99)
HCT VFR BLD AUTO: 35.3 % (ref 37.5–51)
HGB BLD-MCNC: 11.9 G/DL (ref 13–17.7)
IMM GRANULOCYTES # BLD AUTO: 0.1 10*3/MM3 (ref 0–0.05)
IMM GRANULOCYTES NFR BLD AUTO: 1.1 % (ref 0–0.5)
LYMPHOCYTES # BLD AUTO: 0.4 10*3/MM3 (ref 0.7–3.1)
LYMPHOCYTES NFR BLD AUTO: 4.3 % (ref 19.6–45.3)
MCH RBC QN AUTO: 34.1 PG (ref 26.6–33)
MCHC RBC AUTO-ENTMCNC: 33.7 G/DL (ref 31.5–35.7)
MCV RBC AUTO: 101.1 FL (ref 79–97)
MONOCYTES # BLD AUTO: 0.64 10*3/MM3 (ref 0.1–0.9)
MONOCYTES NFR BLD AUTO: 6.9 % (ref 5–12)
NEUTROPHILS NFR BLD AUTO: 8.09 10*3/MM3 (ref 1.7–7)
NEUTROPHILS NFR BLD AUTO: 87.1 % (ref 42.7–76)
NRBC BLD AUTO-RTO: 0 /100 WBC (ref 0–0.2)
PLATELET # BLD AUTO: 111 10*3/MM3 (ref 140–450)
PMV BLD AUTO: 8.8 FL (ref 6–12)
POTASSIUM SERPL-SCNC: 4.2 MMOL/L (ref 3.5–5.2)
PROT SERPL-MCNC: 5.8 G/DL (ref 6–8.5)
RBC # BLD AUTO: 3.49 10*6/MM3 (ref 4.14–5.8)
SODIUM SERPL-SCNC: 138 MMOL/L (ref 136–145)
WBC NRBC COR # BLD AUTO: 9.29 10*3/MM3 (ref 3.4–10.8)

## 2023-11-22 PROCEDURE — 36591 DRAW BLOOD OFF VENOUS DEVICE: CPT

## 2023-11-22 PROCEDURE — 80053 COMPREHEN METABOLIC PANEL: CPT

## 2023-11-22 PROCEDURE — 25010000002 HEPARIN LOCK FLUSH PER 10 UNITS: Performed by: INTERNAL MEDICINE

## 2023-11-22 PROCEDURE — 85025 COMPLETE CBC W/AUTO DIFF WBC: CPT

## 2023-11-22 RX ORDER — SODIUM CHLORIDE 0.9 % (FLUSH) 0.9 %
10 SYRINGE (ML) INJECTION AS NEEDED
OUTPATIENT
Start: 2023-11-22

## 2023-11-22 RX ORDER — HEPARIN SODIUM (PORCINE) LOCK FLUSH IV SOLN 100 UNIT/ML 100 UNIT/ML
500 SOLUTION INTRAVENOUS AS NEEDED
Status: DISCONTINUED | OUTPATIENT
Start: 2023-11-22 | End: 2023-11-22 | Stop reason: HOSPADM

## 2023-11-22 RX ORDER — HEPARIN SODIUM (PORCINE) LOCK FLUSH IV SOLN 100 UNIT/ML 100 UNIT/ML
500 SOLUTION INTRAVENOUS AS NEEDED
OUTPATIENT
Start: 2023-11-22

## 2023-11-22 RX ORDER — SODIUM CHLORIDE 0.9 % (FLUSH) 0.9 %
10 SYRINGE (ML) INJECTION AS NEEDED
Status: DISCONTINUED | OUTPATIENT
Start: 2023-11-22 | End: 2023-11-22 | Stop reason: HOSPADM

## 2023-11-22 RX ADMIN — Medication 500 UNITS: at 11:07

## 2023-11-22 RX ADMIN — Medication 10 ML: at 11:07

## 2023-11-22 NOTE — NURSING NOTE
Pt accessed from Rehab facility for use 3 times weekly IVF's + potassium.  Port dsg dated 11/6/23.  Port de-accessed per protocol and heparin locked.  Port re-accessed with antimicrobial bio patch added with sensitive dressing and dated for today.  Labs drawn via port access.

## 2023-11-22 NOTE — PROGRESS NOTES
Subjective     REASON FOR FOLLOW UP:  1.  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  2.  Initiation of palliative chemo/immunotherapy with carboplatin/-16/atezolizumab with cycle #1 3/27/2023  3.  MediPort placed 3/20/2023 by Dr. Gonzalez  4.  Fourth cycle of carboplatin -16 and atezolizumab initiated 5/31/2023  5.  Continued single agent immunotherapy with atezolizumab  6.  Hospitalization 10/19/2023 with progression of disease and general clinical decline.      HISTORY OF PRESENT ILLNESS:  The patient is a 78 y.o. year old male who was originally seen in the multidisciplinary thoracic oncology clinic along with Dr Lara and Dr. Coreas.  He has been following up with Dr. Nobles of vascular surgery and underwent a CT angiogram of the abdomen on 2/23/2023.  There were some nodules noted in the lung bases and this led to a CT scan of the chest which again showed pulmonary nodules in the right lower lobe as well as subcarinal lymphadenopathy.    He had previously been seen by Dr. Lara and she arranged for him to undergo outpatient navigational bronchoscopy with biopsy on 3/13/2023.  His pathology was positive for small cell carcinoma of the lung.    He underwent additional staging with a PET scan performed on 3/14/2023 which showed evidence of metastatic disease to the liver and the right adrenal gland as well as hypermetabolic mediastinal and hilar lymphadenopathy.    Because of his extensive stage disease we recommended systemic treatment with carboplatin/-16 and atezolizumab immunotherapy.    He underwent an MRI of the brain 3/21/2023 to complete his staging and was found to have multiple tiny mets with no surrounding edema or obvious mass effect.  We elected to proceed with his systemic treatment initially with close follow-up on subsequent MRIs and eventual whole brain radiation treatment in the future.    He completed 4 cycles of carboplatin etoposide and atezolizumab  chemotherapy on 6/2/2023.  He then continued single agent immunotherapy with atezolizumab.  He completed whole brain radiation 8/21/2023.    Unfortunately, he had further clinical decline and was hospitalized in October 2023 (10/19/2023 through 11/2/2023).  During that hospitalization imaging studies revealed significant progression of disease with development of multiple liver metastases and as well as progression of his brain metastases.  He was discharged to subacute rehab facility.  During the hospitalization he was seen by several physicians from our practice and was recommended to pursue palliative care rather than any additional specific treatment of the cancer given his general debility.    INTERVAL HISTORY:  He and his family drove 2 hours today for this office visit.  He is seated in a wheelchair and appears uncomfortable.  He is still at a rehab facility near their home.  He apparently is able to walk with a walker in the rehab facility.  His wife is concerned that he may need to transition to long-term care in a week or 2 and we discussed today that if they do not wish to make that transition to long-term care they should contact hospice and see if he can receive hospice care at home during the terminal phase of his illness.    His family mainly is concerned that he is not eating much and seems to be having more anxiety issues.  I encouraged him to talk to the nurse and physician at the rehab facility about possibly trying to get on a benzodiazepine anxiolytic.  History of Present Illness     Past Medical History:   Diagnosis Date    Acid reflux     Anemia     Arthritis     Coronary artery disease     CVA (cerebral vascular accident)     High cholesterol     History of atrial fibrillation     Hx of blood clots     Hypertension     Irregular heartbeat     Peptic ulcer disease     Prostate cancer     Rheumatoid arthritis     Skin cancer     Small cell lung cancer         Past Surgical History:   Procedure  Laterality Date    ABDOMINAL AORTIC ANEURYSM REPAIR      BACK SURGERY      x2    BRONCHOSCOPY WITH ION ROBOTIC ASSIST N/A 03/13/2023    Procedure: BRONCHOSCOPY ,ENDOBRONCHIAL ULTRASOUND AND ROBOTIC NAVIGATIONAL BRONCHOSCOPY WITH FINE NEEDLE ASPIRATION, BIOPSY X1 AREA, AND BRONCHOALEOLAR LAVAGE;  Surgeon: Sonali Lara MD;  Location: [facility] ENDOSCOPY;  Service: Robotics - Pulmonary;  Laterality: N/A;  Post: LUNG CANCER    CARDIAC ABLATION      x2    COLON SURGERY      HERNIA REPAIR      INSERTION CENTRAL VENOUS ACCESS DEVICE W/ SUBCUTANEOUS PORT Right 03/20/2023    VENOUS ACCESS DEVICE (PORT) INSERTION Right 03/20/2023    Procedure: INSERTION VENOUS ACCESS DEVICE;  Surgeon: Javier Gonzalez MD PhD;  Location: [facility] MAIN OR;  Service: Thoracic;  Laterality: Right;        Current Outpatient Medications on File Prior to Visit   Medication Sig Dispense Refill    acetaminophen (TYLENOL) 325 MG tablet Take 2 tablets by mouth Every 6 (Six) Hours As Needed for Mild Pain, Headache or Fever.      albuterol (PROVENTIL) (2.5 MG/3ML) 0.083% nebulizer solution Take 2.5 mg by nebulization Every 6 (Six) Hours As Needed for Wheezing.      ascorbic acid (VITAMIN C) 1000 MG tablet       aspirin 81 MG EC tablet Daily.      bethanechol (URECHOLINE) 50 MG tablet Take 1 tablet by mouth Every 12 (Twelve) Hours.      bisacodyl (DULCOLAX) 10 MG suppository Insert 1 suppository into the rectum Daily. Hold for diarrhea      coenzyme Q10 100 MG capsule Take 1 capsule by mouth Daily.      cyanocobalamin (VITAMIN B-12) 1000 MCG tablet Take 1 tablet by mouth Daily.      famotidine (PEPCID) 20 MG tablet Take 1 tablet by mouth twice daily 60 tablet 0    fluticasone (FLONASE) 50 MCG/ACT nasal spray 2 sprays by Each Nare route Daily.      guaiFENesin (MUCINEX) 600 MG 12 hr tablet Take 2 tablets by mouth Every 12 (Twelve) Hours.      Magnesium 250 MG tablet Take  by mouth.      metoprolol tartrate (LOPRESSOR) 25 MG tablet Take 0.5 tablets by mouth  2 (Two) Times a Day.      midodrine (PROAMATINE) 2.5 MG tablet Take 1 tablet by mouth 3 (Three) Times a Day Before Meals.      ondansetron (ZOFRAN) 8 MG tablet Take 1 tablet by mouth 3 (Three) Times a Day As Needed for Nausea or Vomiting. 30 tablet 5    Potassium Chloride in NaCl (sodium chloride 0.9 % with KCl 20 mEq) 20-0.9 MEQ/L-% infusion Infuse 125 mL/hr into a venous catheter Continuous. Give 1 L total normal saline with 20MEQ/L KCL,  3 times weekly Monday Wednesday Friday via port      predniSONE (DELTASONE) 20 MG tablet Take 1 tablet by mouth Daily With Breakfast.      promethazine (PHENERGAN) 25 MG suppository Insert 1 suppository into the rectum 1 (One) Time As Needed for Nausea or Vomiting (Use second line for nausea) for up to 1 dose.      traMADol (ULTRAM) 50 MG tablet Take 1 tablet by mouth Every 6 (Six) Hours As Needed for Moderate Pain or Severe Pain. 20 tablet 0    vitamin D3 125 MCG (5000 UT) capsule capsule Take 1 capsule by mouth Daily.      [DISCONTINUED] atorvastatin (LIPITOR) 40 MG tablet Take 1 tablet by mouth Every Night. 90 tablet 0    [DISCONTINUED] Vitamin B Complex-C capsule        No current facility-administered medications on file prior to visit.        ALLERGIES:    Allergies   Allergen Reactions    Codeine Other (See Comments)     headache          Social History     Socioeconomic History    Marital status:      Spouse name: Marivel   Tobacco Use    Smoking status: Former     Packs/day: .5     Types: Cigarettes     Start date: 1976     Quit date: 2020     Years since quitting: 3.8    Smokeless tobacco: Never   Vaping Use    Vaping Use: Never used   Substance and Sexual Activity    Alcohol use: Never    Drug use: Never    Sexual activity: Defer        Family History   Problem Relation Age of Onset    Brain cancer Mother     Lung cancer Sister         Review of Systems   ROS as per HPI    Objective     Vitals:    11/22/23 1137   Pulse: 79   Resp: 18   Temp: 97.3 °F (36.3 °C)  "  TempSrc: Temporal   SpO2: 97%   Weight: 85.6 kg (188 lb 11.2 oz)   Height: 180.3 cm (70.98\")   PainSc:   2   PainLoc: Leg  Comment: both legs           11/22/2023    11:13 AM   Current Status   ECOG score 0       Physical Exam  Constitutional:       General: He is not in acute distress.     Appearance: He is well-developed.   HENT:      Head: Normocephalic.   Eyes:      General: No scleral icterus.     Conjunctiva/sclera: Conjunctivae normal.      Pupils: Pupils are equal, round, and reactive to light.   Neck:      Thyroid: No thyromegaly.      Vascular: No JVD.      Comments: Right IJ Mediport  Cardiovascular:      Rate and Rhythm: Normal rate and regular rhythm.      Heart sounds: No murmur heard.     No friction rub. No gallop.   Pulmonary:      Effort: Pulmonary effort is normal.      Breath sounds: Normal breath sounds. No wheezing or rales.   Abdominal:      General: There is no distension.      Palpations: Abdomen is soft. There is no mass.      Tenderness: There is no abdominal tenderness.   Musculoskeletal:         General: No deformity. Normal range of motion.      Cervical back: Normal range of motion and neck supple.   Lymphadenopathy:      Cervical: No cervical adenopathy.   Skin:     General: Skin is warm and dry.      Findings: Bruising present. No rash.   Neurological:      Mental Status: He is alert and oriented to person, place, and time.      Cranial Nerves: No cranial nerve deficit.      Motor: Weakness present.      Deep Tendon Reflexes: Reflexes are normal and symmetric.   Psychiatric:         Behavior: Behavior normal.         Judgment: Judgment normal.      Comments: Patient is restless and anxious.         I have reexamined the patient and the results are consistent with the previously documented exam. Alexsander Conn MD       RECENT LABS:  Results from last 7 days   Lab Units 11/22/23  1104   WBC 10*3/mm3 9.29   NEUTROS ABS 10*3/mm3 8.09*   HEMOGLOBIN g/dL 11.9*   HEMATOCRIT % 35.3* "   PLATELETS 10*3/mm3 111*       Results from last 7 days   Lab Units 11/22/23  1104   SODIUM mmol/L 138   POTASSIUM mmol/L 4.2   CHLORIDE mmol/L 102   CO2 mmol/L 23.8   BUN mg/dL 18   CREATININE mg/dL 1.10   CALCIUM mg/dL 8.8   ALBUMIN g/dL 3.5   BILIRUBIN mg/dL 0.5   ALK PHOS U/L 75   ALT (SGPT) U/L 27   AST (SGOT) U/L 28   GLUCOSE mg/dL 94             PATHOLOGY 3/13/2023  Final Diagnosis   Lung, right lower lobe, endobronchial biopsies:    Small cell carcinoma, see comment      Comment    The biopsy shows an invasive tumor with crush artifact. Immunohistochemistry was performed utilizing appropriate controls and the tumor is positive for CD56, TTF-1 and synaptophysin and is negative for CD45 and chromogranin. The staining pattern and morphology are consistent with small cell carcinoma.     Molecular profiling has been ordered but is pending    IMAGING:  MR Head Without and With Intravenous Contrast 10/24/2023  FINDINGS:    Previously seen left cerebellar lesion measuring 5 mm now measures 10 mm.  Left precentral lesion which previously measured 2 mm now measures 3 mm.  Unchanged 3 mm enhancing lesion in the left thalamus.  Unchanged 3 mm lesion in the right frontal vertex.  Previously seen left inferior frontal lobe lesion which measured 2 mm now   measures 4 mm  Previously seen left putaminal lesion no longer identified.    Tiny restricted diffusion again seen right frontal lobe.     IMPRESSION:           1.  Some lesions have increased in size while some appear stable.  2.  Evolving tiny infarct in the right frontal lobe.      CT CHEST, ABDOMEN, AND PELVIS WITH IV CONTRAST 10/13/2023  IMPRESSION:  1. Interval development of hepatic metastases and likely new or  progression of bilateral adrenal metastases.  2. No significant change in the 1.4 cm right lower lobe pulmonary  nodule, but there has been increase in the right hilar lymphadenopathy.  Close follow-up is recommended for the 7 mm right middle lobe  pulmonary  nodule which appears marginally larger. There are also stable tiny  bilateral pulmonary nodules.  3. The left paratracheal lymphadenopathy has decreased.      MRI OF THE BRAIN WITH AND WITHOUT CONTRAST  7/26/2023  IMPRESSION:   There is interval progression of metastatic disease to the brain. There  is increase in size of the lesions within the medial aspect of the left  cerebellar hemisphere, the lesion within the junction of the right  superior frontal gyrus and the right precentral gyrus, the lesion within  the left precentral gyrus, and the lesion within the anterior and  inferior portion of the left frontal lobe. There is recurrence of the  metastatic lesion within the left putamen. Additionally, new metastatic  foci are appreciated within the left superior frontal gyrus, the  anterior aspect of the left frontal lobe, the superior aspect of the  right frontal lobe, the right precentral gyrus, and the right inferior  parietal lobule. Additionally, there may be new punctate areas of  metastatic disease within both cerebellar hemispheres.    CT ABDOMEN PELVIS W CONTRAST-, CT CHEST W CONTRAST DIAGNOSTIC- 6/19/2023  IMPRESSION:   1. Right lower lobe pulmonary nodule measures slightly smaller.  Mediastinal lymph nodes show interval increase, suggesting metastatic  disease.  2. Grossly stable metastatic liver, right adrenal, sternal lesions.      MRI OF THE BRAIN WITH AND WITHOUT CONTRAST ON 5/5/2023  IMPRESSION:  1. Since prior MRI of the brain from [facility] on  03/21/2023 there has been a response to the enhancing metastatic lesions  to the brain to interval treatment. Previously there were 8 separate  enhancing metastases to the brain. There were 2 infratentorial lesions  and there were six 2-5 mm supratentorial enhancing lesions. On the  current MRI of the brain there are 5 residual enhancing brain metastases  1 infratentorial in 4 separate tiny 1 to 2 mm residual  supratentorial  enhancing metastases and no new metastases are identified. The  previously identified 3 mm mets to the inferior lateral right cerebellum  has resolved. The met to the medial left cerebellum has decreased from 9  x 8 x 10 mm down to 6 x 4 x 7 mm. Of the previously identified 6  separate 2-5 mm enhancing supratentorial enhancing metastases, there has  been resolution of enhancing metastases in the lateral left putamen and  there has been resolution of enhancement at the site of the met in the  posterior medial left parietal lobe, the enhancing 4-5 mm metastasis to  the posterior superior right frontal lobe and posterior superior left  frontal juxtacortical white matter and in the anterior-inferior left  frontal lobe have decreased and there are residual punctate 2 mm foci of  residual enhancement at the site of these 3 treated metastases. The  posterior inferior medial left parietal lobe and the anterior-inferior  left temporal lobe met have decreased and there are punctate 1 mm foci  of residual enhancement at these sites. No new enhancing metastases are  seen. The remainder of MRI of the brain is normal.    CT CHEST WITH IV CONTRAST  IMPRESSION:  1. Minimal interval decrease in size of right lower lobe nodule  measuring about 1.6 cm  2. Stable metastatic lesions in the left lobe of the liver and right  adrenal gland.    F-18 FDG PET FROM SKULL BASE TO MID THIGH WITH PET/CT FUSION 3/14/2023  IMPRESSION:  1.  Intensely avid right lower lobe pulmonary nodule most suggestive of  malignancy.  2.  Hypermetabolic mediastinal and hilar adenopathy, hepatic lesions,  right adrenal nodule and sternal lesion most likely representing  metastatic disease. Nodular thickening along the right major fissure  which contacts the right hilum indicate uptake mildly above that of the  adjacent lung may represent lymphangitic spread of malignancy  3.  Scattered bilateral sub-6 mm pulmonary nodules are below PET  resolution.  A mildly hypermetabolic cortes hepatic node is present. These  findings are indeterminate indeterminate and continued close attention  on follow-up is recommended.  4.  Other findings as above.      Assessment & Plan     1.  Extensive stage small cell lung carcinoma with PET scan evidence of metastatic disease to the liver and right adrenal gland, hilar and mediastinal lymph nodes and several scattered tiny lesions noted on MRI of the brain.   2.  Comorbidities including vascular disease, coronary artery disease and history of prostate cancer.  He does have a decent performance status overall.  3.  Right IJ Mediport placed by Dr. Gonzalez.  4.  Patient was seen today for the first visit back to the office since being hospitalized.  He remains weak and at this point is not a candidate for additional palliative chemotherapy.      PLAN:  We had an honest discussion with the patient and multiple family members.  We recommended that he focus on comfort and quality of life during the terminal phase of his illness.  We discussed various options and decided that the best approach may be for him to stay at the rehab facility as long as possible but if they need to transition him to long-term nursing care that at that point he may be able to go home with hospice.  We have not scheduled any routine follow-up in our office at this point but we will remain available by phone to help out in any way we can including initiating the hospice referral if necessary.  I encouraged them to talk to the nurse at the rehab facility about his anxiety and perhaps the physician of record could prescribe some Ativan or similar anxiolytic.          Alexsander Conn MD  11/22/2023

## 2023-11-27 ENCOUNTER — PATIENT OUTREACH (OUTPATIENT)
Dept: OTHER | Facility: HOSPITAL | Age: 78
End: 2023-11-27
Payer: MEDICARE

## 2023-11-27 NOTE — PROGRESS NOTES
Reviewed chart    Called patient. Left message that I was just touching base to see how he was doing. Wanted to touch base after the appt with Dr. Conn last week; also wanted to know if he had any questions/concerns or resource/supportive care needs; requested cb

## 2023-12-12 ENCOUNTER — PATIENT OUTREACH (OUTPATIENT)
Dept: OTHER | Facility: HOSPITAL | Age: 78
End: 2023-12-12
Payer: MEDICARE

## 2023-12-12 NOTE — PROGRESS NOTES
Reviewed chart. No recent visits. No scheduled f/u with Dr. Conn, no apparent active cancer treatment.     Called patient's wife. Left message that I was checking in; hadn't been able to reach them.  Since he doesn't appear to be receiving active treatment for his cancer, I will close his case to navigation. Encouraged patient/family to call in the future if the need arises.

## 2024-01-02 LAB
BEAKER LAB AP INTRAOPERATIVE CONSULTATION: NORMAL
LAB AP CARIS, ADDENDUM: NORMAL
LAB AP CASE REPORT: NORMAL
LAB AP CASE REPORT: NORMAL
LAB AP DIAGNOSIS COMMENT: NORMAL
LAB AP DIAGNOSIS COMMENT: NORMAL
PATH REPORT.ADDENDUM SPEC: NORMAL
PATH REPORT.ADDENDUM SPEC: NORMAL
PATH REPORT.FINAL DX SPEC: NORMAL
PATH REPORT.FINAL DX SPEC: NORMAL
PATH REPORT.GROSS SPEC: NORMAL
PATH REPORT.GROSS SPEC: NORMAL

## 2024-11-15 NOTE — TELEPHONE ENCOUNTER
Caller: Laura Ramírez BASILIO Sr.    Relationship: Self    Best call back number: [phone]  What is the best time to reach you: ANY    Who are you requesting to speak with (clinical staff, provider,  specific staff member): CHANDU      What was the call regarding:PATIENT LAURA CALLED TO CHECK STATUS OF PRESCRIPTION REORDER FOR TRAMADOL 100MG. HE SAID HIS INSURANCE APPROVED HIM UNTIL December OF 2023. HE WILL CALL PHARMACY TO CHECK STATUS. CALLBACK IF NEEDED.    Do you require a callback: NO (UNLESS PRESCRIPTION IS NOT AVAILABLE)           Patient/Caregiver provided printed discharge information.

## (undated) DEVICE — Device

## (undated) DEVICE — VISION PROBE ADAPTER AND SUCTION ADAPTER

## (undated) DEVICE — FRCP BIOP SUPERDIMENSION PREMARK

## (undated) DEVICE — INTENDED FOR TISSUE SEPARATION, AND OTHER PROCEDURES THAT REQUIRE A SHARP SURGICAL BLADE TO PUNCTURE OR CUT.: Brand: BARD-PARKER ® CARBON RIB-BACK BLADES

## (undated) DEVICE — CVR PROB 96IN LF STRL

## (undated) DEVICE — ELECTRD BLD EZ CLN MOD XLNG 2.75IN

## (undated) DEVICE — SYR LUERLOK 20CC BX/50

## (undated) DEVICE — DECANTER BAG 9": Brand: MEDLINE INDUSTRIES, INC.

## (undated) DEVICE — SUT MNCRYL PLS ANTIB UD 4/0 PS2 18IN

## (undated) DEVICE — DRP C/ARM 41X74IN

## (undated) DEVICE — VISION PROBE: Brand: ION

## (undated) DEVICE — LOU MINOR PROCEDURE: Brand: MEDLINE INDUSTRIES, INC.

## (undated) DEVICE — LABEL SHEET CUSTOM 2X2 YELLOW: Brand: MEDLINE INDUSTRIES, INC.

## (undated) DEVICE — Device: Brand: ION

## (undated) DEVICE — ANTIBACTERIAL UNDYED BRAIDED (POLYGLACTIN 910), SYNTHETIC ABSORBABLE SUTURE: Brand: COATED VICRYL

## (undated) DEVICE — GLV SURG BIOGEL LTX PF 7

## (undated) DEVICE — UNDERGLV SURG BIOGEL INDICATOR LF PF 7.5

## (undated) DEVICE — CATHETER GUIDE

## (undated) DEVICE — MEDI-VAC YANKAUER SUCTION HANDLE W/BULBOUS TIP: Brand: CARDINAL HEALTH

## (undated) DEVICE — SYR LUERLOK 5CC

## (undated) DEVICE — SUT SILK 2/0 SH CR5 18IN C0125

## (undated) DEVICE — ADHS SKIN SURG TISS VISC PREMIERPRO EXOFIN HI/VISC FAST/DRY

## (undated) DEVICE — BAPTIST FLOYD BRONCHOSCOPY: Brand: MEDLINE INDUSTRIES, INC.

## (undated) DEVICE — PATIENT RETURN ELECTRODE, SINGLE-USE, CONTACT QUALITY MONITORING, ADULT, WITH 9FT CORD, FOR PATIENTS WEIGING OVER 33LBS. (15KG): Brand: MEGADYNE

## (undated) DEVICE — NDL HYPO PRECISIONGLIDE REG 25G 1 1/2

## (undated) DEVICE — PENCL ES MEGADINE EZ/CLEAN BUTN W/HOLSTR 10FT

## (undated) DEVICE — SWIVEL CONNECTOR

## (undated) DEVICE — SUT PDS 2/0 SH 27IN Z317H

## (undated) DEVICE — TUBING, SUCTION, 1/4" X 20', STRAIGHT: Brand: MEDLINE INDUSTRIES, INC.